# Patient Record
Sex: FEMALE | Race: WHITE | Employment: OTHER | URBAN - NONMETROPOLITAN AREA
[De-identification: names, ages, dates, MRNs, and addresses within clinical notes are randomized per-mention and may not be internally consistent; named-entity substitution may affect disease eponyms.]

---

## 2016-05-16 LAB
CHOLESTEROL, TOTAL: 203 MG/DL
CHOLESTEROL/HDL RATIO: 4.1
HDLC SERPL-MCNC: 49 MG/DL (ref 35–70)
LDL CHOLESTEROL CALCULATED: 123.8 MG/DL (ref 0–160)
TRIGL SERPL-MCNC: 151 MG/DL
VLDLC SERPL CALC-MCNC: 30 MG/DL

## 2016-09-14 LAB — HBA1C MFR BLD: 7.4 %

## 2017-01-19 LAB — HBA1C MFR BLD: 7.7 %

## 2017-02-02 ENCOUNTER — OFFICE VISIT (OUTPATIENT)
Dept: PRIMARY CARE CLINIC | Age: 72
End: 2017-02-02

## 2017-02-02 VITALS
SYSTOLIC BLOOD PRESSURE: 128 MMHG | HEART RATE: 70 BPM | HEIGHT: 64 IN | WEIGHT: 262.8 LBS | DIASTOLIC BLOOD PRESSURE: 88 MMHG | BODY MASS INDEX: 44.86 KG/M2 | RESPIRATION RATE: 12 BRPM | TEMPERATURE: 98.2 F | OXYGEN SATURATION: 95 %

## 2017-02-02 DIAGNOSIS — J01.00 ACUTE MAXILLARY SINUSITIS, RECURRENCE NOT SPECIFIED: Primary | ICD-10-CM

## 2017-02-02 PROBLEM — E66.01 OBESITY, MORBID, BMI 40.0-49.9 (HCC): Status: ACTIVE | Noted: 2017-02-02

## 2017-02-02 PROBLEM — E11.9 TYPE II OR UNSPECIFIED TYPE DIABETES MELLITUS WITHOUT MENTION OF COMPLICATION, NOT STATED AS UNCONTROLLED: Status: ACTIVE | Noted: 2017-02-02

## 2017-02-02 PROBLEM — M19.90 ARTHRITIS: Status: ACTIVE | Noted: 2017-02-02

## 2017-02-02 PROBLEM — I10 HYPERTENSION: Status: ACTIVE | Noted: 2017-02-02

## 2017-02-02 PROCEDURE — 1090F PRES/ABSN URINE INCON ASSESS: CPT | Performed by: FAMILY MEDICINE

## 2017-02-02 PROCEDURE — 3014F SCREEN MAMMO DOC REV: CPT | Performed by: FAMILY MEDICINE

## 2017-02-02 PROCEDURE — 1036F TOBACCO NON-USER: CPT | Performed by: FAMILY MEDICINE

## 2017-02-02 PROCEDURE — 99213 OFFICE O/P EST LOW 20 MIN: CPT | Performed by: FAMILY MEDICINE

## 2017-02-02 PROCEDURE — 3017F COLORECTAL CA SCREEN DOC REV: CPT | Performed by: FAMILY MEDICINE

## 2017-02-02 PROCEDURE — G8484 FLU IMMUNIZE NO ADMIN: HCPCS | Performed by: FAMILY MEDICINE

## 2017-02-02 PROCEDURE — G8419 CALC BMI OUT NRM PARAM NOF/U: HCPCS | Performed by: FAMILY MEDICINE

## 2017-02-02 PROCEDURE — 4040F PNEUMOC VAC/ADMIN/RCVD: CPT | Performed by: FAMILY MEDICINE

## 2017-02-02 PROCEDURE — 1123F ACP DISCUSS/DSCN MKR DOCD: CPT | Performed by: FAMILY MEDICINE

## 2017-02-02 PROCEDURE — G8400 PT W/DXA NO RESULTS DOC: HCPCS | Performed by: FAMILY MEDICINE

## 2017-02-02 PROCEDURE — G8427 DOCREV CUR MEDS BY ELIG CLIN: HCPCS | Performed by: FAMILY MEDICINE

## 2017-02-02 RX ORDER — AMOXICILLIN 875 MG/1
875 TABLET, COATED ORAL 2 TIMES DAILY
Qty: 20 TABLET | Refills: 0 | Status: SHIPPED | OUTPATIENT
Start: 2017-02-02 | End: 2020-03-24

## 2017-03-08 LAB
BUN BLDV-MCNC: 26 MG/DL
CALCIUM SERPL-MCNC: 9.3 MG/DL
CHLORIDE BLD-SCNC: 106 MMOL/L
CO2: 106 MMOL/L
CREAT SERPL-MCNC: 1.1 MG/DL
GFR CALCULATED: 49
GLUCOSE BLD-MCNC: 115 MG/DL
POTASSIUM SERPL-SCNC: 5.1 MMOL/L
SODIUM BLD-SCNC: 142 MMOL/L

## 2017-03-23 VITALS
DIASTOLIC BLOOD PRESSURE: 76 MMHG | WEIGHT: 225 LBS | HEIGHT: 64 IN | HEART RATE: 73 BPM | TEMPERATURE: 99.5 F | SYSTOLIC BLOOD PRESSURE: 116 MMHG | BODY MASS INDEX: 38.41 KG/M2

## 2017-03-23 RX ORDER — OXYBUTYNIN CHLORIDE 5 MG/1
5 TABLET ORAL 3 TIMES DAILY
COMMUNITY
End: 2017-08-02

## 2017-03-23 RX ORDER — FUROSEMIDE 40 MG/1
40 TABLET ORAL 2 TIMES DAILY
COMMUNITY
End: 2017-06-02 | Stop reason: SDUPTHER

## 2017-03-23 RX ORDER — CARVEDILOL 3.12 MG/1
3.12 TABLET ORAL 2 TIMES DAILY WITH MEALS
COMMUNITY
End: 2017-06-02 | Stop reason: SDUPTHER

## 2017-03-23 RX ORDER — ASPIRIN/CALCIUM/MAG/ALUMINUM 325 MG
TABLET ORAL
COMMUNITY
End: 2019-04-08 | Stop reason: ALTCHOICE

## 2017-03-23 RX ORDER — PROMETHAZINE HYDROCHLORIDE 25 MG/1
25 TABLET ORAL EVERY 6 HOURS PRN
COMMUNITY
End: 2017-08-02

## 2017-03-23 RX ORDER — IMIQUIMOD 12.5 MG/.25G
CREAM TOPICAL
COMMUNITY
End: 2017-08-02

## 2017-03-23 RX ORDER — SIMVASTATIN 10 MG
10 TABLET ORAL NIGHTLY
COMMUNITY
End: 2017-06-02 | Stop reason: SDUPTHER

## 2017-04-24 LAB
BUN BLDV-MCNC: 17 MG/DL
CALCIUM SERPL-MCNC: 9.1 MG/DL
CHLORIDE BLD-SCNC: 101 MMOL/L
CHOLESTEROL, TOTAL: 184 MG/DL
CHOLESTEROL/HDL RATIO: 4.1
CO2: 27 MMOL/L
CREAT SERPL-MCNC: 1 MG/DL
GFR CALCULATED: NORMAL
GLUCOSE BLD-MCNC: 160 MG/DL
HDLC SERPL-MCNC: 45 MG/DL (ref 35–70)
LDL CHOLESTEROL CALCULATED: 105.8 MG/DL (ref 0–160)
POTASSIUM SERPL-SCNC: 3.9 MMOL/L
SODIUM BLD-SCNC: 142 MMOL/L
TRIGL SERPL-MCNC: 166 MG/DL
VLDLC SERPL CALC-MCNC: 33 MG/DL

## 2017-04-25 DIAGNOSIS — E55.9 VITAMIN D DEFICIENCY: Primary | ICD-10-CM

## 2017-05-16 DIAGNOSIS — E11.29 TYPE 2 DIABETES MELLITUS WITH OTHER KIDNEY COMPLICATION: Primary | ICD-10-CM

## 2017-05-16 RX ORDER — LANCETS 30 GAUGE
1 EACH MISCELLANEOUS 2 TIMES DAILY
Qty: 210 EACH | Refills: 0 | Status: SHIPPED | OUTPATIENT
Start: 2017-05-16 | End: 2017-06-02 | Stop reason: SDUPTHER

## 2017-05-31 PROBLEM — I50.32 CHRONIC DIASTOLIC HEART FAILURE (HCC): Status: ACTIVE | Noted: 2017-05-31

## 2017-06-02 ENCOUNTER — OFFICE VISIT (OUTPATIENT)
Dept: FAMILY MEDICINE CLINIC | Age: 72
End: 2017-06-02
Payer: MEDICARE

## 2017-06-02 VITALS
OXYGEN SATURATION: 94 % | SYSTOLIC BLOOD PRESSURE: 118 MMHG | BODY MASS INDEX: 42.91 KG/M2 | TEMPERATURE: 97.9 F | RESPIRATION RATE: 18 BRPM | WEIGHT: 250 LBS | DIASTOLIC BLOOD PRESSURE: 66 MMHG | HEART RATE: 65 BPM

## 2017-06-02 DIAGNOSIS — E11.29 TYPE 2 DIABETES MELLITUS WITH OTHER KIDNEY COMPLICATION: ICD-10-CM

## 2017-06-02 DIAGNOSIS — L57.0 ACTINIC KERATOSIS OF RIGHT CHEEK: ICD-10-CM

## 2017-06-02 DIAGNOSIS — L71.9 ROSACEA: Primary | ICD-10-CM

## 2017-06-02 PROCEDURE — 1036F TOBACCO NON-USER: CPT | Performed by: NURSE PRACTITIONER

## 2017-06-02 PROCEDURE — 1123F ACP DISCUSS/DSCN MKR DOCD: CPT | Performed by: NURSE PRACTITIONER

## 2017-06-02 PROCEDURE — 3014F SCREEN MAMMO DOC REV: CPT | Performed by: NURSE PRACTITIONER

## 2017-06-02 PROCEDURE — 3045F PR MOST RECENT HEMOGLOBIN A1C LEVEL 7.0-9.0%: CPT | Performed by: NURSE PRACTITIONER

## 2017-06-02 PROCEDURE — 4040F PNEUMOC VAC/ADMIN/RCVD: CPT | Performed by: NURSE PRACTITIONER

## 2017-06-02 PROCEDURE — G8400 PT W/DXA NO RESULTS DOC: HCPCS | Performed by: NURSE PRACTITIONER

## 2017-06-02 PROCEDURE — 1090F PRES/ABSN URINE INCON ASSESS: CPT | Performed by: NURSE PRACTITIONER

## 2017-06-02 PROCEDURE — 99213 OFFICE O/P EST LOW 20 MIN: CPT | Performed by: NURSE PRACTITIONER

## 2017-06-02 PROCEDURE — G8427 DOCREV CUR MEDS BY ELIG CLIN: HCPCS | Performed by: NURSE PRACTITIONER

## 2017-06-02 PROCEDURE — 3017F COLORECTAL CA SCREEN DOC REV: CPT | Performed by: NURSE PRACTITIONER

## 2017-06-02 PROCEDURE — G8419 CALC BMI OUT NRM PARAM NOF/U: HCPCS | Performed by: NURSE PRACTITIONER

## 2017-06-02 RX ORDER — SIMVASTATIN 10 MG
10 TABLET ORAL NIGHTLY
Qty: 90 TABLET | Refills: 1 | Status: SHIPPED | OUTPATIENT
Start: 2017-06-02 | End: 2017-06-29 | Stop reason: ALTCHOICE

## 2017-06-02 RX ORDER — LANCETS
100 EACH MISCELLANEOUS 2 TIMES DAILY
Qty: 100 EACH | Refills: 2 | Status: CANCELLED | OUTPATIENT
Start: 2017-06-02

## 2017-06-02 RX ORDER — CARVEDILOL 3.12 MG/1
3.12 TABLET ORAL 2 TIMES DAILY WITH MEALS
Qty: 180 TABLET | Refills: 1 | Status: SHIPPED | OUTPATIENT
Start: 2017-06-02 | End: 2017-12-28 | Stop reason: SDUPTHER

## 2017-06-02 RX ORDER — SENNOSIDES 8.6 MG
650 CAPSULE ORAL EVERY 8 HOURS PRN
COMMUNITY

## 2017-06-02 RX ORDER — AZITHROMYCIN 500 MG/1
TABLET, FILM COATED ORAL
Qty: 12 TABLET | Refills: 2 | Status: SHIPPED | OUTPATIENT
Start: 2017-06-02 | End: 2017-08-01 | Stop reason: ALTCHOICE

## 2017-06-02 RX ORDER — FUROSEMIDE 40 MG/1
40 TABLET ORAL 2 TIMES DAILY
Qty: 180 TABLET | Refills: 1 | Status: SHIPPED | OUTPATIENT
Start: 2017-06-02 | End: 2018-03-29 | Stop reason: SDUPTHER

## 2017-06-02 RX ORDER — LANCETS 30 GAUGE
1 EACH MISCELLANEOUS 2 TIMES DAILY
Qty: 210 EACH | Refills: 3 | Status: SHIPPED | OUTPATIENT
Start: 2017-06-02 | End: 2018-03-05 | Stop reason: SDUPTHER

## 2017-06-16 ENCOUNTER — TELEPHONE (OUTPATIENT)
Dept: FAMILY MEDICINE CLINIC | Age: 72
End: 2017-06-16

## 2017-06-29 ENCOUNTER — OFFICE VISIT (OUTPATIENT)
Dept: FAMILY MEDICINE CLINIC | Age: 72
End: 2017-06-29
Payer: MEDICARE

## 2017-06-29 VITALS
BODY MASS INDEX: 42.57 KG/M2 | DIASTOLIC BLOOD PRESSURE: 78 MMHG | HEART RATE: 71 BPM | TEMPERATURE: 98.7 F | WEIGHT: 248 LBS | OXYGEN SATURATION: 93 % | SYSTOLIC BLOOD PRESSURE: 138 MMHG | RESPIRATION RATE: 18 BRPM

## 2017-06-29 DIAGNOSIS — E11.9 TYPE 2 DIABETES MELLITUS WITHOUT COMPLICATION, WITH LONG-TERM CURRENT USE OF INSULIN (HCC): ICD-10-CM

## 2017-06-29 DIAGNOSIS — E78.5 HYPERLIPIDEMIA ASSOCIATED WITH TYPE 2 DIABETES MELLITUS (HCC): ICD-10-CM

## 2017-06-29 DIAGNOSIS — G56.01 CARPAL TUNNEL SYNDROME, RIGHT: Primary | ICD-10-CM

## 2017-06-29 DIAGNOSIS — E11.69 HYPERLIPIDEMIA ASSOCIATED WITH TYPE 2 DIABETES MELLITUS (HCC): ICD-10-CM

## 2017-06-29 DIAGNOSIS — Z79.4 TYPE 2 DIABETES MELLITUS WITHOUT COMPLICATION, WITH LONG-TERM CURRENT USE OF INSULIN (HCC): ICD-10-CM

## 2017-06-29 DIAGNOSIS — G47.30 SLEEP APNEA IN ADULT: ICD-10-CM

## 2017-06-29 DIAGNOSIS — Z23 NEED FOR PROPHYLACTIC VACCINATION WITH TETANUS-DIPHTHERIA (TD): ICD-10-CM

## 2017-06-29 LAB
CREATININE URINE: 105.4 MG/DL
MICROALBUMIN/CREAT 24H UR: 4.9 MG/G{CREAT}

## 2017-06-29 PROCEDURE — 1036F TOBACCO NON-USER: CPT | Performed by: NURSE PRACTITIONER

## 2017-06-29 PROCEDURE — 3017F COLORECTAL CA SCREEN DOC REV: CPT | Performed by: NURSE PRACTITIONER

## 2017-06-29 PROCEDURE — 3014F SCREEN MAMMO DOC REV: CPT | Performed by: NURSE PRACTITIONER

## 2017-06-29 PROCEDURE — G8417 CALC BMI ABV UP PARAM F/U: HCPCS | Performed by: NURSE PRACTITIONER

## 2017-06-29 PROCEDURE — 4040F PNEUMOC VAC/ADMIN/RCVD: CPT | Performed by: NURSE PRACTITIONER

## 2017-06-29 PROCEDURE — 99214 OFFICE O/P EST MOD 30 MIN: CPT | Performed by: NURSE PRACTITIONER

## 2017-06-29 PROCEDURE — 1090F PRES/ABSN URINE INCON ASSESS: CPT | Performed by: NURSE PRACTITIONER

## 2017-06-29 PROCEDURE — G8427 DOCREV CUR MEDS BY ELIG CLIN: HCPCS | Performed by: NURSE PRACTITIONER

## 2017-06-29 PROCEDURE — G8400 PT W/DXA NO RESULTS DOC: HCPCS | Performed by: NURSE PRACTITIONER

## 2017-06-29 PROCEDURE — 3046F HEMOGLOBIN A1C LEVEL >9.0%: CPT | Performed by: NURSE PRACTITIONER

## 2017-06-29 PROCEDURE — 1123F ACP DISCUSS/DSCN MKR DOCD: CPT | Performed by: NURSE PRACTITIONER

## 2017-06-29 RX ORDER — TETANUS AND DIPHTHERIA TOXOIDS ADSORBED 2; 2 [LF]/.5ML; [LF]/.5ML
0.5 INJECTION INTRAMUSCULAR ONCE
Qty: 0.5 ML | Refills: 0 | Status: SHIPPED | OUTPATIENT
Start: 2017-06-29 | End: 2017-06-29

## 2017-06-29 RX ORDER — SIMVASTATIN 40 MG
40 TABLET ORAL NIGHTLY
Qty: 90 TABLET | Refills: 0 | Status: SHIPPED | OUTPATIENT
Start: 2017-06-29 | End: 2017-10-19 | Stop reason: SDUPTHER

## 2017-06-29 RX ORDER — OXYBUTYNIN CHLORIDE 5 MG/1
5 TABLET ORAL 3 TIMES DAILY
COMMUNITY
End: 2017-08-02

## 2017-06-29 RX ORDER — MECLIZINE HCL 12.5 MG/1
12.5 TABLET ORAL 3 TIMES DAILY PRN
COMMUNITY
End: 2017-08-23 | Stop reason: SDUPTHER

## 2017-06-29 ASSESSMENT — ENCOUNTER SYMPTOMS
COLOR CHANGE: 0
SHORTNESS OF BREATH: 0
COUGH: 0

## 2017-06-30 DIAGNOSIS — Z79.4 TYPE 2 DIABETES MELLITUS WITHOUT COMPLICATION, WITH LONG-TERM CURRENT USE OF INSULIN (HCC): ICD-10-CM

## 2017-06-30 DIAGNOSIS — E11.9 TYPE 2 DIABETES MELLITUS WITHOUT COMPLICATION, WITH LONG-TERM CURRENT USE OF INSULIN (HCC): ICD-10-CM

## 2017-07-17 RX ORDER — BLOOD SUGAR DIAGNOSTIC
1 STRIP MISCELLANEOUS DAILY
Qty: 180 EACH | Refills: 3 | Status: SHIPPED | OUTPATIENT
Start: 2017-07-17 | End: 2017-07-25 | Stop reason: SDUPTHER

## 2017-07-28 RX ORDER — BLOOD SUGAR DIAGNOSTIC
1 STRIP MISCELLANEOUS DAILY
Qty: 180 EACH | Refills: 3 | Status: SHIPPED | OUTPATIENT
Start: 2017-07-28 | End: 2017-08-02 | Stop reason: SDUPTHER

## 2017-08-01 ENCOUNTER — TELEPHONE (OUTPATIENT)
Dept: FAMILY MEDICINE CLINIC | Age: 72
End: 2017-08-01

## 2017-08-01 ENCOUNTER — OFFICE VISIT (OUTPATIENT)
Dept: FAMILY MEDICINE CLINIC | Age: 72
End: 2017-08-01
Payer: MEDICARE

## 2017-08-01 VITALS
BODY MASS INDEX: 42.57 KG/M2 | OXYGEN SATURATION: 97 % | RESPIRATION RATE: 16 BRPM | DIASTOLIC BLOOD PRESSURE: 94 MMHG | TEMPERATURE: 98.4 F | HEART RATE: 64 BPM | SYSTOLIC BLOOD PRESSURE: 138 MMHG | WEIGHT: 248 LBS

## 2017-08-01 DIAGNOSIS — R42 VERTIGO: Primary | ICD-10-CM

## 2017-08-01 PROCEDURE — 1123F ACP DISCUSS/DSCN MKR DOCD: CPT | Performed by: NURSE PRACTITIONER

## 2017-08-01 PROCEDURE — 1090F PRES/ABSN URINE INCON ASSESS: CPT | Performed by: NURSE PRACTITIONER

## 2017-08-01 PROCEDURE — 3014F SCREEN MAMMO DOC REV: CPT | Performed by: NURSE PRACTITIONER

## 2017-08-01 PROCEDURE — 99213 OFFICE O/P EST LOW 20 MIN: CPT | Performed by: NURSE PRACTITIONER

## 2017-08-01 PROCEDURE — G8417 CALC BMI ABV UP PARAM F/U: HCPCS | Performed by: NURSE PRACTITIONER

## 2017-08-01 PROCEDURE — 4040F PNEUMOC VAC/ADMIN/RCVD: CPT | Performed by: NURSE PRACTITIONER

## 2017-08-01 PROCEDURE — 1036F TOBACCO NON-USER: CPT | Performed by: NURSE PRACTITIONER

## 2017-08-01 PROCEDURE — G8400 PT W/DXA NO RESULTS DOC: HCPCS | Performed by: NURSE PRACTITIONER

## 2017-08-01 PROCEDURE — G8427 DOCREV CUR MEDS BY ELIG CLIN: HCPCS | Performed by: NURSE PRACTITIONER

## 2017-08-01 PROCEDURE — 3017F COLORECTAL CA SCREEN DOC REV: CPT | Performed by: NURSE PRACTITIONER

## 2017-08-01 ASSESSMENT — ENCOUNTER SYMPTOMS
NAUSEA: 0
VOMITING: 0
VISUAL CHANGE: 0

## 2017-08-02 ENCOUNTER — OFFICE VISIT (OUTPATIENT)
Dept: FAMILY MEDICINE CLINIC | Age: 72
End: 2017-08-02
Payer: MEDICARE

## 2017-08-02 VITALS
BODY MASS INDEX: 42.34 KG/M2 | HEIGHT: 64 IN | DIASTOLIC BLOOD PRESSURE: 78 MMHG | WEIGHT: 248 LBS | HEART RATE: 62 BPM | SYSTOLIC BLOOD PRESSURE: 130 MMHG | RESPIRATION RATE: 18 BRPM | OXYGEN SATURATION: 94 % | TEMPERATURE: 98.2 F

## 2017-08-02 DIAGNOSIS — F32.A ANXIETY AND DEPRESSION: ICD-10-CM

## 2017-08-02 DIAGNOSIS — Z23 NEED FOR PROPHYLACTIC VACCINATION AGAINST DIPHTHERIA-TETANUS-PERTUSSIS (DTP): ICD-10-CM

## 2017-08-02 DIAGNOSIS — F41.9 ANXIETY AND DEPRESSION: ICD-10-CM

## 2017-08-02 DIAGNOSIS — Z00.00 ROUTINE GENERAL MEDICAL EXAMINATION AT A HEALTH CARE FACILITY: ICD-10-CM

## 2017-08-02 DIAGNOSIS — Z00.00 MEDICARE ANNUAL WELLNESS VISIT, SUBSEQUENT: Primary | ICD-10-CM

## 2017-08-02 PROCEDURE — G0439 PPPS, SUBSEQ VISIT: HCPCS | Performed by: NURSE PRACTITIONER

## 2017-08-02 RX ORDER — BLOOD SUGAR DIAGNOSTIC
1 STRIP MISCELLANEOUS 2 TIMES DAILY
Qty: 180 EACH | Refills: 3 | Status: SHIPPED | OUTPATIENT
Start: 2017-08-02 | End: 2018-01-02 | Stop reason: SDUPTHER

## 2017-08-02 RX ORDER — ESCITALOPRAM OXALATE 10 MG/1
TABLET ORAL
Qty: 30 TABLET | Refills: 0 | Status: SHIPPED | OUTPATIENT
Start: 2017-08-02 | End: 2017-09-26 | Stop reason: SDUPTHER

## 2017-08-02 ASSESSMENT — ANXIETY QUESTIONNAIRES: GAD7 TOTAL SCORE: 5

## 2017-08-02 ASSESSMENT — LIFESTYLE VARIABLES: HOW OFTEN DO YOU HAVE A DRINK CONTAINING ALCOHOL: 0

## 2017-08-08 ENCOUNTER — TELEPHONE (OUTPATIENT)
Dept: FAMILY MEDICINE CLINIC | Age: 72
End: 2017-08-08

## 2017-08-23 ENCOUNTER — OFFICE VISIT (OUTPATIENT)
Dept: FAMILY MEDICINE CLINIC | Age: 72
End: 2017-08-23
Payer: MEDICARE

## 2017-08-23 VITALS
RESPIRATION RATE: 18 BRPM | TEMPERATURE: 97.8 F | BODY MASS INDEX: 42 KG/M2 | HEIGHT: 64 IN | WEIGHT: 246 LBS | SYSTOLIC BLOOD PRESSURE: 112 MMHG | HEART RATE: 66 BPM | OXYGEN SATURATION: 94 % | DIASTOLIC BLOOD PRESSURE: 64 MMHG

## 2017-08-23 DIAGNOSIS — Z79.4 TYPE 2 DIABETES MELLITUS WITH STAGE 3 CHRONIC KIDNEY DISEASE, WITH LONG-TERM CURRENT USE OF INSULIN (HCC): Primary | ICD-10-CM

## 2017-08-23 DIAGNOSIS — Z91.81 AT HIGH RISK FOR FALLS: ICD-10-CM

## 2017-08-23 DIAGNOSIS — E55.9 HYPOVITAMINOSIS D: ICD-10-CM

## 2017-08-23 DIAGNOSIS — E11.22 TYPE 2 DIABETES MELLITUS WITH STAGE 3 CHRONIC KIDNEY DISEASE, WITH LONG-TERM CURRENT USE OF INSULIN (HCC): Primary | ICD-10-CM

## 2017-08-23 DIAGNOSIS — F32.1 MODERATE SINGLE CURRENT EPISODE OF MAJOR DEPRESSIVE DISORDER (HCC): ICD-10-CM

## 2017-08-23 DIAGNOSIS — Z13.31 POSITIVE DEPRESSION SCREENING: ICD-10-CM

## 2017-08-23 DIAGNOSIS — N18.30 TYPE 2 DIABETES MELLITUS WITH STAGE 3 CHRONIC KIDNEY DISEASE, WITH LONG-TERM CURRENT USE OF INSULIN (HCC): Primary | ICD-10-CM

## 2017-08-23 DIAGNOSIS — R42 VERTIGO: ICD-10-CM

## 2017-08-23 LAB — HBA1C MFR BLD: 7.3 %

## 2017-08-23 PROCEDURE — G8400 PT W/DXA NO RESULTS DOC: HCPCS | Performed by: NURSE PRACTITIONER

## 2017-08-23 PROCEDURE — 1123F ACP DISCUSS/DSCN MKR DOCD: CPT | Performed by: NURSE PRACTITIONER

## 2017-08-23 PROCEDURE — 3014F SCREEN MAMMO DOC REV: CPT | Performed by: NURSE PRACTITIONER

## 2017-08-23 PROCEDURE — G8427 DOCREV CUR MEDS BY ELIG CLIN: HCPCS | Performed by: NURSE PRACTITIONER

## 2017-08-23 PROCEDURE — 4040F PNEUMOC VAC/ADMIN/RCVD: CPT | Performed by: NURSE PRACTITIONER

## 2017-08-23 PROCEDURE — 3017F COLORECTAL CA SCREEN DOC REV: CPT | Performed by: NURSE PRACTITIONER

## 2017-08-23 PROCEDURE — 3046F HEMOGLOBIN A1C LEVEL >9.0%: CPT | Performed by: NURSE PRACTITIONER

## 2017-08-23 PROCEDURE — 1090F PRES/ABSN URINE INCON ASSESS: CPT | Performed by: NURSE PRACTITIONER

## 2017-08-23 PROCEDURE — 1036F TOBACCO NON-USER: CPT | Performed by: NURSE PRACTITIONER

## 2017-08-23 PROCEDURE — 83036 HEMOGLOBIN GLYCOSYLATED A1C: CPT | Performed by: NURSE PRACTITIONER

## 2017-08-23 PROCEDURE — 99214 OFFICE O/P EST MOD 30 MIN: CPT | Performed by: NURSE PRACTITIONER

## 2017-08-23 PROCEDURE — G8417 CALC BMI ABV UP PARAM F/U: HCPCS | Performed by: NURSE PRACTITIONER

## 2017-08-23 PROCEDURE — G8431 POS CLIN DEPRES SCRN F/U DOC: HCPCS | Performed by: NURSE PRACTITIONER

## 2017-08-23 RX ORDER — ERGOCALCIFEROL (VITAMIN D2) 1250 MCG
50000 CAPSULE ORAL WEEKLY
Qty: 13 CAPSULE | Refills: 0 | Status: SHIPPED | OUTPATIENT
Start: 2017-08-23 | End: 2018-12-04 | Stop reason: ALTCHOICE

## 2017-08-23 RX ORDER — MECLIZINE HCL 12.5 MG/1
12.5 TABLET ORAL 3 TIMES DAILY PRN
Qty: 30 TABLET | Refills: 1 | Status: SHIPPED | OUTPATIENT
Start: 2017-08-23 | End: 2019-05-30

## 2017-08-23 RX ORDER — LISINOPRIL 2.5 MG/1
2.5 TABLET ORAL DAILY
Qty: 90 TABLET | Refills: 0 | Status: SHIPPED | OUTPATIENT
Start: 2017-08-23 | End: 2017-11-20 | Stop reason: SDUPTHER

## 2017-08-23 ASSESSMENT — PATIENT HEALTH QUESTIONNAIRE - PHQ9
5. POOR APPETITE OR OVEREATING: 2
SUM OF ALL RESPONSES TO PHQ9 QUESTIONS 1 & 2: 6
7. TROUBLE CONCENTRATING ON THINGS, SUCH AS READING THE NEWSPAPER OR WATCHING TELEVISION: 1
2. FEELING DOWN, DEPRESSED OR HOPELESS: 3
8. MOVING OR SPEAKING SO SLOWLY THAT OTHER PEOPLE COULD HAVE NOTICED. OR THE OPPOSITE, BEING SO FIGETY OR RESTLESS THAT YOU HAVE BEEN MOVING AROUND A LOT MORE THAN USUAL: 0
3. TROUBLE FALLING OR STAYING ASLEEP: 0
4. FEELING TIRED OR HAVING LITTLE ENERGY: 2
9. THOUGHTS THAT YOU WOULD BE BETTER OFF DEAD, OR OF HURTING YOURSELF: 0
6. FEELING BAD ABOUT YOURSELF - OR THAT YOU ARE A FAILURE OR HAVE LET YOURSELF OR YOUR FAMILY DOWN: 2
SUM OF ALL RESPONSES TO PHQ QUESTIONS 1-9: 13
10. IF YOU CHECKED OFF ANY PROBLEMS, HOW DIFFICULT HAVE THESE PROBLEMS MADE IT FOR YOU TO DO YOUR WORK, TAKE CARE OF THINGS AT HOME, OR GET ALONG WITH OTHER PEOPLE: 0
1. LITTLE INTEREST OR PLEASURE IN DOING THINGS: 3

## 2017-08-23 ASSESSMENT — ENCOUNTER SYMPTOMS
VISUAL CHANGE: 0
BLURRED VISION: 0

## 2017-09-25 ENCOUNTER — TELEPHONE (OUTPATIENT)
Dept: FAMILY MEDICINE CLINIC | Age: 72
End: 2017-09-25

## 2017-09-26 DIAGNOSIS — L71.8 ACNE ROSACEA, PAPULAR TYPE: Primary | ICD-10-CM

## 2017-09-26 DIAGNOSIS — F41.9 ANXIETY AND DEPRESSION: ICD-10-CM

## 2017-09-26 DIAGNOSIS — F32.A ANXIETY AND DEPRESSION: ICD-10-CM

## 2017-09-26 RX ORDER — ESCITALOPRAM OXALATE 10 MG/1
10 TABLET ORAL DAILY
Qty: 90 TABLET | Refills: 0 | Status: SHIPPED | OUTPATIENT
Start: 2017-09-26 | End: 2017-12-28 | Stop reason: SDUPTHER

## 2017-10-19 DIAGNOSIS — E11.69 HYPERLIPIDEMIA ASSOCIATED WITH TYPE 2 DIABETES MELLITUS (HCC): ICD-10-CM

## 2017-10-19 DIAGNOSIS — E78.5 HYPERLIPIDEMIA ASSOCIATED WITH TYPE 2 DIABETES MELLITUS (HCC): ICD-10-CM

## 2017-10-19 RX ORDER — OXYBUTYNIN CHLORIDE 5 MG/1
TABLET ORAL
Refills: 0 | COMMUNITY
Start: 2017-08-30 | End: 2018-03-29 | Stop reason: SDUPTHER

## 2017-10-19 RX ORDER — SIMVASTATIN 40 MG
40 TABLET ORAL NIGHTLY
Qty: 90 TABLET | Refills: 1 | Status: SHIPPED | OUTPATIENT
Start: 2017-10-19 | End: 2018-02-20 | Stop reason: SDUPTHER

## 2017-10-24 ENCOUNTER — OFFICE VISIT (OUTPATIENT)
Dept: FAMILY MEDICINE CLINIC | Age: 72
End: 2017-10-24
Payer: MEDICARE

## 2017-10-24 VITALS
TEMPERATURE: 98.3 F | RESPIRATION RATE: 14 BRPM | HEART RATE: 70 BPM | WEIGHT: 243 LBS | BODY MASS INDEX: 41.71 KG/M2 | OXYGEN SATURATION: 93 % | SYSTOLIC BLOOD PRESSURE: 130 MMHG | DIASTOLIC BLOOD PRESSURE: 70 MMHG

## 2017-10-24 DIAGNOSIS — N76.4 LEFT GENITAL LABIAL ABSCESS: Primary | ICD-10-CM

## 2017-10-24 DIAGNOSIS — J40 BRONCHITIS: ICD-10-CM

## 2017-10-24 PROCEDURE — 1090F PRES/ABSN URINE INCON ASSESS: CPT | Performed by: NURSE PRACTITIONER

## 2017-10-24 PROCEDURE — G8427 DOCREV CUR MEDS BY ELIG CLIN: HCPCS | Performed by: NURSE PRACTITIONER

## 2017-10-24 PROCEDURE — 3014F SCREEN MAMMO DOC REV: CPT | Performed by: NURSE PRACTITIONER

## 2017-10-24 PROCEDURE — 10060 I&D ABSCESS SIMPLE/SINGLE: CPT | Performed by: NURSE PRACTITIONER

## 2017-10-24 PROCEDURE — 1123F ACP DISCUSS/DSCN MKR DOCD: CPT | Performed by: NURSE PRACTITIONER

## 2017-10-24 PROCEDURE — 99213 OFFICE O/P EST LOW 20 MIN: CPT | Performed by: NURSE PRACTITIONER

## 2017-10-24 PROCEDURE — G8484 FLU IMMUNIZE NO ADMIN: HCPCS | Performed by: NURSE PRACTITIONER

## 2017-10-24 PROCEDURE — 4040F PNEUMOC VAC/ADMIN/RCVD: CPT | Performed by: NURSE PRACTITIONER

## 2017-10-24 PROCEDURE — 1036F TOBACCO NON-USER: CPT | Performed by: NURSE PRACTITIONER

## 2017-10-24 PROCEDURE — G8400 PT W/DXA NO RESULTS DOC: HCPCS | Performed by: NURSE PRACTITIONER

## 2017-10-24 PROCEDURE — G8417 CALC BMI ABV UP PARAM F/U: HCPCS | Performed by: NURSE PRACTITIONER

## 2017-10-24 PROCEDURE — 3017F COLORECTAL CA SCREEN DOC REV: CPT | Performed by: NURSE PRACTITIONER

## 2017-10-24 RX ORDER — DOXYCYCLINE HYCLATE 100 MG/1
100 CAPSULE ORAL 2 TIMES DAILY
Qty: 20 CAPSULE | Refills: 0 | Status: SHIPPED | OUTPATIENT
Start: 2017-10-24 | End: 2017-10-24 | Stop reason: CLARIF

## 2017-10-24 RX ORDER — GUAIFENESIN AND CODEINE PHOSPHATE 100; 10 MG/5ML; MG/5ML
5 SOLUTION ORAL 2 TIMES DAILY PRN
Qty: 118 ML | Refills: 0 | Status: SHIPPED | OUTPATIENT
Start: 2017-10-24 | End: 2017-10-31

## 2017-10-24 RX ORDER — GUAIFENESIN AND CODEINE PHOSPHATE 100; 10 MG/5ML; MG/5ML
5 SOLUTION ORAL 2 TIMES DAILY PRN
Qty: 118 ML | Refills: 0 | Status: SHIPPED | OUTPATIENT
Start: 2017-10-24 | End: 2017-10-24 | Stop reason: CLARIF

## 2017-10-24 RX ORDER — DOXYCYCLINE HYCLATE 100 MG/1
100 CAPSULE ORAL 2 TIMES DAILY
Qty: 20 CAPSULE | Refills: 0 | Status: SHIPPED | OUTPATIENT
Start: 2017-10-24 | End: 2020-03-24

## 2017-10-24 ASSESSMENT — ENCOUNTER SYMPTOMS: COUGH: 1

## 2017-10-24 NOTE — PROGRESS NOTES
MISC 1 each by Does not apply route 2 times daily 210 each 3    insulin NPH (HUMULIN N) 100 UNIT/ML injection vial 27 units in the morning and 32 units in the evening, 4 vial 2    metFORMIN (GLUCOPHAGE) 500 MG tablet Take 2 tablets by mouth daily Take 2 tablets by mouth twice daily 360 tablet 1    carvedilol (COREG) 3.125 MG tablet Take 1 tablet by mouth 2 times daily (with meals) 180 tablet 1    furosemide (LASIX) 40 MG tablet Take 1 tablet by mouth 2 times daily 180 tablet 1    Aspirin Buf,[id number], (BUFFERED ASPIRIN) 325 MG TABS Take by mouth      BOOSTRIX 5-2.5-18.5 injection        No current facility-administered medications for this visit. Allergies   Allergen Reactions    Prednisone      High blood sugars         Subjective:      Review of Systems   Constitutional: Positive for fatigue. Negative for chills and fever. HENT: Positive for congestion. Respiratory: Positive for cough. Negative for hemoptysis and shortness of breath. Cardiovascular: Negative for chest pain. Gastrointestinal: Negative for abdominal pain. Neurological: Positive for headaches. Objective:     /70 (Site: Left Arm, Position: Sitting, Cuff Size: Large Adult)   Pulse 70   Temp 98.3 °F (36.8 °C) (Tympanic)   Resp 14   Wt 243 lb (110.2 kg)   SpO2 93%   BMI 41.71 kg/m²     Physical Exam   Constitutional: She is oriented to person, place, and time. Vital signs are normal. She appears well-developed and well-nourished. No distress. HENT:   Head: Normocephalic. Right Ear: External ear normal.   Left Ear: External ear normal.   Nose: Nose normal.   Mouth/Throat: Oropharynx is clear and moist. No oropharyngeal exudate. Eyes: Conjunctivae and EOM are normal. Right eye exhibits no discharge. Left eye exhibits no discharge. No scleral icterus. Neck: Normal range of motion. Neck supple. Cardiovascular: Normal rate, regular rhythm and normal heart sounds.     Pulmonary/Chest: Effort normal. No respiratory distress. Congested cough noted   Abdominal: Hernia confirmed negative in the right inguinal area and confirmed negative in the left inguinal area. Genitourinary: Vagina normal.       There is no rash, tenderness, lesion or injury on the right labia. There is tenderness and lesion on the left labia. There is no rash or injury on the left labia. No vaginal discharge found. Musculoskeletal: Normal range of motion. Lymphadenopathy:        Right: No inguinal adenopathy present. Left: No inguinal adenopathy present. Neurological: She is alert and oriented to person, place, and time. Skin: Skin is warm, dry and intact. She is not diaphoretic. Psychiatric: She has a normal mood and affect. Her speech is normal and behavior is normal. Judgment and thought content normal. Cognition and memory are normal.   Nursing note and vitals reviewed. Assessment:     1. Left genital labial abscess  doxycycline hyclate (VIBRAMYCIN) 100 MG capsule    91314 - [place] DRAIN SKIN ABSCESS SIMPLE    DISCONTINUED: doxycycline hyclate (VIBRAMYCIN) 100 MG capsule   2. Bronchitis  guaiFENesin-codeine (TUSSI-ORGANIDIN NR) 100-10 MG/5ML syrup    DISCONTINUED: guaiFENesin-codeine (TUSSI-ORGANIDIN NR) 100-10 MG/5ML syrup     No results found for this visit on 10/24/17. Plan:       Incision and Drainage Procedure Note    Indication: Abscess Left labial    Procedure: The patient was positioned appropriately and the skin over the incision site was prepped with betadine and draped in a sterile fashion. Local anesthesia was obtained by infiltration using 2% Lidocaine without epinephrine - 3 mls. A 0.5 cm incision was then made over the greatest area of fluctuance and approximately 4 cc of purulent, serosanguineous and yellow material was expressed. Loculations were not present. The drainage cavity was then cleared of debris. The patient tolerated the procedure well.     Complications: None  Area was covered with

## 2017-10-24 NOTE — PATIENT INSTRUCTIONS
Patient Education   Doxycyline twice daily for 10 days. Warm showers to the area several times day or warm compresses 4 times a day for 20 minutes each time. Try to express any drainage from the area. Tussi-Organidin 1 teaspoon every 12 hours for cough. Do not operate heavy machinery or drive while taking this medication. Follow up  as needed. Perineal Abscess: Care Instructions  Your Care Instructions  A perineal abscess is an infection that causes a painful lump in the perineum. The perineum is the area between the scrotum and the anus in a man. In a woman, it's the area between the vulva and the anus. The area may look red and feel painful and be swollen. The abscess may form after surgery or after delivery of a baby. It can also be caused by an infection of the prostate gland. The prostate gland is an organ found inside a man's body, just below the bladder. Your doctor may have done minor surgery to open and drain the abscess. You may have had a sedative to help you relax. You may be unsteady after having sedation. It can take a few hours for the medicine's effects to wear off. Common side effects include nausea, vomiting, and feeling sleepy or tired. The doctor has checked you carefully, but problems can develop later. If you notice any problems or new symptoms, get medical treatment right away. Follow-up care is a key part of your treatment and safety. Be sure to make and go to all appointments, and call your doctor if you are having problems. It's also a good idea to know your test results and keep a list of the medicines you take. How can you care for yourself at home? · If your doctor gave you a sedative:  ¨ For 24 hours, don't do anything that requires attention to detail. It takes time for the medicine's effects to completely wear off. ¨ For your safety, do not drive or operate any machinery that could be dangerous. Wait until the medicine wears off.  You need to be able to think clearly and react easily. · Be safe with medicines. Read and follow all instructions on the label. ¨ If the doctor gave you a prescription medicine for pain, take it as prescribed. ¨ If you are not taking a prescription pain medicine, ask your doctor if you can take an over-the-counter medicine. · If your doctor prescribed antibiotics, take them as directed. Do not stop taking them just because you feel better. You need to take the full course of antibiotics. · Sit in a few inches of warm water (sitz bath) 3 times a day and after bowel movements. The warm water helps the area heal and eases discomfort. When should you call for help? Call 911 anytime you think you may need emergency care. For example, call if:  · You have trouble breathing. · You passed out (lost consciousness). · You pass maroon or very bloody stools. Call your doctor now or seek immediate medical care if:  · You have new or worse nausea or vomiting. · Your pain gets worse. · You have a new or higher fever. · You have new or increased swelling. · You have pus in your stool. Watch closely for changes in your health, and be sure to contact your doctor if:  · You do not get better as expected. Where can you learn more? Go to [email or web address].CallistoTV. org and sign in to your [employer] account. Enter J518 in the [facility] box to learn more about \"Perineal Abscess: Care Instructions. \"     If you do not have an account, please click on the \"Sign Up Now\" link. Current as of: August 9, 2016  Content Version: 11.3  © 3609-0865 [employer]. Care instructions adapted under license by [facility] ([facility]). If you have questions about a medical condition or this instruction, always ask your healthcare professional. [place] [address] any warranty or liability for your use of this information.        Patient Education        Bronchitis: Care Instructions  Your Care Instructions    Bronchitis is inflammation of the bronchial tubes, which carry air to the lungs. The tubes swell and produce mucus, or phlegm. The mucus and inflamed bronchial tubes make you cough. You may have trouble breathing. Most cases of bronchitis are caused by viruses like those that cause colds. Antibiotics usually do not help and they may be harmful. Bronchitis usually develops rapidly and lasts about 2 to 3 weeks in otherwise healthy people. Follow-up care is a key part of your treatment and safety. Be sure to make and go to all appointments, and call your doctor if you are having problems. It's also a good idea to know your test results and keep a list of the medicines you take. How can you care for yourself at home? · Take all medicines exactly as prescribed. Call your doctor if you think you are having a problem with your medicine. · Get some extra rest.  · Take an over-the-counter pain medicine, such as acetaminophen (Tylenol), ibuprofen (Advil, Motrin), or naproxen (Aleve) to reduce fever and relieve body aches. Read and follow all instructions on the label. · Do not take two or more pain medicines at the same time unless the doctor told you to. Many pain medicines have acetaminophen, which is Tylenol. Too much acetaminophen (Tylenol) can be harmful. · Take an over-the-counter cough medicine that contains dextromethorphan to help quiet a dry, hacking cough so that you can sleep. Avoid cough medicines that have more than one active ingredient. Read and follow all instructions on the label. · Breathe moist air from a humidifier, hot shower, or sink filled with hot water. The heat and moisture will thin mucus so you can cough it out. · Do not smoke. Smoking can make bronchitis worse. If you need help quitting, talk to your doctor about stop-smoking programs and medicines. These can increase your chances of quitting for good. When should you call for help? Call 911 anytime you think you may need emergency care.  For

## 2017-10-25 ASSESSMENT — ENCOUNTER SYMPTOMS
ABDOMINAL PAIN: 0
SHORTNESS OF BREATH: 0
HEMOPTYSIS: 0

## 2017-11-17 ENCOUNTER — OFFICE VISIT (OUTPATIENT)
Dept: FAMILY MEDICINE CLINIC | Age: 72
End: 2017-11-17
Payer: MEDICARE

## 2017-11-17 VITALS
HEART RATE: 66 BPM | DIASTOLIC BLOOD PRESSURE: 70 MMHG | BODY MASS INDEX: 41.71 KG/M2 | OXYGEN SATURATION: 95 % | RESPIRATION RATE: 14 BRPM | SYSTOLIC BLOOD PRESSURE: 128 MMHG | TEMPERATURE: 98.1 F | WEIGHT: 243 LBS

## 2017-11-17 DIAGNOSIS — Z79.4 TYPE 2 DIABETES MELLITUS WITH MICROALBUMINURIA, WITH LONG-TERM CURRENT USE OF INSULIN (HCC): Primary | ICD-10-CM

## 2017-11-17 DIAGNOSIS — Z23 NEED FOR INFLUENZA VACCINATION: ICD-10-CM

## 2017-11-17 DIAGNOSIS — F32.1 MODERATE SINGLE CURRENT EPISODE OF MAJOR DEPRESSIVE DISORDER (HCC): ICD-10-CM

## 2017-11-17 DIAGNOSIS — F32.A ANXIETY AND DEPRESSION: ICD-10-CM

## 2017-11-17 DIAGNOSIS — F41.9 ANXIETY AND DEPRESSION: ICD-10-CM

## 2017-11-17 DIAGNOSIS — E55.9 HYPOVITAMINOSIS D: ICD-10-CM

## 2017-11-17 DIAGNOSIS — E11.29 TYPE 2 DIABETES MELLITUS WITH MICROALBUMINURIA, WITH LONG-TERM CURRENT USE OF INSULIN (HCC): Primary | ICD-10-CM

## 2017-11-17 DIAGNOSIS — R80.9 TYPE 2 DIABETES MELLITUS WITH MICROALBUMINURIA, WITH LONG-TERM CURRENT USE OF INSULIN (HCC): Primary | ICD-10-CM

## 2017-11-17 DIAGNOSIS — M79.672 LEFT FOOT PAIN: ICD-10-CM

## 2017-11-17 PROBLEM — F32.9 MAJOR DEPRESSIVE DISORDER WITH CURRENT ACTIVE EPISODE: Status: ACTIVE | Noted: 2017-11-17

## 2017-11-17 LAB
CREATININE URINE: 29.4 MG/DL
CREATININE, RANDOM: 29.4 MG/DL
HBA1C MFR BLD: 7.1 %
MICROALBUMIN UR-MCNC: 1 MG/DL
MICROALBUMIN/CREAT 24H UR: 1 MG/G{CREAT}
MICROALBUMIN/CREAT UR-RTO: 34 MCG/MG CR

## 2017-11-17 PROCEDURE — 99214 OFFICE O/P EST MOD 30 MIN: CPT | Performed by: NURSE PRACTITIONER

## 2017-11-17 PROCEDURE — 1090F PRES/ABSN URINE INCON ASSESS: CPT | Performed by: NURSE PRACTITIONER

## 2017-11-17 PROCEDURE — G0008 ADMIN INFLUENZA VIRUS VAC: HCPCS | Performed by: NURSE PRACTITIONER

## 2017-11-17 PROCEDURE — G0444 DEPRESSION SCREEN ANNUAL: HCPCS | Performed by: NURSE PRACTITIONER

## 2017-11-17 PROCEDURE — G8427 DOCREV CUR MEDS BY ELIG CLIN: HCPCS | Performed by: NURSE PRACTITIONER

## 2017-11-17 PROCEDURE — 3017F COLORECTAL CA SCREEN DOC REV: CPT | Performed by: NURSE PRACTITIONER

## 2017-11-17 PROCEDURE — 3045F PR MOST RECENT HEMOGLOBIN A1C LEVEL 7.0-9.0%: CPT | Performed by: NURSE PRACTITIONER

## 2017-11-17 PROCEDURE — G8484 FLU IMMUNIZE NO ADMIN: HCPCS | Performed by: NURSE PRACTITIONER

## 2017-11-17 PROCEDURE — G8400 PT W/DXA NO RESULTS DOC: HCPCS | Performed by: NURSE PRACTITIONER

## 2017-11-17 PROCEDURE — 83036 HEMOGLOBIN GLYCOSYLATED A1C: CPT | Performed by: NURSE PRACTITIONER

## 2017-11-17 PROCEDURE — G8417 CALC BMI ABV UP PARAM F/U: HCPCS | Performed by: NURSE PRACTITIONER

## 2017-11-17 PROCEDURE — 1123F ACP DISCUSS/DSCN MKR DOCD: CPT | Performed by: NURSE PRACTITIONER

## 2017-11-17 PROCEDURE — 90662 IIV NO PRSV INCREASED AG IM: CPT | Performed by: NURSE PRACTITIONER

## 2017-11-17 PROCEDURE — 1036F TOBACCO NON-USER: CPT | Performed by: NURSE PRACTITIONER

## 2017-11-17 PROCEDURE — 4040F PNEUMOC VAC/ADMIN/RCVD: CPT | Performed by: NURSE PRACTITIONER

## 2017-11-17 PROCEDURE — 3014F SCREEN MAMMO DOC REV: CPT | Performed by: NURSE PRACTITIONER

## 2017-11-17 RX ORDER — LIDOCAINE 50 MG/G
1 PATCH TOPICAL DAILY
Qty: 30 PATCH | Refills: 0 | Status: SHIPPED | OUTPATIENT
Start: 2017-11-17 | End: 2018-02-20 | Stop reason: ALTCHOICE

## 2017-11-17 RX ORDER — BUPROPION HYDROCHLORIDE 150 MG/1
150 TABLET ORAL EVERY MORNING
Qty: 30 TABLET | Refills: 1 | Status: SHIPPED | OUTPATIENT
Start: 2017-11-17 | End: 2017-12-28 | Stop reason: SDUPTHER

## 2017-11-17 ASSESSMENT — PATIENT HEALTH QUESTIONNAIRE - PHQ9
4. FEELING TIRED OR HAVING LITTLE ENERGY: 3
3. TROUBLE FALLING OR STAYING ASLEEP: 3
7. TROUBLE CONCENTRATING ON THINGS, SUCH AS READING THE NEWSPAPER OR WATCHING TELEVISION: 3
1. LITTLE INTEREST OR PLEASURE IN DOING THINGS: 0
6. FEELING BAD ABOUT YOURSELF - OR THAT YOU ARE A FAILURE OR HAVE LET YOURSELF OR YOUR FAMILY DOWN: 3
8. MOVING OR SPEAKING SO SLOWLY THAT OTHER PEOPLE COULD HAVE NOTICED. OR THE OPPOSITE, BEING SO FIGETY OR RESTLESS THAT YOU HAVE BEEN MOVING AROUND A LOT MORE THAN USUAL: 0
10. IF YOU CHECKED OFF ANY PROBLEMS, HOW DIFFICULT HAVE THESE PROBLEMS MADE IT FOR YOU TO DO YOUR WORK, TAKE CARE OF THINGS AT HOME, OR GET ALONG WITH OTHER PEOPLE: 1
5. POOR APPETITE OR OVEREATING: 3
2. FEELING DOWN, DEPRESSED OR HOPELESS: 2
9. THOUGHTS THAT YOU WOULD BE BETTER OFF DEAD, OR OF HURTING YOURSELF: 0
SUM OF ALL RESPONSES TO PHQ QUESTIONS 1-9: 17
SUM OF ALL RESPONSES TO PHQ9 QUESTIONS 1 & 2: 2

## 2017-11-17 ASSESSMENT — ENCOUNTER SYMPTOMS
SHORTNESS OF BREATH: 0
ORTHOPNEA: 0
BLURRED VISION: 0

## 2017-11-17 NOTE — PROGRESS NOTES
[id number] [facility], [facility]  [address], [address]  Phone: [phone]  Fax: [phone]    Claudean Arrant is a 71 y.o. female who presents today for her medical conditions/complaints as noted below. Claudean Arrant c/o of 3 Month Follow-Up (DM/Hyperlipidemia/depression); Flu Vaccine (need for); and Hypertension      HPI:     Patient has finished her vitamin D supplementation. We will do recheck on level. She is having left foot pain where she previously had surgery. She is still having depressive symptoms. Hypertension   This is a chronic problem. The current episode started more than 1 year ago. The problem has been gradually improving since onset. The problem is controlled. Pertinent negatives include no anxiety, blurred vision, chest pain, headaches, malaise/fatigue, neck pain, orthopnea, palpitations, peripheral edema, PND, shortness of breath or sweats. There are no associated agents to hypertension. Risk factors for coronary artery disease include diabetes mellitus, dyslipidemia, obesity, sedentary lifestyle and post-menopausal state. Past treatments include beta blockers and ACE inhibitors. The current treatment provides significant improvement. Compliance problems include exercise. Kidney disease: ???   Diabetes   She presents for her follow-up diabetic visit. She has type 2 diabetes mellitus. Her disease course has been improving. There are no hypoglycemic associated symptoms. Pertinent negatives for hypoglycemia include no headaches or sweats. Pertinent negatives for diabetes include no blurred vision and no chest pain. There are no hypoglycemic complications. Diabetic complications include nephropathy (?? Microalbuminuria).  Risk factors for coronary artery disease include diabetes mellitus, dyslipidemia, hypertension, obesity, sedentary lifestyle and post-menopausal. Current diabetic treatment includes intensive insulin program. She is compliant with treatment all of the time. Her weight is decreasing steadily. When asked about meal planning, she reported none. She rarely participates in exercise. Her home blood glucose trend is decreasing steadily. Her breakfast blood glucose range is generally  mg/dl. Her dinner blood glucose range is generally 140-180 mg/dl. Her highest blood glucose is >200 mg/dl. An ACE inhibitor/angiotensin II receptor blocker is being taken. She sees a podiatrist.Eye exam is current. BP Readings from Last 3 Encounters:   11/17/17 128/70   10/24/17 130/70   08/23/17 112/64     Wt Readings from Last 3 Encounters:   11/17/17 243 lb (110.2 kg)   10/24/17 243 lb (110.2 kg)   08/23/17 246 lb (111.6 kg)              Past Medical History:   Diagnosis Date    Actinic keratitis     Ankle pain     Arthritis     Back pain     low     Chronic diastolic CHF (congestive heart failure) (HCC)     Chronic diastolic heart failure (HCC) 5/31/2017    Chronic kidney disease     Dependent edema     Hypertension     Incontinence     in female    Osteoarthritis     knee     Type II or unspecified type diabetes mellitus without mention of complication, not stated as uncontrolled       Past Surgical History:   Procedure Laterality Date    ANKLE SURGERY Left      Family History   Problem Relation Age of Onset    Diabetes Mother     Diabetes Father     Cancer Paternal Grandfather      BONE      Social History   Substance Use Topics    Smoking status: Never Smoker    Smokeless tobacco: Never Used    Alcohol use No      Current Outpatient Prescriptions   Medication Sig Dispense Refill    buPROPion (WELLBUTRIN XL) 150 MG extended release tablet Take 1 tablet by mouth every morning 30 tablet 1    lidocaine (LIDODERM) 5 % Place 1 patch onto the skin daily 12 hours on, 12 hours off.  30 patch 0    simvastatin (ZOCOR) 40 MG tablet Take 1 tablet by mouth nightly 90 tablet 1    BOOSTRIX 5-2.5-18.5 injection  oxybutynin (DITROPAN) 5 MG tablet   0    escitalopram (LEXAPRO) 10 MG tablet Take 1 tablet by mouth daily 90 tablet 0    metroNIDAZOLE (METROCREAM) 0.75 % cream Apply topically 2 times daily. Please provide 90 day supply. 3 Tube 3    ergocalciferol (DRISDOL) 16887 units capsule Take 1 capsule by mouth once a week 13 capsule 0    meclizine (ANTIVERT) 12.5 MG tablet Take 1 tablet by mouth 3 times daily as needed for Dizziness 30 tablet 1    lisinopril (PRINIVIL;ZESTRIL) 2.5 MG tablet Take 1 tablet by mouth daily 90 tablet 0    Insulin Syringe-Needle U-100 30G X 5/16\" 0.5 ML MISC 1 each by Does not apply route 2 times daily 180 each 3    acetaminophen (TYLENOL ARTHRITIS PAIN) 650 MG extended release tablet Take 650 mg by mouth every 8 hours as needed for Pain      glucose blood VI test strips (RELION CONFIRM/MICRO TEST) strip 1 each by In Vitro route 2 times daily As needed. 100 each 5    RELION ULTRA THIN LANCETS 30G MISC 1 each by Does not apply route 2 times daily 210 each 3    insulin NPH (HUMULIN N) 100 UNIT/ML injection vial 27 units in the morning and 32 units in the evening, 4 vial 2    metFORMIN (GLUCOPHAGE) 500 MG tablet Take 2 tablets by mouth daily Take 2 tablets by mouth twice daily 360 tablet 1    carvedilol (COREG) 3.125 MG tablet Take 1 tablet by mouth 2 times daily (with meals) 180 tablet 1    furosemide (LASIX) 40 MG tablet Take 1 tablet by mouth 2 times daily 180 tablet 1    Aspirin Buf,[id number]-[id number], (BUFFERED ASPIRIN) 325 MG TABS Take by mouth       No current facility-administered medications for this visit. Allergies   Allergen Reactions    Prednisone      High blood sugars         Subjective:      Review of Systems   Constitutional: Negative for chills, fever and malaise/fatigue. Eyes: Negative for blurred vision. Respiratory: Negative for shortness of breath. Cardiovascular: Negative for chest pain, palpitations, orthopnea and PND.    Musculoskeletal: Positive

## 2017-11-17 NOTE — PATIENT INSTRUCTIONS
Patient Education        Learning About ACE Inhibitors and ARBs for Diabetes  Introduction  ACE inhibitors and ARBs are medicines used to control blood pressure. They allow blood vessels to relax and open up. This lowers your blood pressure. When you have diabetes, taking an ACE inhibitor or ARB can help to:  · Treat high blood pressure. Your risk of problems from diabetes goes up when you have high blood pressure. · Prevent or slow kidney damage. Diabetes can damage the blood vessels in the kidneys. High blood pressure can damage the kidneys, too. · Lower the risks of stroke and heart attack. Your risks go up when you have high blood pressure, heart disease, or both. An ACE inhibitor or ARB is a good choice for people with diabetes. Unlike some medicines, these don't affect blood sugar levels. Examples  ACE inhibitors include:  · Benazepril. · Lisinopril. · Ramipril. ARBs include:  · Irbesartan. · Losartan. · Telmisartan. Possible side effects  All medicines can cause side effects. Some side effects of ACE inhibitors include:  · Low blood pressure. You may feel dizzy and weak. · A cough. · High potassium levels. · An allergic reaction of the skin. Symptoms may range from mild swelling to painful welts. Some side effects of ARBs include:  · Diarrhea. · High potassium levels. · Sinus problems. · Stomach problems. You may have other side effects or reactions not listed here. Check the information that comes with your medicine. What to know about taking this medicine  · Be safe with medicines. Take your medicines exactly as prescribed. Call your doctor if you think you are having a problem with your medicine. · Before starting an ACE inhibitor or ARB, tell your doctor if you:  ¨ Use a salt substitute. ¨ Take diuretics or potassium tablets. · These medicines are not safe for pregnancy. If you are pregnant or planning to be, talk to your doctor about a safe blood pressure medicine.   · ACE sick, but most of the time it doesn't lead to other problems. But it can cause serious problems in people who are older or who have a long-term illness, such as heart disease or diabetes. You can help prevent the flu by getting a flu vaccine every year, as soon as it is available. You cannot get the flu from the vaccine. The vaccine prevents most cases of the flu. But even when the vaccine doesn't prevent the flu, it can make symptoms less severe and reduce the chance of problems from the flu. Sometimes, young children and people who have an immune system problem may have a slight fever or muscle aches or pains 6 to 12 hours after getting the shot. These symptoms usually last 1 or 2 days. Follow-up care is a key part of your treatment and safety. Be sure to make and go to all appointments, and call your doctor if you are having problems. It's also a good idea to know your test results and keep a list of the medicines you take. Who should get the flu vaccine? Everyone age 7 months or older should get a flu vaccine each year. It lowers the chance of getting and spreading the flu. The vaccine is very important for people who are at high risk for getting other health problems from the flu. This includes:  · Anyone 48years of age or older. · People who live in a long-term care center, such as a nursing home. · All children 6 months through 25years of age. · Adults and children 6 months and older who have long-term heart or lung problems, such as asthma. · Adults and children 6 months and older who needed medical care or were in a hospital during the past year because of diabetes, chronic kidney disease, or a weak immune system (including HIV or AIDS). · Women who will be pregnant during the flu season. · People who have any condition that can make it hard to breathe or swallow (such as a brain injury or muscle disorders).   · People who can give the flu to others who are at high risk for problems from the flu. This includes all health care workers and close contacts of people age 72 or older. Who should not get the flu vaccine? The person who gives the vaccine may tell you not to get it if you:  · Have a severe allergy to eggs or any part of the vaccine. · Have had a severe reaction to a flu vaccine in the past.  · Have had Guillain-Barré syndrome (GBS). · Are sick with a fever. (Get the vaccine when symptoms are gone.)  How can you care for yourself at home? · If you or your child has a sore arm or a slight fever after the shot, take an over-the-counter pain medicine, such as acetaminophen (Tylenol) or ibuprofen (Advil, Motrin). Read and follow all instructions on the label. Do not give aspirin to anyone younger than 20. It has been linked to Reye syndrome, a serious illness. · Do not take two or more pain medicines at the same time unless the doctor told you to. Many pain medicines have acetaminophen, which is Tylenol. Too much acetaminophen (Tylenol) can be harmful. When should you call for help? Call 911 anytime you think you may need emergency care. For example, call if after getting the flu vaccine:  · You have symptoms of a severe reaction to the flu vaccine. Symptoms of a severe reaction may include:  ¨ Severe difficulty breathing. ¨ Sudden raised, red areas (hives) all over your body. ¨ Severe lightheadedness. Call your doctor now or seek immediate medical care if after getting the flu vaccine:  · You think you are having a reaction to the flu vaccine, such as a new fever. Watch closely for changes in your health, and be sure to contact your doctor if you have any problems. Where can you learn more? Go to [email or web address] org and sign in to your AutomateIt account. Enter T018 in the [facility] box to learn more about \"Influenza (Flu) Vaccine: Care Instructions. \"     If you do not have an account, please click on the \"Sign Up Now\" link.   Current as of: August 1, 2016  Content Version: 11.3  © 1413-6380 [employer]. Care instructions adapted under license by [facility] ([facility]). If you have questions about a medical condition or this instruction, always ask your healthcare professional. [address] any warranty or liability for your use of this information.

## 2017-11-17 NOTE — PROGRESS NOTES
After obtaining consent, and per orders of Stephanie Miller, injection of high dose influenza given in Left deltoid by Lenore Lam. Patient instructed to remain in clinic for 20 minutes afterwards, and to report any adverse reaction to me immediately.

## 2017-11-20 DIAGNOSIS — Z79.4 TYPE 2 DIABETES MELLITUS WITH STAGE 3 CHRONIC KIDNEY DISEASE, WITH LONG-TERM CURRENT USE OF INSULIN (HCC): ICD-10-CM

## 2017-11-20 DIAGNOSIS — N18.30 TYPE 2 DIABETES MELLITUS WITH STAGE 3 CHRONIC KIDNEY DISEASE, WITH LONG-TERM CURRENT USE OF INSULIN (HCC): ICD-10-CM

## 2017-11-20 DIAGNOSIS — E11.22 TYPE 2 DIABETES MELLITUS WITH STAGE 3 CHRONIC KIDNEY DISEASE, WITH LONG-TERM CURRENT USE OF INSULIN (HCC): ICD-10-CM

## 2017-11-20 RX ORDER — LISINOPRIL 2.5 MG/1
2.5 TABLET ORAL DAILY
Qty: 90 TABLET | Refills: 0 | Status: SHIPPED | OUTPATIENT
Start: 2017-11-20 | End: 2017-12-28 | Stop reason: SDUPTHER

## 2017-12-28 DIAGNOSIS — Z79.4 TYPE 2 DIABETES MELLITUS WITH STAGE 3 CHRONIC KIDNEY DISEASE, WITH LONG-TERM CURRENT USE OF INSULIN (HCC): ICD-10-CM

## 2017-12-28 DIAGNOSIS — F41.9 ANXIETY AND DEPRESSION: ICD-10-CM

## 2017-12-28 DIAGNOSIS — F32.A ANXIETY AND DEPRESSION: ICD-10-CM

## 2017-12-28 DIAGNOSIS — N18.30 TYPE 2 DIABETES MELLITUS WITH STAGE 3 CHRONIC KIDNEY DISEASE, WITH LONG-TERM CURRENT USE OF INSULIN (HCC): ICD-10-CM

## 2017-12-28 DIAGNOSIS — F32.1 MODERATE SINGLE CURRENT EPISODE OF MAJOR DEPRESSIVE DISORDER (HCC): ICD-10-CM

## 2017-12-28 DIAGNOSIS — E11.22 TYPE 2 DIABETES MELLITUS WITH STAGE 3 CHRONIC KIDNEY DISEASE, WITH LONG-TERM CURRENT USE OF INSULIN (HCC): ICD-10-CM

## 2017-12-28 RX ORDER — BUPROPION HYDROCHLORIDE 150 MG/1
150 TABLET ORAL EVERY MORNING
Qty: 90 TABLET | Refills: 1 | Status: SHIPPED | OUTPATIENT
Start: 2017-12-28 | End: 2018-04-05 | Stop reason: ALTCHOICE

## 2017-12-28 RX ORDER — CARVEDILOL 3.12 MG/1
3.12 TABLET ORAL 2 TIMES DAILY WITH MEALS
Qty: 180 TABLET | Refills: 1 | Status: SHIPPED | OUTPATIENT
Start: 2017-12-28 | End: 2018-03-05 | Stop reason: DRUGHIGH

## 2017-12-28 RX ORDER — LISINOPRIL 2.5 MG/1
2.5 TABLET ORAL DAILY
Qty: 90 TABLET | Refills: 0 | Status: SHIPPED | OUTPATIENT
Start: 2017-12-28 | End: 2018-03-29 | Stop reason: SDUPTHER

## 2017-12-28 RX ORDER — ESCITALOPRAM OXALATE 10 MG/1
10 TABLET ORAL DAILY
Qty: 90 TABLET | Refills: 0 | Status: SHIPPED | OUTPATIENT
Start: 2017-12-28 | End: 2018-05-17 | Stop reason: SDUPTHER

## 2018-01-02 RX ORDER — BLOOD SUGAR DIAGNOSTIC
1 STRIP MISCELLANEOUS 2 TIMES DAILY
Qty: 180 EACH | Refills: 3 | Status: SHIPPED | OUTPATIENT
Start: 2018-01-02 | End: 2018-02-20 | Stop reason: SDUPTHER

## 2018-02-20 ENCOUNTER — OFFICE VISIT (OUTPATIENT)
Dept: FAMILY MEDICINE CLINIC | Age: 73
End: 2018-02-20
Payer: MEDICARE

## 2018-02-20 VITALS
RESPIRATION RATE: 16 BRPM | DIASTOLIC BLOOD PRESSURE: 84 MMHG | OXYGEN SATURATION: 96 % | WEIGHT: 251 LBS | HEART RATE: 63 BPM | BODY MASS INDEX: 43.08 KG/M2 | TEMPERATURE: 98.2 F | SYSTOLIC BLOOD PRESSURE: 152 MMHG

## 2018-02-20 DIAGNOSIS — E11.29 TYPE 2 DIABETES MELLITUS WITH MICROALBUMINURIA, WITH LONG-TERM CURRENT USE OF INSULIN (HCC): ICD-10-CM

## 2018-02-20 DIAGNOSIS — R63.5 WEIGHT GAIN: ICD-10-CM

## 2018-02-20 DIAGNOSIS — Z79.4 TYPE 2 DIABETES MELLITUS WITH MICROALBUMINURIA, WITH LONG-TERM CURRENT USE OF INSULIN (HCC): ICD-10-CM

## 2018-02-20 DIAGNOSIS — I50.9 ACUTE HEART FAILURE, UNSPECIFIED HEART FAILURE TYPE (HCC): Primary | ICD-10-CM

## 2018-02-20 DIAGNOSIS — R06.02 SOB (SHORTNESS OF BREATH): ICD-10-CM

## 2018-02-20 DIAGNOSIS — E78.5 HYPERLIPIDEMIA ASSOCIATED WITH TYPE 2 DIABETES MELLITUS (HCC): ICD-10-CM

## 2018-02-20 DIAGNOSIS — F32.1 MODERATE SINGLE CURRENT EPISODE OF MAJOR DEPRESSIVE DISORDER (HCC): ICD-10-CM

## 2018-02-20 DIAGNOSIS — R80.9 TYPE 2 DIABETES MELLITUS WITH MICROALBUMINURIA, WITH LONG-TERM CURRENT USE OF INSULIN (HCC): ICD-10-CM

## 2018-02-20 DIAGNOSIS — E11.69 HYPERLIPIDEMIA ASSOCIATED WITH TYPE 2 DIABETES MELLITUS (HCC): ICD-10-CM

## 2018-02-20 DIAGNOSIS — I50.32 CHRONIC DIASTOLIC HEART FAILURE (HCC): ICD-10-CM

## 2018-02-20 LAB — HBA1C MFR BLD: 6.6 %

## 2018-02-20 PROCEDURE — G8431 POS CLIN DEPRES SCRN F/U DOC: HCPCS | Performed by: NURSE PRACTITIONER

## 2018-02-20 PROCEDURE — 3017F COLORECTAL CA SCREEN DOC REV: CPT | Performed by: NURSE PRACTITIONER

## 2018-02-20 PROCEDURE — G8417 CALC BMI ABV UP PARAM F/U: HCPCS | Performed by: NURSE PRACTITIONER

## 2018-02-20 PROCEDURE — 93000 ELECTROCARDIOGRAM COMPLETE: CPT | Performed by: NURSE PRACTITIONER

## 2018-02-20 PROCEDURE — 3044F HG A1C LEVEL LT 7.0%: CPT | Performed by: NURSE PRACTITIONER

## 2018-02-20 PROCEDURE — G8484 FLU IMMUNIZE NO ADMIN: HCPCS | Performed by: NURSE PRACTITIONER

## 2018-02-20 PROCEDURE — 1090F PRES/ABSN URINE INCON ASSESS: CPT | Performed by: NURSE PRACTITIONER

## 2018-02-20 PROCEDURE — G8400 PT W/DXA NO RESULTS DOC: HCPCS | Performed by: NURSE PRACTITIONER

## 2018-02-20 PROCEDURE — G8427 DOCREV CUR MEDS BY ELIG CLIN: HCPCS | Performed by: NURSE PRACTITIONER

## 2018-02-20 PROCEDURE — 99213 OFFICE O/P EST LOW 20 MIN: CPT | Performed by: NURSE PRACTITIONER

## 2018-02-20 PROCEDURE — 3014F SCREEN MAMMO DOC REV: CPT | Performed by: NURSE PRACTITIONER

## 2018-02-20 PROCEDURE — 1123F ACP DISCUSS/DSCN MKR DOCD: CPT | Performed by: NURSE PRACTITIONER

## 2018-02-20 PROCEDURE — 1036F TOBACCO NON-USER: CPT | Performed by: NURSE PRACTITIONER

## 2018-02-20 PROCEDURE — 83036 HEMOGLOBIN GLYCOSYLATED A1C: CPT | Performed by: NURSE PRACTITIONER

## 2018-02-20 PROCEDURE — 4040F PNEUMOC VAC/ADMIN/RCVD: CPT | Performed by: NURSE PRACTITIONER

## 2018-02-20 RX ORDER — SYRINGE-NEEDLE,INSULIN,0.5 ML 31 GX5/16"
SYRINGE, EMPTY DISPOSABLE MISCELLANEOUS
COMMUNITY
Start: 2018-01-28 | End: 2019-01-02 | Stop reason: SDUPTHER

## 2018-02-20 RX ORDER — SIMVASTATIN 40 MG
40 TABLET ORAL NIGHTLY
Qty: 90 TABLET | Refills: 1 | Status: SHIPPED | OUTPATIENT
Start: 2018-02-20 | End: 2018-02-21 | Stop reason: SDUPTHER

## 2018-02-20 ASSESSMENT — ENCOUNTER SYMPTOMS
SHORTNESS OF BREATH: 1
WHEEZING: 1

## 2018-02-20 NOTE — PROGRESS NOTES
Aspirin Buf,KuGxl-MwQib-ZxAic, (BUFFERED ASPIRIN) 325 MG TABS Take by mouth      RELION INSULIN SYR 0.5ML/31G 31G X 5/16\" 0.5 ML MISC       BOOSTRIX 5-2.5-18.5 injection       ergocalciferol (DRISDOL) 83051 units capsule Take 1 capsule by mouth once a week 13 capsule 0     No current facility-administered medications for this visit. Allergies   Allergen Reactions    Prednisone      High blood sugars         Subjective:      Review of Systems   Respiratory: Positive for shortness of breath and wheezing. Cardiovascular: Positive for chest pain (last week). Negative for claudication, leg swelling and syncope. Objective:     BP (!) 152/84 (Site: Left Arm, Position: Sitting, Cuff Size: Large Adult)   Pulse 63   Temp 98.2 °F (36.8 °C) (Tympanic)   Resp 16   Wt 251 lb (113.9 kg)   SpO2 96%   BMI 43.08 kg/m²     Physical Exam   Constitutional: She is oriented to person, place, and time. Vital signs are normal. She appears well-developed and well-nourished. No distress. HENT:   Head: Normocephalic. Right Ear: External ear normal.   Left Ear: External ear normal.   Eyes: Conjunctivae and EOM are normal. Right eye exhibits no discharge. Left eye exhibits no discharge. No scleral icterus. Neck: Normal range of motion. Neck supple. Cardiovascular: Normal rate, regular rhythm and normal heart sounds. Pulmonary/Chest: Effort normal. No respiratory distress. She has rales. Musculoskeletal: Normal range of motion. She exhibits no edema. Neurological: She is alert and oriented to person, place, and time. Skin: Skin is warm, dry and intact. She is not diaphoretic. Psychiatric: She has a normal mood and affect. Her speech is normal and behavior is normal. Judgment and thought content normal. Cognition and memory are normal.   Nursing note and vitals reviewed. Assessment:     1. Acute heart failure, unspecified heart failure type ([address]     2.  Hyperlipidemia associated with type 2 diabetes mellitus (HCC)  simvastatin (ZOCOR) 40 MG tablet    DISCONTINUED: simvastatin (ZOCOR) 40 MG tablet   3. SOB (shortness of breath)  EKG 12 Lead   4. Chronic diastolic heart failure (HCC)  EKG 12 Lead   5. Weight gain  EKG 12 Lead   6. Type 2 diabetes mellitus with microalbuminuria, with long-term current use of insulin (HCC)  RELION INSULIN SYR 0.5ML/31G 31G X 5/16\" 0.5 ML MISC    POCT Hb A1C (glycosylated hemoglobin)     Results for POC orders placed in visit on 02/20/18   POCT Hb A1C (glycosylated hemoglobin)   Result Value Ref Range    Hemoglobin A1C 6.6 %         Plan:       Patient appears to have an acute on chronic heart failure. She may have had a silent myocardial infarct or may have a pulmonary embolism. Taken by wheelchair to the emergency room for further evaluation. Script sent for simvastatin as requested. No Follow-up on file. Discussed use, benefit, and side effects of prescribed medications. All patient questions answered. Pt voiced understanding. Reviewed health maintenance. Instructed to continue current medications, diet and exercise. Patient agreed with treatment plan. Follow up as directed.      Electronically signed by Adamaris John CNP on 2/21/2018

## 2018-02-21 RX ORDER — SIMVASTATIN 40 MG
40 TABLET ORAL NIGHTLY
Qty: 90 TABLET | Refills: 1 | Status: SHIPPED | OUTPATIENT
Start: 2018-02-21 | End: 2018-08-22 | Stop reason: SDUPTHER

## 2018-02-26 ENCOUNTER — NURSE ONLY (OUTPATIENT)
Dept: FAMILY MEDICINE CLINIC | Age: 73
End: 2018-02-26

## 2018-02-26 VITALS — SYSTOLIC BLOOD PRESSURE: 130 MMHG | DIASTOLIC BLOOD PRESSURE: 68 MMHG

## 2018-02-26 DIAGNOSIS — I50.32 CHRONIC DIASTOLIC HEART FAILURE (HCC): ICD-10-CM

## 2018-02-26 DIAGNOSIS — E11.29 TYPE 2 DIABETES MELLITUS WITH MICROALBUMINURIA, WITH LONG-TERM CURRENT USE OF INSULIN (HCC): ICD-10-CM

## 2018-02-26 DIAGNOSIS — D64.9 ANEMIA, UNSPECIFIED TYPE: Primary | ICD-10-CM

## 2018-02-26 DIAGNOSIS — I10 ESSENTIAL HYPERTENSION: Primary | ICD-10-CM

## 2018-02-26 DIAGNOSIS — I10 HYPERTENSION, UNSPECIFIED TYPE: Primary | ICD-10-CM

## 2018-02-26 DIAGNOSIS — R80.9 TYPE 2 DIABETES MELLITUS WITH MICROALBUMINURIA, WITH LONG-TERM CURRENT USE OF INSULIN (HCC): ICD-10-CM

## 2018-02-26 DIAGNOSIS — Z79.4 TYPE 2 DIABETES MELLITUS WITH MICROALBUMINURIA, WITH LONG-TERM CURRENT USE OF INSULIN (HCC): ICD-10-CM

## 2018-02-26 RX ORDER — BLOOD PRESSURE TEST KIT
1 KIT MISCELLANEOUS DAILY
Qty: 1 KIT | Refills: 0 | Status: SHIPPED | OUTPATIENT
Start: 2018-02-26 | End: 2018-03-05 | Stop reason: ALTCHOICE

## 2018-02-28 LAB
AGE FOR GFR: 71
ANION GAP SERPL CALCULATED.3IONS-SCNC: 20 MMOL/L
BUN BLDV-MCNC: 50 MG/DL
CALCIUM SERPL-MCNC: 9.6 MG/DL
CHLORIDE BLD-SCNC: 101 MMOL/L
CO2: 28 MMOL/L
CREAT SERPL-MCNC: 1.7 MG/DL
EGFR BF: 36 ML/MIN/1.73 M2
EGFR BM: 48 ML/MIN/1.73 M2
EGFR WF: 30 ML/MIN/1.73 M2
EGFR WM: 40 ML/MIN/1.73 M2
ERYTHROCYTES: NORMAL
FOLATE: 9.2 NG/ML
GLUCOSE: 105 MG/DL
METHYLMALONIC ACID: NORMAL
POTASSIUM SERPL-SCNC: 4.6 MMOL/L
RETICULOCYTE ABSOLUTE COUNT: NORMAL
RETICULOCYTE COUNT PCT: NORMAL
SODIUM BLD-SCNC: 144 MMOL/L
VITAMIN B-12: 256 PG/ML

## 2018-03-01 LAB — RETIC: NORMAL

## 2018-03-05 ENCOUNTER — OFFICE VISIT (OUTPATIENT)
Dept: FAMILY MEDICINE CLINIC | Age: 73
End: 2018-03-05
Payer: MEDICARE

## 2018-03-05 VITALS
DIASTOLIC BLOOD PRESSURE: 62 MMHG | RESPIRATION RATE: 16 BRPM | BODY MASS INDEX: 41.02 KG/M2 | WEIGHT: 239 LBS | OXYGEN SATURATION: 96 % | SYSTOLIC BLOOD PRESSURE: 116 MMHG | TEMPERATURE: 98.6 F | HEART RATE: 64 BPM

## 2018-03-05 DIAGNOSIS — D51.9 ANEMIA DUE TO VITAMIN B12 DEFICIENCY, UNSPECIFIED B12 DEFICIENCY TYPE: ICD-10-CM

## 2018-03-05 DIAGNOSIS — E11.9 DIABETES MELLITUS TYPE 2, INSULIN DEPENDENT (HCC): ICD-10-CM

## 2018-03-05 DIAGNOSIS — D50.8 OTHER IRON DEFICIENCY ANEMIA: ICD-10-CM

## 2018-03-05 DIAGNOSIS — E66.01 OBESITY, MORBID, BMI 40.0-49.9 (HCC): ICD-10-CM

## 2018-03-05 DIAGNOSIS — I50.32 CHRONIC DIASTOLIC HEART FAILURE (HCC): Primary | ICD-10-CM

## 2018-03-05 DIAGNOSIS — Z79.4 DIABETES MELLITUS TYPE 2, INSULIN DEPENDENT (HCC): ICD-10-CM

## 2018-03-05 PROBLEM — R80.9 TYPE 2 DIABETES MELLITUS WITH MICROALBUMINURIA, WITH LONG-TERM CURRENT USE OF INSULIN (HCC): Status: RESOLVED | Noted: 2017-02-02 | Resolved: 2018-03-05

## 2018-03-05 PROBLEM — E11.29 TYPE 2 DIABETES MELLITUS WITH MICROALBUMINURIA, WITH LONG-TERM CURRENT USE OF INSULIN (HCC): Status: RESOLVED | Noted: 2017-02-02 | Resolved: 2018-03-05

## 2018-03-05 PROCEDURE — 1036F TOBACCO NON-USER: CPT | Performed by: NURSE PRACTITIONER

## 2018-03-05 PROCEDURE — 3017F COLORECTAL CA SCREEN DOC REV: CPT | Performed by: NURSE PRACTITIONER

## 2018-03-05 PROCEDURE — G8427 DOCREV CUR MEDS BY ELIG CLIN: HCPCS | Performed by: NURSE PRACTITIONER

## 2018-03-05 PROCEDURE — 99214 OFFICE O/P EST MOD 30 MIN: CPT | Performed by: NURSE PRACTITIONER

## 2018-03-05 PROCEDURE — G8400 PT W/DXA NO RESULTS DOC: HCPCS | Performed by: NURSE PRACTITIONER

## 2018-03-05 PROCEDURE — 3044F HG A1C LEVEL LT 7.0%: CPT | Performed by: NURSE PRACTITIONER

## 2018-03-05 PROCEDURE — 1123F ACP DISCUSS/DSCN MKR DOCD: CPT | Performed by: NURSE PRACTITIONER

## 2018-03-05 PROCEDURE — 1090F PRES/ABSN URINE INCON ASSESS: CPT | Performed by: NURSE PRACTITIONER

## 2018-03-05 PROCEDURE — 3014F SCREEN MAMMO DOC REV: CPT | Performed by: NURSE PRACTITIONER

## 2018-03-05 PROCEDURE — 4040F PNEUMOC VAC/ADMIN/RCVD: CPT | Performed by: NURSE PRACTITIONER

## 2018-03-05 PROCEDURE — 1111F DSCHRG MED/CURRENT MED MERGE: CPT | Performed by: NURSE PRACTITIONER

## 2018-03-05 PROCEDURE — G8417 CALC BMI ABV UP PARAM F/U: HCPCS | Performed by: NURSE PRACTITIONER

## 2018-03-05 PROCEDURE — G8482 FLU IMMUNIZE ORDER/ADMIN: HCPCS | Performed by: NURSE PRACTITIONER

## 2018-03-05 RX ORDER — POTASSIUM CHLORIDE 750 MG/1
CAPSULE, EXTENDED RELEASE ORAL
COMMUNITY
Start: 2018-02-22 | End: 2018-05-17 | Stop reason: ALTCHOICE

## 2018-03-05 RX ORDER — BUMETANIDE 1 MG/1
TABLET ORAL
COMMUNITY
Start: 2018-02-22 | End: 2018-09-12 | Stop reason: SDUPTHER

## 2018-03-05 RX ORDER — CYANOCOBALAMIN 1000 UG/ML
1000 INJECTION INTRAMUSCULAR; SUBCUTANEOUS ONCE
Qty: 1 ML | Refills: 0 | Status: SHIPPED | OUTPATIENT
Start: 2018-03-05 | End: 2018-05-02 | Stop reason: SDUPTHER

## 2018-03-05 RX ORDER — BLOOD PRESSURE TEST KIT-MEDIUM
KIT MISCELLANEOUS
COMMUNITY
Start: 2018-02-26 | End: 2018-03-05 | Stop reason: ALTCHOICE

## 2018-03-05 RX ORDER — FERROUS SULFATE 325(65) MG
325 TABLET ORAL
Qty: 30 TABLET | Refills: 3 | Status: SHIPPED | OUTPATIENT
Start: 2018-03-05 | End: 2018-07-26 | Stop reason: SDUPTHER

## 2018-03-05 RX ORDER — LANCETS 30 GAUGE
1 EACH MISCELLANEOUS 2 TIMES DAILY
Qty: 210 EACH | Refills: 3 | Status: SHIPPED | OUTPATIENT
Start: 2018-03-05 | End: 2019-11-05 | Stop reason: SDUPTHER

## 2018-03-05 RX ORDER — CARVEDILOL 6.25 MG/1
TABLET ORAL
COMMUNITY
Start: 2018-02-22 | End: 2018-10-04 | Stop reason: SDUPTHER

## 2018-03-05 NOTE — PATIENT INSTRUCTIONS
Patient Education     STOP METFORMIN. Start Januvia. Start Iron. Start Vitamin B12. Labs done in 1 month - CBC and BMP. Iron Deficiency Anemia: Care Instructions  Your Care Instructions    Anemia means that you do not have enough red blood cells. Red blood cells carry oxygen around your body. When you have anemia, it can make you pale, weak, and tired. Many things can cause anemia. The most common cause is loss of blood. This can happen if you have heavy menstrual periods. It can also happen if you have bleeding in your stomach or bowel. You can also get anemia if you don't have enough iron in your diet or if it's hard for your body to absorb iron. In some cases, pregnancy causes anemia. That's because a pregnant woman needs more iron. Your doctor may do more tests to find the cause of your anemia. If a disease or other health problem is causing it, your doctor will treat that problem. It's important to follow up with your doctor to make sure that your iron level returns to normal.  Follow-up care is a key part of your treatment and safety. Be sure to make and go to all appointments, and call your doctor if you are having problems. It's also a good idea to know your test results and keep a list of the medicines you take. How can you care for yourself at home? · If your doctor recommended iron pills, take them as directed. ¨ Try to take the pills on an empty stomach. You can do this about 1 hour before or 2 hours after meals. But you may need to take iron with food to avoid an upset stomach. ¨ Do not take antacids or drink milk or anything with caffeine within 2 hours of when you take your iron. They can keep your body from absorbing the iron well. ¨ Vitamin C helps your body absorb iron. You may want to take iron pills with a glass of orange juice or some other food high in vitamin C.  ¨ Iron pills may cause stomach problems.  These include heartburn, nausea, diarrhea, constipation, and could harm a nursing baby. Tell your doctor if you are breast-feeding a baby. Sitagliptin is not approved for use by anyone younger than 25years old. How should I take sitagliptin? Follow all directions on your prescription label. Your doctor may occasionally change your dose to make sure you get the best results. Do not take this medicine in larger or smaller amounts or for longer than recommended. You may take this medicine with or without food. Follow your doctor's instructions. Your blood sugar will need to be checked often, and you may need other blood tests at your doctor's office. Low blood sugar (hypoglycemia) can happen to everyone who has diabetes. Symptoms include headache, hunger, sweating, pale skin, irritability, dizziness, feeling shaky, or trouble concentrating. Keep a source of sugar with you in case you have low blood sugar. Sugar sources include fruit juice, hard candy, crackers, raisins, and non-diet soda. Be sure your family and close friends know how to help you in an emergency. If you have severe hypoglycemia and cannot eat or drink, use a glucagon injection. Your doctor can prescribe a glucagon emergency injection kit and tell you how to use it. Also watch for signs of high blood sugar (hyperglycemia) such as increased thirst, increased urination, hunger, dry mouth, fruity breath odor, drowsiness, dry skin, blurred vision, and weight loss. Check your blood sugar carefully during times of stress, travel, illness, surgery or medical emergency, vigorous exercise, or if you drink alcohol or skip meals. These things can affect your glucose levels and your dose needs may also change. Do not change your medication dose or schedule without your doctor's advice. Sitagliptin is only part of a complete treatment program that may also include diet, exercise, weight control, regular blood sugar testing, and special medical care. Follow your doctor's instructions very closely.   Store at room sitagliptin? Other drugs may increase or decrease the effects of sitagliptin on lowering your blood sugar. Tell your doctor about all medications you use. This includes prescription and over-the-counter medicines, vitamins, and herbal products. Not all possible interactions are listed in this medication guide. Where can I get more information? Your pharmacist can provide more information about sitagliptin. Remember, keep this and all other medicines out of the reach of children, never share your medicines with others, and use this medication only for the indication prescribed. Every effort has been made to ensure that the information provided by [address] is accurate, up-to-date, and complete, but no guarantee is made to that effect. Drug information contained herein may be time sensitive. [facility] information has been compiled for use by healthcare practitioners and consumers in the [place] and therefore [facility] does not warrant that uses outside of the [place] are appropriate, unless specifically indicated otherwise. [facility]'s drug information does not endorse drugs, diagnose patients or recommend therapy. [facility] drug information is an informational resource designed to assist licensed healthcare practitioners in caring for their patients and/or to serve consumers viewing this service as a supplement to, and not a substitute for, the expertise, skill, knowledge and judgment of healthcare practitioners. The absence of a warning for a given drug or drug combination in no way should be construed to indicate that the drug or drug combination is safe, effective or appropriate for any given patient. [facility] does not assume any responsibility for any aspect of healthcare administered with the aid of information [facility] provides.  The information contained herein is not intended to cover all possible uses, directions, precautions, warnings, drug interactions, allergic reactions, or recommended. Take ferrous sulfate on an empty stomach, at least 1 hour before or 2 hours after a meal. Avoid taking antacids or antibiotics within 2 hours before or after taking ferrous sulfate . Take this medication with a full glass of water. Do not crush, chew, break, or open an extended-release tablet or capsule. Swallow the pill whole. Breaking or opening the pill may cause too much of the drug to be released at one time. Shake the oral suspension (liquid) well just before you measure a dose. Measure the liquid with a special dose-measuring spoon or medicine cup, not with a regular table spoon. If you do not have a dose-measuring device, ask your pharmacist for one. Ferrous sulfate can stain your teeth, but this effect is temporary. To prevent tooth staining, mix the liquid form of ferrous sulfate with water or fruit juice (not with milk) and drink the mixture through a straw. You may also clean your teeth with baking soda once per week to treat any tooth staining. Ferrous sulfate is only part of a complete program of treatment that may also include a special diet. It is very important to follow the diet plan created for you by your doctor or nutrition counselor. You should become very familiar with the list of foods you should eat to make sure you get enough iron from both your diet and your medication. Store at room temperature, away from moisture and heat. What happens if I miss a dose? Take the missed dose as soon as you remember. Skip the missed dose if it is almost time for your next scheduled dose. Do not  take extra medicine to make up the missed dose. What happens if I overdose? Seek emergency medical attention or call the Poison Help line at 1-747.667.3271, especially if a child has accidentally swallowed it. An overdose of ferrous sulfate can be fatal to a child.   Overdose symptoms may include nausea, severe stomach pain, bloody diarrhea, coughing up blood or vomit that looks like coffee John provides. The information contained herein is not intended to cover all possible uses, directions, precautions, warnings, drug interactions, allergic reactions, or adverse effects. If you have questions about the drugs you are taking, check with your doctor, nurse or pharmacist.  Copyright 4724-1873 [address] [place] [address]. Version: 4.03. Revision date: 3/30/2012. Care instructions adapted under license by [facility] ([facility]). If you have questions about a medical condition or this instruction, always ask your healthcare professional. [place] [address] any warranty or liability for your use of this information. Patient Education          cyanocobalamin (oral)  Pronunciation:  justin cruz  Brand:  B-12, Eligen B12, Vitamin B12  What is the most important information I should know about oral cyanocobalamin? You should not use this medicine if you have Darvin's disease. Cyanocobalamin can lead to optic nerve damage (and possibly blindness) in people with Darvin's disease. What is oral cyanocobalamin? Cyanocobalamin is a man-made form of vitamin B12. Vitamin B12 is important for growth, cell reproduction, blood formation, and protein and tissue synthesis. Cyanocobalamin is used to treat vitamin B12 deficiency in people with pernicious anemia and other conditions. Cyanocobalamin may also be used for purposes not listed in this medication guide. What should I discuss with my healthcare provider before taking oral cyanocobalamin? You should not use this medicine if you are allergic to cobalt, or if you have Darvin's disease. Cyanocobalamin can lead to optic nerve damage (and possibly blindness) in people with Darvin's disease. To make sure cyanocobalamin is safe for you, tell your doctor if you have:  · any type of infection;  · iron or folic acid deficiency;  · kidney or liver disease; or  · if you are receiving any medication or treatment that affects bone marrow.   It is not known

## 2018-03-05 NOTE — PROGRESS NOTES
10 MG tablet  Take 1 tablet by mouth daily             ferrous sulfate 325 (65 Fe) MG tablet  Take 1 tablet by mouth daily (with breakfast)             furosemide (LASIX) 40 MG tablet  Take 1 tablet by mouth 2 times daily             glucose blood VI test strips (RELION CONFIRM/MICRO TEST) strip  1 each by In Vitro route 2 times daily As needed. insulin NPH (HUMULIN N) 100 UNIT/ML injection vial  27 units in the morning and 32 units in the evening,             lisinopril (PRINIVIL;ZESTRIL) 2.5 MG tablet  Take 1 tablet by mouth daily             meclizine (ANTIVERT) 12.5 MG tablet  Take 1 tablet by mouth 3 times daily as needed for Dizziness             metroNIDAZOLE (METROCREAM) 0.75 % cream  Apply topically 2 times daily. Please provide 90 day supply.              oxybutynin (DITROPAN) 5 MG tablet               potassium chloride (MICRO-K) 10 MEQ extended release capsule               RELION INSULIN SYR 0.5ML/31G 31G X 5/16\" 0.5 ML MISC               RELION ULTRA THIN LANCETS 30G MISC  1 each by Does not apply route 2 times daily             simvastatin (ZOCOR) 40 MG tablet  Take 1 tablet by mouth nightly             SITagliptin (JANUVIA) 100 MG tablet  Take 1 tablet by mouth daily                   Medications marked \"taking\" at this time  Outpatient Prescriptions Marked as Taking for the 3/5/18 encounter (Office Visit) with Brian Cisneros CNP   Medication Sig Dispense Refill    bumetanide (BUMEX) 1 MG tablet       carvedilol (COREG) 6.25 MG tablet       potassium chloride (MICRO-K) 10 MEQ extended release capsule       cyanocobalamin 1000 MCG/ML injection Inject 1 mL into the muscle once for 1 dose 1 mL 0    ferrous sulfate 325 (65 Fe) MG tablet Take 1 tablet by mouth daily (with breakfast) 30 tablet 3    SITagliptin (JANUVIA) 100 MG tablet Take 1 tablet by mouth daily 28 tablet 0    glucose blood VI test strips (RELION CONFIRM/MICRO TEST) strip 1 each by In Vitro route 2 times daily As needed. 100 each 5    RELION ULTRA THIN LANCETS 30G MISC 1 each by Does not apply route 2 times daily 210 each 3    simvastatin (ZOCOR) 40 MG tablet Take 1 tablet by mouth nightly 90 tablet 1    RELION INSULIN SYR 0.5ML/31G 31G X 5/16\" 0.5 ML MISC       buPROPion (WELLBUTRIN XL) 150 MG extended release tablet Take 1 tablet by mouth every morning 90 tablet 1    lisinopril (PRINIVIL;ZESTRIL) 2.5 MG tablet Take 1 tablet by mouth daily 90 tablet 0    escitalopram (LEXAPRO) 10 MG tablet Take 1 tablet by mouth daily 90 tablet 0    oxybutynin (DITROPAN) 5 MG tablet   0    metroNIDAZOLE (METROCREAM) 0.75 % cream Apply topically 2 times daily. Please provide 90 day supply. 3 Tube 3    meclizine (ANTIVERT) 12.5 MG tablet Take 1 tablet by mouth 3 times daily as needed for Dizziness 30 tablet 1    acetaminophen (TYLENOL ARTHRITIS PAIN) 650 MG extended release tablet Take 650 mg by mouth every 8 hours as needed for Pain      insulin NPH (HUMULIN N) 100 UNIT/ML injection vial 27 units in the morning and 32 units in the evening, 4 vial 2    furosemide (LASIX) 40 MG tablet Take 1 tablet by mouth 2 times daily 180 tablet 1    Aspirin Buf,InAov-EoEsd-ZcZql, (BUFFERED ASPIRIN) 325 MG TABS Take by mouth          Medications patient taking as of now reconciled against medications ordered at time of hospital discharge: Yes    Vitals:    03/05/18 0925   BP: 116/62   Site: Left Arm   Position: Sitting   Cuff Size: Large Adult   Pulse: 64   Resp: 16   Temp: 98.6 °F (37 °C)   SpO2: 96%   Weight: 239 lb (108.4 kg)     Body mass index is 41.02 kg/m². Wt Readings from Last 3 Encounters:   03/05/18 239 lb (108.4 kg)   02/20/18 251 lb (113.9 kg)   11/17/17 243 lb (110.2 kg)     BP Readings from Last 3 Encounters:   03/05/18 116/62   02/26/18 130/68   02/20/18 (!) 152/84        Inpatient course: Discharge summary reviewed- see chart.     Chief Complaint   Patient presents with    Results     lab test results    Anemia 4.80 - 10 thou/ml3 11.13 (H)   RBC Latest Ref Range: 4.20 - 5.4 M/uL 3.82 (L)   Hemoglobin Quant Latest Ref Range: 12.0 - 16 g/dL 10.6 (L)   Hematocrit Latest Ref Range: 37.0 - 47 % 33.0 (L)   MCV Latest Ref Range: 80.0 - 94 fl 86.4   MCH Latest Ref Range: 28.5 - 32 pg 27.7 (L)   MCHC Latest Ref Range: 32.0 - 37 g/dL 32.1   MPV Latest Ref Range: 7.4 - 11.0 fl 6.5 (L)   RDW Latest Ref Range: 8.5 - 15.5 % 14.5   Platelet Count Latest Ref Range: 130 - 400 thou/mm3 301   Differential Unknown AUTOMATED DIFF   Lymphocytes Latest Ref Range: 1.00 - 5.5 thou/mm3 2.55   Monocytes Latest Ref Range: 0.10 - 1.0 thou/mm3 0.84   Eosinophils Latest Ref Range: 0.00 - 1.1 thou/mm3 0.21   Basophils Latest Ref Range: 0.00 - 0.3 thou/mm3 0.12   Results for Christiana Bird (MRN [id number]) as of 3/8/2018 13:01   Ref. Range 2/20/2018 08:54   Ferritin Latest Ref Range: 10.00 - 28 ng/mL 19   Iron Latest Ref Range: 37 - 170 mg/dL 38   TIBC Latest Ref Range: 261 - 497 mg/dL 342     Kusum was seen today for results and anemia. Diagnoses and all orders for this visit:    Diabetes mellitus type 2, insulin dependent (HCC)  -     SITagliptin (JANUVIA) 100 MG tablet; Take 1 tablet by mouth daily  -     glucose blood VI test strips (RELION CONFIRM/MICRO TEST) strip; 1 each by In Vitro route 2 times daily As needed. -     RELION ULTRA THIN LANCETS 30G MISC; 1 each by Does not apply route 2 times daily  -     Basic Metabolic Panel; Future    Anemia due to vitamin B12 deficiency, unspecified B12 deficiency type  -     cyanocobalamin 1000 MCG/ML injection; Inject 1 mL into the muscle once for 1 dose  -     CBC Auto Differential    Other iron deficiency anemia  -     ferrous sulfate 325 (65 Fe) MG tablet; Take 1 tablet by mouth daily (with breakfast)  -     CBC Auto Differential            STOP METFORMIN. Start Januvia. Start Iron. Start Vitamin B12. Labs done in 1 month - CBC and BMP. Daily pulse, weight and blood pressure check.    If weight varies by 3 pounds please call me.

## 2018-03-06 DIAGNOSIS — D64.9 ANEMIA, UNSPECIFIED TYPE: ICD-10-CM

## 2018-03-08 LAB
AGE FOR GFR: 71
ANION GAP SERPL CALCULATED.3IONS-SCNC: 17 MMOL/L
BUN BLDV-MCNC: 37 MG/DL
CALCIUM SERPL-MCNC: 9.5 MG/DL
CHLORIDE BLD-SCNC: 99 MMOL/L
CO2: 32 MMOL/L
CREAT SERPL-MCNC: 1.6 MG/DL
EGFR BF: 38 ML/MIN/1.73 M2
EGFR BM: 52 ML/MIN/1.73 M2
EGFR WF: 32 ML/MIN/1.73 M2
EGFR WM: 43 ML/MIN/1.73 M2
GLUCOSE: 136 MG/DL
POTASSIUM SERPL-SCNC: 5.1 MMOL/L
SODIUM BLD-SCNC: 143 MMOL/L

## 2018-03-15 ENCOUNTER — OFFICE VISIT (OUTPATIENT)
Dept: FAMILY MEDICINE CLINIC | Age: 73
End: 2018-03-15
Payer: MEDICARE

## 2018-03-15 VITALS
HEART RATE: 56 BPM | TEMPERATURE: 98.1 F | DIASTOLIC BLOOD PRESSURE: 64 MMHG | SYSTOLIC BLOOD PRESSURE: 118 MMHG | BODY MASS INDEX: 40.85 KG/M2 | RESPIRATION RATE: 14 BRPM | WEIGHT: 238 LBS | OXYGEN SATURATION: 96 %

## 2018-03-15 DIAGNOSIS — N18.4 TYPE 2 DIABETES MELLITUS WITH STAGE 4 CHRONIC KIDNEY DISEASE, WITH LONG-TERM CURRENT USE OF INSULIN (HCC): Primary | ICD-10-CM

## 2018-03-15 DIAGNOSIS — E11.69 HYPERLIPIDEMIA ASSOCIATED WITH TYPE 2 DIABETES MELLITUS (HCC): ICD-10-CM

## 2018-03-15 DIAGNOSIS — E78.5 HYPERLIPIDEMIA ASSOCIATED WITH TYPE 2 DIABETES MELLITUS (HCC): ICD-10-CM

## 2018-03-15 DIAGNOSIS — Z79.4 TYPE 2 DIABETES MELLITUS WITH STAGE 4 CHRONIC KIDNEY DISEASE, WITH LONG-TERM CURRENT USE OF INSULIN (HCC): Primary | ICD-10-CM

## 2018-03-15 DIAGNOSIS — E11.22 TYPE 2 DIABETES MELLITUS WITH STAGE 4 CHRONIC KIDNEY DISEASE, WITH LONG-TERM CURRENT USE OF INSULIN (HCC): Primary | ICD-10-CM

## 2018-03-15 PROBLEM — R94.31 ABNORMAL ELECTROCARDIOGRAM: Status: ACTIVE | Noted: 2017-03-08

## 2018-03-15 PROBLEM — R60.9 EDEMA: Status: ACTIVE | Noted: 2017-03-08

## 2018-03-15 PROCEDURE — 4040F PNEUMOC VAC/ADMIN/RCVD: CPT | Performed by: NURSE PRACTITIONER

## 2018-03-15 PROCEDURE — 1090F PRES/ABSN URINE INCON ASSESS: CPT | Performed by: NURSE PRACTITIONER

## 2018-03-15 PROCEDURE — G8427 DOCREV CUR MEDS BY ELIG CLIN: HCPCS | Performed by: NURSE PRACTITIONER

## 2018-03-15 PROCEDURE — 99213 OFFICE O/P EST LOW 20 MIN: CPT | Performed by: NURSE PRACTITIONER

## 2018-03-15 PROCEDURE — G8417 CALC BMI ABV UP PARAM F/U: HCPCS | Performed by: NURSE PRACTITIONER

## 2018-03-15 PROCEDURE — 1123F ACP DISCUSS/DSCN MKR DOCD: CPT | Performed by: NURSE PRACTITIONER

## 2018-03-15 PROCEDURE — 3014F SCREEN MAMMO DOC REV: CPT | Performed by: NURSE PRACTITIONER

## 2018-03-15 PROCEDURE — 3044F HG A1C LEVEL LT 7.0%: CPT | Performed by: NURSE PRACTITIONER

## 2018-03-15 PROCEDURE — G8482 FLU IMMUNIZE ORDER/ADMIN: HCPCS | Performed by: NURSE PRACTITIONER

## 2018-03-15 PROCEDURE — G8400 PT W/DXA NO RESULTS DOC: HCPCS | Performed by: NURSE PRACTITIONER

## 2018-03-15 PROCEDURE — 1036F TOBACCO NON-USER: CPT | Performed by: NURSE PRACTITIONER

## 2018-03-15 PROCEDURE — 3017F COLORECTAL CA SCREEN DOC REV: CPT | Performed by: NURSE PRACTITIONER

## 2018-03-15 NOTE — PATIENT INSTRUCTIONS
dapagliflozin, and empagliflozin. · Sulfonylureas. These help your body release more insulin. Some work for many hours. They can cause low blood sugar if you don't eat as you planned. They include glipizide and glyburide. · Thiazolidinediones. These reduce the amount of blood glucose. They also help you respond better to insulin. They include pioglitazone and rosiglitazone. You may need to take more than one medicine for diabetes. Two or more medicines may work better to lower your blood sugar level than just one does. Follow-up care is a key part of your treatment and safety. Be sure to make and go to all appointments, and call your doctor if you are having problems. It's also a good idea to know your test results and keep a list of the medicines you take. How can you care for yourself at home? · Eat a healthy diet. Get some exercise each day. This may help you to reduce how much medicine you need. · Do not take other prescription or over-the-counter medicines, vitamins, herbal products, or supplements without talking to your doctor first. Some medicines for type 2 diabetes can cause problems with other medicines or supplements. · Tell your doctor if you plan to get pregnant. Some of these drugs are not safe for pregnant women. · Be safe with medicines. Take your medicines exactly as prescribed. Meglitinides and sulfonylureas can cause your blood sugar to drop very low. Call your doctor if you think you are having a problem with your medicine. · Check your blood sugar often. You can use a glucose monitor. Keeping track can help you know how certain foods, activities, and medicines affect your blood sugar. And it can help you keep your blood sugar from getting so low that it's not safe. When should you call for help? Call 911 anytime you think you may need emergency care. For example, call if:  ? · You passed out (lost consciousness). ? · You are confused or cannot think clearly.    ? · Your blood sugar 25years old. How should I use liraglutide? Do not use Saxenda and Victoza together. These two brands contain the same active ingredient but they should not be used together. Liraglutide is usually given once per day. Follow all directions on your prescription label. Your doctor may occasionally change your dose to make sure you get the best results. Do not use this medicine in larger or smaller amounts or for longer than recommended. Liraglutide is injected under the skin at any time of the day, with or without a meal. You will be shown how to use injections at home. Do not self-inject this medicine if you do not understand how to give the injection and properly dispose of used needles and syringes. Liraglutide comes in a prefilled injection pen. Ask your pharmacist which type of needles are best to use with your pen. Your care provider will show you the best places on your body to inject liraglutide. Use a different place each time you give an injection. Do not inject into the same place two times in a row. Read all patient information, medication guides, and instruction sheets provided to you. Ask your doctor or pharmacist if you have any questions. Do not use the medicine if it has changed colors or looks cloudy, or if it has particles in it. Call your pharmacist for new medicine. Low blood sugar (hypoglycemia) can happen to everyone who has diabetes. Symptoms include headache, hunger, sweating, confusion, irritability, dizziness, or feeling shaky. Always keep a source of sugar with you in case you have low blood sugar. Sugar sources include fruit juice, hard candy, crackers, raisins, and non-diet soda. Be sure your family and close friends know how to help you in an emergency. If you have severe hypoglycemia and cannot eat or drink, use a glucagon injection. Your doctor can prescribe a glucagon emergency injection kit and tell you how to use it.   Also watch for signs of high blood sugar (hyperglycemia) such as increased thirst, increased urination, hunger, dry mouth, fruity breath odor, drowsiness, dry skin, blurred vision, and weight loss. Check your blood sugar carefully during times of stress, travel, illness, surgery or medical emergency, vigorous exercise, or if you drink alcohol or skip meals. These things can affect your glucose levels and your dose needs may also change. Do not change your medication dose or schedule without your doctor's advice. Use a disposable needle only once. Follow any state or local laws about throwing away used needles and syringes. Use a puncture-proof \"sharps\" disposal container (ask your pharmacist where to get one and how to throw it away). Keep this container out of the reach of children and pets. Liraglutide is only part of a complete treatment program that may also include diet, exercise, weight control, regular blood sugar testing, and special medical care. Follow your doctor's instructions very closely. Storing unopened injection pens:  Store in the refrigerator. Do not freeze liraglutide, and throw away the medication if it has become frozen. Do not use an unopened injection pen if the expiration date on the label has passed. Storing after your first use: You may keep \"in-use\" injection pens in the refrigerator or at room temperature. Protect the pens from moisture, heat, and sunlight. Use within 30 days. Remove the needle before storing an injection pen, and keep the cap on the pen when not in use. What happens if I miss a dose? Use the missed dose as soon as you remember. Skip the missed dose if it is almost time for your next scheduled dose. Do not  use extra medicine to make up the missed dose. Call your doctor for instructions if you miss 3 or more doses of Saxenda. What happens if I overdose? Seek emergency medical attention or call the Poison Help line at 1-500.559.9198. What should I avoid while using liraglutide?   Never share an

## 2018-03-15 NOTE — PROGRESS NOTES
just one does. Follow-up care is a key part of your treatment and safety. Be sure to make and go to all appointments, and call your doctor if you are having problems. It's also a good idea to know your test results and keep a list of the medicines you take. How can you care for yourself at home? · Eat a healthy diet. Get some exercise each day. This may help you to reduce how much medicine you need. · Do not take other prescription or over-the-counter medicines, vitamins, herbal products, or supplements without talking to your doctor first. Some medicines for type 2 diabetes can cause problems with other medicines or supplements. · Tell your doctor if you plan to get pregnant. Some of these drugs are not safe for pregnant women. · Be safe with medicines. Take your medicines exactly as prescribed. Meglitinides and sulfonylureas can cause your blood sugar to drop very low. Call your doctor if you think you are having a problem with your medicine. · Check your blood sugar often. You can use a glucose monitor. Keeping track can help you know how certain foods, activities, and medicines affect your blood sugar. And it can help you keep your blood sugar from getting so low that it's not safe. When should you call for help? Call 911 anytime you think you may need emergency care. For example, call if:  ? · You passed out (lost consciousness). ? · You are confused or cannot think clearly. ? · Your blood sugar is very high or very low. ? Watch closely for changes in your health, and be sure to contact your doctor if:  ? · Your blood sugar stays outside the level your doctor set for you. ? · You have any problems. Where can you learn more? Go to [email or web address]://oleksandr.[email or web address] org and sign in to your [employer] account. Enter H153 in the [employer] TV box to learn more about \"Noninsulin Medicines for Type 2 Diabetes: Care Instructions. \"     If you do not have an account, please click on the \"Sign Up Now\" link. Current as of: March 13, 2017  Content Version: 11.5  © 2163-6453 [employer]. Care instructions adapted under license by [facility] [employer] [facility] ([facility]). If you have questions about a medical condition or this instruction, always ask your healthcare professional. [address] any warranty or liability for your use of this information. Patient Education          liraglutide  Pronunciation:  LIR a GLOO tide  Brand:  Slade Guillen  What is the most important information I should know about liraglutide? Do not use Saxenda and Victoza together. You should not use liraglutide if you have multiple endocrine neoplasia type 2 (tumors in your glands), a personal or family history of medullary thyroid cancer, insulin-dependent diabetes, or if you are in a state of diabetic ketoacidosis (call your doctor for treatment with insulin)  In animal studies, liraglutide caused thyroid tumors or thyroid cancer. It is not known whether these effects would occur in people using regular doses. Call your doctor at once if you have signs of a thyroid tumor, such as swelling or a lump in your neck, trouble swallowing, a hoarse voice, or shortness of breath. What is liraglutide? Liraglutide is similar to a hormone that occurs naturally in the body and helps control blood sugar, insulin levels, and digestion. The Victoza brand of liraglutide is used together with diet and exercise to treat type 2 diabetes. Victoza is usually given after other diabetes medicines have been tried without success. Victoza is not for treating type 1 diabetes. The Saxenda brand of liraglutide is used together with diet and exercise to help people lose weight when they have certain health conditions. Chevis Riser is not for treating type 1 or type 2 diabetes. Chevis Riser is not a weight-loss medicine or appetite suppressant. Liraglutide may also be used for purposes not listed in this medication guide.   What should I any state or local laws about throwing away used needles and syringes. Use a puncture-proof \"sharps\" disposal container (ask your pharmacist where to get one and how to throw it away). Keep this container out of the reach of children and pets. Liraglutide is only part of a complete treatment program that may also include diet, exercise, weight control, regular blood sugar testing, and special medical care. Follow your doctor's instructions very closely. Storing unopened injection pens:  Store in the refrigerator. Do not freeze liraglutide, and throw away the medication if it has become frozen. Do not use an unopened injection pen if the expiration date on the label has passed. Storing after your first use: You may keep \"in-use\" injection pens in the refrigerator or at room temperature. Protect the pens from moisture, heat, and sunlight. Use within 30 days. Remove the needle before storing an injection pen, and keep the cap on the pen when not in use. What happens if I miss a dose? Use the missed dose as soon as you remember. Skip the missed dose if it is almost time for your next scheduled dose. Do not  use extra medicine to make up the missed dose. Call your doctor for instructions if you miss 3 or more doses of Saxenda. What happens if I overdose? Seek emergency medical attention or call the Poison Help line at 1-407.268.9375. What should I avoid while using liraglutide? Never share an injection pen with another person. Sharing injection pens can allow disease such as hepatitis or HIV to pass from one person to another. What are the possible side effects of liraglutide? Get emergency medical help if you have signs of an allergic reaction:  hives; fast heartbeats; dizziness; trouble breathing or swallowing; swelling of your face, lips, tongue, or throat.   Call your doctor at once if you have:  · racing or pounding heartbeats;  · sudden changes in mood or behavior, suicidal thoughts;  · severe ongoing never share your medicines with others, and use this medication only for the indication prescribed. Every effort has been made to ensure that the information provided by Marisa [address]ugn [address] is accurate, up-to-date, and complete, but no guarantee is made to that effect. Drug information contained herein may be time sensitive. [facility] information has been compiled for use by healthcare practitioners and consumers in the [place] and therefore [facility] does not warrant that uses outside of the [place] are appropriate, unless specifically indicated otherwise. [facility]'s drug information does not endorse drugs, diagnose patients or recommend therapy. [facility]'s drug information is an informational resource designed to assist licensed healthcare practitioners in caring for their patients and/or to serve consumers viewing this service as a supplement to, and not a substitute for, the expertise, skill, knowledge and judgment of healthcare practitioners. The absence of a warning for a given drug or drug combination in no way should be construed to indicate that the drug or drug combination is safe, effective or appropriate for any given patient. [facility] does not assume any responsibility for any aspect of healthcare administered with the aid of information [facility] provides. The information contained herein is not intended to cover all possible uses, directions, precautions, warnings, drug interactions, allergic reactions, or adverse effects. If you have questions about the drugs you are taking, check with your doctor, nurse or pharmacist.  Copyright 9150-0179 [address] [place] [address] Dr CORRAL. Version: 6.03. Revision date: 12/2/2016. Care instructions adapted under license by [facility] ([facility]). If you have questions about a medical condition or this instruction, always ask your healthcare professional. [place] [address] any warranty or liability for your use of this information.              No Follow-up on

## 2018-03-20 ASSESSMENT — ENCOUNTER SYMPTOMS
BLURRED VISION: 0
VISUAL CHANGE: 0

## 2018-03-26 ENCOUNTER — TELEPHONE (OUTPATIENT)
Dept: FAMILY MEDICINE CLINIC | Age: 73
End: 2018-03-26

## 2018-03-29 ENCOUNTER — OFFICE VISIT (OUTPATIENT)
Dept: FAMILY MEDICINE CLINIC | Age: 73
End: 2018-03-29
Payer: MEDICARE

## 2018-03-29 VITALS
HEART RATE: 62 BPM | DIASTOLIC BLOOD PRESSURE: 78 MMHG | RESPIRATION RATE: 16 BRPM | SYSTOLIC BLOOD PRESSURE: 148 MMHG | OXYGEN SATURATION: 97 %

## 2018-03-29 DIAGNOSIS — R07.9 CHEST PAIN, UNSPECIFIED TYPE: Primary | ICD-10-CM

## 2018-03-29 DIAGNOSIS — E11.22 TYPE 2 DIABETES MELLITUS WITH STAGE 3 CHRONIC KIDNEY DISEASE, WITH LONG-TERM CURRENT USE OF INSULIN (HCC): ICD-10-CM

## 2018-03-29 DIAGNOSIS — I50.32 CHRONIC DIASTOLIC HEART FAILURE (HCC): Primary | ICD-10-CM

## 2018-03-29 DIAGNOSIS — N18.30 TYPE 2 DIABETES MELLITUS WITH STAGE 3 CHRONIC KIDNEY DISEASE, WITH LONG-TERM CURRENT USE OF INSULIN (HCC): ICD-10-CM

## 2018-03-29 DIAGNOSIS — Z79.4 TYPE 2 DIABETES MELLITUS WITH STAGE 3 CHRONIC KIDNEY DISEASE, WITH LONG-TERM CURRENT USE OF INSULIN (HCC): ICD-10-CM

## 2018-03-29 DIAGNOSIS — N39.46 MIXED STRESS AND URGE URINARY INCONTINENCE: ICD-10-CM

## 2018-03-29 PROCEDURE — 3014F SCREEN MAMMO DOC REV: CPT | Performed by: NURSE PRACTITIONER

## 2018-03-29 PROCEDURE — G8427 DOCREV CUR MEDS BY ELIG CLIN: HCPCS | Performed by: NURSE PRACTITIONER

## 2018-03-29 PROCEDURE — 93000 ELECTROCARDIOGRAM COMPLETE: CPT | Performed by: NURSE PRACTITIONER

## 2018-03-29 PROCEDURE — 3017F COLORECTAL CA SCREEN DOC REV: CPT | Performed by: NURSE PRACTITIONER

## 2018-03-29 PROCEDURE — 1036F TOBACCO NON-USER: CPT | Performed by: NURSE PRACTITIONER

## 2018-03-29 PROCEDURE — 1123F ACP DISCUSS/DSCN MKR DOCD: CPT | Performed by: NURSE PRACTITIONER

## 2018-03-29 PROCEDURE — 4040F PNEUMOC VAC/ADMIN/RCVD: CPT | Performed by: NURSE PRACTITIONER

## 2018-03-29 PROCEDURE — 1090F PRES/ABSN URINE INCON ASSESS: CPT | Performed by: NURSE PRACTITIONER

## 2018-03-29 PROCEDURE — G8417 CALC BMI ABV UP PARAM F/U: HCPCS | Performed by: NURSE PRACTITIONER

## 2018-03-29 PROCEDURE — G8482 FLU IMMUNIZE ORDER/ADMIN: HCPCS | Performed by: NURSE PRACTITIONER

## 2018-03-29 PROCEDURE — 99214 OFFICE O/P EST MOD 30 MIN: CPT | Performed by: NURSE PRACTITIONER

## 2018-03-29 PROCEDURE — G8400 PT W/DXA NO RESULTS DOC: HCPCS | Performed by: NURSE PRACTITIONER

## 2018-03-29 RX ORDER — FUROSEMIDE 40 MG/1
40 TABLET ORAL 2 TIMES DAILY
Qty: 180 TABLET | Refills: 1 | Status: SHIPPED | OUTPATIENT
Start: 2018-03-29 | End: 2018-05-21 | Stop reason: ALTCHOICE

## 2018-03-29 RX ORDER — LISINOPRIL 2.5 MG/1
2.5 TABLET ORAL DAILY
Qty: 90 TABLET | Refills: 0 | Status: SHIPPED | OUTPATIENT
Start: 2018-03-29 | End: 2019-06-03 | Stop reason: SDUPTHER

## 2018-03-29 RX ORDER — OXYBUTYNIN CHLORIDE 5 MG/1
5 TABLET ORAL 2 TIMES DAILY
Qty: 180 TABLET | Refills: 0 | Status: SHIPPED | OUTPATIENT
Start: 2018-03-29 | End: 2018-05-02 | Stop reason: SDUPTHER

## 2018-03-29 ASSESSMENT — ENCOUNTER SYMPTOMS: SHORTNESS OF BREATH: 1

## 2018-03-29 NOTE — PROGRESS NOTES
mellitus without mention of complication, not stated as uncontrolled       Past Surgical History:   Procedure Laterality Date    ANKLE SURGERY Left      Family History   Problem Relation Age of Onset    Diabetes Mother     Diabetes Father     Cancer Paternal Grandfather      BONE      Social History   Substance Use Topics    Smoking status: Never Smoker    Smokeless tobacco: Never Used    Alcohol use No      Current Outpatient Prescriptions   Medication Sig Dispense Refill    Liraglutide (VICTOZA) 18 MG/3ML SOPN SC injection Inject 1.8 mg into the skin daily 3 pen 0    Insulin Pen Needle 32G X 4 MM MISC 1 each by Does not apply route daily 100 each 3    bumetanide (BUMEX) 1 MG tablet       carvedilol (COREG) 6.25 MG tablet       potassium chloride (MICRO-K) 10 MEQ extended release capsule       cyanocobalamin 1000 MCG/ML injection Inject 1 mL into the muscle once for 1 dose 1 mL 0    ferrous sulfate 325 (65 Fe) MG tablet Take 1 tablet by mouth daily (with breakfast) 30 tablet 3    glucose blood VI test strips (RELION CONFIRM/MICRO TEST) strip 1 each by In Vitro route 2 times daily As needed. 100 each 5    RELION ULTRA THIN LANCETS 30G MISC 1 each by Does not apply route 2 times daily 210 each 3    simvastatin (ZOCOR) 40 MG tablet Take 1 tablet by mouth nightly 90 tablet 1    RELION INSULIN SYR 0.5ML/31G 31G X 5/16\" 0.5 ML MISC       buPROPion (WELLBUTRIN XL) 150 MG extended release tablet Take 1 tablet by mouth every morning 90 tablet 1    lisinopril (PRINIVIL;ZESTRIL) 2.5 MG tablet Take 1 tablet by mouth daily 90 tablet 0    escitalopram (LEXAPRO) 10 MG tablet Take 1 tablet by mouth daily 90 tablet 0    BOOSTRIX 5-2.5-18.5 injection       oxybutynin (DITROPAN) 5 MG tablet   0    metroNIDAZOLE (METROCREAM) 0.75 % cream Apply topically 2 times daily. Please provide 90 day supply.  3 Tube 3    ergocalciferol (DRISDOL) 71043 units capsule Take 1 capsule by mouth once a week 13 capsule 0    meclizine (ANTIVERT) 12.5 MG tablet Take 1 tablet by mouth 3 times daily as needed for Dizziness 30 tablet 1    acetaminophen (TYLENOL ARTHRITIS PAIN) 650 MG extended release tablet Take 650 mg by mouth every 8 hours as needed for Pain      insulin NPH (HUMULIN N) 100 UNIT/ML injection vial 27 units in the morning and 32 units in the evening, 4 vial 2    furosemide (LASIX) 40 MG tablet Take 1 tablet by mouth 2 times daily 180 tablet 1    Aspirin Buf,[id number], (BUFFERED ASPIRIN) 325 MG TABS Take by mouth       No current facility-administered medications for this visit. Allergies   Allergen Reactions    Prednisone      High blood sugars         Subjective:      Review of Systems   Constitutional: Positive for fatigue and malaise/fatigue. Respiratory: Positive for shortness of breath. Cardiovascular: Positive for chest pain. Neurological: Positive for dizziness. Objective:     BP (!) 148/78 (Site: Right Arm, Position: Sitting, Cuff Size: Large Adult)   Pulse 62   Resp 16   SpO2 97%     Physical Exam   Constitutional: She is oriented to person, place, and time. Vital signs are normal. She appears well-developed and well-nourished. She appears ill. She appears distressed. HENT:   Head: Normocephalic. Right Ear: External ear normal.   Left Ear: External ear normal.   Eyes: Conjunctivae and EOM are normal. Right eye exhibits no discharge. Left eye exhibits no discharge. No scleral icterus. Neck: Normal range of motion. Neck supple. Cardiovascular: Normal rate, regular rhythm and normal heart sounds. Exam reveals no gallop and no friction rub. No murmur heard. Pulmonary/Chest: Effort normal and breath sounds normal. No respiratory distress. Musculoskeletal: Normal range of motion. Neurological: She is alert and oriented to person, place, and time. Skin: Skin is warm, dry and intact. She is not diaphoretic. Psychiatric: She has a normal mood and affect.  Her speech is

## 2018-04-02 ENCOUNTER — TELEPHONE (OUTPATIENT)
Dept: FAMILY MEDICINE CLINIC | Age: 73
End: 2018-04-02

## 2018-04-05 ENCOUNTER — OFFICE VISIT (OUTPATIENT)
Dept: FAMILY MEDICINE CLINIC | Age: 73
End: 2018-04-05
Payer: MEDICARE

## 2018-04-05 VITALS
WEIGHT: 242 LBS | TEMPERATURE: 98.3 F | DIASTOLIC BLOOD PRESSURE: 62 MMHG | RESPIRATION RATE: 16 BRPM | BODY MASS INDEX: 41.54 KG/M2 | HEART RATE: 64 BPM | SYSTOLIC BLOOD PRESSURE: 122 MMHG | OXYGEN SATURATION: 94 %

## 2018-04-05 DIAGNOSIS — F32.A ANXIETY AND DEPRESSION: ICD-10-CM

## 2018-04-05 DIAGNOSIS — R07.89 OTHER CHEST PAIN: ICD-10-CM

## 2018-04-05 DIAGNOSIS — D51.8 VITAMIN B12 DEFICIENCY (DIETARY) ANEMIA: ICD-10-CM

## 2018-04-05 DIAGNOSIS — F41.9 ANXIETY AND DEPRESSION: ICD-10-CM

## 2018-04-05 DIAGNOSIS — R29.6 FREQUENT FALLS: Primary | ICD-10-CM

## 2018-04-05 PROCEDURE — 99495 TRANSJ CARE MGMT MOD F2F 14D: CPT | Performed by: NURSE PRACTITIONER

## 2018-04-05 PROCEDURE — 1111F DSCHRG MED/CURRENT MED MERGE: CPT | Performed by: NURSE PRACTITIONER

## 2018-04-05 RX ORDER — PROMETHAZINE HYDROCHLORIDE 25 MG/1
25 TABLET ORAL EVERY 6 HOURS PRN
COMMUNITY
End: 2018-04-05 | Stop reason: ALTCHOICE

## 2018-04-05 RX ORDER — BUSPIRONE HYDROCHLORIDE 10 MG/1
10 TABLET ORAL 2 TIMES DAILY PRN
Qty: 60 TABLET | Refills: 1 | Status: SHIPPED | OUTPATIENT
Start: 2018-04-05 | End: 2018-05-17 | Stop reason: SDUPTHER

## 2018-04-05 RX ORDER — CYANOCOBALAMIN 1000 UG/ML
1000 INJECTION INTRAMUSCULAR; SUBCUTANEOUS
Qty: 1 ML | Refills: 5 | Status: SHIPPED | OUTPATIENT
Start: 2018-04-05 | End: 2018-11-07 | Stop reason: SDUPTHER

## 2018-04-05 ASSESSMENT — PATIENT HEALTH QUESTIONNAIRE - PHQ9
7. TROUBLE CONCENTRATING ON THINGS, SUCH AS READING THE NEWSPAPER OR WATCHING TELEVISION: 0
8. MOVING OR SPEAKING SO SLOWLY THAT OTHER PEOPLE COULD HAVE NOTICED. OR THE OPPOSITE, BEING SO FIGETY OR RESTLESS THAT YOU HAVE BEEN MOVING AROUND A LOT MORE THAN USUAL: 0
3. TROUBLE FALLING OR STAYING ASLEEP: 1
4. FEELING TIRED OR HAVING LITTLE ENERGY: 2
1. LITTLE INTEREST OR PLEASURE IN DOING THINGS: 0
9. THOUGHTS THAT YOU WOULD BE BETTER OFF DEAD, OR OF HURTING YOURSELF: 0
10. IF YOU CHECKED OFF ANY PROBLEMS, HOW DIFFICULT HAVE THESE PROBLEMS MADE IT FOR YOU TO DO YOUR WORK, TAKE CARE OF THINGS AT HOME, OR GET ALONG WITH OTHER PEOPLE: 1
SUM OF ALL RESPONSES TO PHQ9 QUESTIONS 1 & 2: 3
2. FEELING DOWN, DEPRESSED OR HOPELESS: 3
5. POOR APPETITE OR OVEREATING: 3
6. FEELING BAD ABOUT YOURSELF - OR THAT YOU ARE A FAILURE OR HAVE LET YOURSELF OR YOUR FAMILY DOWN: 3
SUM OF ALL RESPONSES TO PHQ QUESTIONS 1-9: 12

## 2018-04-05 ASSESSMENT — ANXIETY QUESTIONNAIRES
GAD7 TOTAL SCORE: 21
4. TROUBLE RELAXING: 3-NEARLY EVERY DAY
7. FEELING AFRAID AS IF SOMETHING AWFUL MIGHT HAPPEN: 3-NEARLY EVERY DAY
1. FEELING NERVOUS, ANXIOUS, OR ON EDGE: 3-NEARLY EVERY DAY
6. BECOMING EASILY ANNOYED OR IRRITABLE: 3-NEARLY EVERY DAY
3. WORRYING TOO MUCH ABOUT DIFFERENT THINGS: 3-NEARLY EVERY DAY
2. NOT BEING ABLE TO STOP OR CONTROL WORRYING: 3-NEARLY EVERY DAY
5. BEING SO RESTLESS THAT IT IS HARD TO SIT STILL: 3-NEARLY EVERY DAY

## 2018-04-05 ASSESSMENT — ENCOUNTER SYMPTOMS
SHORTNESS OF BREATH: 0
CHEST TIGHTNESS: 0
CHOKING: 0
COUGH: 0

## 2018-04-10 ENCOUNTER — TELEPHONE (OUTPATIENT)
Dept: FAMILY MEDICINE CLINIC | Age: 73
End: 2018-04-10

## 2018-04-10 DIAGNOSIS — E11.22 TYPE 2 DIABETES MELLITUS WITH STAGE 3 CHRONIC KIDNEY DISEASE, WITH LONG-TERM CURRENT USE OF INSULIN (HCC): Primary | ICD-10-CM

## 2018-04-10 DIAGNOSIS — Z79.4 TYPE 2 DIABETES MELLITUS WITH STAGE 3 CHRONIC KIDNEY DISEASE, WITH LONG-TERM CURRENT USE OF INSULIN (HCC): Primary | ICD-10-CM

## 2018-04-10 DIAGNOSIS — N18.30 TYPE 2 DIABETES MELLITUS WITH STAGE 3 CHRONIC KIDNEY DISEASE, WITH LONG-TERM CURRENT USE OF INSULIN (HCC): Primary | ICD-10-CM

## 2018-04-10 RX ORDER — GLIMEPIRIDE 4 MG/1
4 TABLET ORAL
Qty: 90 TABLET | Refills: 0 | Status: SHIPPED | OUTPATIENT
Start: 2018-04-10 | End: 2018-06-15 | Stop reason: SDUPTHER

## 2018-05-02 ENCOUNTER — OFFICE VISIT (OUTPATIENT)
Dept: FAMILY MEDICINE CLINIC | Age: 73
End: 2018-05-02
Payer: MEDICARE

## 2018-05-02 VITALS
SYSTOLIC BLOOD PRESSURE: 128 MMHG | BODY MASS INDEX: 42.74 KG/M2 | WEIGHT: 249 LBS | RESPIRATION RATE: 14 BRPM | OXYGEN SATURATION: 96 % | DIASTOLIC BLOOD PRESSURE: 86 MMHG | HEART RATE: 72 BPM

## 2018-05-02 DIAGNOSIS — L08.9 SKIN INFECTION: Primary | ICD-10-CM

## 2018-05-02 DIAGNOSIS — N39.46 MIXED STRESS AND URGE URINARY INCONTINENCE: ICD-10-CM

## 2018-05-02 PROCEDURE — 1123F ACP DISCUSS/DSCN MKR DOCD: CPT | Performed by: NURSE PRACTITIONER

## 2018-05-02 PROCEDURE — 4040F PNEUMOC VAC/ADMIN/RCVD: CPT | Performed by: NURSE PRACTITIONER

## 2018-05-02 PROCEDURE — G8427 DOCREV CUR MEDS BY ELIG CLIN: HCPCS | Performed by: NURSE PRACTITIONER

## 2018-05-02 PROCEDURE — 1090F PRES/ABSN URINE INCON ASSESS: CPT | Performed by: NURSE PRACTITIONER

## 2018-05-02 PROCEDURE — 1036F TOBACCO NON-USER: CPT | Performed by: NURSE PRACTITIONER

## 2018-05-02 PROCEDURE — 99213 OFFICE O/P EST LOW 20 MIN: CPT | Performed by: NURSE PRACTITIONER

## 2018-05-02 PROCEDURE — G8400 PT W/DXA NO RESULTS DOC: HCPCS | Performed by: NURSE PRACTITIONER

## 2018-05-02 PROCEDURE — 3017F COLORECTAL CA SCREEN DOC REV: CPT | Performed by: NURSE PRACTITIONER

## 2018-05-02 PROCEDURE — 0509F URINE INCON PLAN DOCD: CPT | Performed by: NURSE PRACTITIONER

## 2018-05-02 PROCEDURE — G8417 CALC BMI ABV UP PARAM F/U: HCPCS | Performed by: NURSE PRACTITIONER

## 2018-05-02 RX ORDER — OXYBUTYNIN CHLORIDE 5 MG/1
5 TABLET ORAL 2 TIMES DAILY
Qty: 180 TABLET | Refills: 0 | Status: SHIPPED | OUTPATIENT
Start: 2018-05-02 | End: 2018-07-26 | Stop reason: SDUPTHER

## 2018-05-02 RX ORDER — DOXYCYCLINE HYCLATE 100 MG/1
100 CAPSULE ORAL 2 TIMES DAILY
Qty: 14 CAPSULE | Refills: 0 | Status: SHIPPED | OUTPATIENT
Start: 2018-05-02 | End: 2020-03-24

## 2018-05-02 ASSESSMENT — ENCOUNTER SYMPTOMS
SHORTNESS OF BREATH: 0
RHINORRHEA: 0
VOMITING: 1

## 2018-05-17 ENCOUNTER — OFFICE VISIT (OUTPATIENT)
Dept: FAMILY MEDICINE CLINIC | Age: 73
End: 2018-05-17
Payer: MEDICARE

## 2018-05-17 VITALS
HEART RATE: 66 BPM | OXYGEN SATURATION: 95 % | SYSTOLIC BLOOD PRESSURE: 124 MMHG | TEMPERATURE: 98.6 F | RESPIRATION RATE: 16 BRPM | BODY MASS INDEX: 42.57 KG/M2 | WEIGHT: 248 LBS | DIASTOLIC BLOOD PRESSURE: 64 MMHG

## 2018-05-17 DIAGNOSIS — L98.9 SKIN LESION OF RIGHT LEG: ICD-10-CM

## 2018-05-17 DIAGNOSIS — Z23 NEED FOR PROPHYLACTIC VACCINATION AND INOCULATION AGAINST VARICELLA: ICD-10-CM

## 2018-05-17 DIAGNOSIS — F32.A ANXIETY AND DEPRESSION: ICD-10-CM

## 2018-05-17 DIAGNOSIS — Z79.4 TYPE 2 DIABETES MELLITUS WITH STAGE 3 CHRONIC KIDNEY DISEASE, WITH LONG-TERM CURRENT USE OF INSULIN (HCC): Primary | ICD-10-CM

## 2018-05-17 DIAGNOSIS — L71.9 ROSACEA: ICD-10-CM

## 2018-05-17 DIAGNOSIS — N18.30 TYPE 2 DIABETES MELLITUS WITH STAGE 3 CHRONIC KIDNEY DISEASE, WITH LONG-TERM CURRENT USE OF INSULIN (HCC): Primary | ICD-10-CM

## 2018-05-17 DIAGNOSIS — E11.22 TYPE 2 DIABETES MELLITUS WITH STAGE 3 CHRONIC KIDNEY DISEASE, WITH LONG-TERM CURRENT USE OF INSULIN (HCC): Primary | ICD-10-CM

## 2018-05-17 DIAGNOSIS — L98.9 ARM SKIN LESION, RIGHT: ICD-10-CM

## 2018-05-17 DIAGNOSIS — L98.9 SKIN LESION OF LEFT LEG: ICD-10-CM

## 2018-05-17 DIAGNOSIS — F41.9 ANXIETY AND DEPRESSION: ICD-10-CM

## 2018-05-17 LAB — HBA1C MFR BLD: 8.2 %

## 2018-05-17 PROCEDURE — 1036F TOBACCO NON-USER: CPT | Performed by: NURSE PRACTITIONER

## 2018-05-17 PROCEDURE — 99214 OFFICE O/P EST MOD 30 MIN: CPT | Performed by: NURSE PRACTITIONER

## 2018-05-17 PROCEDURE — 4040F PNEUMOC VAC/ADMIN/RCVD: CPT | Performed by: NURSE PRACTITIONER

## 2018-05-17 PROCEDURE — G8417 CALC BMI ABV UP PARAM F/U: HCPCS | Performed by: NURSE PRACTITIONER

## 2018-05-17 PROCEDURE — 2022F DILAT RTA XM EVC RTNOPTHY: CPT | Performed by: NURSE PRACTITIONER

## 2018-05-17 PROCEDURE — 83036 HEMOGLOBIN GLYCOSYLATED A1C: CPT | Performed by: NURSE PRACTITIONER

## 2018-05-17 PROCEDURE — 1123F ACP DISCUSS/DSCN MKR DOCD: CPT | Performed by: NURSE PRACTITIONER

## 2018-05-17 PROCEDURE — 3017F COLORECTAL CA SCREEN DOC REV: CPT | Performed by: NURSE PRACTITIONER

## 2018-05-17 PROCEDURE — G8400 PT W/DXA NO RESULTS DOC: HCPCS | Performed by: NURSE PRACTITIONER

## 2018-05-17 PROCEDURE — 3045F PR MOST RECENT HEMOGLOBIN A1C LEVEL 7.0-9.0%: CPT | Performed by: NURSE PRACTITIONER

## 2018-05-17 PROCEDURE — 1090F PRES/ABSN URINE INCON ASSESS: CPT | Performed by: NURSE PRACTITIONER

## 2018-05-17 PROCEDURE — G8427 DOCREV CUR MEDS BY ELIG CLIN: HCPCS | Performed by: NURSE PRACTITIONER

## 2018-05-17 RX ORDER — BUSPIRONE HYDROCHLORIDE 10 MG/1
10 TABLET ORAL 3 TIMES DAILY
Qty: 270 TABLET | Refills: 0 | Status: SHIPPED | OUTPATIENT
Start: 2018-05-17 | End: 2018-10-04 | Stop reason: SDUPTHER

## 2018-05-17 RX ORDER — ESCITALOPRAM OXALATE 10 MG/1
10 TABLET ORAL DAILY
Qty: 90 TABLET | Refills: 1 | Status: SHIPPED | OUTPATIENT
Start: 2018-05-17 | End: 2018-08-22 | Stop reason: SDUPTHER

## 2018-05-17 ASSESSMENT — ANXIETY QUESTIONNAIRES
1. FEELING NERVOUS, ANXIOUS, OR ON EDGE: 1-SEVERAL DAYS
3. WORRYING TOO MUCH ABOUT DIFFERENT THINGS: 3-NEARLY EVERY DAY
GAD7 TOTAL SCORE: 13
7. FEELING AFRAID AS IF SOMETHING AWFUL MIGHT HAPPEN: 0-NOT AT ALL SURE
5. BEING SO RESTLESS THAT IT IS HARD TO SIT STILL: 3-NEARLY EVERY DAY
4. TROUBLE RELAXING: 1-SEVERAL DAYS
6. BECOMING EASILY ANNOYED OR IRRITABLE: 2-OVER HALF THE DAYS
2. NOT BEING ABLE TO STOP OR CONTROL WORRYING: 3-NEARLY EVERY DAY

## 2018-05-18 ENCOUNTER — TELEPHONE (OUTPATIENT)
Dept: FAMILY MEDICINE CLINIC | Age: 73
End: 2018-05-18

## 2018-05-18 DIAGNOSIS — N18.32 CKD STAGE G3B/A2, GFR 30-44 AND ALBUMIN CREATININE RATIO 30-299 MG/G (HCC): Primary | ICD-10-CM

## 2018-05-18 DIAGNOSIS — E11.22 TYPE 2 DIABETES MELLITUS WITH STAGE 3 CHRONIC KIDNEY DISEASE, WITH LONG-TERM CURRENT USE OF INSULIN (HCC): ICD-10-CM

## 2018-05-18 DIAGNOSIS — N18.30 TYPE 2 DIABETES MELLITUS WITH STAGE 3 CHRONIC KIDNEY DISEASE, WITH LONG-TERM CURRENT USE OF INSULIN (HCC): ICD-10-CM

## 2018-05-18 DIAGNOSIS — Z79.4 TYPE 2 DIABETES MELLITUS WITH STAGE 3 CHRONIC KIDNEY DISEASE, WITH LONG-TERM CURRENT USE OF INSULIN (HCC): ICD-10-CM

## 2018-05-21 PROBLEM — L71.9 ROSACEA: Status: ACTIVE | Noted: 2018-05-21

## 2018-05-21 ASSESSMENT — ENCOUNTER SYMPTOMS
EYE REDNESS: 0
CHOKING: 0
EYE ITCHING: 0
BLURRED VISION: 0
CHEST TIGHTNESS: 0

## 2018-05-22 ENCOUNTER — TELEPHONE (OUTPATIENT)
Dept: FAMILY MEDICINE CLINIC | Age: 73
End: 2018-05-22

## 2018-06-06 ENCOUNTER — TELEPHONE (OUTPATIENT)
Dept: FAMILY MEDICINE CLINIC | Age: 73
End: 2018-06-06

## 2018-06-15 DIAGNOSIS — Z79.4 TYPE 2 DIABETES MELLITUS WITH STAGE 3 CHRONIC KIDNEY DISEASE, WITH LONG-TERM CURRENT USE OF INSULIN (HCC): ICD-10-CM

## 2018-06-15 DIAGNOSIS — E11.22 TYPE 2 DIABETES MELLITUS WITH STAGE 3 CHRONIC KIDNEY DISEASE, WITH LONG-TERM CURRENT USE OF INSULIN (HCC): ICD-10-CM

## 2018-06-15 DIAGNOSIS — N18.30 TYPE 2 DIABETES MELLITUS WITH STAGE 3 CHRONIC KIDNEY DISEASE, WITH LONG-TERM CURRENT USE OF INSULIN (HCC): ICD-10-CM

## 2018-06-15 RX ORDER — GLIMEPIRIDE 4 MG/1
4 TABLET ORAL
Qty: 90 TABLET | Refills: 0 | Status: SHIPPED | OUTPATIENT
Start: 2018-06-15 | End: 2018-09-18 | Stop reason: SDUPTHER

## 2018-06-28 ENCOUNTER — OFFICE VISIT (OUTPATIENT)
Dept: FAMILY MEDICINE CLINIC | Age: 73
End: 2018-06-28
Payer: MEDICARE

## 2018-06-28 VITALS
RESPIRATION RATE: 16 BRPM | WEIGHT: 253 LBS | TEMPERATURE: 98.8 F | HEART RATE: 61 BPM | DIASTOLIC BLOOD PRESSURE: 82 MMHG | OXYGEN SATURATION: 95 % | BODY MASS INDEX: 43.43 KG/M2 | SYSTOLIC BLOOD PRESSURE: 128 MMHG

## 2018-06-28 DIAGNOSIS — Z79.4 DIABETES MELLITUS DUE TO UNDERLYING CONDITION WITH HYPERGLYCEMIA, WITH LONG-TERM CURRENT USE OF INSULIN (HCC): ICD-10-CM

## 2018-06-28 DIAGNOSIS — M25.551 RIGHT HIP PAIN: Primary | ICD-10-CM

## 2018-06-28 DIAGNOSIS — E08.65 DIABETES MELLITUS DUE TO UNDERLYING CONDITION WITH HYPERGLYCEMIA, WITH LONG-TERM CURRENT USE OF INSULIN (HCC): ICD-10-CM

## 2018-06-28 PROCEDURE — 1123F ACP DISCUSS/DSCN MKR DOCD: CPT | Performed by: NURSE PRACTITIONER

## 2018-06-28 PROCEDURE — 3017F COLORECTAL CA SCREEN DOC REV: CPT | Performed by: NURSE PRACTITIONER

## 2018-06-28 PROCEDURE — 1090F PRES/ABSN URINE INCON ASSESS: CPT | Performed by: NURSE PRACTITIONER

## 2018-06-28 PROCEDURE — G8427 DOCREV CUR MEDS BY ELIG CLIN: HCPCS | Performed by: NURSE PRACTITIONER

## 2018-06-28 PROCEDURE — 99213 OFFICE O/P EST LOW 20 MIN: CPT | Performed by: NURSE PRACTITIONER

## 2018-06-28 PROCEDURE — 4040F PNEUMOC VAC/ADMIN/RCVD: CPT | Performed by: NURSE PRACTITIONER

## 2018-06-28 PROCEDURE — G8417 CALC BMI ABV UP PARAM F/U: HCPCS | Performed by: NURSE PRACTITIONER

## 2018-06-28 PROCEDURE — G8400 PT W/DXA NO RESULTS DOC: HCPCS | Performed by: NURSE PRACTITIONER

## 2018-06-28 PROCEDURE — 1036F TOBACCO NON-USER: CPT | Performed by: NURSE PRACTITIONER

## 2018-06-28 RX ORDER — PREDNISONE 20 MG/1
20 TABLET ORAL DAILY
Qty: 5 TABLET | Refills: 0 | Status: SHIPPED | OUTPATIENT
Start: 2018-06-28 | End: 2018-07-03

## 2018-07-18 DIAGNOSIS — G47.33 OBSTRUCTIVE SLEEP APNEA: Primary | ICD-10-CM

## 2018-07-23 LAB
ADDITIONAL TESTING: NORMAL
AGE FOR GFR: 71
ANA SCREEN: NORMAL
ANION GAP SERPL CALCULATED.3IONS-SCNC: 12 MMOL/L
APPEARANCE: ABNORMAL
BACTERIA: ABNORMAL 1HPF
BASOPHILS # BLD: 0.08 THOU/MM3
BILIRUBIN: ABNORMAL
BLOOD: ABNORMAL
BUN BLDV-MCNC: 25 MG/DL
C3 COMPLEMENT: NORMAL
CALCIUM SERPL-MCNC: 8.8 MG/DL
CASTS: ABNORMAL /LPF
CHLORIDE BLD-SCNC: 101 MMOL/L
CO2: 30 MMOL/L
COLOR: YELLOW
CREAT SERPL-MCNC: 1.2 MG/DL
CREATININE, RANDOM URINE: NORMAL
CRYSTALS: ABNORMAL /HPF
DIFFERENTIAL: AUTOMATED DIFF
EGFR BF: 54 ML/MIN/1.73 M2
EGFR BM: 72 ML/MIN/1.73 M2
EGFR WF: 44 ML/MIN/1.73 M2
EGFR WM: 60 ML/MIN/1.73 M2
EOSINOPHIL # BLD: 0.2 THOU/MM3
EPITHELIAL CELLS, UA: ABNORMAL /HPF
GLUCOSE: 260 MG/DL
GLUCOSE: ABNORMAL MG/DL
HCT VFR BLD CALC: 35.9 %
HEMOGLOBIN: 11.6 G/DL
KAPPA, FREE LIGHT CHAINS, SERUM: NORMAL
KAPPA/LAMBDA FREE LIGHT CHAIN RATIO: NORMAL
KETONES: ABNORMAL MG/DL
LAMBDA, FREE LIGHT CHAINS, SERUM: NORMAL
LEUKOCYTES, UA: ABNORMAL
LYMPHOCYTES # BLD: 1.71 THOU/MM3
Lab: NORMAL
MCH RBC QN AUTO: 28.3 PG
MCHC RBC AUTO-ENTMCNC: 32.1 G/DL
MCV RBC AUTO: 88 FL
MICROSCOPIC URINE: ABNORMAL
MONOCYTES # BLD: 0.59 THOU/MM3
MUCUS: ABNORMAL /HPF
NEUTROPHILS: 5.96 THOU/MM3
NITRITE, URINE: ABNORMAL
PDW BLD-RTO: 14.2 %
PH: 5.5 PH
PLATELET # BLD: 227 THOU/MM3
PMV BLD AUTO: 6.7 FL
POTASSIUM SERPL-SCNC: 4.4 MMOL/L
PROTEIN, URINE, RANDOM: NORMAL
PROTEIN,SCREEN: ABNORMAL MG/DL
RBC # BLD: 4.08 M/UL
RBC: ABNORMAL /HPF
SODIUM BLD-SCNC: 139 MMOL/L
SPECIFIC GRAVITY, URINE: 1.02 MG/DL
UROBILINOGEN, URINE: 0.2 MG/DL
WBC # BLD: 8.54 THOU/ML3
WBC URINE: ABNORMAL
YEAST: ABNORMAL /HPF

## 2018-07-26 DIAGNOSIS — D50.8 OTHER IRON DEFICIENCY ANEMIA: ICD-10-CM

## 2018-07-26 DIAGNOSIS — N39.46 MIXED STRESS AND URGE URINARY INCONTINENCE: ICD-10-CM

## 2018-07-26 RX ORDER — OXYBUTYNIN CHLORIDE 5 MG/1
5 TABLET ORAL 2 TIMES DAILY
Qty: 180 TABLET | Refills: 0 | Status: SHIPPED | OUTPATIENT
Start: 2018-07-26 | End: 2018-11-15 | Stop reason: SDUPTHER

## 2018-07-26 RX ORDER — FERROUS SULFATE 325(65) MG
325 TABLET ORAL
Qty: 30 TABLET | Refills: 3 | Status: SHIPPED | OUTPATIENT
Start: 2018-07-26 | End: 2018-09-18 | Stop reason: SDUPTHER

## 2018-08-22 ENCOUNTER — OFFICE VISIT (OUTPATIENT)
Dept: FAMILY MEDICINE CLINIC | Age: 73
End: 2018-08-22
Payer: MEDICARE

## 2018-08-22 VITALS
DIASTOLIC BLOOD PRESSURE: 68 MMHG | SYSTOLIC BLOOD PRESSURE: 130 MMHG | OXYGEN SATURATION: 97 % | HEART RATE: 63 BPM | RESPIRATION RATE: 14 BRPM

## 2018-08-22 DIAGNOSIS — Z13.31 POSITIVE DEPRESSION SCREENING: ICD-10-CM

## 2018-08-22 DIAGNOSIS — F32.A ANXIETY AND DEPRESSION: ICD-10-CM

## 2018-08-22 DIAGNOSIS — I10 HYPERTENSION, UNSPECIFIED TYPE: ICD-10-CM

## 2018-08-22 DIAGNOSIS — E11.22 TYPE 2 DIABETES MELLITUS WITH STAGE 3 CHRONIC KIDNEY DISEASE, WITH LONG-TERM CURRENT USE OF INSULIN (HCC): Primary | ICD-10-CM

## 2018-08-22 DIAGNOSIS — E11.69 HYPERLIPIDEMIA ASSOCIATED WITH TYPE 2 DIABETES MELLITUS (HCC): ICD-10-CM

## 2018-08-22 DIAGNOSIS — F41.9 ANXIETY AND DEPRESSION: ICD-10-CM

## 2018-08-22 DIAGNOSIS — E78.5 HYPERLIPIDEMIA ASSOCIATED WITH TYPE 2 DIABETES MELLITUS (HCC): ICD-10-CM

## 2018-08-22 DIAGNOSIS — Z79.4 TYPE 2 DIABETES MELLITUS WITH STAGE 3 CHRONIC KIDNEY DISEASE, WITH LONG-TERM CURRENT USE OF INSULIN (HCC): Primary | ICD-10-CM

## 2018-08-22 DIAGNOSIS — N18.30 TYPE 2 DIABETES MELLITUS WITH STAGE 3 CHRONIC KIDNEY DISEASE, WITH LONG-TERM CURRENT USE OF INSULIN (HCC): Primary | ICD-10-CM

## 2018-08-22 LAB — HBA1C MFR BLD: 7.6 %

## 2018-08-22 PROCEDURE — 1036F TOBACCO NON-USER: CPT | Performed by: NURSE PRACTITIONER

## 2018-08-22 PROCEDURE — 2022F DILAT RTA XM EVC RTNOPTHY: CPT | Performed by: NURSE PRACTITIONER

## 2018-08-22 PROCEDURE — G8431 POS CLIN DEPRES SCRN F/U DOC: HCPCS | Performed by: NURSE PRACTITIONER

## 2018-08-22 PROCEDURE — 3017F COLORECTAL CA SCREEN DOC REV: CPT | Performed by: NURSE PRACTITIONER

## 2018-08-22 PROCEDURE — 1090F PRES/ABSN URINE INCON ASSESS: CPT | Performed by: NURSE PRACTITIONER

## 2018-08-22 PROCEDURE — 4040F PNEUMOC VAC/ADMIN/RCVD: CPT | Performed by: NURSE PRACTITIONER

## 2018-08-22 PROCEDURE — G8427 DOCREV CUR MEDS BY ELIG CLIN: HCPCS | Performed by: NURSE PRACTITIONER

## 2018-08-22 PROCEDURE — G8400 PT W/DXA NO RESULTS DOC: HCPCS | Performed by: NURSE PRACTITIONER

## 2018-08-22 PROCEDURE — 1101F PT FALLS ASSESS-DOCD LE1/YR: CPT | Performed by: NURSE PRACTITIONER

## 2018-08-22 PROCEDURE — G8417 CALC BMI ABV UP PARAM F/U: HCPCS | Performed by: NURSE PRACTITIONER

## 2018-08-22 PROCEDURE — 1123F ACP DISCUSS/DSCN MKR DOCD: CPT | Performed by: NURSE PRACTITIONER

## 2018-08-22 PROCEDURE — 3045F PR MOST RECENT HEMOGLOBIN A1C LEVEL 7.0-9.0%: CPT | Performed by: NURSE PRACTITIONER

## 2018-08-22 PROCEDURE — 99214 OFFICE O/P EST MOD 30 MIN: CPT | Performed by: NURSE PRACTITIONER

## 2018-08-22 PROCEDURE — 83036 HEMOGLOBIN GLYCOSYLATED A1C: CPT | Performed by: NURSE PRACTITIONER

## 2018-08-22 RX ORDER — ESCITALOPRAM OXALATE 10 MG/1
15 TABLET ORAL DAILY
Qty: 90 TABLET | Refills: 1
Start: 2018-08-22 | End: 2018-12-03 | Stop reason: SDUPTHER

## 2018-08-22 RX ORDER — SIMVASTATIN 40 MG
40 TABLET ORAL NIGHTLY
Qty: 90 TABLET | Refills: 1 | Status: SHIPPED | OUTPATIENT
Start: 2018-08-22 | End: 2018-11-15 | Stop reason: SDUPTHER

## 2018-08-22 ASSESSMENT — PATIENT HEALTH QUESTIONNAIRE - PHQ9
SUM OF ALL RESPONSES TO PHQ QUESTIONS 1-9: 14
7. TROUBLE CONCENTRATING ON THINGS, SUCH AS READING THE NEWSPAPER OR WATCHING TELEVISION: 3
3. TROUBLE FALLING OR STAYING ASLEEP: 1
SUM OF ALL RESPONSES TO PHQ9 QUESTIONS 1 & 2: 3
SUM OF ALL RESPONSES TO PHQ QUESTIONS 1-9: 14
5. POOR APPETITE OR OVEREATING: 2
8. MOVING OR SPEAKING SO SLOWLY THAT OTHER PEOPLE COULD HAVE NOTICED. OR THE OPPOSITE, BEING SO FIGETY OR RESTLESS THAT YOU HAVE BEEN MOVING AROUND A LOT MORE THAN USUAL: 1
2. FEELING DOWN, DEPRESSED OR HOPELESS: 2
4. FEELING TIRED OR HAVING LITTLE ENERGY: 3
6. FEELING BAD ABOUT YOURSELF - OR THAT YOU ARE A FAILURE OR HAVE LET YOURSELF OR YOUR FAMILY DOWN: 1
10. IF YOU CHECKED OFF ANY PROBLEMS, HOW DIFFICULT HAVE THESE PROBLEMS MADE IT FOR YOU TO DO YOUR WORK, TAKE CARE OF THINGS AT HOME, OR GET ALONG WITH OTHER PEOPLE: 1
9. THOUGHTS THAT YOU WOULD BE BETTER OFF DEAD, OR OF HURTING YOURSELF: 0
1. LITTLE INTEREST OR PLEASURE IN DOING THINGS: 1

## 2018-09-01 ASSESSMENT — ENCOUNTER SYMPTOMS
CONSTIPATION: 0
EYES NEGATIVE: 1
COUGH: 0
ABDOMINAL PAIN: 0
SHORTNESS OF BREATH: 0
WHEEZING: 0
DIARRHEA: 0

## 2018-09-12 RX ORDER — BUMETANIDE 1 MG/1
1 TABLET ORAL 2 TIMES DAILY
Qty: 180 TABLET | Refills: 1 | Status: SHIPPED | OUTPATIENT
Start: 2018-09-12 | End: 2019-05-23 | Stop reason: SDUPTHER

## 2018-09-18 ENCOUNTER — OFFICE VISIT (OUTPATIENT)
Dept: FAMILY MEDICINE CLINIC | Age: 73
End: 2018-09-18

## 2018-09-18 VITALS
TEMPERATURE: 98.5 F | SYSTOLIC BLOOD PRESSURE: 136 MMHG | BODY MASS INDEX: 43.6 KG/M2 | DIASTOLIC BLOOD PRESSURE: 74 MMHG | RESPIRATION RATE: 10 BRPM | OXYGEN SATURATION: 98 % | WEIGHT: 254 LBS | HEART RATE: 60 BPM

## 2018-09-18 DIAGNOSIS — E11.22 TYPE 2 DIABETES MELLITUS WITH STAGE 3 CHRONIC KIDNEY DISEASE, WITH LONG-TERM CURRENT USE OF INSULIN (HCC): ICD-10-CM

## 2018-09-18 DIAGNOSIS — R42 VERTIGO: ICD-10-CM

## 2018-09-18 DIAGNOSIS — N18.30 TYPE 2 DIABETES MELLITUS WITH STAGE 3 CHRONIC KIDNEY DISEASE, WITH LONG-TERM CURRENT USE OF INSULIN (HCC): ICD-10-CM

## 2018-09-18 DIAGNOSIS — I50.32 CHRONIC DIASTOLIC HEART FAILURE (HCC): ICD-10-CM

## 2018-09-18 DIAGNOSIS — E11.69 HYPERLIPIDEMIA ASSOCIATED WITH TYPE 2 DIABETES MELLITUS (HCC): ICD-10-CM

## 2018-09-18 DIAGNOSIS — I10 HYPERTENSION, UNSPECIFIED TYPE: ICD-10-CM

## 2018-09-18 DIAGNOSIS — F32.A ANXIETY AND DEPRESSION: ICD-10-CM

## 2018-09-18 DIAGNOSIS — Z01.818 ENCOUNTER FOR PRE-OPERATIVE EXAMINATION: Primary | ICD-10-CM

## 2018-09-18 DIAGNOSIS — G47.33 OBSTRUCTIVE SLEEP APNEA: ICD-10-CM

## 2018-09-18 DIAGNOSIS — F41.9 ANXIETY AND DEPRESSION: ICD-10-CM

## 2018-09-18 DIAGNOSIS — M65.331 TRIGGER MIDDLE FINGER OF RIGHT HAND: ICD-10-CM

## 2018-09-18 DIAGNOSIS — E66.01 OBESITY, MORBID, BMI 40.0-49.9 (HCC): ICD-10-CM

## 2018-09-18 DIAGNOSIS — E78.5 HYPERLIPIDEMIA ASSOCIATED WITH TYPE 2 DIABETES MELLITUS (HCC): ICD-10-CM

## 2018-09-18 DIAGNOSIS — Z79.4 TYPE 2 DIABETES MELLITUS WITH STAGE 3 CHRONIC KIDNEY DISEASE, WITH LONG-TERM CURRENT USE OF INSULIN (HCC): ICD-10-CM

## 2018-09-18 DIAGNOSIS — D50.8 OTHER IRON DEFICIENCY ANEMIA: ICD-10-CM

## 2018-09-18 PROCEDURE — PREOPEXAM PRE-OP EXAM: Performed by: NURSE PRACTITIONER

## 2018-09-18 RX ORDER — GLIMEPIRIDE 4 MG/1
4 TABLET ORAL
Qty: 90 TABLET | Refills: 2 | Status: SHIPPED | OUTPATIENT
Start: 2018-09-18 | End: 2018-11-26 | Stop reason: SDUPTHER

## 2018-09-18 RX ORDER — FERROUS SULFATE 325(65) MG
325 TABLET ORAL
Qty: 90 TABLET | Refills: 2 | Status: SHIPPED | OUTPATIENT
Start: 2018-09-18 | End: 2019-11-06 | Stop reason: SDUPTHER

## 2018-09-18 ASSESSMENT — ENCOUNTER SYMPTOMS
COUGH: 0
ORTHOPNEA: 0
EYES NEGATIVE: 1
ABDOMINAL PAIN: 0
CONSTIPATION: 0
WHEEZING: 0
DIARRHEA: 0
NAUSEA: 0
HEARTBURN: 0
SHORTNESS OF BREATH: 1

## 2018-09-18 NOTE — PROGRESS NOTES
months 1 each 1    Insulin NPH Human, Isophane, (NOVOLIN N SC) Inject into the skin       cyanocobalamin 1000 MCG/ML injection Inject 1 mL into the muscle every 30 days 1 mL 5    Insulin Pen Needle 32G X 4 MM MISC 1 each by Does not apply route daily 100 each 3    carvedilol (COREG) 6.25 MG tablet       glucose blood VI test strips (RELION CONFIRM/MICRO TEST) strip 1 each by In Vitro route 2 times daily As needed. 100 each 5    RELION ULTRA THIN LANCETS 30G MISC 1 each by Does not apply route 2 times daily 210 each 3    RELION INSULIN SYR 0.5ML/31G 31G X 5/16\" 0.5 ML MISC       BOOSTRIX 5-2.5-18.5 injection       metroNIDAZOLE (METROCREAM) 0.75 % cream Apply topically 2 times daily. Please provide 90 day supply. 3 Tube 3    ergocalciferol (DRISDOL) 41948 units capsule Take 1 capsule by mouth once a week 13 capsule 0    meclizine (ANTIVERT) 12.5 MG tablet Take 1 tablet by mouth 3 times daily as needed for Dizziness 30 tablet 1    acetaminophen (TYLENOL ARTHRITIS PAIN) 650 MG extended release tablet Take 650 mg by mouth every 8 hours as needed for Pain      Aspirin Buf,[id number], (BUFFERED ASPIRIN) 325 MG TABS Take by mouth      lisinopril (PRINIVIL;ZESTRIL) 2.5 MG tablet Take 1 tablet by mouth daily 90 tablet 0     No current facility-administered medications for this visit. Allergies   Allergen Reactions    Prednisone      High blood sugars       Social History   Substance Use Topics    Smoking status: Never Smoker    Smokeless tobacco: Never Used    Alcohol use No        Family History   Problem Relation Age of Onset    Diabetes Mother     Diabetes Father     Cancer Paternal Grandfather         BONE       Review of Systems   Constitutional: Negative for chills, fever and malaise/fatigue. HENT: Negative. Eyes: Negative. Respiratory: Positive for shortness of breath (with humidity). Negative for cough and wheezing.     Cardiovascular: Negative for chest pain, palpitations, orthopnea and leg swelling. Gastrointestinal: Negative for abdominal pain, constipation, diarrhea, heartburn and nausea. Genitourinary: Negative for dysuria, flank pain, frequency, hematuria and urgency. Musculoskeletal: Negative for falls, joint pain and myalgias. Right middle trigger finger   Skin: Negative for itching and rash. Neurological: Negative for dizziness, tingling, tremors and focal weakness. Endo/Heme/Allergies: Does not bruise/bleed easily. Psychiatric/Behavioral: Negative for depression. The patient is not nervous/anxious and does not have insomnia. Objective:      /74   Pulse 60   Temp 98.5 °F (36.9 °C) (Tympanic)   Resp 10   Wt 254 lb (115.2 kg)   SpO2 98%   BMI 43.60 kg/m²      Physical Exam   Constitutional: She is oriented to person, place, and time and well-developed, well-nourished, and in no distress. HENT:   Head: Normocephalic. Right Ear: Tympanic membrane and external ear normal.   Left Ear: Tympanic membrane and external ear normal.   Nose: Nose normal. No rhinorrhea, sinus tenderness or septal deviation. Mouth/Throat: Oropharynx is clear and moist and mucous membranes are normal. Normal dentition. Eyes: Pupils are equal, round, and reactive to light. Conjunctivae and EOM are normal.   Neck: Neck supple. No JVD present. Carotid bruit is not present. No tracheal deviation present. No thyromegaly present. Cardiovascular: Normal rate, regular rhythm, normal heart sounds and intact distal pulses. No murmur heard. Pulmonary/Chest: Effort normal and breath sounds normal. No respiratory distress. She has no wheezes. Chest wall is not dull to percussion. Abdominal: Soft. Bowel sounds are normal. There is no hepatosplenomegaly. There is no tenderness. There is no CVA tenderness. Musculoskeletal: She exhibits no edema. Right hand: She exhibits normal range of motion and no swelling.         Hands:  Lymphadenopathy:     She has no cervical adenopathy. Neurological: She is alert and oriented to person, place, and time. She has normal strength and normal reflexes. Gait normal.   Skin: Skin is intact. Psychiatric: Affect normal.       Lab Results   Component Value Date    LABA1C 7.6 08/22/2018     No results found for: EAG    Assessment:       ICD-10-CM ICD-9-CM    1. Encounter for pre-operative examination Z01.818 V72.84    2. Trigger middle finger of right hand M65.331 727.03    3. Type 2 diabetes mellitus with stage 3 chronic kidney disease, with long-term current use of insulin ([facility]) E11.22 250.40 glimepiride (AMARYL) 4 MG tablet    N18.3 585.3     Z79.4 V58.67    4. Hypertension, unspecified type I10 401.9    5. Hyperlipidemia associated with type 2 diabetes mellitus ([facility]) E11.69 250.80     E78.5 272.4    6. Other iron deficiency anemia D50.8 280.8 ferrous sulfate 325 (65 Fe) MG tablet   7. Chronic diastolic heart failure ([facility]) I50.32 428.32    8. Anxiety and depression F41.9 300.00     F32.9 311    9. Obstructive sleep apnea G47.33 327.23    10. Obesity, morbid, BMI 40.0-49.9 ([facility]) E66.01 278.01    11. Vertigo R42 780.4         Plan:     Per operating surgeon. Orders Placed This Encounter   Medications    glimepiride (AMARYL) 4 MG tablet     Sig: Take 1 tablet by mouth every morning (before breakfast)     Dispense:  90 tablet     Refill:  2    ferrous sulfate 325 (65 Fe) MG tablet     Sig: Take 1 tablet by mouth daily (with breakfast)     Dispense:  90 tablet     Refill:  2     Patient states pre operative testing was ordered by Dr. Yaakov Pearce. She will complete any testing needed on Thursday this week. We will contact Dr. Yaakov Pearce to clarify what has been ordered and order any additional testing needed. Will wait for results to clear for surgery.

## 2018-09-23 PROBLEM — D64.9 ABSOLUTE ANEMIA: Status: ACTIVE | Noted: 2018-09-23

## 2018-09-23 PROBLEM — M65.331 TRIGGER MIDDLE FINGER OF RIGHT HAND: Status: ACTIVE | Noted: 2018-09-23

## 2018-09-24 ENCOUNTER — TELEPHONE (OUTPATIENT)
Dept: FAMILY MEDICINE CLINIC | Age: 73
End: 2018-09-24

## 2018-09-24 DIAGNOSIS — Z01.818 PREOP EXAMINATION: Primary | ICD-10-CM

## 2018-09-24 NOTE — TELEPHONE ENCOUNTER
Pt has surgery with Dr. Regan Chacon on 09/27/18, she needs a call back to see what medications to take and not take, she is questioning the insulin.  Please call her at [phone]

## 2018-10-02 DIAGNOSIS — R42 VERTIGO: Primary | ICD-10-CM

## 2018-10-04 DIAGNOSIS — F32.A ANXIETY AND DEPRESSION: ICD-10-CM

## 2018-10-04 DIAGNOSIS — F41.9 ANXIETY AND DEPRESSION: ICD-10-CM

## 2018-10-04 RX ORDER — BUSPIRONE HYDROCHLORIDE 10 MG/1
10 TABLET ORAL 3 TIMES DAILY
Qty: 270 TABLET | Refills: 2 | Status: SHIPPED | OUTPATIENT
Start: 2018-10-04 | End: 2019-06-03 | Stop reason: SDUPTHER

## 2018-10-04 RX ORDER — CARVEDILOL 6.25 MG/1
6.25 TABLET ORAL 2 TIMES DAILY WITH MEALS
Qty: 180 TABLET | Refills: 1 | Status: SHIPPED | OUTPATIENT
Start: 2018-10-04 | End: 2019-05-23 | Stop reason: SDUPTHER

## 2018-11-06 PROBLEM — E78.49 OTHER HYPERLIPIDEMIA: Status: ACTIVE | Noted: 2017-06-29

## 2018-11-07 ENCOUNTER — OFFICE VISIT (OUTPATIENT)
Dept: FAMILY MEDICINE CLINIC | Age: 73
End: 2018-11-07
Payer: MEDICARE

## 2018-11-07 VITALS
TEMPERATURE: 98.1 F | WEIGHT: 258 LBS | DIASTOLIC BLOOD PRESSURE: 72 MMHG | SYSTOLIC BLOOD PRESSURE: 126 MMHG | OXYGEN SATURATION: 97 % | HEART RATE: 66 BPM | HEIGHT: 64 IN | RESPIRATION RATE: 16 BRPM | BODY MASS INDEX: 44.05 KG/M2

## 2018-11-07 DIAGNOSIS — E11.22 TYPE 2 DIABETES MELLITUS WITH STAGE 3 CHRONIC KIDNEY DISEASE, WITH LONG-TERM CURRENT USE OF INSULIN (HCC): Primary | ICD-10-CM

## 2018-11-07 DIAGNOSIS — Z79.4 TYPE 2 DIABETES MELLITUS WITH STAGE 3 CHRONIC KIDNEY DISEASE, WITH LONG-TERM CURRENT USE OF INSULIN (HCC): Primary | ICD-10-CM

## 2018-11-07 DIAGNOSIS — G47.33 OBSTRUCTIVE SLEEP APNEA: ICD-10-CM

## 2018-11-07 DIAGNOSIS — E11.40 NEUROPATHY DUE TO TYPE 2 DIABETES MELLITUS (HCC): ICD-10-CM

## 2018-11-07 DIAGNOSIS — N18.30 TYPE 2 DIABETES MELLITUS WITH STAGE 3 CHRONIC KIDNEY DISEASE, WITH LONG-TERM CURRENT USE OF INSULIN (HCC): Primary | ICD-10-CM

## 2018-11-07 DIAGNOSIS — D51.8 VITAMIN B12 DEFICIENCY (DIETARY) ANEMIA: ICD-10-CM

## 2018-11-07 DIAGNOSIS — Z23 NEED FOR SHINGLES VACCINE: ICD-10-CM

## 2018-11-07 DIAGNOSIS — E78.49 OTHER HYPERLIPIDEMIA: ICD-10-CM

## 2018-11-07 DIAGNOSIS — I50.32 CHRONIC DIASTOLIC HEART FAILURE (HCC): ICD-10-CM

## 2018-11-07 DIAGNOSIS — I10 ESSENTIAL HYPERTENSION: ICD-10-CM

## 2018-11-07 PROCEDURE — 4040F PNEUMOC VAC/ADMIN/RCVD: CPT | Performed by: NURSE PRACTITIONER

## 2018-11-07 PROCEDURE — 1036F TOBACCO NON-USER: CPT | Performed by: NURSE PRACTITIONER

## 2018-11-07 PROCEDURE — 3045F PR MOST RECENT HEMOGLOBIN A1C LEVEL 7.0-9.0%: CPT | Performed by: NURSE PRACTITIONER

## 2018-11-07 PROCEDURE — 2022F DILAT RTA XM EVC RTNOPTHY: CPT | Performed by: NURSE PRACTITIONER

## 2018-11-07 PROCEDURE — G8427 DOCREV CUR MEDS BY ELIG CLIN: HCPCS | Performed by: NURSE PRACTITIONER

## 2018-11-07 PROCEDURE — 1090F PRES/ABSN URINE INCON ASSESS: CPT | Performed by: NURSE PRACTITIONER

## 2018-11-07 PROCEDURE — 99214 OFFICE O/P EST MOD 30 MIN: CPT | Performed by: NURSE PRACTITIONER

## 2018-11-07 PROCEDURE — 1123F ACP DISCUSS/DSCN MKR DOCD: CPT | Performed by: NURSE PRACTITIONER

## 2018-11-07 PROCEDURE — 83036 HEMOGLOBIN GLYCOSYLATED A1C: CPT | Performed by: NURSE PRACTITIONER

## 2018-11-07 PROCEDURE — G8484 FLU IMMUNIZE NO ADMIN: HCPCS | Performed by: NURSE PRACTITIONER

## 2018-11-07 PROCEDURE — 3017F COLORECTAL CA SCREEN DOC REV: CPT | Performed by: NURSE PRACTITIONER

## 2018-11-07 PROCEDURE — 1101F PT FALLS ASSESS-DOCD LE1/YR: CPT | Performed by: NURSE PRACTITIONER

## 2018-11-07 PROCEDURE — G8400 PT W/DXA NO RESULTS DOC: HCPCS | Performed by: NURSE PRACTITIONER

## 2018-11-07 PROCEDURE — G8417 CALC BMI ABV UP PARAM F/U: HCPCS | Performed by: NURSE PRACTITIONER

## 2018-11-07 RX ORDER — CYANOCOBALAMIN 1000 UG/ML
1000 INJECTION INTRAMUSCULAR; SUBCUTANEOUS
Qty: 1 ML | Refills: 5 | Status: SHIPPED | OUTPATIENT
Start: 2018-11-07 | End: 2019-01-02 | Stop reason: SDUPTHER

## 2018-11-07 NOTE — PROGRESS NOTES
II or unspecified type diabetes mellitus without mention of complication, not stated as uncontrolled       Past Surgical History:   Procedure Laterality Date    ANKLE SURGERY Left        Family History   Problem Relation Age of Onset    Diabetes Mother     Diabetes Father     Cancer Paternal Grandfather         BONE        Social History   Substance Use Topics    Smoking status: Never Smoker    Smokeless tobacco: Never Used    Alcohol use No        Current Outpatient Prescriptions   Medication Sig Dispense Refill    cyanocobalamin 1000 MCG/ML injection Inject 1 mL into the muscle every 30 days 1 mL 5    busPIRone (BUSPAR) 10 MG tablet Take 1 tablet by mouth 3 times daily 270 tablet 2    carvedilol (COREG) 6.25 MG tablet Take 1 tablet by mouth 2 times daily (with meals) 180 tablet 1    glimepiride (AMARYL) 4 MG tablet Take 1 tablet by mouth every morning (before breakfast) 90 tablet 2    ferrous sulfate 325 (65 Fe) MG tablet Take 1 tablet by mouth daily (with breakfast) 90 tablet 2    bumetanide (BUMEX) 1 MG tablet Take 1 tablet by mouth 2 times daily 180 tablet 1    escitalopram (LEXAPRO) 10 MG tablet Take 1.5 tablets by mouth daily 90 tablet 1    Insulin NPH Human, Isophane, (NOVOLIN N SC) Inject into the skin       lisinopril (PRINIVIL;ZESTRIL) 2.5 MG tablet Take 1 tablet by mouth daily 90 tablet 0    Insulin Pen Needle 32G X 4 MM MISC 1 each by Does not apply route daily 100 each 3    glucose blood VI test strips (RELION CONFIRM/MICRO TEST) strip 1 each by In Vitro route 2 times daily As needed.  100 each 5    RELION ULTRA THIN LANCETS 30G MISC 1 each by Does not apply route 2 times daily 210 each 3    RELION INSULIN SYR 0.5ML/31G 31G X 5/16\" 0.5 ML MISC       meclizine (ANTIVERT) 12.5 MG tablet Take 1 tablet by mouth 3 times daily as needed for Dizziness 30 tablet 1    acetaminophen (TYLENOL ARTHRITIS PAIN) 650 MG extended release tablet Take 650 mg by mouth every 8 hours as needed for Pain

## 2018-11-15 DIAGNOSIS — E11.69 HYPERLIPIDEMIA ASSOCIATED WITH TYPE 2 DIABETES MELLITUS (HCC): ICD-10-CM

## 2018-11-15 DIAGNOSIS — E78.5 HYPERLIPIDEMIA ASSOCIATED WITH TYPE 2 DIABETES MELLITUS (HCC): ICD-10-CM

## 2018-11-15 DIAGNOSIS — N39.46 MIXED STRESS AND URGE URINARY INCONTINENCE: ICD-10-CM

## 2018-11-15 LAB — HBA1C MFR BLD: 7.3 %

## 2018-11-15 RX ORDER — OXYBUTYNIN CHLORIDE 5 MG/1
5 TABLET ORAL 2 TIMES DAILY
Qty: 180 TABLET | Refills: 0 | Status: SHIPPED | OUTPATIENT
Start: 2018-11-15 | End: 2019-03-07 | Stop reason: SDUPTHER

## 2018-11-15 RX ORDER — SIMVASTATIN 40 MG
40 TABLET ORAL NIGHTLY
Qty: 90 TABLET | Refills: 1 | Status: SHIPPED | OUTPATIENT
Start: 2018-11-15 | End: 2019-03-07 | Stop reason: SDUPTHER

## 2018-11-17 PROBLEM — D51.8 VITAMIN B12 DEFICIENCY (DIETARY) ANEMIA: Status: ACTIVE | Noted: 2018-11-17

## 2018-11-17 PROBLEM — E11.40 NEUROPATHY DUE TO TYPE 2 DIABETES MELLITUS (HCC): Status: ACTIVE | Noted: 2018-11-17

## 2018-11-17 ASSESSMENT — ENCOUNTER SYMPTOMS
SHORTNESS OF BREATH: 0
CONSTIPATION: 0
COUGH: 0
DIARRHEA: 0
ABDOMINAL PAIN: 0
WHEEZING: 0
EYES NEGATIVE: 1

## 2018-11-26 ENCOUNTER — OFFICE VISIT (OUTPATIENT)
Dept: FAMILY MEDICINE CLINIC | Age: 73
End: 2018-11-26
Payer: MEDICARE

## 2018-11-26 VITALS
RESPIRATION RATE: 16 BRPM | SYSTOLIC BLOOD PRESSURE: 134 MMHG | OXYGEN SATURATION: 94 % | HEART RATE: 60 BPM | TEMPERATURE: 98 F | DIASTOLIC BLOOD PRESSURE: 76 MMHG

## 2018-11-26 DIAGNOSIS — N18.30 TYPE 2 DIABETES MELLITUS WITH STAGE 3 CHRONIC KIDNEY DISEASE, WITH LONG-TERM CURRENT USE OF INSULIN (HCC): ICD-10-CM

## 2018-11-26 DIAGNOSIS — Z79.4 TYPE 2 DIABETES MELLITUS WITH STAGE 3 CHRONIC KIDNEY DISEASE, WITH LONG-TERM CURRENT USE OF INSULIN (HCC): ICD-10-CM

## 2018-11-26 DIAGNOSIS — E11.22 TYPE 2 DIABETES MELLITUS WITH STAGE 3 CHRONIC KIDNEY DISEASE, WITH LONG-TERM CURRENT USE OF INSULIN (HCC): ICD-10-CM

## 2018-11-26 LAB — GLUCOSE BLD-MCNC: 308 MG/DL

## 2018-11-26 PROCEDURE — 99214 OFFICE O/P EST MOD 30 MIN: CPT | Performed by: NURSE PRACTITIONER

## 2018-11-26 PROCEDURE — G8427 DOCREV CUR MEDS BY ELIG CLIN: HCPCS | Performed by: NURSE PRACTITIONER

## 2018-11-26 PROCEDURE — 1101F PT FALLS ASSESS-DOCD LE1/YR: CPT | Performed by: NURSE PRACTITIONER

## 2018-11-26 PROCEDURE — 1090F PRES/ABSN URINE INCON ASSESS: CPT | Performed by: NURSE PRACTITIONER

## 2018-11-26 PROCEDURE — 3045F PR MOST RECENT HEMOGLOBIN A1C LEVEL 7.0-9.0%: CPT | Performed by: NURSE PRACTITIONER

## 2018-11-26 PROCEDURE — 2022F DILAT RTA XM EVC RTNOPTHY: CPT | Performed by: NURSE PRACTITIONER

## 2018-11-26 PROCEDURE — 4040F PNEUMOC VAC/ADMIN/RCVD: CPT | Performed by: NURSE PRACTITIONER

## 2018-11-26 PROCEDURE — G8417 CALC BMI ABV UP PARAM F/U: HCPCS | Performed by: NURSE PRACTITIONER

## 2018-11-26 PROCEDURE — 1036F TOBACCO NON-USER: CPT | Performed by: NURSE PRACTITIONER

## 2018-11-26 PROCEDURE — G8400 PT W/DXA NO RESULTS DOC: HCPCS | Performed by: NURSE PRACTITIONER

## 2018-11-26 PROCEDURE — 82962 GLUCOSE BLOOD TEST: CPT | Performed by: NURSE PRACTITIONER

## 2018-11-26 PROCEDURE — G8484 FLU IMMUNIZE NO ADMIN: HCPCS | Performed by: NURSE PRACTITIONER

## 2018-11-26 PROCEDURE — 3017F COLORECTAL CA SCREEN DOC REV: CPT | Performed by: NURSE PRACTITIONER

## 2018-11-26 PROCEDURE — 1123F ACP DISCUSS/DSCN MKR DOCD: CPT | Performed by: NURSE PRACTITIONER

## 2018-11-26 RX ORDER — GLIMEPIRIDE 2 MG/1
2 TABLET ORAL
Qty: 30 TABLET | Refills: 2 | Status: SHIPPED | OUTPATIENT
Start: 2018-11-26 | End: 2019-02-06 | Stop reason: DRUGHIGH

## 2018-11-26 NOTE — PROGRESS NOTES
[address] [facility]  [address] [facility], [address] [facility]  Dept: [phone]  Dept Fax: [phone]    Fan Guerrero is a 68 y.o. female who presents todayfor her medical conditions/complaints as noted below. Minor Bolds c/o of Blood Sugar Problem (hypoglycemia EMT reports blood sugar was  29. Denied any symptoms  prior to event \" asleep for an hour and a hlf before  we realized she was just not napping\") and Other (only taking 1/3 of insulin per  ER instructions 157 last night, 152 this  morning, and  an hour ago 331  blood sugar)    HPI:   Patient presents to the office for follow up after ED visit at [facility] for hypoglycemia. She reports feeling tired and remembers royal down to rest. Family reports they could not wake her and called EMS. Blood sugar reported as 29. Medications adjusted and discharged home. Today she reports feeling good. She has not had any more hypoglycemic events since medication changes. She is currently taking 12 units of insulin bid. Diabetes   She presents for her follow-up diabetic visit. She has type 2 diabetes mellitus. Her disease course has been fluctuating. Pertinent negatives for hypoglycemia include no confusion, dizziness, headaches or nervousness/anxiousness. Associated symptoms include fatigue. Pertinent negatives for diabetes include no chest pain, no polydipsia and no polyuria. Hypoglycemia complications include required assistance. [facility] ED) Symptoms are improving. Risk factors for coronary artery disease include diabetes mellitus, sedentary lifestyle, post-menopausal, obesity, hypertension and dyslipidemia. She is compliant with treatment all of the time. Her home blood glucose trend is fluctuating dramatically ([id number]).         BP Readings from Last 3 Encounters:   12/04/18 132/74   11/26/18 134/76   11/07/18 126/72          ([id number])    Wt Readings from Last 3 Encounters:   12/04/18 260 lb (117.9 kg)   11/07/18 258 lb (117 kg) 09/18/18 254 lb (115.2 kg)       Past Medical History:   Diagnosis Date    Actinic keratitis     Ankle pain     Arthritis     Back pain     low     Chronic diastolic CHF (congestive heart failure) (HCC)     Chronic diastolic heart failure (HCC) 5/31/2017    Chronic kidney disease     Dependent edema     Hypertension     Incontinence     in female    Osteoarthritis     knee     Type II or unspecified type diabetes mellitus without mention of complication, not stated as uncontrolled       Past Surgical History:   Procedure Laterality Date    ANKLE SURGERY Left        Family History   Problem Relation Age of Onset    Diabetes Mother     Diabetes Father     Cancer Paternal Grandfather         BONE        Social History   Substance Use Topics    Smoking status: Never Smoker    Smokeless tobacco: Never Used    Alcohol use No      Current Outpatient Prescriptions   Medication Sig Dispense Refill    glimepiride (AMARYL) 2 MG tablet Take 1 tablet by mouth every morning (before breakfast) 30 tablet 2    insulin NPH (NOVOLIN N) 100 UNIT/ML injection vial Inject 24 Units into the skin nightly 1 vial 3    simvastatin (ZOCOR) 40 MG tablet Take 1 tablet by mouth nightly 90 tablet 1    oxybutynin (DITROPAN) 5 MG tablet Take 1 tablet by mouth 2 times daily 180 tablet 0    cyanocobalamin 1000 MCG/ML injection Inject 1 mL into the muscle every 30 days 1 mL 5    busPIRone (BUSPAR) 10 MG tablet Take 1 tablet by mouth 3 times daily 270 tablet 2    carvedilol (COREG) 6.25 MG tablet Take 1 tablet by mouth 2 times daily (with meals) 180 tablet 1    ferrous sulfate 325 (65 Fe) MG tablet Take 1 tablet by mouth daily (with breakfast) 90 tablet 2    bumetanide (BUMEX) 1 MG tablet Take 1 tablet by mouth 2 times daily 180 tablet 1    lisinopril (PRINIVIL;ZESTRIL) 2.5 MG tablet Take 1 tablet by mouth daily 90 tablet 0    Insulin Pen Needle 32G X 4 MM MISC 1 each by Does not apply route daily 100 each 3    glucose blood VI test strips (RELION CONFIRM/MICRO TEST) strip 1 each by In Vitro route 2 times daily As needed. 100 each 5    RELION ULTRA THIN LANCETS 30G MISC 1 each by Does not apply route 2 times daily 210 each 3    RELION INSULIN SYR 0.5ML/31G 31G X 5/16\" 0.5 ML MISC       meclizine (ANTIVERT) 12.5 MG tablet Take 1 tablet by mouth 3 times daily as needed for Dizziness 30 tablet 1    acetaminophen (TYLENOL ARTHRITIS PAIN) 650 MG extended release tablet Take 650 mg by mouth every 8 hours as needed for Pain      Aspirin Buf,[id number], (BUFFERED ASPIRIN) 325 MG TABS Take by mouth      escitalopram (LEXAPRO) 10 MG tablet Take 1.5 tablets by mouth daily 135 tablet 1     No current facility-administered medications for this visit. Allergies   Allergen Reactions    Prednisone      High blood sugars       Health Maintenance   Topic Date Due    Shingles Vaccine (1 of 2 - 2 Dose Series) 03/01/2007    Hepatitis C screen  06/29/2022 (Originally 1945)    Breast cancer screen  01/11/2019    Lipid screen  02/21/2019    Diabetic retinal exam  04/11/2019    Diabetic foot exam  11/07/2019    A1C test (Diabetic or Prediabetic)  11/07/2019    Potassium monitoring  11/23/2019    Creatinine monitoring  11/23/2019    Colon cancer screen colonoscopy  10/04/2020    DTaP/Tdap/Td vaccine (2 - Td) 08/25/2027    DEXA (modify frequency per FRAX score)  Completed    Flu vaccine  Completed    Pneumococcal low/med risk  Completed     Subjective:      Review of Systems   Constitutional: Positive for fatigue. Negative for chills, diaphoresis and fever. HENT: Negative. Respiratory: Negative for cough, shortness of breath and wheezing. Cardiovascular: Negative for chest pain, palpitations and leg swelling. Gastrointestinal: Negative for abdominal pain, constipation, diarrhea and nausea. Endocrine: Negative for polydipsia and polyuria. Musculoskeletal: Negative for myalgias.    Neurological: Negative for

## 2018-12-03 ENCOUNTER — TELEPHONE (OUTPATIENT)
Dept: FAMILY MEDICINE CLINIC | Age: 73
End: 2018-12-03

## 2018-12-03 DIAGNOSIS — F41.9 ANXIETY AND DEPRESSION: ICD-10-CM

## 2018-12-03 DIAGNOSIS — F32.A ANXIETY AND DEPRESSION: ICD-10-CM

## 2018-12-03 RX ORDER — ESCITALOPRAM OXALATE 10 MG/1
15 TABLET ORAL DAILY
Qty: 90 TABLET | Refills: 1 | Status: SHIPPED | OUTPATIENT
Start: 2018-12-03 | End: 2018-12-04 | Stop reason: SDUPTHER

## 2018-12-03 NOTE — TELEPHONE ENCOUNTER
Jaret Jung is calling to request a refill on the following medication(s):  Requested Prescriptions     Pending Prescriptions Disp Refills    escitalopram (LEXAPRO) 10 MG tablet 90 tablet 1     Sig: Take 1.5 tablets by mouth daily       Last Visit Date (If Applicable):  79/63/2321    Next Visit Date:    12/10/2018

## 2018-12-04 ENCOUNTER — OFFICE VISIT (OUTPATIENT)
Dept: PULMONOLOGY | Age: 73
End: 2018-12-04
Payer: MEDICARE

## 2018-12-04 VITALS
HEIGHT: 64 IN | WEIGHT: 260 LBS | SYSTOLIC BLOOD PRESSURE: 132 MMHG | DIASTOLIC BLOOD PRESSURE: 74 MMHG | HEART RATE: 64 BPM | OXYGEN SATURATION: 94 % | BODY MASS INDEX: 44.39 KG/M2

## 2018-12-04 DIAGNOSIS — E11.22 TYPE 2 DIABETES MELLITUS WITH STAGE 3 CHRONIC KIDNEY DISEASE, WITH LONG-TERM CURRENT USE OF INSULIN (HCC): ICD-10-CM

## 2018-12-04 DIAGNOSIS — Z79.4 TYPE 2 DIABETES MELLITUS WITH STAGE 3 CHRONIC KIDNEY DISEASE, WITH LONG-TERM CURRENT USE OF INSULIN (HCC): ICD-10-CM

## 2018-12-04 DIAGNOSIS — G47.33 OSA TREATED WITH BIPAP: Primary | ICD-10-CM

## 2018-12-04 DIAGNOSIS — N18.30 TYPE 2 DIABETES MELLITUS WITH STAGE 3 CHRONIC KIDNEY DISEASE, WITH LONG-TERM CURRENT USE OF INSULIN (HCC): ICD-10-CM

## 2018-12-04 PROCEDURE — 3017F COLORECTAL CA SCREEN DOC REV: CPT | Performed by: INTERNAL MEDICINE

## 2018-12-04 PROCEDURE — G8427 DOCREV CUR MEDS BY ELIG CLIN: HCPCS | Performed by: INTERNAL MEDICINE

## 2018-12-04 PROCEDURE — 2022F DILAT RTA XM EVC RTNOPTHY: CPT | Performed by: INTERNAL MEDICINE

## 2018-12-04 PROCEDURE — G8417 CALC BMI ABV UP PARAM F/U: HCPCS | Performed by: INTERNAL MEDICINE

## 2018-12-04 PROCEDURE — 1090F PRES/ABSN URINE INCON ASSESS: CPT | Performed by: INTERNAL MEDICINE

## 2018-12-04 PROCEDURE — G8400 PT W/DXA NO RESULTS DOC: HCPCS | Performed by: INTERNAL MEDICINE

## 2018-12-04 PROCEDURE — 1036F TOBACCO NON-USER: CPT | Performed by: INTERNAL MEDICINE

## 2018-12-04 PROCEDURE — G8484 FLU IMMUNIZE NO ADMIN: HCPCS | Performed by: INTERNAL MEDICINE

## 2018-12-04 PROCEDURE — 3045F PR MOST RECENT HEMOGLOBIN A1C LEVEL 7.0-9.0%: CPT | Performed by: INTERNAL MEDICINE

## 2018-12-04 PROCEDURE — 99204 OFFICE O/P NEW MOD 45 MIN: CPT | Performed by: INTERNAL MEDICINE

## 2018-12-04 PROCEDURE — 1123F ACP DISCUSS/DSCN MKR DOCD: CPT | Performed by: INTERNAL MEDICINE

## 2018-12-04 PROCEDURE — 4040F PNEUMOC VAC/ADMIN/RCVD: CPT | Performed by: INTERNAL MEDICINE

## 2018-12-04 PROCEDURE — 1101F PT FALLS ASSESS-DOCD LE1/YR: CPT | Performed by: INTERNAL MEDICINE

## 2018-12-04 RX ORDER — ESCITALOPRAM OXALATE 10 MG/1
15 TABLET ORAL DAILY
Qty: 135 TABLET | Refills: 1 | Status: SHIPPED | OUTPATIENT
Start: 2018-12-04 | End: 2018-12-13 | Stop reason: SDUPTHER

## 2018-12-04 ASSESSMENT — SLEEP AND FATIGUE QUESTIONNAIRES
HOW LIKELY ARE YOU TO NOD OFF OR FALL ASLEEP WHILE SITTING AND READING: 1
HOW LIKELY ARE YOU TO NOD OFF OR FALL ASLEEP WHILE SITTING QUIETLY AFTER LUNCH WITHOUT ALCOHOL: 0
HOW LIKELY ARE YOU TO NOD OFF OR FALL ASLEEP WHILE LYING DOWN TO REST IN THE AFTERNOON WHEN CIRCUMSTANCES PERMIT: 0
HOW LIKELY ARE YOU TO NOD OFF OR FALL ASLEEP WHILE WATCHING TV: 0
HOW LIKELY ARE YOU TO NOD OFF OR FALL ASLEEP WHILE SITTING AND TALKING TO SOMEONE: 0
HOW LIKELY ARE YOU TO NOD OFF OR FALL ASLEEP WHEN YOU ARE A PASSENGER IN A CAR FOR AN HOUR WITHOUT A BREAK: 0
HOW LIKELY ARE YOU TO NOD OFF OR FALL ASLEEP IN A CAR, WHILE STOPPED FOR A FEW MINUTES IN TRAFFIC: 0
HOW LIKELY ARE YOU TO NOD OFF OR FALL ASLEEP WHILE SITTING INACTIVE IN A PUBLIC PLACE: 0
ESS TOTAL SCORE: 1

## 2018-12-04 NOTE — PROGRESS NOTES
Influenza, High Dose (Fluzone 65 yrs and older) 11/17/2017    Pneumococcal 13-valent Conjugate (Elohzov12) 09/15/2015    Pneumococcal Polysaccharide ([id number]) 01/01/2009, 09/14/2016    Tdap (Boostrix, Adacel) 08/25/2017    Zoster Live (Zostavax) 01/01/2007        Pneumococcal Vaccine     [x] Up to date    [] Indicated   [] Refused  [] Contraindicated       Influenza Vaccine   [x] Up to date    [] Indicated   [] Refused  [] Contraindicated          PAST MEDICAL HISTORY:       Diagnosis Date    Actinic keratitis     Ankle pain     Arthritis     Back pain     low     Chronic diastolic CHF (congestive heart failure) ([facility]     Chronic diastolic heart failure ([address] 5/31/2017    Chronic kidney disease     Dependent edema     Hypertension     Incontinence     in female    Osteoarthritis     knee     Type II or unspecified type diabetes mellitus without mention of complication, not stated as uncontrolled          Family History:   Family History   Problem Relation Age of Onset    Diabetes Mother     Diabetes Father     Cancer Paternal Grandfather         BONE        SURGICAL HISTORY:   Past Surgical History:   Procedure Laterality Date    ANKLE SURGERY Left            SOCIAL AND OCCUPATIONAL HEALTH:      There  no history of TB or TB exposure. There  no asbestos or silica dust exposure. The patient reports  n coal, foundry, quarry or [employer] exposure. Travel history reveals o. There  no history of recreational or IV drug use. There  no hot tube exposure. 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 She has never used smokeless tobacco.  ETOH:   reports that she does not drink alcohol. ALLERGIES:      Allergies   Allergen Reactions    Prednisone      High blood sugars         Home Meds:   Prior to Admission medications    Medication Sig Start Date End Date Taking?  Authorizing Provider   escitalopram (LEXAPRO) 10 MG tablet 325 MG TABS Take by mouth   Yes Historical Provider, MD   meclizine (ANTIVERT) 12.5 MG tablet Take 1 tablet by mouth 3 times daily as needed for Dizziness 8/23/17   ORTIZ Carrera - CNP              REVIEW OF SYSTEMS:    CONSTITUTIONAL:  negative for  fevers, chills, sweats, fatigue, malaise, anorexia and weight loss  EYES:  negative for  double vision, blurred vision, dry eyes, eye discharge and redness  HEENT:  negative for  hearing loss, tinnitus, ear drainage, earaches and nasal congestion  RESPIRATORY:no sob no cough no wheeze  CARDIOVASCULAR:  negative for  chest pain,, palpitations, orthopnea, PND  GASTROINTESTINAL:  negative for nausea, vomiting, change in bowel habits, diarrhea, constipation, abdominal pain, pruritus, abdominal mass and abdominal distention  GENITOURINARY:  negative for frequency, dysuria, nocturia, urinary incontinence and hesitancy  INTEGUMENT  negative for rash, skin lesion(s), dryness, skin color change, changes in lesion, pruritus and changes in hair  HEMATOLOGIC/LYMPHATIC:  negative for easy bruising, bleeding, lymphadenopathy, petechiae and swelling/edema  ALLERGIC/IMMUNOLOGIC:  negative for recurrent infections, urticaria and drug reactions  ENDOCRINE:  negative for heat intolerance, cold intolerance, tremor, weight changes and change in bowel habits  MUSCULOSKELETAL:  negative for  myalgias, arthralgias, pain, joint swelling, stiff joints and decreased range of motion  NEUROLOGICAL:  negative for headaches, dizziness, seizures, memory problems, speech problems, visual disturbance and coordination problems  BEHAVIOR/PSYCH:  negative for poor appetite, increased appetite, decreased sleep, increased sleep, decreased energy level, increased energy level and poor concentration  Skin no rash no dermatitis  Vitals:  /74   Pulse 64   Ht 5' 4\" (1.626 m)   Wt 260 lb (117.9 kg)   SpO2 94%   BMI 44.63 kg/m²     PHYSICAL EXAM:  General Appearance:    Alert, cooperative, no clinical thinking. Thank you for allowing me to participate in his care. Sincerely,    Electronically signed by Alexandria Turner MD on 12/4/2018 at 2:30 PM       Please note that this chart was generated using voice recognition Dragon dictation software. Although every effort was made to ensure the accuracy of this automated transcription, some errors in transcription may have occurred.

## 2018-12-07 ENCOUNTER — TELEPHONE (OUTPATIENT)
Dept: FAMILY MEDICINE CLINIC | Age: 73
End: 2018-12-07

## 2018-12-07 ASSESSMENT — ENCOUNTER SYMPTOMS
ABDOMINAL PAIN: 0
NAUSEA: 0
DIARRHEA: 0
SHORTNESS OF BREATH: 0
COUGH: 0
CONSTIPATION: 0
WHEEZING: 0

## 2018-12-10 ENCOUNTER — OFFICE VISIT (OUTPATIENT)
Dept: FAMILY MEDICINE CLINIC | Age: 73
End: 2018-12-10
Payer: MEDICARE

## 2018-12-10 VITALS
BODY MASS INDEX: 43.94 KG/M2 | SYSTOLIC BLOOD PRESSURE: 134 MMHG | TEMPERATURE: 98.1 F | HEART RATE: 60 BPM | DIASTOLIC BLOOD PRESSURE: 82 MMHG | WEIGHT: 256 LBS | RESPIRATION RATE: 16 BRPM

## 2018-12-10 DIAGNOSIS — I10 ESSENTIAL HYPERTENSION: ICD-10-CM

## 2018-12-10 DIAGNOSIS — G47.33 OBSTRUCTIVE SLEEP APNEA: ICD-10-CM

## 2018-12-10 DIAGNOSIS — D51.8 VITAMIN B12 DEFICIENCY (DIETARY) ANEMIA: ICD-10-CM

## 2018-12-10 DIAGNOSIS — E78.49 OTHER HYPERLIPIDEMIA: ICD-10-CM

## 2018-12-10 DIAGNOSIS — E66.01 OBESITY, MORBID, BMI 40.0-49.9 (HCC): ICD-10-CM

## 2018-12-10 DIAGNOSIS — E11.40 NEUROPATHY DUE TO TYPE 2 DIABETES MELLITUS (HCC): ICD-10-CM

## 2018-12-10 DIAGNOSIS — I50.32 CHRONIC DIASTOLIC HEART FAILURE (HCC): ICD-10-CM

## 2018-12-10 DIAGNOSIS — F41.9 ANXIETY AND DEPRESSION: ICD-10-CM

## 2018-12-10 DIAGNOSIS — F32.A ANXIETY AND DEPRESSION: ICD-10-CM

## 2018-12-10 DIAGNOSIS — Z79.4 TYPE 2 DIABETES MELLITUS WITH STAGE 3 CHRONIC KIDNEY DISEASE, WITH LONG-TERM CURRENT USE OF INSULIN (HCC): Primary | ICD-10-CM

## 2018-12-10 DIAGNOSIS — M19.90 ARTHRITIS: ICD-10-CM

## 2018-12-10 DIAGNOSIS — E11.22 TYPE 2 DIABETES MELLITUS WITH STAGE 3 CHRONIC KIDNEY DISEASE, WITH LONG-TERM CURRENT USE OF INSULIN (HCC): Primary | ICD-10-CM

## 2018-12-10 DIAGNOSIS — N18.30 TYPE 2 DIABETES MELLITUS WITH STAGE 3 CHRONIC KIDNEY DISEASE, WITH LONG-TERM CURRENT USE OF INSULIN (HCC): Primary | ICD-10-CM

## 2018-12-10 PROCEDURE — 3045F PR MOST RECENT HEMOGLOBIN A1C LEVEL 7.0-9.0%: CPT | Performed by: NURSE PRACTITIONER

## 2018-12-10 PROCEDURE — 99214 OFFICE O/P EST MOD 30 MIN: CPT | Performed by: NURSE PRACTITIONER

## 2018-12-10 PROCEDURE — G8427 DOCREV CUR MEDS BY ELIG CLIN: HCPCS | Performed by: NURSE PRACTITIONER

## 2018-12-10 PROCEDURE — 1101F PT FALLS ASSESS-DOCD LE1/YR: CPT | Performed by: NURSE PRACTITIONER

## 2018-12-10 PROCEDURE — G8400 PT W/DXA NO RESULTS DOC: HCPCS | Performed by: NURSE PRACTITIONER

## 2018-12-10 PROCEDURE — 4040F PNEUMOC VAC/ADMIN/RCVD: CPT | Performed by: NURSE PRACTITIONER

## 2018-12-10 PROCEDURE — G8484 FLU IMMUNIZE NO ADMIN: HCPCS | Performed by: NURSE PRACTITIONER

## 2018-12-10 PROCEDURE — 1036F TOBACCO NON-USER: CPT | Performed by: NURSE PRACTITIONER

## 2018-12-10 PROCEDURE — 1123F ACP DISCUSS/DSCN MKR DOCD: CPT | Performed by: NURSE PRACTITIONER

## 2018-12-10 PROCEDURE — 1090F PRES/ABSN URINE INCON ASSESS: CPT | Performed by: NURSE PRACTITIONER

## 2018-12-10 PROCEDURE — 3017F COLORECTAL CA SCREEN DOC REV: CPT | Performed by: NURSE PRACTITIONER

## 2018-12-10 PROCEDURE — G8417 CALC BMI ABV UP PARAM F/U: HCPCS | Performed by: NURSE PRACTITIONER

## 2018-12-10 PROCEDURE — 2022F DILAT RTA XM EVC RTNOPTHY: CPT | Performed by: NURSE PRACTITIONER

## 2018-12-13 ENCOUNTER — TELEPHONE (OUTPATIENT)
Dept: FAMILY MEDICINE CLINIC | Age: 73
End: 2018-12-13

## 2018-12-13 DIAGNOSIS — F32.A ANXIETY AND DEPRESSION: ICD-10-CM

## 2018-12-13 DIAGNOSIS — F41.9 ANXIETY AND DEPRESSION: ICD-10-CM

## 2018-12-15 RX ORDER — ESCITALOPRAM OXALATE 10 MG/1
15 TABLET ORAL DAILY
Qty: 135 TABLET | Refills: 1 | Status: SHIPPED | OUTPATIENT
Start: 2018-12-15 | End: 2019-05-30

## 2018-12-17 ENCOUNTER — TELEPHONE (OUTPATIENT)
Dept: FAMILY MEDICINE CLINIC | Age: 73
End: 2018-12-17

## 2018-12-17 ASSESSMENT — ENCOUNTER SYMPTOMS
SHORTNESS OF BREATH: 0
DIARRHEA: 0
ABDOMINAL PAIN: 0
WHEEZING: 0
COUGH: 0
EYES NEGATIVE: 1
CONSTIPATION: 0

## 2018-12-28 ENCOUNTER — TELEPHONE (OUTPATIENT)
Dept: FAMILY MEDICINE CLINIC | Age: 73
End: 2018-12-28

## 2018-12-28 DIAGNOSIS — E11.22 TYPE 2 DIABETES MELLITUS WITH STAGE 3 CHRONIC KIDNEY DISEASE, WITH LONG-TERM CURRENT USE OF INSULIN (HCC): ICD-10-CM

## 2018-12-28 DIAGNOSIS — N18.30 TYPE 2 DIABETES MELLITUS WITH STAGE 3 CHRONIC KIDNEY DISEASE, WITH LONG-TERM CURRENT USE OF INSULIN (HCC): ICD-10-CM

## 2018-12-28 DIAGNOSIS — Z79.4 TYPE 2 DIABETES MELLITUS WITH STAGE 3 CHRONIC KIDNEY DISEASE, WITH LONG-TERM CURRENT USE OF INSULIN (HCC): ICD-10-CM

## 2019-01-02 ENCOUNTER — OFFICE VISIT (OUTPATIENT)
Dept: FAMILY MEDICINE CLINIC | Age: 74
End: 2019-01-02
Payer: MEDICARE

## 2019-01-02 VITALS
BODY MASS INDEX: 44.37 KG/M2 | SYSTOLIC BLOOD PRESSURE: 134 MMHG | OXYGEN SATURATION: 97 % | TEMPERATURE: 97.9 F | RESPIRATION RATE: 18 BRPM | HEART RATE: 68 BPM | DIASTOLIC BLOOD PRESSURE: 72 MMHG | WEIGHT: 258.5 LBS

## 2019-01-02 DIAGNOSIS — M65.331 TRIGGER MIDDLE FINGER OF RIGHT HAND: ICD-10-CM

## 2019-01-02 DIAGNOSIS — I50.32 CHRONIC DIASTOLIC HEART FAILURE (HCC): ICD-10-CM

## 2019-01-02 DIAGNOSIS — E11.29 TYPE 2 DIABETES MELLITUS WITH MICROALBUMINURIA, WITH LONG-TERM CURRENT USE OF INSULIN (HCC): ICD-10-CM

## 2019-01-02 DIAGNOSIS — N18.4 TYPE 2 DIABETES MELLITUS WITH STAGE 4 CHRONIC KIDNEY DISEASE, WITH LONG-TERM CURRENT USE OF INSULIN (HCC): ICD-10-CM

## 2019-01-02 DIAGNOSIS — D51.8 VITAMIN B12 DEFICIENCY (DIETARY) ANEMIA: ICD-10-CM

## 2019-01-02 DIAGNOSIS — E11.22 TYPE 2 DIABETES MELLITUS WITH STAGE 4 CHRONIC KIDNEY DISEASE, WITH LONG-TERM CURRENT USE OF INSULIN (HCC): ICD-10-CM

## 2019-01-02 DIAGNOSIS — E78.49 OTHER HYPERLIPIDEMIA: ICD-10-CM

## 2019-01-02 DIAGNOSIS — R80.9 TYPE 2 DIABETES MELLITUS WITH MICROALBUMINURIA, WITH LONG-TERM CURRENT USE OF INSULIN (HCC): ICD-10-CM

## 2019-01-02 DIAGNOSIS — Z12.31 ENCOUNTER FOR SCREENING MAMMOGRAM FOR BREAST CANCER: ICD-10-CM

## 2019-01-02 DIAGNOSIS — F32.A ANXIETY AND DEPRESSION: ICD-10-CM

## 2019-01-02 DIAGNOSIS — E11.40 NEUROPATHY DUE TO TYPE 2 DIABETES MELLITUS (HCC): ICD-10-CM

## 2019-01-02 DIAGNOSIS — N18.30 TYPE 2 DIABETES MELLITUS WITH STAGE 3 CHRONIC KIDNEY DISEASE, WITH LONG-TERM CURRENT USE OF INSULIN (HCC): Primary | ICD-10-CM

## 2019-01-02 DIAGNOSIS — Z79.4 TYPE 2 DIABETES MELLITUS WITH MICROALBUMINURIA, WITH LONG-TERM CURRENT USE OF INSULIN (HCC): ICD-10-CM

## 2019-01-02 DIAGNOSIS — G47.33 OBSTRUCTIVE SLEEP APNEA: ICD-10-CM

## 2019-01-02 DIAGNOSIS — E11.22 TYPE 2 DIABETES MELLITUS WITH STAGE 3 CHRONIC KIDNEY DISEASE, WITH LONG-TERM CURRENT USE OF INSULIN (HCC): Primary | ICD-10-CM

## 2019-01-02 DIAGNOSIS — Z79.4 TYPE 2 DIABETES MELLITUS WITH STAGE 3 CHRONIC KIDNEY DISEASE, WITH LONG-TERM CURRENT USE OF INSULIN (HCC): Primary | ICD-10-CM

## 2019-01-02 DIAGNOSIS — Z79.4 TYPE 2 DIABETES MELLITUS WITH STAGE 4 CHRONIC KIDNEY DISEASE, WITH LONG-TERM CURRENT USE OF INSULIN (HCC): ICD-10-CM

## 2019-01-02 DIAGNOSIS — I10 ESSENTIAL HYPERTENSION: ICD-10-CM

## 2019-01-02 DIAGNOSIS — F41.9 ANXIETY AND DEPRESSION: ICD-10-CM

## 2019-01-02 PROCEDURE — G0444 DEPRESSION SCREEN ANNUAL: HCPCS | Performed by: NURSE PRACTITIONER

## 2019-01-02 PROCEDURE — 1036F TOBACCO NON-USER: CPT | Performed by: NURSE PRACTITIONER

## 2019-01-02 PROCEDURE — 1101F PT FALLS ASSESS-DOCD LE1/YR: CPT | Performed by: NURSE PRACTITIONER

## 2019-01-02 PROCEDURE — G8427 DOCREV CUR MEDS BY ELIG CLIN: HCPCS | Performed by: NURSE PRACTITIONER

## 2019-01-02 PROCEDURE — 99214 OFFICE O/P EST MOD 30 MIN: CPT | Performed by: NURSE PRACTITIONER

## 2019-01-02 PROCEDURE — 4040F PNEUMOC VAC/ADMIN/RCVD: CPT | Performed by: NURSE PRACTITIONER

## 2019-01-02 PROCEDURE — G8417 CALC BMI ABV UP PARAM F/U: HCPCS | Performed by: NURSE PRACTITIONER

## 2019-01-02 PROCEDURE — 1090F PRES/ABSN URINE INCON ASSESS: CPT | Performed by: NURSE PRACTITIONER

## 2019-01-02 PROCEDURE — G8484 FLU IMMUNIZE NO ADMIN: HCPCS | Performed by: NURSE PRACTITIONER

## 2019-01-02 PROCEDURE — 3046F HEMOGLOBIN A1C LEVEL >9.0%: CPT | Performed by: NURSE PRACTITIONER

## 2019-01-02 PROCEDURE — 1123F ACP DISCUSS/DSCN MKR DOCD: CPT | Performed by: NURSE PRACTITIONER

## 2019-01-02 PROCEDURE — G8400 PT W/DXA NO RESULTS DOC: HCPCS | Performed by: NURSE PRACTITIONER

## 2019-01-02 PROCEDURE — 2022F DILAT RTA XM EVC RTNOPTHY: CPT | Performed by: NURSE PRACTITIONER

## 2019-01-02 PROCEDURE — 3017F COLORECTAL CA SCREEN DOC REV: CPT | Performed by: NURSE PRACTITIONER

## 2019-01-02 RX ORDER — SYRINGE-NEEDLE,INSULIN,0.5 ML 31 GX5/16"
1 SYRINGE, EMPTY DISPOSABLE MISCELLANEOUS 2 TIMES DAILY
Qty: 100 EACH | Refills: 5 | Status: SHIPPED | OUTPATIENT
Start: 2019-01-02 | End: 2019-10-30 | Stop reason: SDUPTHER

## 2019-01-02 RX ORDER — CYANOCOBALAMIN 1000 UG/ML
1000 INJECTION INTRAMUSCULAR; SUBCUTANEOUS
Qty: 1 ML | Refills: 5 | Status: SHIPPED | OUTPATIENT
Start: 2019-01-02 | End: 2019-07-31 | Stop reason: SDUPTHER

## 2019-01-02 ASSESSMENT — PATIENT HEALTH QUESTIONNAIRE - PHQ9
3. TROUBLE FALLING OR STAYING ASLEEP: 3
10. IF YOU CHECKED OFF ANY PROBLEMS, HOW DIFFICULT HAVE THESE PROBLEMS MADE IT FOR YOU TO DO YOUR WORK, TAKE CARE OF THINGS AT HOME, OR GET ALONG WITH OTHER PEOPLE: 0
SUM OF ALL RESPONSES TO PHQ QUESTIONS 1-9: 16
9. THOUGHTS THAT YOU WOULD BE BETTER OFF DEAD, OR OF HURTING YOURSELF: 0
4. FEELING TIRED OR HAVING LITTLE ENERGY: 3
5. POOR APPETITE OR OVEREATING: 3
2. FEELING DOWN, DEPRESSED OR HOPELESS: 2
6. FEELING BAD ABOUT YOURSELF - OR THAT YOU ARE A FAILURE OR HAVE LET YOURSELF OR YOUR FAMILY DOWN: 2
1. LITTLE INTEREST OR PLEASURE IN DOING THINGS: 2
8. MOVING OR SPEAKING SO SLOWLY THAT OTHER PEOPLE COULD HAVE NOTICED. OR THE OPPOSITE, BEING SO FIGETY OR RESTLESS THAT YOU HAVE BEEN MOVING AROUND A LOT MORE THAN USUAL: 0
SUM OF ALL RESPONSES TO PHQ9 QUESTIONS 1 & 2: 4
SUM OF ALL RESPONSES TO PHQ QUESTIONS 1-9: 16
7. TROUBLE CONCENTRATING ON THINGS, SUCH AS READING THE NEWSPAPER OR WATCHING TELEVISION: 1

## 2019-01-04 ASSESSMENT — ENCOUNTER SYMPTOMS
ABDOMINAL PAIN: 0
WHEEZING: 0
COUGH: 0
EYES NEGATIVE: 1
SHORTNESS OF BREATH: 0
DIARRHEA: 0
CONSTIPATION: 0

## 2019-01-14 ENCOUNTER — OFFICE VISIT (OUTPATIENT)
Dept: INTERNAL MEDICINE | Age: 74
End: 2019-01-14
Payer: MEDICARE

## 2019-01-14 VITALS
OXYGEN SATURATION: 94 % | RESPIRATION RATE: 16 BRPM | DIASTOLIC BLOOD PRESSURE: 76 MMHG | SYSTOLIC BLOOD PRESSURE: 120 MMHG | HEART RATE: 57 BPM | BODY MASS INDEX: 44.08 KG/M2 | WEIGHT: 256.8 LBS

## 2019-01-14 DIAGNOSIS — E66.01 OBESITY, MORBID, BMI 40.0-49.9 (HCC): ICD-10-CM

## 2019-01-14 DIAGNOSIS — N18.30 TYPE 2 DIABETES MELLITUS WITH STAGE 3 CHRONIC KIDNEY DISEASE, WITH LONG-TERM CURRENT USE OF INSULIN (HCC): Primary | ICD-10-CM

## 2019-01-14 DIAGNOSIS — I10 ESSENTIAL HYPERTENSION: ICD-10-CM

## 2019-01-14 DIAGNOSIS — E11.22 TYPE 2 DIABETES MELLITUS WITH STAGE 3 CHRONIC KIDNEY DISEASE, WITH LONG-TERM CURRENT USE OF INSULIN (HCC): Primary | ICD-10-CM

## 2019-01-14 DIAGNOSIS — E78.49 OTHER HYPERLIPIDEMIA: ICD-10-CM

## 2019-01-14 DIAGNOSIS — Z79.4 TYPE 2 DIABETES MELLITUS WITH STAGE 3 CHRONIC KIDNEY DISEASE, WITH LONG-TERM CURRENT USE OF INSULIN (HCC): Primary | ICD-10-CM

## 2019-01-14 PROCEDURE — 3017F COLORECTAL CA SCREEN DOC REV: CPT | Performed by: NURSE PRACTITIONER

## 2019-01-14 PROCEDURE — G8427 DOCREV CUR MEDS BY ELIG CLIN: HCPCS | Performed by: NURSE PRACTITIONER

## 2019-01-14 PROCEDURE — 1036F TOBACCO NON-USER: CPT | Performed by: NURSE PRACTITIONER

## 2019-01-14 PROCEDURE — 99214 OFFICE O/P EST MOD 30 MIN: CPT | Performed by: NURSE PRACTITIONER

## 2019-01-14 PROCEDURE — G8400 PT W/DXA NO RESULTS DOC: HCPCS | Performed by: NURSE PRACTITIONER

## 2019-01-14 PROCEDURE — 95251 CONT GLUC MNTR ANALYSIS I&R: CPT | Performed by: NURSE PRACTITIONER

## 2019-01-14 PROCEDURE — 1123F ACP DISCUSS/DSCN MKR DOCD: CPT | Performed by: NURSE PRACTITIONER

## 2019-01-14 PROCEDURE — 4040F PNEUMOC VAC/ADMIN/RCVD: CPT | Performed by: NURSE PRACTITIONER

## 2019-01-14 PROCEDURE — 1090F PRES/ABSN URINE INCON ASSESS: CPT | Performed by: NURSE PRACTITIONER

## 2019-01-14 PROCEDURE — G8417 CALC BMI ABV UP PARAM F/U: HCPCS | Performed by: NURSE PRACTITIONER

## 2019-01-14 PROCEDURE — 2022F DILAT RTA XM EVC RTNOPTHY: CPT | Performed by: NURSE PRACTITIONER

## 2019-01-14 PROCEDURE — 1101F PT FALLS ASSESS-DOCD LE1/YR: CPT | Performed by: NURSE PRACTITIONER

## 2019-01-14 PROCEDURE — 3046F HEMOGLOBIN A1C LEVEL >9.0%: CPT | Performed by: NURSE PRACTITIONER

## 2019-01-14 PROCEDURE — G8484 FLU IMMUNIZE NO ADMIN: HCPCS | Performed by: NURSE PRACTITIONER

## 2019-01-14 ASSESSMENT — ENCOUNTER SYMPTOMS
DIARRHEA: 0
VISUAL CHANGE: 0
ABDOMINAL PAIN: 0
RESPIRATORY NEGATIVE: 1
SHORTNESS OF BREATH: 0
BLURRED VISION: 0

## 2019-02-06 ENCOUNTER — OFFICE VISIT (OUTPATIENT)
Dept: INTERNAL MEDICINE | Age: 74
End: 2019-02-06
Payer: MEDICARE

## 2019-02-06 VITALS
DIASTOLIC BLOOD PRESSURE: 78 MMHG | WEIGHT: 252.4 LBS | OXYGEN SATURATION: 98 % | HEART RATE: 58 BPM | BODY MASS INDEX: 43.32 KG/M2 | SYSTOLIC BLOOD PRESSURE: 130 MMHG

## 2019-02-06 DIAGNOSIS — Z79.4 TYPE 2 DIABETES MELLITUS WITH STAGE 3 CHRONIC KIDNEY DISEASE, WITH LONG-TERM CURRENT USE OF INSULIN (HCC): Primary | ICD-10-CM

## 2019-02-06 DIAGNOSIS — I10 ESSENTIAL HYPERTENSION: ICD-10-CM

## 2019-02-06 DIAGNOSIS — Z71.89 DIABETES EDUCATION, ENCOUNTER FOR: ICD-10-CM

## 2019-02-06 DIAGNOSIS — E78.49 OTHER HYPERLIPIDEMIA: ICD-10-CM

## 2019-02-06 DIAGNOSIS — E66.01 OBESITY, MORBID, BMI 40.0-49.9 (HCC): ICD-10-CM

## 2019-02-06 DIAGNOSIS — E11.22 TYPE 2 DIABETES MELLITUS WITH STAGE 3 CHRONIC KIDNEY DISEASE, WITH LONG-TERM CURRENT USE OF INSULIN (HCC): Primary | ICD-10-CM

## 2019-02-06 DIAGNOSIS — N18.30 TYPE 2 DIABETES MELLITUS WITH STAGE 3 CHRONIC KIDNEY DISEASE, WITH LONG-TERM CURRENT USE OF INSULIN (HCC): Primary | ICD-10-CM

## 2019-02-06 PROCEDURE — 99214 OFFICE O/P EST MOD 30 MIN: CPT | Performed by: NURSE PRACTITIONER

## 2019-02-06 PROCEDURE — G8427 DOCREV CUR MEDS BY ELIG CLIN: HCPCS | Performed by: NURSE PRACTITIONER

## 2019-02-06 PROCEDURE — 2022F DILAT RTA XM EVC RTNOPTHY: CPT | Performed by: NURSE PRACTITIONER

## 2019-02-06 PROCEDURE — G8400 PT W/DXA NO RESULTS DOC: HCPCS | Performed by: NURSE PRACTITIONER

## 2019-02-06 PROCEDURE — 1123F ACP DISCUSS/DSCN MKR DOCD: CPT | Performed by: NURSE PRACTITIONER

## 2019-02-06 PROCEDURE — 3046F HEMOGLOBIN A1C LEVEL >9.0%: CPT | Performed by: NURSE PRACTITIONER

## 2019-02-06 PROCEDURE — 1036F TOBACCO NON-USER: CPT | Performed by: NURSE PRACTITIONER

## 2019-02-06 PROCEDURE — 1090F PRES/ABSN URINE INCON ASSESS: CPT | Performed by: NURSE PRACTITIONER

## 2019-02-06 PROCEDURE — G8484 FLU IMMUNIZE NO ADMIN: HCPCS | Performed by: NURSE PRACTITIONER

## 2019-02-06 PROCEDURE — 4040F PNEUMOC VAC/ADMIN/RCVD: CPT | Performed by: NURSE PRACTITIONER

## 2019-02-06 PROCEDURE — 3017F COLORECTAL CA SCREEN DOC REV: CPT | Performed by: NURSE PRACTITIONER

## 2019-02-06 PROCEDURE — 1101F PT FALLS ASSESS-DOCD LE1/YR: CPT | Performed by: NURSE PRACTITIONER

## 2019-02-06 PROCEDURE — G8417 CALC BMI ABV UP PARAM F/U: HCPCS | Performed by: NURSE PRACTITIONER

## 2019-02-06 RX ORDER — GLIMEPIRIDE 2 MG/1
2 TABLET ORAL
COMMUNITY
End: 2019-03-07 | Stop reason: ALTCHOICE

## 2019-02-06 ASSESSMENT — ENCOUNTER SYMPTOMS
SHORTNESS OF BREATH: 0
DIARRHEA: 0
RESPIRATORY NEGATIVE: 1
ABDOMINAL PAIN: 0
BLURRED VISION: 0
VISUAL CHANGE: 0

## 2019-02-07 ENCOUNTER — OFFICE VISIT (OUTPATIENT)
Dept: FAMILY MEDICINE CLINIC | Age: 74
End: 2019-02-07
Payer: MEDICARE

## 2019-02-07 VITALS
OXYGEN SATURATION: 97 % | WEIGHT: 252 LBS | BODY MASS INDEX: 43.02 KG/M2 | TEMPERATURE: 97.3 F | DIASTOLIC BLOOD PRESSURE: 84 MMHG | HEIGHT: 64 IN | RESPIRATION RATE: 18 BRPM | SYSTOLIC BLOOD PRESSURE: 120 MMHG | HEART RATE: 56 BPM

## 2019-02-07 DIAGNOSIS — F33.1 MODERATE EPISODE OF RECURRENT MAJOR DEPRESSIVE DISORDER (HCC): ICD-10-CM

## 2019-02-07 DIAGNOSIS — E11.40 NEUROPATHY DUE TO TYPE 2 DIABETES MELLITUS (HCC): ICD-10-CM

## 2019-02-07 DIAGNOSIS — Z79.4 TYPE 2 DIABETES MELLITUS WITH STAGE 3 CHRONIC KIDNEY DISEASE, WITH LONG-TERM CURRENT USE OF INSULIN (HCC): Primary | ICD-10-CM

## 2019-02-07 DIAGNOSIS — E66.01 OBESITY, MORBID, BMI 40.0-49.9 (HCC): ICD-10-CM

## 2019-02-07 DIAGNOSIS — N18.30 TYPE 2 DIABETES MELLITUS WITH STAGE 3 CHRONIC KIDNEY DISEASE, WITH LONG-TERM CURRENT USE OF INSULIN (HCC): Primary | ICD-10-CM

## 2019-02-07 DIAGNOSIS — K42.9 PERIUMBILICAL HERNIA: ICD-10-CM

## 2019-02-07 DIAGNOSIS — I10 ESSENTIAL HYPERTENSION: ICD-10-CM

## 2019-02-07 DIAGNOSIS — E11.22 TYPE 2 DIABETES MELLITUS WITH STAGE 3 CHRONIC KIDNEY DISEASE, WITH LONG-TERM CURRENT USE OF INSULIN (HCC): Primary | ICD-10-CM

## 2019-02-07 DIAGNOSIS — E78.49 OTHER HYPERLIPIDEMIA: ICD-10-CM

## 2019-02-07 DIAGNOSIS — G47.33 OSA TREATED WITH BIPAP: ICD-10-CM

## 2019-02-07 LAB — HBA1C MFR BLD: 8.6 %

## 2019-02-07 PROCEDURE — 3045F PR MOST RECENT HEMOGLOBIN A1C LEVEL 7.0-9.0%: CPT | Performed by: NURSE PRACTITIONER

## 2019-02-07 PROCEDURE — 1036F TOBACCO NON-USER: CPT | Performed by: NURSE PRACTITIONER

## 2019-02-07 PROCEDURE — G8400 PT W/DXA NO RESULTS DOC: HCPCS | Performed by: NURSE PRACTITIONER

## 2019-02-07 PROCEDURE — G8427 DOCREV CUR MEDS BY ELIG CLIN: HCPCS | Performed by: NURSE PRACTITIONER

## 2019-02-07 PROCEDURE — 1123F ACP DISCUSS/DSCN MKR DOCD: CPT | Performed by: NURSE PRACTITIONER

## 2019-02-07 PROCEDURE — G8417 CALC BMI ABV UP PARAM F/U: HCPCS | Performed by: NURSE PRACTITIONER

## 2019-02-07 PROCEDURE — 99214 OFFICE O/P EST MOD 30 MIN: CPT | Performed by: NURSE PRACTITIONER

## 2019-02-07 PROCEDURE — 4040F PNEUMOC VAC/ADMIN/RCVD: CPT | Performed by: NURSE PRACTITIONER

## 2019-02-07 PROCEDURE — 1101F PT FALLS ASSESS-DOCD LE1/YR: CPT | Performed by: NURSE PRACTITIONER

## 2019-02-07 PROCEDURE — G8484 FLU IMMUNIZE NO ADMIN: HCPCS | Performed by: NURSE PRACTITIONER

## 2019-02-07 PROCEDURE — 1090F PRES/ABSN URINE INCON ASSESS: CPT | Performed by: NURSE PRACTITIONER

## 2019-02-07 PROCEDURE — 2022F DILAT RTA XM EVC RTNOPTHY: CPT | Performed by: NURSE PRACTITIONER

## 2019-02-07 PROCEDURE — 3017F COLORECTAL CA SCREEN DOC REV: CPT | Performed by: NURSE PRACTITIONER

## 2019-02-07 PROCEDURE — 83036 HEMOGLOBIN GLYCOSYLATED A1C: CPT | Performed by: NURSE PRACTITIONER

## 2019-02-07 ASSESSMENT — PATIENT HEALTH QUESTIONNAIRE - PHQ9
7. TROUBLE CONCENTRATING ON THINGS, SUCH AS READING THE NEWSPAPER OR WATCHING TELEVISION: 2
4. FEELING TIRED OR HAVING LITTLE ENERGY: 2
5. POOR APPETITE OR OVEREATING: 2
6. FEELING BAD ABOUT YOURSELF - OR THAT YOU ARE A FAILURE OR HAVE LET YOURSELF OR YOUR FAMILY DOWN: 2
3. TROUBLE FALLING OR STAYING ASLEEP: 3
10. IF YOU CHECKED OFF ANY PROBLEMS, HOW DIFFICULT HAVE THESE PROBLEMS MADE IT FOR YOU TO DO YOUR WORK, TAKE CARE OF THINGS AT HOME, OR GET ALONG WITH OTHER PEOPLE: 1
9. THOUGHTS THAT YOU WOULD BE BETTER OFF DEAD, OR OF HURTING YOURSELF: 0
SUM OF ALL RESPONSES TO PHQ QUESTIONS 1-9: 15
8. MOVING OR SPEAKING SO SLOWLY THAT OTHER PEOPLE COULD HAVE NOTICED. OR THE OPPOSITE, BEING SO FIGETY OR RESTLESS THAT YOU HAVE BEEN MOVING AROUND A LOT MORE THAN USUAL: 1
SUM OF ALL RESPONSES TO PHQ9 QUESTIONS 1 & 2: 3
SUM OF ALL RESPONSES TO PHQ QUESTIONS 1-9: 15
1. LITTLE INTEREST OR PLEASURE IN DOING THINGS: 0
2. FEELING DOWN, DEPRESSED OR HOPELESS: 3

## 2019-02-17 PROBLEM — F33.1 MODERATE EPISODE OF RECURRENT MAJOR DEPRESSIVE DISORDER (HCC): Status: ACTIVE | Noted: 2019-02-17

## 2019-02-17 ASSESSMENT — ENCOUNTER SYMPTOMS
DIARRHEA: 0
SHORTNESS OF BREATH: 0
COUGH: 0
ABDOMINAL PAIN: 0
CONSTIPATION: 0
EYES NEGATIVE: 1
WHEEZING: 0

## 2019-03-07 ENCOUNTER — OFFICE VISIT (OUTPATIENT)
Dept: FAMILY MEDICINE CLINIC | Age: 74
End: 2019-03-07
Payer: MEDICARE

## 2019-03-07 VITALS
BODY MASS INDEX: 42.85 KG/M2 | WEIGHT: 251 LBS | SYSTOLIC BLOOD PRESSURE: 128 MMHG | TEMPERATURE: 97.9 F | HEIGHT: 64 IN | DIASTOLIC BLOOD PRESSURE: 84 MMHG | HEART RATE: 60 BPM | OXYGEN SATURATION: 96 %

## 2019-03-07 DIAGNOSIS — N18.30 TYPE 2 DIABETES MELLITUS WITH STAGE 3 CHRONIC KIDNEY DISEASE, WITH LONG-TERM CURRENT USE OF INSULIN (HCC): Primary | ICD-10-CM

## 2019-03-07 DIAGNOSIS — E11.22 TYPE 2 DIABETES MELLITUS WITH STAGE 3 CHRONIC KIDNEY DISEASE, WITH LONG-TERM CURRENT USE OF INSULIN (HCC): Primary | ICD-10-CM

## 2019-03-07 DIAGNOSIS — Z79.4 TYPE 2 DIABETES MELLITUS WITH STAGE 3 CHRONIC KIDNEY DISEASE, WITH LONG-TERM CURRENT USE OF INSULIN (HCC): Primary | ICD-10-CM

## 2019-03-07 DIAGNOSIS — G47.33 OSA TREATED WITH BIPAP: ICD-10-CM

## 2019-03-07 DIAGNOSIS — E78.5 HYPERLIPIDEMIA ASSOCIATED WITH TYPE 2 DIABETES MELLITUS (HCC): ICD-10-CM

## 2019-03-07 DIAGNOSIS — E11.69 HYPERLIPIDEMIA ASSOCIATED WITH TYPE 2 DIABETES MELLITUS (HCC): ICD-10-CM

## 2019-03-07 DIAGNOSIS — N39.46 MIXED STRESS AND URGE URINARY INCONTINENCE: ICD-10-CM

## 2019-03-07 DIAGNOSIS — E66.01 OBESITY, MORBID, BMI 40.0-49.9 (HCC): ICD-10-CM

## 2019-03-07 DIAGNOSIS — I10 ESSENTIAL HYPERTENSION: ICD-10-CM

## 2019-03-07 DIAGNOSIS — R35.0 FREQUENCY OF URINATION: ICD-10-CM

## 2019-03-07 DIAGNOSIS — N39.0 URINARY TRACT INFECTION WITH HEMATURIA, SITE UNSPECIFIED: ICD-10-CM

## 2019-03-07 DIAGNOSIS — R31.9 URINARY TRACT INFECTION WITH HEMATURIA, SITE UNSPECIFIED: ICD-10-CM

## 2019-03-07 LAB
APPEARANCE FLUID: CLEAR
BILIRUBIN, POC: NEGATIVE
BLOOD URINE, POC: ABNORMAL
CLARITY, POC: CLEAR
COLOR, POC: YELLOW
GLUCOSE URINE, POC: NEGATIVE
HBA1C MFR BLD: 8.3 %
KETONES, POC: NEGATIVE
LEUKOCYTE EST, POC: ABNORMAL
NITRITE, POC: NEGATIVE
PH, POC: 5
PROTEIN, POC: NEGATIVE
SPECIFIC GRAVITY, POC: 1.01
URINE CULTURE, ROUTINE: NORMAL
UROBILINOGEN, POC: 0.2

## 2019-03-07 PROCEDURE — 81002 URINALYSIS NONAUTO W/O SCOPE: CPT | Performed by: NURSE PRACTITIONER

## 2019-03-07 PROCEDURE — G8400 PT W/DXA NO RESULTS DOC: HCPCS | Performed by: NURSE PRACTITIONER

## 2019-03-07 PROCEDURE — 1101F PT FALLS ASSESS-DOCD LE1/YR: CPT | Performed by: NURSE PRACTITIONER

## 2019-03-07 PROCEDURE — 99214 OFFICE O/P EST MOD 30 MIN: CPT | Performed by: NURSE PRACTITIONER

## 2019-03-07 PROCEDURE — 3017F COLORECTAL CA SCREEN DOC REV: CPT | Performed by: NURSE PRACTITIONER

## 2019-03-07 PROCEDURE — 1036F TOBACCO NON-USER: CPT | Performed by: NURSE PRACTITIONER

## 2019-03-07 PROCEDURE — 4040F PNEUMOC VAC/ADMIN/RCVD: CPT | Performed by: NURSE PRACTITIONER

## 2019-03-07 PROCEDURE — 83036 HEMOGLOBIN GLYCOSYLATED A1C: CPT | Performed by: NURSE PRACTITIONER

## 2019-03-07 PROCEDURE — 87086 URINE CULTURE/COLONY COUNT: CPT | Performed by: NURSE PRACTITIONER

## 2019-03-07 PROCEDURE — 1090F PRES/ABSN URINE INCON ASSESS: CPT | Performed by: NURSE PRACTITIONER

## 2019-03-07 PROCEDURE — 1123F ACP DISCUSS/DSCN MKR DOCD: CPT | Performed by: NURSE PRACTITIONER

## 2019-03-07 PROCEDURE — G8427 DOCREV CUR MEDS BY ELIG CLIN: HCPCS | Performed by: NURSE PRACTITIONER

## 2019-03-07 PROCEDURE — 2022F DILAT RTA XM EVC RTNOPTHY: CPT | Performed by: NURSE PRACTITIONER

## 2019-03-07 PROCEDURE — 0509F URINE INCON PLAN DOCD: CPT | Performed by: NURSE PRACTITIONER

## 2019-03-07 PROCEDURE — G8484 FLU IMMUNIZE NO ADMIN: HCPCS | Performed by: NURSE PRACTITIONER

## 2019-03-07 PROCEDURE — 3045F PR MOST RECENT HEMOGLOBIN A1C LEVEL 7.0-9.0%: CPT | Performed by: NURSE PRACTITIONER

## 2019-03-07 PROCEDURE — G8417 CALC BMI ABV UP PARAM F/U: HCPCS | Performed by: NURSE PRACTITIONER

## 2019-03-07 RX ORDER — NITROFURANTOIN 25; 75 MG/1; MG/1
100 CAPSULE ORAL 2 TIMES DAILY
Qty: 20 CAPSULE | Refills: 0 | Status: SHIPPED | OUTPATIENT
Start: 2019-03-07 | End: 2020-03-24

## 2019-03-07 RX ORDER — OXYBUTYNIN CHLORIDE 5 MG/1
5 TABLET ORAL 2 TIMES DAILY
Qty: 180 TABLET | Refills: 0 | Status: SHIPPED | OUTPATIENT
Start: 2019-03-07 | End: 2019-05-30

## 2019-03-07 RX ORDER — SIMVASTATIN 40 MG
40 TABLET ORAL NIGHTLY
Qty: 90 TABLET | Refills: 1 | Status: SHIPPED | OUTPATIENT
Start: 2019-03-07 | End: 2019-05-30

## 2019-03-07 ASSESSMENT — ENCOUNTER SYMPTOMS: NAUSEA: 0

## 2019-03-11 ENCOUNTER — TELEPHONE (OUTPATIENT)
Dept: INTERNAL MEDICINE | Age: 74
End: 2019-03-11

## 2019-03-14 ENCOUNTER — TELEPHONE (OUTPATIENT)
Dept: FAMILY MEDICINE CLINIC | Age: 74
End: 2019-03-14

## 2019-03-17 ASSESSMENT — ENCOUNTER SYMPTOMS
ABDOMINAL PAIN: 0
SHORTNESS OF BREATH: 0
WHEEZING: 0
COUGH: 0
DIARRHEA: 0
CONSTIPATION: 0

## 2019-03-19 ENCOUNTER — TELEPHONE (OUTPATIENT)
Dept: FAMILY MEDICINE CLINIC | Age: 74
End: 2019-03-19

## 2019-03-26 ENCOUNTER — TELEPHONE (OUTPATIENT)
Dept: FAMILY MEDICINE CLINIC | Age: 74
End: 2019-03-26

## 2019-03-26 DIAGNOSIS — Z79.4 TYPE 2 DIABETES MELLITUS WITH STAGE 3 CHRONIC KIDNEY DISEASE, WITH LONG-TERM CURRENT USE OF INSULIN (HCC): ICD-10-CM

## 2019-03-26 DIAGNOSIS — N18.30 TYPE 2 DIABETES MELLITUS WITH STAGE 3 CHRONIC KIDNEY DISEASE, WITH LONG-TERM CURRENT USE OF INSULIN (HCC): ICD-10-CM

## 2019-03-26 DIAGNOSIS — E11.22 TYPE 2 DIABETES MELLITUS WITH STAGE 3 CHRONIC KIDNEY DISEASE, WITH LONG-TERM CURRENT USE OF INSULIN (HCC): ICD-10-CM

## 2019-03-28 ENCOUNTER — OFFICE VISIT (OUTPATIENT)
Dept: FAMILY MEDICINE CLINIC | Age: 74
End: 2019-03-28
Payer: MEDICARE

## 2019-03-28 VITALS
HEIGHT: 64 IN | HEART RATE: 68 BPM | OXYGEN SATURATION: 96 % | WEIGHT: 248 LBS | SYSTOLIC BLOOD PRESSURE: 124 MMHG | DIASTOLIC BLOOD PRESSURE: 80 MMHG | BODY MASS INDEX: 42.34 KG/M2

## 2019-03-28 DIAGNOSIS — G47.33 OSA TREATED WITH BIPAP: ICD-10-CM

## 2019-03-28 DIAGNOSIS — I10 ESSENTIAL HYPERTENSION: ICD-10-CM

## 2019-03-28 DIAGNOSIS — D51.8 VITAMIN B12 DEFICIENCY (DIETARY) ANEMIA: ICD-10-CM

## 2019-03-28 DIAGNOSIS — E66.01 OBESITY, MORBID, BMI 40.0-49.9 (HCC): ICD-10-CM

## 2019-03-28 DIAGNOSIS — E78.49 OTHER HYPERLIPIDEMIA: ICD-10-CM

## 2019-03-28 DIAGNOSIS — F33.0 MILD EPISODE OF RECURRENT MAJOR DEPRESSIVE DISORDER (HCC): ICD-10-CM

## 2019-03-28 DIAGNOSIS — E11.22 TYPE 2 DIABETES MELLITUS WITH STAGE 3 CHRONIC KIDNEY DISEASE, WITH LONG-TERM CURRENT USE OF INSULIN (HCC): Primary | ICD-10-CM

## 2019-03-28 DIAGNOSIS — I50.32 CHRONIC DIASTOLIC HEART FAILURE (HCC): ICD-10-CM

## 2019-03-28 DIAGNOSIS — E11.40 NEUROPATHY DUE TO TYPE 2 DIABETES MELLITUS (HCC): ICD-10-CM

## 2019-03-28 DIAGNOSIS — R42 VERTIGO: ICD-10-CM

## 2019-03-28 DIAGNOSIS — N18.30 TYPE 2 DIABETES MELLITUS WITH STAGE 3 CHRONIC KIDNEY DISEASE, WITH LONG-TERM CURRENT USE OF INSULIN (HCC): Primary | ICD-10-CM

## 2019-03-28 DIAGNOSIS — Z79.4 TYPE 2 DIABETES MELLITUS WITH STAGE 3 CHRONIC KIDNEY DISEASE, WITH LONG-TERM CURRENT USE OF INSULIN (HCC): Primary | ICD-10-CM

## 2019-03-28 LAB
CHOLESTEROL/HDL RATIO: 3 RATIO (ref 0–4.5)
CHOLESTEROL: 157 MG/DL (ref 50–200)
HDL, DIRECT: 53 MG/DL (ref 36–68)
LDL CHOLESTEROL CALCULATED: 69.4 MG/DL (ref 0–160)
TRIGL SERPL-MCNC: 173 MG/DL (ref 10–250)
VLDLC SERPL CALC-MCNC: 35 MG/DL (ref 0–40)

## 2019-03-28 PROCEDURE — 4040F PNEUMOC VAC/ADMIN/RCVD: CPT | Performed by: NURSE PRACTITIONER

## 2019-03-28 PROCEDURE — 2022F DILAT RTA XM EVC RTNOPTHY: CPT | Performed by: NURSE PRACTITIONER

## 2019-03-28 PROCEDURE — 3017F COLORECTAL CA SCREEN DOC REV: CPT | Performed by: NURSE PRACTITIONER

## 2019-03-28 PROCEDURE — 1090F PRES/ABSN URINE INCON ASSESS: CPT | Performed by: NURSE PRACTITIONER

## 2019-03-28 PROCEDURE — G8484 FLU IMMUNIZE NO ADMIN: HCPCS | Performed by: NURSE PRACTITIONER

## 2019-03-28 PROCEDURE — 1123F ACP DISCUSS/DSCN MKR DOCD: CPT | Performed by: NURSE PRACTITIONER

## 2019-03-28 PROCEDURE — 1036F TOBACCO NON-USER: CPT | Performed by: NURSE PRACTITIONER

## 2019-03-28 PROCEDURE — 3045F PR MOST RECENT HEMOGLOBIN A1C LEVEL 7.0-9.0%: CPT | Performed by: NURSE PRACTITIONER

## 2019-03-28 PROCEDURE — G8400 PT W/DXA NO RESULTS DOC: HCPCS | Performed by: NURSE PRACTITIONER

## 2019-03-28 PROCEDURE — 99214 OFFICE O/P EST MOD 30 MIN: CPT | Performed by: NURSE PRACTITIONER

## 2019-03-28 PROCEDURE — G8427 DOCREV CUR MEDS BY ELIG CLIN: HCPCS | Performed by: NURSE PRACTITIONER

## 2019-03-28 PROCEDURE — G8417 CALC BMI ABV UP PARAM F/U: HCPCS | Performed by: NURSE PRACTITIONER

## 2019-03-28 NOTE — PROGRESS NOTES
([address] 5/31/2017    Chronic kidney disease     Dependent edema     Hypertension     Incontinence     in female    Osteoarthritis     knee     Type II or unspecified type diabetes mellitus without mention of complication, not stated as uncontrolled       Past Surgical History:   Procedure Laterality Date    ANKLE SURGERY Left     FINGER TRIGGER RELEASE Right        Family History   Problem Relation Age of Onset    Diabetes Mother     Diabetes Father     Cancer Paternal Grandfather         BONE        Social History     Tobacco Use    Smoking status: Never Smoker    Smokeless tobacco: Never Used   Substance Use Topics    Alcohol use: No      Current Outpatient Medications   Medication Sig Dispense Refill    Insulin Glargine-Lixisenatide (SOLIQUA) 100-33 UNT-MCG/ML SOPN Inject 46 Units into the skin daily 1 pen 5    oxybutynin (DITROPAN) 5 MG tablet Take 1 tablet by mouth 2 times daily 180 tablet 0    simvastatin (ZOCOR) 40 MG tablet Take 1 tablet by mouth nightly 90 tablet 1    cyanocobalamin 1000 MCG/ML injection Inject 1 mL into the muscle every 30 days 1 mL 5    RELION INSULIN SYR 0.5ML/31G 31G X 5/16\" 0.5 ML MISC Inject 1 each into the skin 2 times daily 100 each 5    escitalopram (LEXAPRO) 10 MG tablet Take 1.5 tablets by mouth daily 135 tablet 1    busPIRone (BUSPAR) 10 MG tablet Take 1 tablet by mouth 3 times daily 270 tablet 2    carvedilol (COREG) 6.25 MG tablet Take 1 tablet by mouth 2 times daily (with meals) 180 tablet 1    ferrous sulfate 325 (65 Fe) MG tablet Take 1 tablet by mouth daily (with breakfast) 90 tablet 2    bumetanide (BUMEX) 1 MG tablet Take 1 tablet by mouth 2 times daily 180 tablet 1    lisinopril (PRINIVIL;ZESTRIL) 2.5 MG tablet Take 1 tablet by mouth daily 90 tablet 0    Insulin Pen Needle 32G X 4 MM MISC 1 each by Does not apply route daily 100 each 3    glucose blood VI test strips (RELION CONFIRM/MICRO TEST) strip 1 each by In Vitro route 2 times daily As needed. 100 each 5    RELION ULTRA THIN LANCETS 30G MISC 1 each by Does not apply route 2 times daily 210 each 3    meclizine (ANTIVERT) 12.5 MG tablet Take 1 tablet by mouth 3 times daily as needed for Dizziness 30 tablet 1    acetaminophen (TYLENOL ARTHRITIS PAIN) 650 MG extended release tablet Take 650 mg by mouth every 8 hours as needed for Pain      Aspirin Buf,[id number], (BUFFERED ASPIRIN) 325 MG TABS Take by mouth       No current facility-administered medications for this visit. Allergies   Allergen Reactions    Prednisone      High blood sugars       Health Maintenance   Topic Date Due    Shingles Vaccine (2 of 3) 02/26/2007    Breast cancer screen  01/11/2019    Diabetic retinal exam  04/11/2019    Hepatitis C screen  06/29/2022 (Originally 1945)    Diabetic foot exam  11/07/2019    Potassium monitoring  12/11/2019    Creatinine monitoring  12/11/2019    A1C test (Diabetic or Prediabetic)  03/07/2020    Lipid screen  03/28/2020    Colon cancer screen colonoscopy  10/04/2020    DTaP/Tdap/Td vaccine (2 - Td) 08/25/2027    DEXA (modify frequency per FRAX score)  Completed    Flu vaccine  Completed    Pneumococcal 65+ years Vaccine  Completed     Subjective:      Review of Systems   Constitutional: Negative for chills, diaphoresis and fever. HENT: Negative. Respiratory: Negative for cough, shortness of breath and wheezing. Cardiovascular: Negative for chest pain, palpitations and leg swelling. Gastrointestinal: Negative for abdominal pain, constipation and diarrhea. Endocrine: Negative for cold intolerance, heat intolerance, polydipsia, polyphagia and polyuria. Genitourinary: Negative. Musculoskeletal: Negative for myalgias. Neurological: Negative for dizziness and headaches. Psychiatric/Behavioral: Negative for sleep disturbance. The patient is not nervous/anxious.       Objective:     /80 (Site: Right Upper Arm, Position: Sitting)   Pulse 68 Ht 5' 4\" (1.626 m)   Wt 248 lb (112.5 kg)   SpO2 96%   BMI 42.57 kg/m²     Physical Exam   Constitutional: She is oriented to person, place, and time. She appears well-developed and well-nourished. HENT:   Head: Normocephalic. Right Ear: Tympanic membrane normal.   Left Ear: Tympanic membrane normal.   Nose: Nose normal.   Eyes: Pupils are equal, round, and reactive to light. Conjunctivae are normal.   Neck: Neck supple. No JVD present. Carotid bruit is not present. No thyromegaly present. Cardiovascular: Normal rate, regular rhythm, normal heart sounds and intact distal pulses. Pulmonary/Chest: Effort normal and breath sounds normal. No respiratory distress. She has no wheezes. Abdominal: Soft. Bowel sounds are normal. There is no tenderness. Musculoskeletal: She exhibits no edema. Lymphadenopathy:     She has no cervical adenopathy. Neurological: She is alert and oriented to person, place, and time. Psychiatric: She has a normal mood and affect. Lab Results   Component Value Date     (L) 12/11/2018    K 4.3 12/11/2018    CL 95 (L) 12/11/2018    CO2 28 12/11/2018    BUN 28 (H) 12/11/2018    CREATININE 1.2 (H) 12/11/2018    GLUCOSE 374 (H) 12/11/2018    CALCIUM 9.0 12/11/2018    PROT 7.3 11/23/2018    LABALBU 4.2 11/23/2018    BILITOT neg 11/23/2018    ALKPHOS 96 11/23/2018    AST 16 11/23/2018    ALT 10 11/23/2018    LABGLOM 44/53 12/11/2018    AGRATIO 1.4 11/23/2018    GLOB 3.1 11/23/2018       Lab Results   Component Value Date    LABA1C 8.3 03/07/2019    LABA1C 8.6 02/07/2019    LABA1C 7.3 11/07/2018     Lab Results   Component Value Date    LABMICR 1.0 11/17/2017    LDLCALC 69.4 03/28/2019    CREATININE 1.2 (H) 12/11/2018       Assessment:     1. Type 2 diabetes mellitus with stage 3 chronic kidney disease, with long-term current use of insulin ([facility]    2. Essential hypertension    3. Other hyperlipidemia    4. Chronic diastolic heart failure ([facility]    5.  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 Obesity, morbid, BMI 40.0-49.9 (HCC)    7. Vertigo    8. DAVID treated with BiPAP    9. Vitamin B12 deficiency (dietary) anemia    10. Mild episode of recurrent major depressive disorder ([facility]        Plan:     Orders Placed This Encounter   Procedures    Lipid Panel       Orders Placed This Encounter   Medications    Insulin Glargine-Lixisenatide (SOLIQUA) 100-33 UNT-MCG/ML SOPN     Sig: Inject 46 Units into the skin daily     Dispense:  1 pen     Refill:  5      Return in about 3 months (around 6/28/2019). Instructed to continue 46 units of Soliqua for 1 week. Call after return from vacation to report blood sugar results. Discussed use, benefit, and side effects of prescribed medications. All patient questions answered. Patient voiced understanding. Health Maintenance reviewed. Instructed to continue current medications, diet and exercise. Patient agreed with treatment plan. Follow up as directed.      Electronically signed by ORTIZ Bronson CNP on 4/6/2019

## 2019-04-06 PROBLEM — F33.0 MILD EPISODE OF RECURRENT MAJOR DEPRESSIVE DISORDER (HCC): Status: ACTIVE | Noted: 2017-11-17

## 2019-04-06 ASSESSMENT — ENCOUNTER SYMPTOMS
ABDOMINAL PAIN: 0
CONSTIPATION: 0
WHEEZING: 0
COUGH: 0
DIARRHEA: 0
SHORTNESS OF BREATH: 0

## 2019-04-08 ENCOUNTER — OFFICE VISIT (OUTPATIENT)
Dept: FAMILY MEDICINE CLINIC | Age: 74
End: 2019-04-08
Payer: MEDICARE

## 2019-04-08 VITALS
WEIGHT: 246.9 LBS | OXYGEN SATURATION: 95 % | DIASTOLIC BLOOD PRESSURE: 80 MMHG | HEART RATE: 61 BPM | BODY MASS INDEX: 42.15 KG/M2 | HEIGHT: 64 IN | SYSTOLIC BLOOD PRESSURE: 124 MMHG

## 2019-04-08 DIAGNOSIS — N18.30 TYPE 2 DIABETES MELLITUS WITH STAGE 3 CHRONIC KIDNEY DISEASE, WITH LONG-TERM CURRENT USE OF INSULIN (HCC): Primary | ICD-10-CM

## 2019-04-08 DIAGNOSIS — Z79.4 TYPE 2 DIABETES MELLITUS WITH STAGE 3 CHRONIC KIDNEY DISEASE, WITH LONG-TERM CURRENT USE OF INSULIN (HCC): Primary | ICD-10-CM

## 2019-04-08 DIAGNOSIS — F33.0 MILD EPISODE OF RECURRENT MAJOR DEPRESSIVE DISORDER (HCC): ICD-10-CM

## 2019-04-08 DIAGNOSIS — E66.01 OBESITY, MORBID, BMI 40.0-49.9 (HCC): ICD-10-CM

## 2019-04-08 DIAGNOSIS — E11.22 TYPE 2 DIABETES MELLITUS WITH STAGE 3 CHRONIC KIDNEY DISEASE, WITH LONG-TERM CURRENT USE OF INSULIN (HCC): Primary | ICD-10-CM

## 2019-04-08 DIAGNOSIS — I10 ESSENTIAL HYPERTENSION: ICD-10-CM

## 2019-04-08 DIAGNOSIS — E78.49 OTHER HYPERLIPIDEMIA: ICD-10-CM

## 2019-04-08 DIAGNOSIS — I50.32 CHRONIC DIASTOLIC HEART FAILURE (HCC): ICD-10-CM

## 2019-04-08 PROCEDURE — 99214 OFFICE O/P EST MOD 30 MIN: CPT | Performed by: NURSE PRACTITIONER

## 2019-04-08 PROCEDURE — 1090F PRES/ABSN URINE INCON ASSESS: CPT | Performed by: NURSE PRACTITIONER

## 2019-04-08 PROCEDURE — 3017F COLORECTAL CA SCREEN DOC REV: CPT | Performed by: NURSE PRACTITIONER

## 2019-04-08 PROCEDURE — 1123F ACP DISCUSS/DSCN MKR DOCD: CPT | Performed by: NURSE PRACTITIONER

## 2019-04-08 PROCEDURE — 2022F DILAT RTA XM EVC RTNOPTHY: CPT | Performed by: NURSE PRACTITIONER

## 2019-04-08 PROCEDURE — 3045F PR MOST RECENT HEMOGLOBIN A1C LEVEL 7.0-9.0%: CPT | Performed by: NURSE PRACTITIONER

## 2019-04-08 PROCEDURE — G8427 DOCREV CUR MEDS BY ELIG CLIN: HCPCS | Performed by: NURSE PRACTITIONER

## 2019-04-08 PROCEDURE — 4040F PNEUMOC VAC/ADMIN/RCVD: CPT | Performed by: NURSE PRACTITIONER

## 2019-04-08 PROCEDURE — G8400 PT W/DXA NO RESULTS DOC: HCPCS | Performed by: NURSE PRACTITIONER

## 2019-04-08 PROCEDURE — 1036F TOBACCO NON-USER: CPT | Performed by: NURSE PRACTITIONER

## 2019-04-08 PROCEDURE — G8417 CALC BMI ABV UP PARAM F/U: HCPCS | Performed by: NURSE PRACTITIONER

## 2019-04-08 NOTE — PROGRESS NOTES
[address] [facility]  [address] [facility], [address] [facility]  Dept: [phone]  Dept Fax: [phone]    Korey Reyez is a 68 y.o. female who presents today for her medical conditions/complaints as noted below. Korey Brain c/o of Other (f/u due to [facility] denial)      HPI:   Patient present to the office for follow up of diabetes. She has been taking [facility] with adjustments in doses weekly. Bloods sugars:  in am, 150-217 in pm. Medication has been denied by the insurance and patient is unable to afford continuing. Diabetes   She presents for her follow-up diabetic visit. She has type 2 diabetes mellitus. Her disease course has been improving. There are no hypoglycemic associated symptoms. Pertinent negatives for hypoglycemia include no dizziness, headaches or nervousness/anxiousness. There are no diabetic associated symptoms. Pertinent negatives for diabetes include no chest pain, no polydipsia, no polyphagia and no polyuria. There are no hypoglycemic complications. Symptoms are stable. There are no diabetic complications. Risk factors for coronary artery disease include obesity, sedentary lifestyle, post-menopausal and diabetes mellitus. Current diabetic treatment includes insulin injections. She is compliant with treatment most of the time. An ACE inhibitor/angiotensin II receptor blocker is being taken.        BP Readings from Last 3 Encounters:   04/08/19 124/80   03/28/19 124/80   03/07/19 128/84          ([id number])    Wt Readings from Last 3 Encounters:   04/08/19 246 lb 14.4 oz (112 kg)   03/28/19 248 lb (112.5 kg)   03/07/19 251 lb (113.9 kg)       Past Medical History:   Diagnosis Date    Actinic keratitis     Ankle pain     Arthritis     Back pain     low     Chronic diastolic CHF (congestive heart failure) (HCC)     Chronic diastolic heart failure (HCC) 5/31/2017    Chronic kidney disease     Dependent edema     Hypertension     Incontinence in female    Osteoarthritis     knee     Type II or unspecified type diabetes mellitus without mention of complication, not stated as uncontrolled       Past Surgical History:   Procedure Laterality Date    ANKLE SURGERY Left     FINGER TRIGGER RELEASE Right        Family History   Problem Relation Age of Onset    Diabetes Mother     Diabetes Father     Cancer Paternal Grandfather         BONE        Social History     Tobacco Use    Smoking status: Never Smoker    Smokeless tobacco: Never Used   Substance Use Topics    Alcohol use: No      Current Outpatient Medications   Medication Sig Dispense Refill    insulin glargine (TOUJEO SOLOSTAR) 300 UNIT/ML injection pen Inject 40 Units into the skin nightly 3 pen 2    oxybutynin (DITROPAN) 5 MG tablet Take 1 tablet by mouth 2 times daily 180 tablet 0    simvastatin (ZOCOR) 40 MG tablet Take 1 tablet by mouth nightly 90 tablet 1    cyanocobalamin 1000 MCG/ML injection Inject 1 mL into the muscle every 30 days 1 mL 5    RELION INSULIN SYR 0.5ML/31G 31G X 5/16\" 0.5 ML MISC Inject 1 each into the skin 2 times daily 100 each 5    escitalopram (LEXAPRO) 10 MG tablet Take 1.5 tablets by mouth daily 135 tablet 1    busPIRone (BUSPAR) 10 MG tablet Take 1 tablet by mouth 3 times daily 270 tablet 2    carvedilol (COREG) 6.25 MG tablet Take 1 tablet by mouth 2 times daily (with meals) 180 tablet 1    ferrous sulfate 325 (65 Fe) MG tablet Take 1 tablet by mouth daily (with breakfast) 90 tablet 2    bumetanide (BUMEX) 1 MG tablet Take 1 tablet by mouth 2 times daily 180 tablet 1    lisinopril (PRINIVIL;ZESTRIL) 2.5 MG tablet Take 1 tablet by mouth daily 90 tablet 0    Insulin Pen Needle 32G X 4 MM MISC 1 each by Does not apply route daily 100 each 3    glucose blood VI test strips (RELION CONFIRM/MICRO TEST) strip 1 each by In Vitro route 2 times daily As needed.  100 each 5    RELION ULTRA THIN LANCETS 30G MISC 1 each by Does not apply route 2 times daily in about 1 month (around 5/6/2019). Continue to monitor blood sugars daily. Call in 1 week with results. Discussed use, benefit, and side effects of prescribed medications. All patient questions answered. Patient voiced understanding. Health Maintenance reviewed. Instructed to continue current medications, diet and exercise. Patient agreed with treatment plan. Follow up as directed.      Electronically signed by ORTIZ Sam CNP on 4/12/2019

## 2019-04-12 ASSESSMENT — ENCOUNTER SYMPTOMS
WHEEZING: 0
COUGH: 0
SHORTNESS OF BREATH: 0

## 2019-04-17 ENCOUNTER — OFFICE VISIT (OUTPATIENT)
Dept: FAMILY MEDICINE CLINIC | Age: 74
End: 2019-04-17
Payer: MEDICARE

## 2019-04-17 VITALS
DIASTOLIC BLOOD PRESSURE: 82 MMHG | HEIGHT: 64 IN | OXYGEN SATURATION: 96 % | SYSTOLIC BLOOD PRESSURE: 130 MMHG | HEART RATE: 66 BPM | BODY MASS INDEX: 42.68 KG/M2 | WEIGHT: 250 LBS

## 2019-04-17 DIAGNOSIS — E11.40 NEUROPATHY DUE TO TYPE 2 DIABETES MELLITUS (HCC): ICD-10-CM

## 2019-04-17 DIAGNOSIS — I50.32 CHRONIC DIASTOLIC HEART FAILURE (HCC): ICD-10-CM

## 2019-04-17 DIAGNOSIS — E11.22 TYPE 2 DIABETES MELLITUS WITH STAGE 3 CHRONIC KIDNEY DISEASE, WITH LONG-TERM CURRENT USE OF INSULIN (HCC): Primary | ICD-10-CM

## 2019-04-17 DIAGNOSIS — Z79.4 TYPE 2 DIABETES MELLITUS WITH STAGE 3 CHRONIC KIDNEY DISEASE, WITH LONG-TERM CURRENT USE OF INSULIN (HCC): Primary | ICD-10-CM

## 2019-04-17 DIAGNOSIS — F33.0 MILD EPISODE OF RECURRENT MAJOR DEPRESSIVE DISORDER (HCC): ICD-10-CM

## 2019-04-17 DIAGNOSIS — Z13.31 POSITIVE DEPRESSION SCREENING: ICD-10-CM

## 2019-04-17 DIAGNOSIS — I10 ESSENTIAL HYPERTENSION: ICD-10-CM

## 2019-04-17 DIAGNOSIS — Z91.81 AT HIGH RISK FOR FALLS: ICD-10-CM

## 2019-04-17 DIAGNOSIS — N18.30 TYPE 2 DIABETES MELLITUS WITH STAGE 3 CHRONIC KIDNEY DISEASE, WITH LONG-TERM CURRENT USE OF INSULIN (HCC): Primary | ICD-10-CM

## 2019-04-17 DIAGNOSIS — E78.49 OTHER HYPERLIPIDEMIA: ICD-10-CM

## 2019-04-17 DIAGNOSIS — E66.01 OBESITY, MORBID, BMI 40.0-49.9 (HCC): ICD-10-CM

## 2019-04-17 PROCEDURE — 1090F PRES/ABSN URINE INCON ASSESS: CPT | Performed by: NURSE PRACTITIONER

## 2019-04-17 PROCEDURE — 2022F DILAT RTA XM EVC RTNOPTHY: CPT | Performed by: NURSE PRACTITIONER

## 2019-04-17 PROCEDURE — G8400 PT W/DXA NO RESULTS DOC: HCPCS | Performed by: NURSE PRACTITIONER

## 2019-04-17 PROCEDURE — G8417 CALC BMI ABV UP PARAM F/U: HCPCS | Performed by: NURSE PRACTITIONER

## 2019-04-17 PROCEDURE — 99214 OFFICE O/P EST MOD 30 MIN: CPT | Performed by: NURSE PRACTITIONER

## 2019-04-17 PROCEDURE — 4040F PNEUMOC VAC/ADMIN/RCVD: CPT | Performed by: NURSE PRACTITIONER

## 2019-04-17 PROCEDURE — 3017F COLORECTAL CA SCREEN DOC REV: CPT | Performed by: NURSE PRACTITIONER

## 2019-04-17 PROCEDURE — 1036F TOBACCO NON-USER: CPT | Performed by: NURSE PRACTITIONER

## 2019-04-17 PROCEDURE — G8427 DOCREV CUR MEDS BY ELIG CLIN: HCPCS | Performed by: NURSE PRACTITIONER

## 2019-04-17 PROCEDURE — 1123F ACP DISCUSS/DSCN MKR DOCD: CPT | Performed by: NURSE PRACTITIONER

## 2019-04-17 PROCEDURE — 3045F PR MOST RECENT HEMOGLOBIN A1C LEVEL 7.0-9.0%: CPT | Performed by: NURSE PRACTITIONER

## 2019-04-17 NOTE — PROGRESS NOTES
([address] 5/31/2017    Chronic kidney disease     Dependent edema     Hypertension     Incontinence     in female    Osteoarthritis     knee     Type II or unspecified type diabetes mellitus without mention of complication, not stated as uncontrolled       Past Surgical History:   Procedure Laterality Date    ANKLE SURGERY Left     FINGER TRIGGER RELEASE Right        Family History   Problem Relation Age of Onset    Diabetes Mother     Diabetes Father     Cancer Paternal Grandfather         BONE        Social History     Tobacco Use    Smoking status: Never Smoker    Smokeless tobacco: Never Used   Substance Use Topics    Alcohol use: No      Current Outpatient Medications   Medication Sig Dispense Refill    insulin glargine (TOUJEO SOLOSTAR) 300 UNIT/ML injection pen Inject 40 Units into the skin nightly 3 pen 2    oxybutynin (DITROPAN) 5 MG tablet Take 1 tablet by mouth 2 times daily 180 tablet 0    simvastatin (ZOCOR) 40 MG tablet Take 1 tablet by mouth nightly 90 tablet 1    cyanocobalamin 1000 MCG/ML injection Inject 1 mL into the muscle every 30 days 1 mL 5    RELION INSULIN SYR 0.5ML/31G 31G X 5/16\" 0.5 ML MISC Inject 1 each into the skin 2 times daily 100 each 5    escitalopram (LEXAPRO) 10 MG tablet Take 1.5 tablets by mouth daily 135 tablet 1    busPIRone (BUSPAR) 10 MG tablet Take 1 tablet by mouth 3 times daily 270 tablet 2    carvedilol (COREG) 6.25 MG tablet Take 1 tablet by mouth 2 times daily (with meals) 180 tablet 1    ferrous sulfate 325 (65 Fe) MG tablet Take 1 tablet by mouth daily (with breakfast) 90 tablet 2    bumetanide (BUMEX) 1 MG tablet Take 1 tablet by mouth 2 times daily 180 tablet 1    lisinopril (PRINIVIL;ZESTRIL) 2.5 MG tablet Take 1 tablet by mouth daily 90 tablet 0    Insulin Pen Needle 32G X 4 MM MISC 1 each by Does not apply route daily 100 each 3    glucose blood VI test strips (RELION CONFIRM/MICRO TEST) strip 1 each by In Vitro route 2 times daily As needed. 100 each 5    RELION ULTRA THIN LANCETS 30G MISC 1 each by Does not apply route 2 times daily 210 each 3    meclizine (ANTIVERT) 12.5 MG tablet Take 1 tablet by mouth 3 times daily as needed for Dizziness 30 tablet 1    acetaminophen (TYLENOL ARTHRITIS PAIN) 650 MG extended release tablet Take 650 mg by mouth every 8 hours as needed for Pain       No current facility-administered medications for this visit. Allergies   Allergen Reactions    Prednisone      High blood sugars       Health Maintenance   Topic Date Due    Shingles Vaccine (2 of 3) 02/26/2007    Breast cancer screen  01/11/2019    Diabetic retinal exam  04/11/2019    Hepatitis C screen  06/29/2022 (Originally 1945)    Diabetic foot exam  11/07/2019    Potassium monitoring  12/11/2019    Creatinine monitoring  12/11/2019    A1C test (Diabetic or Prediabetic)  03/07/2020    Lipid screen  03/28/2020    Colon cancer screen colonoscopy  10/04/2020    DTaP/Tdap/Td vaccine (2 - Td) 08/25/2027    DEXA (modify frequency per FRAX score)  Completed    Flu vaccine  Completed    Pneumococcal 65+ years Vaccine  Completed     Subjective:      Review of Systems   Constitutional: Negative for chills, diaphoresis and fever. HENT: Negative. Respiratory: Negative for cough, shortness of breath and wheezing. Cardiovascular: Negative for chest pain, palpitations and leg swelling. Gastrointestinal: Negative for abdominal pain, constipation and diarrhea. Endocrine: Negative for cold intolerance, heat intolerance, polydipsia, polyphagia and polyuria. Genitourinary: Negative. Musculoskeletal: Negative for myalgias. Neurological: Negative for dizziness and headaches. Psychiatric/Behavioral: Negative for sleep disturbance. The patient is not nervous/anxious.       Objective:     /82 (Site: Left Upper Arm, Position: Sitting)   Pulse 66   Ht 5' 4\" (1.626 m)   Wt 250 lb (113.4 kg)   SpO2 96%   BMI 42.91 kg/m² Physical Exam   Constitutional: She is oriented to person, place, and time. She appears well-developed and well-nourished. HENT:   Head: Normocephalic. Right Ear: Tympanic membrane normal.   Left Ear: Tympanic membrane normal.   Nose: Nose normal.   Eyes: Pupils are equal, round, and reactive to light. Conjunctivae are normal.   Neck: Neck supple. No JVD present. Carotid bruit is not present. No thyromegaly present. Cardiovascular: Normal rate, regular rhythm, normal heart sounds and intact distal pulses. Pulmonary/Chest: Effort normal and breath sounds normal. No respiratory distress. She has no wheezes. Abdominal: Soft. Bowel sounds are normal. There is no tenderness. Musculoskeletal: She exhibits no edema. Lymphadenopathy:     She has no cervical adenopathy. Neurological: She is alert and oriented to person, place, and time. Psychiatric: She has a normal mood and affect.      PHQ Scores 2/7/2019 1/2/2019 8/22/2018 4/5/2018 11/17/2017 8/23/2017 8/2/2017   PHQ2 Score 3 4 3 3 2 6 3   PHQ9 Score 15 16 14 12 17 13 15     Interpretation of Total Score Depression Severity: 1-4 = Minimal depression, 5-9 = Mild depression, 10-14 = Moderate depression, 15-19 = Moderately severe depression, 20-27 = Severe depression    Lab Results   Component Value Date     (L) 12/11/2018    K 4.3 12/11/2018    CL 95 (L) 12/11/2018    CO2 28 12/11/2018    BUN 28 (H) 12/11/2018    CREATININE 1.2 (H) 12/11/2018    GLUCOSE 374 (H) 12/11/2018    CALCIUM 9.0 12/11/2018    PROT 7.3 11/23/2018    LABALBU 4.2 11/23/2018    BILITOT neg 11/23/2018    ALKPHOS 96 11/23/2018    AST 16 11/23/2018    ALT 10 11/23/2018    LABGLOM 44/53 12/11/2018    AGRATIO 1.4 11/23/2018    GLOB 3.1 11/23/2018       Lab Results   Component Value Date    LABA1C 8.3 03/07/2019    LABA1C 8.6 02/07/2019    LABA1C 7.3 11/07/2018     Lab Results   Component Value Date    LABMICR 1.0 11/17/2017    LDLCALC 69.4 03/28/2019    CREATININE 1.2 (H) 12/11/2018         Assessment:     1. Type 2 diabetes mellitus with stage 3 chronic kidney disease, with long-term current use of insulin ([facility]    2. Essential hypertension    3. Other hyperlipidemia    4. Obesity, morbid, BMI 40.0-49.9 ([facility]    5. Positive depression screening    6. At high risk for falls    7. Chronic diastolic heart failure ([facility]    8. Mild episode of recurrent major depressive disorder ([facility]    9. Neuropathy due to type 2 diabetes mellitus ([facility]        Plan:        Return in about 1 month (around 5/15/2019). Patient given educational materials - see patient instructions. Discussed use, benefit, and side effects of prescribed medications. All patient questions answered. Patient voiced understanding. Health Maintenance reviewed. Instructed to continue current medications, diet and exercise. Patient agreed with treatment plan. Follow up as directed. On the basis of positive falls risk screening, assessment and plan is as follows: home safety tips provided.     Electronically signed by ORTIZ Sam CNP on 4/25/2019

## 2019-04-23 DIAGNOSIS — E11.9 DIABETES MELLITUS TYPE 2, INSULIN DEPENDENT (HCC): ICD-10-CM

## 2019-04-23 DIAGNOSIS — Z79.4 DIABETES MELLITUS TYPE 2, INSULIN DEPENDENT (HCC): ICD-10-CM

## 2019-04-25 ASSESSMENT — ENCOUNTER SYMPTOMS
ABDOMINAL PAIN: 0
SHORTNESS OF BREATH: 0
CONSTIPATION: 0
COUGH: 0
DIARRHEA: 0
WHEEZING: 0

## 2019-04-29 NOTE — TELEPHONE ENCOUNTER
Patient is requesting test strips and a new test patch? Patient states that she has seen a lot of good feedback on a patch that you wear on your arm.

## 2019-05-02 ENCOUNTER — OFFICE VISIT (OUTPATIENT)
Dept: FAMILY MEDICINE CLINIC | Age: 74
End: 2019-05-02
Payer: MEDICARE

## 2019-05-02 VITALS
OXYGEN SATURATION: 92 % | WEIGHT: 246 LBS | BODY MASS INDEX: 42.23 KG/M2 | HEART RATE: 61 BPM | DIASTOLIC BLOOD PRESSURE: 84 MMHG | SYSTOLIC BLOOD PRESSURE: 126 MMHG

## 2019-05-02 DIAGNOSIS — N30.01 ACUTE CYSTITIS WITH HEMATURIA: Primary | ICD-10-CM

## 2019-05-02 DIAGNOSIS — Z79.4 TYPE 2 DIABETES MELLITUS WITH STAGE 3 CHRONIC KIDNEY DISEASE, WITH LONG-TERM CURRENT USE OF INSULIN (HCC): ICD-10-CM

## 2019-05-02 DIAGNOSIS — R35.0 URINARY FREQUENCY: ICD-10-CM

## 2019-05-02 DIAGNOSIS — E11.22 TYPE 2 DIABETES MELLITUS WITH STAGE 3 CHRONIC KIDNEY DISEASE, WITH LONG-TERM CURRENT USE OF INSULIN (HCC): ICD-10-CM

## 2019-05-02 DIAGNOSIS — N18.30 TYPE 2 DIABETES MELLITUS WITH STAGE 3 CHRONIC KIDNEY DISEASE, WITH LONG-TERM CURRENT USE OF INSULIN (HCC): ICD-10-CM

## 2019-05-02 LAB
BILIRUBIN, POC: NEGATIVE
BLOOD URINE, POC: ABNORMAL
CLARITY, POC: ABNORMAL
COLOR, POC: YELLOW
GLUCOSE URINE, POC: NEGATIVE
KETONES, POC: NEGATIVE
LEUKOCYTE EST, POC: ABNORMAL
NITRITE, POC: NEGATIVE
PH, POC: 5
PROTEIN, POC: ABNORMAL
SPECIFIC GRAVITY, POC: 1.02
URINE CULTURE, ROUTINE: NORMAL
UROBILINOGEN, POC: 0.2

## 2019-05-02 PROCEDURE — 81002 URINALYSIS NONAUTO W/O SCOPE: CPT | Performed by: NURSE PRACTITIONER

## 2019-05-02 PROCEDURE — 4040F PNEUMOC VAC/ADMIN/RCVD: CPT | Performed by: NURSE PRACTITIONER

## 2019-05-02 PROCEDURE — 1036F TOBACCO NON-USER: CPT | Performed by: NURSE PRACTITIONER

## 2019-05-02 PROCEDURE — G8427 DOCREV CUR MEDS BY ELIG CLIN: HCPCS | Performed by: NURSE PRACTITIONER

## 2019-05-02 PROCEDURE — 99213 OFFICE O/P EST LOW 20 MIN: CPT | Performed by: NURSE PRACTITIONER

## 2019-05-02 PROCEDURE — G8400 PT W/DXA NO RESULTS DOC: HCPCS | Performed by: NURSE PRACTITIONER

## 2019-05-02 PROCEDURE — 1123F ACP DISCUSS/DSCN MKR DOCD: CPT | Performed by: NURSE PRACTITIONER

## 2019-05-02 PROCEDURE — 2022F DILAT RTA XM EVC RTNOPTHY: CPT | Performed by: NURSE PRACTITIONER

## 2019-05-02 PROCEDURE — 1090F PRES/ABSN URINE INCON ASSESS: CPT | Performed by: NURSE PRACTITIONER

## 2019-05-02 PROCEDURE — 3017F COLORECTAL CA SCREEN DOC REV: CPT | Performed by: NURSE PRACTITIONER

## 2019-05-02 PROCEDURE — G8417 CALC BMI ABV UP PARAM F/U: HCPCS | Performed by: NURSE PRACTITIONER

## 2019-05-02 PROCEDURE — 87086 URINE CULTURE/COLONY COUNT: CPT | Performed by: NURSE PRACTITIONER

## 2019-05-02 PROCEDURE — 3045F PR MOST RECENT HEMOGLOBIN A1C LEVEL 7.0-9.0%: CPT | Performed by: NURSE PRACTITIONER

## 2019-05-02 RX ORDER — NITROFURANTOIN 25; 75 MG/1; MG/1
100 CAPSULE ORAL 2 TIMES DAILY
Qty: 14 CAPSULE | Refills: 0 | Status: SHIPPED | OUTPATIENT
Start: 2019-05-02 | End: 2020-03-24

## 2019-05-02 ASSESSMENT — ENCOUNTER SYMPTOMS
WHEEZING: 0
CONSTIPATION: 0
SHORTNESS OF BREATH: 0
NAUSEA: 0
ABDOMINAL PAIN: 0
BLOOD IN STOOL: 0
COUGH: 0
DIARRHEA: 0

## 2019-05-02 NOTE — PROGRESS NOTES
[address] [place] [address]  Dept: [phone]  Dept Fax: [phone]      Vinod Arvizu is a 68 y.o. female who presents today for her medical conditions/complaints as noted below. Chief Complaint   Patient presents with    Urinary Frequency     x3 days       HPI:     Urinary Frequency    This is a new problem. The current episode started in the past 7 days (3 days). The problem has been gradually worsening. The pain is at a severity of 0/10. The patient is experiencing no pain. There has been no fever. Associated symptoms include flank pain (left), frequency and urgency (some incontinence ). Pertinent negatives include no chills, hematuria or nausea. She has tried nothing for the symptoms. BS running close to 300 this week. Current Outpatient Medications   Medication Sig Dispense Refill    nitrofurantoin, macrocrystal-monohydrate, (MACROBID) 100 MG capsule Take 1 capsule by mouth 2 times daily for 7 days 14 capsule 0    Insulin Glargine-Lixisenatide 100-33 UNT-MCG/ML SOPN Inject 52 Units into the skin daily 5 pen 5    blood glucose test strips (RELION CONFIRM/MICRO TEST) strip 1 each by In Vitro route 2 times daily As needed.  100 each 5    oxybutynin (DITROPAN) 5 MG tablet Take 1 tablet by mouth 2 times daily 180 tablet 0    simvastatin (ZOCOR) 40 MG tablet Take 1 tablet by mouth nightly 90 tablet 1    cyanocobalamin 1000 MCG/ML injection Inject 1 mL into the muscle every 30 days 1 mL 5    RELION INSULIN SYR 0.5ML/31G 31G X 5/16\" 0.5 ML MISC Inject 1 each into the skin 2 times daily 100 each 5    escitalopram (LEXAPRO) 10 MG tablet Take 1.5 tablets by mouth daily 135 tablet 1    busPIRone (BUSPAR) 10 MG tablet Take 1 tablet by mouth 3 times daily 270 tablet 2    carvedilol (COREG) 6.25 MG tablet Take 1 tablet by mouth 2 times daily (with meals) 180 tablet 1    ferrous sulfate 325 (65 Fe) MG tablet Take 1 tablet by mouth use: No    Drug use: No    Sexual activity: Not on file   Lifestyle    Physical activity:     Days per week: Not on file     Minutes per session: Not on file    Stress: Not on file   Relationships    Social connections:     Talks on phone: Not on file     Gets together: Not on file     Attends Yazdanism service: Not on file     Active member of club or organization: Not on file     Attends meetings of clubs or organizations: Not on file     Relationship status: Not on file    Intimate partner violence:     Fear of current or ex partner: Not on file     Emotionally abused: Not on file     Physically abused: Not on file     Forced sexual activity: Not on file   Other Topics Concern    Not on file   Social History Narrative    Not on file     Family History   Problem Relation Age of Onset    Diabetes Mother     Diabetes Father     Cancer Paternal Grandfather         BONE        Subjective:      Review of Systems   Constitutional: Positive for fatigue. Negative for appetite change, chills and fever. Respiratory: Negative for cough, shortness of breath and wheezing. Cardiovascular: Negative for chest pain. Gastrointestinal: Negative for abdominal pain, blood in stool, constipation, diarrhea and nausea. Genitourinary: Positive for flank pain (left), frequency and urgency (some incontinence ). Negative for dysuria and hematuria. Objective:     /84 (Site: Left Upper Arm, Position: Sitting)   Pulse 61   Wt 246 lb (111.6 kg)   SpO2 92%   BMI 42.23 kg/m²     Physical Exam   Constitutional: She is oriented to person, place, and time. She appears well-developed and well-nourished. HENT:   Head: Normocephalic. Eyes: Conjunctivae are normal.   Neck: Neck supple. Cardiovascular: Normal rate, regular rhythm and normal heart sounds. Pulmonary/Chest: Effort normal and breath sounds normal. No respiratory distress. She has no wheezes. Abdominal: Soft.  Bowel sounds are normal. There is no tenderness. There is CVA tenderness (left). Lymphadenopathy:     She has no cervical adenopathy. Neurological: She is alert and oriented to person, place, and time. Assessment:      Diagnosis Orders   1. Acute cystitis with hematuria  nitrofurantoin, macrocrystal-monohydrate, (MACROBID) 100 MG capsule    Urine Culture   2. Urinary frequency  POCT Urinalysis no Micro   3. Type 2 diabetes mellitus with stage 3 chronic kidney disease, with long-term current use of insulin (HCC)  Insulin Glargine-Lixisenatide 100-33 UNT-MCG/ML SOPN       Plan:     Return if symptoms worsen or fail to improve. Orders Placed This Encounter   Procedures    Urine Culture     Order Specific Question:   Specify (ex-cath, midstream, cysto, etc)? Answer:   midstream    POCT Urinalysis no Micro     Increase Soliqua to 52 units daily. Continue to monitor blood sugar and call the office in 1 week to report readings. Patient given educational materials - see patient instructions. Discussed use, benefit, and side effects of prescribed medications. Instructed to rest and increase fluids. Monitor for worsening symptoms, call office with any concerns. All patient questions answered. Patient voiced understanding. Follow up as directed.      Electronically signed by ORTIZ Lopez CNP on 5/2/2019 at 9:13 AM

## 2019-05-04 ENCOUNTER — TELEPHONE (OUTPATIENT)
Dept: FAMILY MEDICINE CLINIC | Age: 74
End: 2019-05-04

## 2019-05-04 NOTE — TELEPHONE ENCOUNTER
Renetta Corona from [facility] lab called with positive Urine results. Positive for Ecoli.   Patient was started on Macrobid on 5/2/2019    Sent to on-call

## 2019-05-08 ENCOUNTER — OFFICE VISIT (OUTPATIENT)
Dept: DIABETES SERVICES | Age: 74
End: 2019-05-08
Payer: MEDICARE

## 2019-05-08 VITALS
HEART RATE: 69 BPM | WEIGHT: 246.6 LBS | DIASTOLIC BLOOD PRESSURE: 76 MMHG | OXYGEN SATURATION: 96 % | SYSTOLIC BLOOD PRESSURE: 122 MMHG | BODY MASS INDEX: 42.33 KG/M2

## 2019-05-08 DIAGNOSIS — Z71.89 DIABETES EDUCATION, ENCOUNTER FOR: ICD-10-CM

## 2019-05-08 DIAGNOSIS — E78.49 OTHER HYPERLIPIDEMIA: ICD-10-CM

## 2019-05-08 DIAGNOSIS — E66.01 OBESITY, MORBID, BMI 40.0-49.9 (HCC): ICD-10-CM

## 2019-05-08 DIAGNOSIS — N18.30 TYPE 2 DIABETES MELLITUS WITH STAGE 3 CHRONIC KIDNEY DISEASE, WITH LONG-TERM CURRENT USE OF INSULIN (HCC): Primary | ICD-10-CM

## 2019-05-08 DIAGNOSIS — E11.22 TYPE 2 DIABETES MELLITUS WITH STAGE 3 CHRONIC KIDNEY DISEASE, WITH LONG-TERM CURRENT USE OF INSULIN (HCC): Primary | ICD-10-CM

## 2019-05-08 DIAGNOSIS — I10 ESSENTIAL HYPERTENSION: ICD-10-CM

## 2019-05-08 DIAGNOSIS — Z79.4 TYPE 2 DIABETES MELLITUS WITH STAGE 3 CHRONIC KIDNEY DISEASE, WITH LONG-TERM CURRENT USE OF INSULIN (HCC): Primary | ICD-10-CM

## 2019-05-08 PROCEDURE — G8400 PT W/DXA NO RESULTS DOC: HCPCS | Performed by: NURSE PRACTITIONER

## 2019-05-08 PROCEDURE — 4040F PNEUMOC VAC/ADMIN/RCVD: CPT | Performed by: NURSE PRACTITIONER

## 2019-05-08 PROCEDURE — 3045F PR MOST RECENT HEMOGLOBIN A1C LEVEL 7.0-9.0%: CPT | Performed by: NURSE PRACTITIONER

## 2019-05-08 PROCEDURE — 1036F TOBACCO NON-USER: CPT | Performed by: NURSE PRACTITIONER

## 2019-05-08 PROCEDURE — 1123F ACP DISCUSS/DSCN MKR DOCD: CPT | Performed by: NURSE PRACTITIONER

## 2019-05-08 PROCEDURE — G8417 CALC BMI ABV UP PARAM F/U: HCPCS | Performed by: NURSE PRACTITIONER

## 2019-05-08 PROCEDURE — G8428 CUR MEDS NOT DOCUMENT: HCPCS | Performed by: NURSE PRACTITIONER

## 2019-05-08 PROCEDURE — 2022F DILAT RTA XM EVC RTNOPTHY: CPT | Performed by: NURSE PRACTITIONER

## 2019-05-08 PROCEDURE — 1090F PRES/ABSN URINE INCON ASSESS: CPT | Performed by: NURSE PRACTITIONER

## 2019-05-08 PROCEDURE — 99215 OFFICE O/P EST HI 40 MIN: CPT | Performed by: NURSE PRACTITIONER

## 2019-05-08 PROCEDURE — 3017F COLORECTAL CA SCREEN DOC REV: CPT | Performed by: NURSE PRACTITIONER

## 2019-05-08 ASSESSMENT — ENCOUNTER SYMPTOMS
BLURRED VISION: 0
VISUAL CHANGE: 0
RESPIRATORY NEGATIVE: 1
ABDOMINAL PAIN: 0
SHORTNESS OF BREATH: 0
DIARRHEA: 0

## 2019-05-08 NOTE — PROGRESS NOTES
[address] [facility] INTERNAL MED  [address] [facility]  [phone]        HISTORY:    Farheen Newell presents today for evaluation and management of:  Chief Complaint   Patient presents with    Diabetes       Diabetes   She presents for her follow-up diabetic visit. She has type 2 diabetes mellitus. No MedicAlert identification noted. Her disease course has been worsening. Hypoglycemia symptoms include sweats and tremors. Pertinent negatives for hypoglycemia include no confusion, dizziness, headaches or seizures. (Symptomatic when in the 80's) Associated symptoms include fatigue, foot paresthesias, polydipsia and polyuria. Pertinent negatives for diabetes include no blurred vision, no chest pain, no foot ulcerations, no polyphagia, no visual change, no weakness and no weight loss. Symptoms are stable. Diabetic complications include heart disease, nephropathy and peripheral neuropathy. Pertinent negatives for diabetic complications include no CVA or retinopathy. Risk factors for coronary artery disease include diabetes mellitus, dyslipidemia, family history, hypertension, obesity and sedentary lifestyle. Current diabetic treatment includes insulin injections. She is compliant with treatment all of the time. She is currently taking insulin pre-breakfast. Insulin injections are given by patient. Rotation sites for injection include the abdominal wall. Her weight is stable. She is following a diabetic diet. Meal planning includes carbohydrate counting (45 grams or less per meal). She has not had a previous visit with a dietitian. She never participates in exercise. She monitors blood glucose at home 3-4 x per week. Blood glucose monitoring compliance is fair. Her home blood glucose trend is increasing steadily. Her overall blood glucose range is >200 mg/dl. An ACE inhibitor/angiotensin II receptor blocker is being taken.  She does not see a podiatrist.Eye exam current: blood glucose test strips (RELION CONFIRM/MICRO TEST) strip 1 each by In Vitro route 2 times daily As needed. 100 each 5    oxybutynin (DITROPAN) 5 MG tablet Take 1 tablet by mouth 2 times daily 180 tablet 0    simvastatin (ZOCOR) 40 MG tablet Take 1 tablet by mouth nightly 90 tablet 1    cyanocobalamin 1000 MCG/ML injection Inject 1 mL into the muscle every 30 days 1 mL 5    RELION INSULIN SYR 0.5ML/31G 31G X 5/16\" 0.5 ML MISC Inject 1 each into the skin 2 times daily 100 each 5    escitalopram (LEXAPRO) 10 MG tablet Take 1.5 tablets by mouth daily 135 tablet 1    busPIRone (BUSPAR) 10 MG tablet Take 1 tablet by mouth 3 times daily 270 tablet 2    carvedilol (COREG) 6.25 MG tablet Take 1 tablet by mouth 2 times daily (with meals) 180 tablet 1    ferrous sulfate 325 (65 Fe) MG tablet Take 1 tablet by mouth daily (with breakfast) 90 tablet 2    bumetanide (BUMEX) 1 MG tablet Take 1 tablet by mouth 2 times daily 180 tablet 1    lisinopril (PRINIVIL;ZESTRIL) 2.5 MG tablet Take 1 tablet by mouth daily 90 tablet 0    Insulin Pen Needle 32G X 4 MM MISC 1 each by Does not apply route daily 100 each 3    RELION ULTRA THIN LANCETS 30G MISC 1 each by Does not apply route 2 times daily 210 each 3    meclizine (ANTIVERT) 12.5 MG tablet Take 1 tablet by mouth 3 times daily as needed for Dizziness 30 tablet 1    acetaminophen (TYLENOL ARTHRITIS PAIN) 650 MG extended release tablet Take 650 mg by mouth every 8 hours as needed for Pain       No current facility-administered medications for this visit. Review ofSymptoms:  Review of Systems   Constitutional: Positive for fatigue. Negative for unexpected weight change and weight loss. Eyes: Negative for blurred vision and visual disturbance. Respiratory: Negative. Negative for shortness of breath. Cardiovascular: Negative for chest pain and leg swelling. Gastrointestinal: Negative for abdominal pain and diarrhea.    Endocrine: Positive for polydipsia and polyuria. Negative for polyphagia. Genitourinary: Negative. Musculoskeletal: Negative. Skin: Negative for rash and wound. Neurological: Positive for tremors. Negative for dizziness, seizures, weakness and headaches. Psychiatric/Behavioral: Negative. Negative for confusion and decreased concentration. Theremainder of a complete 14-point review of systems is negative. Vital Signs: /76   Pulse 69   Wt 246 lb 9.6 oz (111.9 kg)   SpO2 96%   BMI 42.33 kg/m²      Wt Readings from Last 3 Encounters:   05/08/19 246 lb 9.6 oz (111.9 kg)   05/02/19 246 lb (111.6 kg)   04/17/19 250 lb (113.4 kg)     Body mass index is 42.33 kg/m².   LABS:  Hemoglobin A1C   Date Value Ref Range Status   03/07/2019 8.3 % Final   02/07/2019 8.6 % Final     Lab Results   Component Value Date    LABMICR 1.0 11/17/2017     Lab Results   Component Value Date     (L) 12/11/2018    K 4.3 12/11/2018    CL 95 (L) 12/11/2018    CO2 28 12/11/2018    BUN 28 (H) 12/11/2018    CREATININE 1.2 (H) 12/11/2018    GLUCOSE 374 (H) 12/11/2018    CALCIUM 9.0 12/11/2018    PROT 7.3 11/23/2018    LABALBU 4.2 11/23/2018    BILITOT neg 11/23/2018    ALKPHOS 96 11/23/2018    AST 16 11/23/2018    ALT 10 11/23/2018    LABGLOM 44/53 12/11/2018    AGRATIO 1.4 11/23/2018    GLOB 3.1 11/23/2018     Lab Results   Component Value Date    CHOL 157 03/28/2019    CHOL 194 02/21/2018    CHOL 184 04/24/2017     Lab Results   Component Value Date    TRIG 173 03/28/2019    TRIG 110 02/21/2018    TRIG 166 04/24/2017     Lab Results   Component Value Date    HDL 45 04/24/2017    HDL 49 05/16/2016     Lab Results   Component Value Date    LDLCALC 69.4 03/28/2019    LDLCALC 125.0 02/21/2018    LDLCALC 105.8 04/24/2017     Lab Results   Component Value Date    VLDL 35 03/28/2019    VLDL 22 02/21/2018    VLDL 33 04/24/2017     Lab Results   Component Value Date    CHOLHDLRATIO 3.0 03/28/2019    CHOLHDLRATIO 4.1 02/21/2018    CHOLHDLRATIO 4.1 04/24/2017 current?:No .  Encouraged patient to schedule. Diabetic retinal exam and have office or access result note. - Labs reviewed includes: Most recent A1c of 8.3%, creatinine 1.2, GFR 44. Repeat labs ordered Yes due in June. -Blood sugar report reviewed and scanned into media tab. -We discussed in great detail dietary modifications they can make to better improve their blood sugaafter discussing with her PCP, we will transition her from combination medication to [place] 45 units daily. Titrate as needed. Consider adding in mealtime insulin. Recommend patient start testing blood sugars more frequently. Would also consider adding a CGM for better blood sugar control and ease of titrating insulin injections. CKD- stable. Keep appointment with renal on 6/19/2019    Discussed signs and symptoms of hyper/hypoglycemia and how to treat. Encouraged 150 minutes of physical activity per week. Follow a low carbohydrate diet consuming 45 grams of carbohydrates at breakfast, lunch and dinner with two separate snacks [id number] grams of carbohydrates. Encouraged at least 7 hours of sleep. The patient was informed of the goals of diabetes management. This can only be accomplished by watching their diet and exercise levels. We certainly use medicines to help attain these goals. The consequences of not controlling blood sugars were discussed. These include blindness, heart disease, stroke, kidney disease, and possibly need for dialysis. They were told to be careful with their foot care as diabetics often have nerve damage, infections and risk for limb amutations . They also need a dilated eye exam yearly.  We discussed the issues of diet, exercise, medication, complication avoidance, reviewed the signs and symptoms of diabetes, hypoglycemic episodes, significance of HbA1C.       3. Obesity, morbid, BMI 40.0-49.9 ([facility])  Reduce calories and increase physical activity to achieve a slow and steady weight loss to improve blood pressure, cholesterol and diabetes. 4. Other hyperlipidemia  stable, lipid panel reviewed, continue current medications. Diet and exercise      5. Essential hypertension   stable, continue current medications. Diet and exercise Seek emergent care if chest pain develops. Answered all patient questions. Agrees to follow plan of care and to follow up in 2 weeks, sooner if needed. Call office if unexplained blood sugars less than 70 occur or above 400. Call office or access MyChart with any further questions or concerns. Be sure to bring glucometer/food log at next appointment. Patient was seen with total face to face time of 60 minutes. More than 50%  of this visit was counseling and education regarding her diabetes.     Electronically signed by ORTIZ Slater CNP on 5/8/2019 at 12:48 PM      (Please note that portions of this note were completed with a voice-recognition program. Efforts were made to edit the dictation but occasionally words are mis-transcribed.)

## 2019-05-08 NOTE — PATIENT INSTRUCTIONS
Document blood sugars 3 times a day     When you are out of soliqua start sample Tresiba at 45 units once a day. Call me with any low blood sugars. Patient Education        insulin degludec  Pronunciation:  IN airam Jamison  Brand:  Miguel Ángel Lacey Alexander  What is the most important information I should know about insulin degludec? Never share an injection pen with another person, even if the needle has been changed. What is insulin degludec? Insulin is a hormone that works by lowering levels of glucose (sugar) in the blood. Insulin degludec is a long-acting insulin that starts to work several hours after injection and keeps working evenly for 24 hours. Insulin degludec is used to improve blood sugar control in adults with diabetes mellitus. This medicine may be used for type 1 or type 2 diabetes. Insulin degludec may also be used for purposes not listed in this medication guide. What should I discuss with my healthcare provider before using insulin degludec? You should not use insulin degludec if you are allergic to it, or if you are having an episode of hypoglycemia (low blood sugar). Insulin degludec is not approved for use by anyone younger than 25years old. To make sure insulin degludec is safe for you, tell your doctor if you have:  · liver or kidney disease;  · low levels of potassium in your blood (hypokalemia); or  · diabetic ketoacidosis (call your doctor for treatment). Tell your doctor if you also take pioglitazone or rosiglitazone (sometimes contained in combinations with glimepiride or metformin). Taking certain oral diabetes medicines while you are using insulin may increase your risk of serious heart problems. Follow your doctor's instructions about using insulin if you are pregnant or breast-feeding a baby. Blood sugar control is very important during pregnancy, and your dose needs may be different during each trimester of pregnancy.  Your dose needs may also be different while you are breast-feeding. How should I use insulin degludec? Follow all directions on your prescription label. Do not use this medicine in larger or smaller amounts or for longer than recommended. A dose counter on the injection pen shows your dose in units. Do not convert your dose. Insulin is injected under the skin. You will be shown how to use injections at home. Do not give yourself this medicine if you do not understand how to use the injection and properly dispose of used needles and syringes. Insulin degludec must not be given with an insulin pump, or mixed with other insulins. Do not inject insulin degludec into a vein or a muscle. Insulin degludec is usually given once daily, at any time of the day. Follow your doctor's dosing instructions very carefully. Your care provider will show you the best places on your body to inject insulin degludec. Use a different place each time you give an injection. Do not inject into the same place two times in a row. Use only the prefilled injection pen that comes with this medicine. Attach a new needle before each use. Do not transfer the insulin from the pen into a syringe. Never share an injection pen with another person, even if the needle has been changed. Sharing these devices can allow infections or disease to pass from one person to another. Use a disposable needle and syringe only once. Follow any state or local laws about throwing away used needles and syringes. Use a puncture-proof \"sharps\" disposal container (ask your pharmacist where to get one and how to throw it away). Keep this container out of the reach of children and pets. Low blood sugar (hypoglycemia) can happen to everyone who has diabetes. Symptoms include headache, hunger, sweating, irritability, dizziness, nausea, fast heart rate, and feeling anxious or shaky.  To quickly treat low blood sugar, always keep a fast-acting source of sugar with you such as fruit juice, hard candy, crackers, raisins, or non-diet soda. Your doctor can prescribe a glucagon emergency injection kit to use in case you have severe hypoglycemia and cannot eat or drink. Be sure your family and close friends know how to give you this injection in an emergency. Also watch for signs of high blood sugar (hyperglycemia) such as increased thirst or urination, blurred vision, headache, and tiredness. Blood sugar levels can be affected by stress, illness, surgery, exercise, alcohol use, or skipping meals. Ask your doctor before changing your insulin dose or schedule. Insulin degludec is only part of a complete treatment program that may also include diet, exercise, weight control, regular blood sugar testing, and special medical care. Follow your doctor's instructions very closely. Keep this medicine in its original container protected from heat and light. Do not freeze insulin or store it near the cooling element in a refrigerator. Throw away any insulin that has been frozen. Storing unopened (not in use) insulin degludec:  · Refrigerate and use until expiration date; or  · Store at room temperature and use within 8 weeks (56 days). Storing opened (in use) insulin degludec:  · Store the injection pen at room temperature (do not refrigerate) and use within 8 weeks. Do not store the injection pen with a needle attached. Do not use the medicine if it looks cloudy or has changed colors. Call your pharmacist for new medicine. Read all patient information, medication guides, and instruction sheets provided to you. Ask your doctor or pharmacist if you have any questions. Wear a diabetes medical alert tag in case of emergency. Any medical care provider who treats you should know that you have diabetes. What happens if I miss a dose? Use the missed dose as soon as you remember. Then continue your regular dosing schedule, allowing at least 8 hours to pass between doses. Do not use extra insulin to make up a missed dose.   Keep insulin on hand at all times. Get your prescription refilled before you run out of medicine completely. What happens if I overdose? Seek emergency medical attention or call the Poison Help line at 1-548.365.1081. Insulin overdose can cause life-threatening hypoglycemia. Symptoms include drowsiness, confusion, blurred vision, numbness or tingling in your mouth, trouble speaking, muscle weakness, clumsy or jerky movements, seizure (convulsions), or loss of consciousness. What should I avoid while using insulin degludec? Insulin can cause low blood sugar. Avoid driving or operating machinery until you know how this medicine will affect you. Avoid medication errors by always checking the medicine label before injecting your insulin. Avoid drinking alcohol. It can cause low blood sugar and may interfere with your diabetes treatment. What are the possible side effects of insulin degludec? Get emergency medical help if you have signs of an allergic reaction: hives, itching, skin rash; wheezing, tiredness, trouble breathing; feeling like you might pass out; nausea, diarrhea; swelling of your face, lips, tongue, or throat. Call your doctor at once if you have:  · fluid retention --weight gain, swelling in your hands or feet, feeling short of breath; or  · low potassium --leg cramps, constipation, irregular heartbeats, fluttering in your chest, increased thirst or urination, numbness or tingling, muscle weakness or limp feeling. Common side effects may include:  · low blood sugar;  · itching, mild skin rash; or  · thickening or hollowing of the skin where you injected the medicine. This is not a complete list of side effects and others may occur. Call your doctor for medical advice about side effects. You may report side effects to FDA at 6-166-FDA-9655. What other drugs will affect insulin degludec? Many other medicines can affect your blood sugar, and some medicines can increase or decrease the effects of insulin.  Some drugs

## 2019-05-09 ENCOUNTER — TELEPHONE (OUTPATIENT)
Dept: INTERNAL MEDICINE | Age: 74
End: 2019-05-09

## 2019-05-10 ENCOUNTER — TELEPHONE (OUTPATIENT)
Dept: INTERNAL MEDICINE | Age: 74
End: 2019-05-10

## 2019-05-10 NOTE — TELEPHONE ENCOUNTER
Patient called into the office to let Aura Wang know that the medication that was sent into there pharmacy at her last appointment was $433 with insurance. She did not pick it up. Please call her back to discuss the next step, [phone].

## 2019-05-14 ENCOUNTER — TELEPHONE (OUTPATIENT)
Dept: INTERNAL MEDICINE | Age: 74
End: 2019-05-14

## 2019-05-14 NOTE — TELEPHONE ENCOUNTER
Patient called to state tresiba is tier 3 and first month will be $400 then $134 per box. I have given her a list of all long acting insulin to check on cost. We can change to whatever insulin is most feasible.

## 2019-05-23 ENCOUNTER — OFFICE VISIT (OUTPATIENT)
Dept: FAMILY MEDICINE CLINIC | Age: 74
End: 2019-05-23
Payer: MEDICARE

## 2019-05-23 VITALS
HEIGHT: 64 IN | DIASTOLIC BLOOD PRESSURE: 84 MMHG | SYSTOLIC BLOOD PRESSURE: 120 MMHG | HEART RATE: 53 BPM | WEIGHT: 253 LBS | OXYGEN SATURATION: 97 % | BODY MASS INDEX: 43.19 KG/M2

## 2019-05-23 DIAGNOSIS — E78.5 HYPERLIPIDEMIA ASSOCIATED WITH TYPE 2 DIABETES MELLITUS (HCC): ICD-10-CM

## 2019-05-23 DIAGNOSIS — M62.830 SPASM OF LUMBAR PARASPINOUS MUSCLE: Primary | ICD-10-CM

## 2019-05-23 DIAGNOSIS — I50.32 CHRONIC DIASTOLIC HEART FAILURE (HCC): ICD-10-CM

## 2019-05-23 DIAGNOSIS — I10 ESSENTIAL HYPERTENSION: ICD-10-CM

## 2019-05-23 DIAGNOSIS — E11.69 HYPERLIPIDEMIA ASSOCIATED WITH TYPE 2 DIABETES MELLITUS (HCC): ICD-10-CM

## 2019-05-23 PROCEDURE — 3045F PR MOST RECENT HEMOGLOBIN A1C LEVEL 7.0-9.0%: CPT | Performed by: NURSE PRACTITIONER

## 2019-05-23 PROCEDURE — G8427 DOCREV CUR MEDS BY ELIG CLIN: HCPCS | Performed by: NURSE PRACTITIONER

## 2019-05-23 PROCEDURE — 1123F ACP DISCUSS/DSCN MKR DOCD: CPT | Performed by: NURSE PRACTITIONER

## 2019-05-23 PROCEDURE — G8417 CALC BMI ABV UP PARAM F/U: HCPCS | Performed by: NURSE PRACTITIONER

## 2019-05-23 PROCEDURE — 1090F PRES/ABSN URINE INCON ASSESS: CPT | Performed by: NURSE PRACTITIONER

## 2019-05-23 PROCEDURE — 3017F COLORECTAL CA SCREEN DOC REV: CPT | Performed by: NURSE PRACTITIONER

## 2019-05-23 PROCEDURE — 99213 OFFICE O/P EST LOW 20 MIN: CPT | Performed by: NURSE PRACTITIONER

## 2019-05-23 PROCEDURE — 1036F TOBACCO NON-USER: CPT | Performed by: NURSE PRACTITIONER

## 2019-05-23 PROCEDURE — G8400 PT W/DXA NO RESULTS DOC: HCPCS | Performed by: NURSE PRACTITIONER

## 2019-05-23 PROCEDURE — 2022F DILAT RTA XM EVC RTNOPTHY: CPT | Performed by: NURSE PRACTITIONER

## 2019-05-23 PROCEDURE — 4040F PNEUMOC VAC/ADMIN/RCVD: CPT | Performed by: NURSE PRACTITIONER

## 2019-05-23 RX ORDER — CARVEDILOL 6.25 MG/1
6.25 TABLET ORAL 2 TIMES DAILY WITH MEALS
Qty: 180 TABLET | Refills: 1 | Status: SHIPPED | OUTPATIENT
Start: 2019-05-23 | End: 2019-05-30

## 2019-05-23 RX ORDER — BACLOFEN 10 MG/1
10 TABLET ORAL 3 TIMES DAILY
Qty: 90 TABLET | Refills: 0 | Status: SHIPPED | OUTPATIENT
Start: 2019-05-23 | End: 2019-06-03 | Stop reason: ALTCHOICE

## 2019-05-23 RX ORDER — BUMETANIDE 1 MG/1
1 TABLET ORAL 2 TIMES DAILY
Qty: 180 TABLET | Refills: 1 | Status: SHIPPED | OUTPATIENT
Start: 2019-05-23 | End: 2019-06-13 | Stop reason: SDUPTHER

## 2019-05-23 ASSESSMENT — ENCOUNTER SYMPTOMS: ABDOMINAL PAIN: 0

## 2019-05-23 NOTE — PATIENT INSTRUCTIONS
Patient Education      Low Back Pain: Exercises  Your Care Instructions  Here are some examples of typical rehabilitation exercises for your condition. Start each exercise slowly. Ease off the exercise if you start to have pain. Your doctor or physical therapist will tell you when you can start these exercises and which ones will work best for you. How to do the exercises  Press-up    1. Lie on your stomach, supporting your body with your forearms. 2. Press your elbows down into the floor to raise your upper back. As you do this, relax your stomach muscles and allow your back to arch without using your back muscles. As your press up, do not let your hips or pelvis come off the floor. 3. Hold for 15 to 30 seconds, then relax. 4. Repeat 2 to 4 times. Alternate arm and leg (bird dog) exercise    1. Start on the floor, on your hands and knees. 2. Tighten your belly muscles. 3. Raise one leg off the floor, and hold it straight out behind you. Be careful not to let your hip drop down, because that will twist your trunk. 4. Hold for about 6 seconds, then lower your leg and switch to the other leg. 5. Repeat 8 to 12 times on each leg. 6. Over time, work up to holding for 10 to 30 seconds each time. 7. If you feel stable and secure with your leg raised, try raising the opposite arm straight out in front of you at the same time. Knee-to-chest exercise    1. Lie on your back with your knees bent and your feet flat on the floor. 2. Bring one knee to your chest, keeping the other foot flat on the floor (or keeping the other leg straight, whichever feels better on your lower back). 3. Keep your lower back pressed to the floor. Hold for at least 15 to 30 seconds. 4. Relax, and lower the knee to the starting position. 5. Repeat with the other leg. Repeat 2 to 4 times with each leg. 6. To get more stretch, put your other leg flat on the floor while pulling your knee to your chest.    Curl-ups    1.  Lie on the floor on your back with your knees bent at a 90-degree angle. Your feet should be flat on the floor, about 12 inches from your buttocks. 2. Cross your arms over your chest. If this bothers your neck, try putting your hands behind your neck (not your head), with your elbows spread apart. 3. Slowly tighten your belly muscles and raise your shoulder blades off the floor. 4. Keep your head in line with your body, and do not press your chin to your chest.  5. Hold this position for 1 or 2 seconds, then slowly lower yourself back down to the floor. 6. Repeat 8 to 12 times. Pelvic tilt exercise    1. Lie on your back with your knees bent. 2. \"Brace\" your stomach. This means to tighten your muscles by pulling in and imagining your belly button moving toward your spine. You should feel like your back is pressing to the floor and your hips and pelvis are rocking back. 3. Hold for about 6 seconds while you breathe smoothly. 4. Repeat 8 to 12 times. Heel dig bridging    1. Lie on your back with both knees bent and your ankles bent so that only your heels are digging into the floor. Your knees should be bent about 90 degrees. 2. Then push your heels into the floor, squeeze your buttocks, and lift your hips off the floor until your shoulders, hips, and knees are all in a straight line. 3. Hold for about 6 seconds as you continue to breathe normally, and then slowly lower your hips back down to the floor and rest for up to 10 seconds. 4. Do 8 to 12 repetitions. Hamstring stretch in doorway    1. Lie on your back in a doorway, with one leg through the open door. 2. Slide your leg up the wall to straighten your knee. You should feel a gentle stretch down the back of your leg. 3. Hold the stretch for at least 15 to 30 seconds. Do not arch your back, point your toes, or bend either knee. Keep one heel touching the floor and the other heel touching the wall. 4. Repeat with your other leg.   5. Do 2 to 4 times for each leg. Hip flexor stretch    1. Kneel on the floor with one knee bent and one leg behind you. Place your forward knee over your foot. Keep your other knee touching the floor. 2. Slowly push your hips forward until you feel a stretch in the upper thigh of your rear leg. 3. Hold the stretch for at least 15 to 30 seconds. Repeat with your other leg. 4. Do 2 to 4 times on each side. Wall sit    1. Stand with your back 10 to 12 inches away from a wall. 2. Lean into the wall until your back is flat against it. 3. Slowly slide down until your knees are slightly bent, pressing your lower back into the wall. 4. Hold for about 6 seconds, then slide back up the wall. 5. Repeat 8 to 12 times. Follow-up care is a key part of your treatment and safety. Be sure to make and go to all appointments, and call your doctor if you are having problems. It's also a good idea to know your test results and keep a list of the medicines you take. Where can you learn more? Go to https://[employer]Lettuce. org and sign in to your [employer] account. Enter J911 in the [employer] box to learn more about \"Low Back Pain: Exercises. \"     If you do not have an account, please click on the \"Sign Up Now\" link. Current as of: September 20, 2018  Content Version: 12.0  © 8130-2715 [employer]. Care instructions adapted under license by [facility] ([facility]). If you have questions about a medical condition or this instruction, always ask your healthcare professional. [place] [address] any warranty or liability for your use of this information.

## 2019-05-23 NOTE — PROGRESS NOTES
[address] [address] [address] [facility], [address] [facility]  Dept: [phone]  Dept Fax: [phone]    Linda Mancini is a 68 y.o. female who presents today for her medical conditions/complaints as noted below. Linda Mancini c/o of Back Pain (low right side) and Hip Pain (right hip)      HPI:     Back Pain   This is a new problem. The current episode started 1 to 4 weeks ago (1 week). The problem occurs constantly. The problem is unchanged. The pain is present in the lumbar spine (on right). The quality of the pain is described as aching (occasionally sharp). Radiates to: right hip. The pain is at a severity of 8/10. The pain is moderate. The pain is the same all the time. The symptoms are aggravated by bending (going from sitting to standing position). Pertinent negatives include no abdominal pain, bladder incontinence, chest pain, dysuria, fever, numbness, tingling or weakness. Risk factors include menopause, sedentary lifestyle and obesity. She has tried heat and ice (Biofreeze) for the symptoms. The treatment provided mild relief.        BP Readings from Last 3 Encounters:   05/23/19 120/84   05/08/19 122/76   05/02/19 126/84          ([id number])    Wt Readings from Last 3 Encounters:   05/23/19 253 lb (114.8 kg)   05/08/19 246 lb 9.6 oz (111.9 kg)   05/02/19 246 lb (111.6 kg)       Past Medical History:   Diagnosis Date    Actinic keratitis     Ankle pain     Arthritis     Back pain     low     Chronic diastolic CHF (congestive heart failure) (HCC)     Chronic diastolic heart failure (HCC) 5/31/2017    Chronic kidney disease     Dependent edema     Hypertension     Incontinence     in female    Osteoarthritis     knee     Type II or unspecified type diabetes mellitus without mention of complication, not stated as uncontrolled       Past Surgical History:   Procedure Laterality Date    ANKLE SURGERY Left     FINGER TRIGGER RELEASE Right        Family History Problem Relation Age of Onset    Diabetes Mother     Diabetes Father     Cancer Paternal Grandfather         BONE        Social History     Tobacco Use    Smoking status: Never Smoker    Smokeless tobacco: Never Used   Substance Use Topics    Alcohol use: No      Current Outpatient Medications   Medication Sig Dispense Refill    simvastatin (ZOCOR) 40 MG tablet Take 1 tablet by mouth nightly 90 tablet 1    carvedilol (COREG) 6.25 MG tablet Take 1 tablet by mouth 2 times daily (with meals) 180 tablet 1    baclofen (LIORESAL) 10 MG tablet Take 1 tablet by mouth 3 times daily 90 tablet 0    bumetanide (BUMEX) 1 MG tablet Take 1 tablet by mouth 2 times daily 180 tablet 1    Insulin Degludec (TRESIBA FLEXTOUCH) 200 UNIT/ML SOPN Inject 45 Units into the skin daily 3 pen 3    blood glucose test strips (RELION CONFIRM/MICRO TEST) strip 1 each by In Vitro route 2 times daily As needed. 100 each 5    cyanocobalamin 1000 MCG/ML injection Inject 1 mL into the muscle every 30 days 1 mL 5    RELION INSULIN SYR 0.5ML/31G 31G X 5/16\" 0.5 ML MISC Inject 1 each into the skin 2 times daily 100 each 5    busPIRone (BUSPAR) 10 MG tablet Take 1 tablet by mouth 3 times daily 270 tablet 2    ferrous sulfate 325 (65 Fe) MG tablet Take 1 tablet by mouth daily (with breakfast) 90 tablet 2    lisinopril (PRINIVIL;ZESTRIL) 2.5 MG tablet Take 1 tablet by mouth daily 90 tablet 0    Insulin Pen Needle 32G X 4 MM MISC 1 each by Does not apply route daily 100 each 3    RELION ULTRA THIN LANCETS 30G MISC 1 each by Does not apply route 2 times daily 210 each 3    acetaminophen (TYLENOL ARTHRITIS PAIN) 650 MG extended release tablet Take 650 mg by mouth every 8 hours as needed for Pain      lisinopril (PRINIVIL;ZESTRIL) 2.5 MG tablet Take 2.5 mg by mouth      meclizine (ANTIVERT) 12.5 MG tablet Take 12.5 mg by mouth       No current facility-administered medications for this visit.       Allergies   Allergen Reactions    Prednisone      High blood sugars       Health Maintenance   Topic Date Due    Shingles Vaccine (2 of 3) 02/26/2007    Breast cancer screen  01/11/2019    Diabetic retinal exam  04/11/2019    Hepatitis C screen  06/29/2022 (Originally 1945)    Diabetic foot exam  11/07/2019    A1C test (Diabetic or Prediabetic)  03/07/2020    Lipid screen  03/28/2020    Potassium monitoring  05/26/2020    Creatinine monitoring  05/26/2020    Colon cancer screen colonoscopy  10/04/2020    DTaP/Tdap/Td vaccine (2 - Td) 08/25/2027    DEXA (modify frequency per FRAX score)  Completed    Flu vaccine  Completed    Pneumococcal 65+ years Vaccine  Completed     Subjective:      Review of Systems   Constitutional: Negative for chills, fatigue and fever. HENT: Negative. Respiratory: Negative. Cardiovascular: Negative for chest pain. Gastrointestinal: Negative for abdominal pain. Genitourinary: Negative for bladder incontinence and dysuria. Musculoskeletal: Positive for back pain (lower). Neurological: Negative for tingling, weakness and numbness. Objective:     /84 (Site: Right Upper Arm, Position: Sitting)   Pulse 53   Ht 5' 4\" (1.626 m)   Wt 253 lb (114.8 kg)   SpO2 97%   BMI 43.43 kg/m²     Physical Exam   Constitutional: She is oriented to person, place, and time. She appears well-developed and well-nourished. HENT:   Head: Normocephalic. Eyes: Conjunctivae are normal.   Neck: Neck supple. Cardiovascular: Normal rate, regular rhythm and normal heart sounds. Pulmonary/Chest: Effort normal and breath sounds normal. No respiratory distress. She has no wheezes. Musculoskeletal:        Lumbar back: She exhibits tenderness and spasm. She exhibits normal range of motion. Back:    Lymphadenopathy:     She has no cervical adenopathy. Neurological: She is alert and oriented to person, place, and time.        Lab Results   Component Value Date     05/26/2019    K 4.1 Electronically signed by ORTIZ Abdul CNP on 6/1/2019

## 2019-05-28 ENCOUNTER — TELEPHONE (OUTPATIENT)
Dept: FAMILY MEDICINE CLINIC | Age: 74
End: 2019-05-28

## 2019-05-29 NOTE — TELEPHONE ENCOUNTER
[facility] Initial Follow Up Call    Call within 2 business days of discharge: Yes     Patient: Blaire Morales Patient : 1945 MRN: [id number]    [unfilled]    RARS: No data recorded     Spoke with: Sunday Aly    Discharge department/facility: [facility]    Non-face-to-face services provided:  Scheduled appointment with PCP-6/3/19    Follow Up  Future Appointments   Date Time Provider Kailee Vidal   6/3/2019  9:20 AM ORTIZ Gilman - [facility]   2019  9:50 AM Karishma Neri MD [facility] None   2019  9:30 [facility]       Kalpana Stock MA      Discharged home/MRI scheduled 4 wks out.

## 2019-05-30 RX ORDER — LISINOPRIL 2.5 MG/1
2.5 TABLET ORAL
COMMUNITY
End: 2019-06-03 | Stop reason: SDUPTHER

## 2019-05-30 RX ORDER — MECLIZINE HCL 12.5 MG/1
12.5 TABLET ORAL
COMMUNITY
End: 2019-06-03 | Stop reason: SDUPTHER

## 2019-06-01 RX ORDER — SIMVASTATIN 40 MG
40 TABLET ORAL NIGHTLY
Qty: 90 TABLET | Refills: 1
Start: 2019-06-01 | End: 2019-10-08 | Stop reason: SDUPTHER

## 2019-06-01 RX ORDER — CARVEDILOL 6.25 MG/1
6.25 TABLET ORAL 2 TIMES DAILY WITH MEALS
Qty: 180 TABLET | Refills: 1
Start: 2019-06-01 | End: 2019-06-13 | Stop reason: SDUPTHER

## 2019-06-01 ASSESSMENT — ENCOUNTER SYMPTOMS
RESPIRATORY NEGATIVE: 1
BACK PAIN: 1

## 2019-06-03 ENCOUNTER — OFFICE VISIT (OUTPATIENT)
Dept: FAMILY MEDICINE CLINIC | Age: 74
End: 2019-06-03
Payer: MEDICARE

## 2019-06-03 VITALS
WEIGHT: 244 LBS | OXYGEN SATURATION: 95 % | SYSTOLIC BLOOD PRESSURE: 132 MMHG | BODY MASS INDEX: 41.88 KG/M2 | HEART RATE: 55 BPM | DIASTOLIC BLOOD PRESSURE: 82 MMHG

## 2019-06-03 DIAGNOSIS — R42 DIZZINESS: ICD-10-CM

## 2019-06-03 DIAGNOSIS — N30.01 ACUTE CYSTITIS WITH HEMATURIA: ICD-10-CM

## 2019-06-03 DIAGNOSIS — M54.50 ACUTE RIGHT-SIDED LOW BACK PAIN WITHOUT SCIATICA: ICD-10-CM

## 2019-06-03 DIAGNOSIS — M16.11 PRIMARY OSTEOARTHRITIS OF RIGHT HIP: ICD-10-CM

## 2019-06-03 DIAGNOSIS — N18.30 TYPE 2 DIABETES MELLITUS WITH STAGE 3 CHRONIC KIDNEY DISEASE, WITH LONG-TERM CURRENT USE OF INSULIN (HCC): ICD-10-CM

## 2019-06-03 DIAGNOSIS — Z79.4 TYPE 2 DIABETES MELLITUS WITH STAGE 3 CHRONIC KIDNEY DISEASE, WITH LONG-TERM CURRENT USE OF INSULIN (HCC): ICD-10-CM

## 2019-06-03 DIAGNOSIS — Z09 HOSPITAL DISCHARGE FOLLOW-UP: Primary | ICD-10-CM

## 2019-06-03 DIAGNOSIS — I50.32 CHRONIC DIASTOLIC HEART FAILURE (HCC): ICD-10-CM

## 2019-06-03 DIAGNOSIS — F32.A ANXIETY AND DEPRESSION: ICD-10-CM

## 2019-06-03 DIAGNOSIS — E11.22 TYPE 2 DIABETES MELLITUS WITH STAGE 3 CHRONIC KIDNEY DISEASE, WITH LONG-TERM CURRENT USE OF INSULIN (HCC): ICD-10-CM

## 2019-06-03 DIAGNOSIS — F41.9 ANXIETY AND DEPRESSION: ICD-10-CM

## 2019-06-03 DIAGNOSIS — R35.0 URINARY FREQUENCY: ICD-10-CM

## 2019-06-03 LAB
APPEARANCE FLUID: ABNORMAL
BILIRUBIN, POC: NEGATIVE
BLOOD URINE, POC: ABNORMAL
CLARITY, POC: ABNORMAL
COLOR, POC: YELLOW
GLUCOSE URINE, POC: NEGATIVE
KETONES, POC: ABNORMAL
LEUKOCYTE EST, POC: ABNORMAL
NITRITE, POC: NEGATIVE
PH, POC: 5
PROTEIN, POC: NEGATIVE
SPECIFIC GRAVITY, POC: 1.02
URINE CULTURE, ROUTINE: NORMAL
UROBILINOGEN, POC: 0.2

## 2019-06-03 PROCEDURE — 81002 URINALYSIS NONAUTO W/O SCOPE: CPT | Performed by: NURSE PRACTITIONER

## 2019-06-03 PROCEDURE — 1111F DSCHRG MED/CURRENT MED MERGE: CPT | Performed by: NURSE PRACTITIONER

## 2019-06-03 PROCEDURE — 99495 TRANSJ CARE MGMT MOD F2F 14D: CPT | Performed by: NURSE PRACTITIONER

## 2019-06-03 RX ORDER — BUSPIRONE HYDROCHLORIDE 15 MG/1
15 TABLET ORAL 3 TIMES DAILY
Qty: 90 TABLET | Refills: 5
Start: 2019-06-03 | End: 2020-01-21 | Stop reason: SDUPTHER

## 2019-06-03 RX ORDER — LISINOPRIL 2.5 MG/1
2.5 TABLET ORAL DAILY
Qty: 90 TABLET | Refills: 0 | Status: SHIPPED | OUTPATIENT
Start: 2019-06-03 | End: 2019-11-06 | Stop reason: SDUPTHER

## 2019-06-03 RX ORDER — TRAMADOL HYDROCHLORIDE 50 MG/1
50 TABLET ORAL EVERY 4 HOURS PRN
Qty: 30 TABLET | Refills: 0 | Status: SHIPPED | OUTPATIENT
Start: 2019-06-03 | End: 2019-06-08

## 2019-06-03 RX ORDER — TRAMADOL HYDROCHLORIDE 50 MG/1
50 TABLET ORAL EVERY 6 HOURS PRN
Refills: 0 | Status: CANCELLED | OUTPATIENT
Start: 2019-06-03 | End: 2019-06-08

## 2019-06-03 RX ORDER — MECLIZINE HCL 12.5 MG/1
12.5 TABLET ORAL 3 TIMES DAILY PRN
Qty: 90 TABLET | Refills: 2 | Status: SHIPPED | OUTPATIENT
Start: 2019-06-03 | End: 2022-11-03 | Stop reason: SDUPTHER

## 2019-06-03 RX ORDER — SULFAMETHOXAZOLE AND TRIMETHOPRIM 800; 160 MG/1; MG/1
1 TABLET ORAL 2 TIMES DAILY
Qty: 10 TABLET | Refills: 0 | Status: SHIPPED | OUTPATIENT
Start: 2019-06-03 | End: 2020-03-24

## 2019-06-03 RX ORDER — MELOXICAM 15 MG/1
15 TABLET ORAL DAILY
Qty: 30 TABLET | Refills: 1 | Status: CANCELLED | OUTPATIENT
Start: 2019-06-03

## 2019-06-03 RX ORDER — CARVEDILOL 6.25 MG/1
6.25 TABLET ORAL
COMMUNITY
End: 2019-06-03 | Stop reason: SDUPTHER

## 2019-06-03 RX ORDER — PNV NO.95/FERROUS FUM/FOLIC AC 28MG-0.8MG
325 TABLET ORAL
COMMUNITY
End: 2019-06-03 | Stop reason: SDUPTHER

## 2019-06-03 RX ORDER — BUMETANIDE 1 MG/1
1 TABLET ORAL
COMMUNITY
End: 2019-06-03 | Stop reason: SDUPTHER

## 2019-06-03 NOTE — PROGRESS NOTES
sulfamethoxazole-trimethoprim (BACTRIM DS;SEPTRA DS) 800-160 MG per tablet Take 1 tablet by mouth 2 times daily for 5 days 10 tablet 0    [] traMADol (ULTRAM) 50 MG tablet Take 1 tablet by mouth every 4 hours as needed for Pain for up to 5 days. Intended supply: 5 days. Take lowest dose possible to manage pain 30 tablet 0    simvastatin (ZOCOR) 40 MG tablet Take 1 tablet by mouth nightly 90 tablet 1    carvedilol (COREG) 6.25 MG tablet Take 1 tablet by mouth 2 times daily (with meals) 180 tablet 1    bumetanide (BUMEX) 1 MG tablet Take 1 tablet by mouth 2 times daily 180 tablet 1    Insulin Degludec (TRESIBA FLEXTOUCH) 200 UNIT/ML SOPN Inject 45 Units into the skin daily 3 pen 3    blood glucose test strips (RELION CONFIRM/MICRO TEST) strip 1 each by In Vitro route 2 times daily As needed. 100 each 5    cyanocobalamin 1000 MCG/ML injection Inject 1 mL into the muscle every 30 days 1 mL 5    RELION INSULIN SYR 0.5ML/31G 31G X 5/16\" 0.5 ML MISC Inject 1 each into the skin 2 times daily 100 each 5    ferrous sulfate 325 (65 Fe) MG tablet Take 1 tablet by mouth daily (with breakfast) 90 tablet 2    Insulin Pen Needle 32G X 4 MM MISC 1 each by Does not apply route daily 100 each 3    RELION ULTRA THIN LANCETS 30G MISC 1 each by Does not apply route 2 times daily 210 each 3    acetaminophen (TYLENOL ARTHRITIS PAIN) 650 MG extended release tablet Take 650 mg by mouth every 8 hours as needed for Pain        Medications patient taking as of now reconciled against medications ordered at time of hospital discharge: Yes    Chief Complaint   Patient presents with    Follow-Up from [facility]     d/c [facility]     Patient went to [facility] on 2018 for confusion. Positive for UTI, transferred to [facility] to see neurologist/further evaluation of confusion. She has an appointment scheduled on 2019 with Dr. Sukhwinder Randhawa (neurologist). Urinary Tract Infection    This is a new problem.  The current episode started in the past 7 days. The problem has been waxing and waning. The patient is experiencing no pain. There has been no fever. Associated symptoms include frequency and urgency. Pertinent negatives include no chills, flank pain, hematuria, hesitancy, nausea or vomiting. Treatments tried: Bactrim. The treatment provided mild relief. Inpatient course: Discharge summary reviewed- see chart. Interval history/Current status: Patient states she has been forgetting to take her medication. She still complains of frequency, and urgency, denies hematuria, or confusion. Vitals:    06/03/19 0935   BP: 132/82   Site: Left Upper Arm   Position: Sitting   Pulse: 55   SpO2: 95%   Weight: 244 lb (110.7 kg)     Body mass index is 41.88 kg/m². Wt Readings from Last 3 Encounters:   06/03/19 244 lb (110.7 kg)   05/23/19 253 lb (114.8 kg)   05/08/19 246 lb 9.6 oz (111.9 kg)     BP Readings from Last 3 Encounters:   06/03/19 132/82   05/23/19 120/84   05/08/19 122/76     Review of Systems   Constitutional: Negative for chills, fatigue and fever. HENT: Negative. Eyes: Negative. Respiratory: Negative for cough, shortness of breath and wheezing. Cardiovascular: Negative for chest pain, palpitations and leg swelling. Gastrointestinal: Negative for abdominal pain, constipation, diarrhea, nausea and vomiting. Endocrine: Negative for cold intolerance, heat intolerance, polydipsia, polyphagia and polyuria. Genitourinary: Positive for frequency and urgency. Negative for flank pain, hematuria and hesitancy. Musculoskeletal: Positive for back pain (lower back, chronic right-sided hip pain). Negative for arthralgias and myalgias. Skin: Negative. Allergic/Immunologic: Negative for environmental allergies and food allergies. Neurological: Negative for dizziness, weakness and headaches. Psychiatric/Behavioral: Positive for decreased concentration (improving).  Negative for confusion, dysphoric mood and sleep disturbance. The patient is nervous/anxious. Vitals:    06/03/19 0935   BP: 132/82   Site: Left Upper Arm   Position: Sitting   Pulse: 55   SpO2: 95%   Weight: 244 lb (110.7 kg)       Physical Exam   Constitutional: She is oriented to person, place, and time. She appears well-developed and well-nourished. HENT:   Head: Normocephalic. Right Ear: Tympanic membrane normal.   Left Ear: Tympanic membrane normal.   Mouth/Throat: Oropharynx is clear and moist.   Eyes: Conjunctivae are normal.   Neck: Neck supple. No thyromegaly present. Cardiovascular: Normal rate, regular rhythm and normal heart sounds. Pulmonary/Chest: Effort normal and breath sounds normal. She has no wheezes. Abdominal: Soft. Bowel sounds are normal. There is no tenderness. There is no CVA tenderness. Musculoskeletal:        Lumbar back: She exhibits decreased range of motion, tenderness and pain. Back:    Lymphadenopathy:     She has no cervical adenopathy. Neurological: She is alert and oriented to person, place, and time. She has normal strength. Psychiatric: She exhibits a depressed mood. Results for POC orders placed in visit on 06/03/19   POCT Urinalysis no Micro   Result Value Ref Range    Color, UA yellow     Clarity, UA cloudy     Glucose, UA POC negative     Bilirubin, UA negative     Ketones, UA trace     Spec Grav, UA 1.020     Blood, UA POC small     pH, UA 5.0     Protein, UA POC negative     Urobilinogen, UA 0.2     Leukocytes, UA large     Nitrite, UA negative     Appearance, Fluid Cloudy (A) Clear, Slightly Cloudy       Assessment/Plan:    ICD-10-CM    1. Hospital discharge follow-up Z09 RI DISCHARGE MEDS RECONCILED W/ CURRENT OUTPATIENT MED LIST   2. Acute cystitis with hematuria N30.01 sulfamethoxazole-trimethoprim (BACTRIM DS;SEPTRA DS) 800-160 MG per tablet   3.  Acute right-sided low back pain without sciatica M54.5 Hailee Hairston CNP, Pain Management, [place]     traMADol (ULTRAM) 50 MG tablet XR LUMBAR SPINE (2-3 VIEWS)   4. Primary osteoarthritis of right hip M16.11    5. Type 2 diabetes mellitus with stage 3 chronic kidney disease, with long-term current use of insulin (HCC) E11.22 lisinopril (PRINIVIL;ZESTRIL) 2.5 MG tablet    N18.3     Z79.4    6. Chronic diastolic heart failure (HCC) I50.32 lisinopril (PRINIVIL;ZESTRIL) 2.5 MG tablet   7. Dizziness R42 meclizine (ANTIVERT) 12.5 MG tablet   8. Anxiety and depression F41.9 busPIRone (BUSPAR) 15 MG tablet    F32.9    9. Urinary frequency R35.0 POCT Urinalysis no Micro     Urine Culture       Medical Decision Making: moderate complexity    Referral in place for pain management. NSAID's are contraindicated due to decreased renal function. Short term supply of Tramadol given. Discussed use, benefit, and side effects of prescribed medications. All patient questions answered. Patient voiced understanding. Instructed to continue current medications, diet and exercise. Patient agreed with treatment plan. Follow up as directed. Controlled Substances Monitoring: Attestation: The Prescription Monitoring Report for this patient was reviewed today.  ORTIZ Choe CNP)  Periodic Controlled Substance Monitoring: No signs of potential drug abuse or diversion identified: otherwise, see note documentation ORTIZ Choe CNP)    Electronically signed by ORTIZ Mon CNP on 6/11/2019 at 9:27 AM.

## 2019-06-11 ASSESSMENT — ENCOUNTER SYMPTOMS
NAUSEA: 0
ABDOMINAL PAIN: 0
COUGH: 0
VOMITING: 0
CONSTIPATION: 0
SHORTNESS OF BREATH: 0
EYES NEGATIVE: 1
DIARRHEA: 0
WHEEZING: 0
BACK PAIN: 1

## 2019-06-13 ENCOUNTER — OFFICE VISIT (OUTPATIENT)
Dept: FAMILY MEDICINE CLINIC | Age: 74
End: 2019-06-13
Payer: MEDICARE

## 2019-06-13 VITALS
SYSTOLIC BLOOD PRESSURE: 118 MMHG | HEART RATE: 64 BPM | DIASTOLIC BLOOD PRESSURE: 80 MMHG | WEIGHT: 242 LBS | BODY MASS INDEX: 41.54 KG/M2

## 2019-06-13 DIAGNOSIS — F41.9 ANXIETY AND DEPRESSION: ICD-10-CM

## 2019-06-13 DIAGNOSIS — M54.50 ACUTE RIGHT-SIDED LOW BACK PAIN WITHOUT SCIATICA: Primary | ICD-10-CM

## 2019-06-13 DIAGNOSIS — E78.49 OTHER HYPERLIPIDEMIA: ICD-10-CM

## 2019-06-13 DIAGNOSIS — Z79.4 DIABETES MELLITUS TYPE 2, INSULIN DEPENDENT (HCC): ICD-10-CM

## 2019-06-13 DIAGNOSIS — I10 ESSENTIAL HYPERTENSION: ICD-10-CM

## 2019-06-13 DIAGNOSIS — F32.A ANXIETY AND DEPRESSION: ICD-10-CM

## 2019-06-13 DIAGNOSIS — I50.32 CHRONIC DIASTOLIC HEART FAILURE (HCC): ICD-10-CM

## 2019-06-13 DIAGNOSIS — E11.9 DIABETES MELLITUS TYPE 2, INSULIN DEPENDENT (HCC): ICD-10-CM

## 2019-06-13 LAB
BILIRUBIN, POC: NEGATIVE
BLOOD URINE, POC: NEGATIVE
CLARITY, POC: CLEAR
COLOR, POC: NORMAL
GLUCOSE URINE, POC: NORMAL
KETONES, POC: NEGATIVE
LEUKOCYTE EST, POC: NEGATIVE
NITRITE, POC: NEGATIVE
PH, POC: 5
PROTEIN, POC: NEGATIVE
SPECIFIC GRAVITY, POC: 1
UROBILINOGEN, POC: 0.2

## 2019-06-13 PROCEDURE — 99214 OFFICE O/P EST MOD 30 MIN: CPT | Performed by: NURSE PRACTITIONER

## 2019-06-13 PROCEDURE — G8400 PT W/DXA NO RESULTS DOC: HCPCS | Performed by: NURSE PRACTITIONER

## 2019-06-13 PROCEDURE — 1123F ACP DISCUSS/DSCN MKR DOCD: CPT | Performed by: NURSE PRACTITIONER

## 2019-06-13 PROCEDURE — 3045F PR MOST RECENT HEMOGLOBIN A1C LEVEL 7.0-9.0%: CPT | Performed by: NURSE PRACTITIONER

## 2019-06-13 PROCEDURE — G8427 DOCREV CUR MEDS BY ELIG CLIN: HCPCS | Performed by: NURSE PRACTITIONER

## 2019-06-13 PROCEDURE — 2022F DILAT RTA XM EVC RTNOPTHY: CPT | Performed by: NURSE PRACTITIONER

## 2019-06-13 PROCEDURE — 4040F PNEUMOC VAC/ADMIN/RCVD: CPT | Performed by: NURSE PRACTITIONER

## 2019-06-13 PROCEDURE — 1036F TOBACCO NON-USER: CPT | Performed by: NURSE PRACTITIONER

## 2019-06-13 PROCEDURE — G8417 CALC BMI ABV UP PARAM F/U: HCPCS | Performed by: NURSE PRACTITIONER

## 2019-06-13 PROCEDURE — 3017F COLORECTAL CA SCREEN DOC REV: CPT | Performed by: NURSE PRACTITIONER

## 2019-06-13 PROCEDURE — 1090F PRES/ABSN URINE INCON ASSESS: CPT | Performed by: NURSE PRACTITIONER

## 2019-06-13 PROCEDURE — 81002 URINALYSIS NONAUTO W/O SCOPE: CPT | Performed by: NURSE PRACTITIONER

## 2019-06-13 RX ORDER — CARVEDILOL 6.25 MG/1
6.25 TABLET ORAL 2 TIMES DAILY WITH MEALS
Qty: 180 TABLET | Refills: 1 | Status: SHIPPED | OUTPATIENT
Start: 2019-06-13 | End: 2019-11-06 | Stop reason: SDUPTHER

## 2019-06-13 RX ORDER — BUMETANIDE 1 MG/1
1 TABLET ORAL 2 TIMES DAILY
Qty: 180 TABLET | Refills: 1 | Status: SHIPPED | OUTPATIENT
Start: 2019-06-13 | End: 2021-09-01 | Stop reason: DRUGHIGH

## 2019-06-13 NOTE — PROGRESS NOTES
diastolic CHF (congestive heart failure) ([facility])     Chronic diastolic heart failure ([facility] [address] 5/31/2017    Chronic kidney disease     Dependent edema     Hypertension     Incontinence     in female    Osteoarthritis     knee     Type II or unspecified type diabetes mellitus without mention of complication, not stated as uncontrolled       Past Surgical History:   Procedure Laterality Date    ANKLE SURGERY Left     FINGER TRIGGER RELEASE Right        Family History   Problem Relation Age of Onset    Diabetes Mother     Diabetes Father     Cancer Paternal Grandfather         BONE        Social History     Tobacco Use    Smoking status: Never Smoker    Smokeless tobacco: Never Used   Substance Use Topics    Alcohol use: No      Current Outpatient Medications   Medication Sig Dispense Refill    carvedilol (COREG) 6.25 MG tablet Take 1 tablet by mouth 2 times daily (with meals) 180 tablet 1    bumetanide (BUMEX) 1 MG tablet Take 1 tablet by mouth 2 times daily 180 tablet 1    lisinopril (PRINIVIL;ZESTRIL) 2.5 MG tablet Take 1 tablet by mouth daily 90 tablet 0    meclizine (ANTIVERT) 12.5 MG tablet Take 1 tablet by mouth 3 times daily as needed for Dizziness 90 tablet 2    busPIRone (BUSPAR) 15 MG tablet Take 15 mg by mouth 3 times daily 90 tablet 5    simvastatin (ZOCOR) 40 MG tablet Take 1 tablet by mouth nightly 90 tablet 1    Insulin Degludec (TRESIBA FLEXTOUCH) 200 UNIT/ML SOPN Inject 45 Units into the skin daily 3 pen 3    blood glucose test strips (RELION CONFIRM/MICRO TEST) strip 1 each by In Vitro route 2 times daily As needed.  100 each 5    cyanocobalamin 1000 MCG/ML injection Inject 1 mL into the muscle every 30 days 1 mL 5    RELION INSULIN SYR 0.5ML/31G 31G X 5/16\" 0.5 ML MISC Inject 1 each into the skin 2 times daily 100 each 5    ferrous sulfate 325 (65 Fe) MG tablet Take 1 tablet by mouth daily (with breakfast) 90 tablet 2    Insulin Pen Needle 32G X 4 MM MISC 1 each by Does not apply route daily 100 each 3    RELION ULTRA THIN LANCETS 30G MISC 1 each by Does not apply route 2 times daily 210 each 3    acetaminophen (TYLENOL ARTHRITIS PAIN) 650 MG extended release tablet Take 650 mg by mouth every 8 hours as needed for Pain       No current facility-administered medications for this visit. No Known Allergies    Health Maintenance   Topic Date Due    Shingles Vaccine (2 of 3) 02/26/2007    Annual Wellness Visit (AWV)  11/17/2018    Breast cancer screen  01/11/2019    Diabetic retinal exam  04/11/2019    Hepatitis C screen  06/29/2022 (Originally 1945)    Diabetic foot exam  11/07/2019    A1C test (Diabetic or Prediabetic)  03/07/2020    Lipid screen  03/28/2020    Potassium monitoring  05/26/2020    Creatinine monitoring  05/26/2020    Colon cancer screen colonoscopy  10/04/2020    DTaP/Tdap/Td vaccine (2 - Td) 08/25/2027    DEXA (modify frequency per FRAX score)  Completed    Flu vaccine  Completed    Pneumococcal 65+ years Vaccine  Completed     Subjective:      Review of Systems   Constitutional: Negative for chills, fatigue and fever. HENT: Negative. Eyes: Negative. Respiratory: Negative for cough, shortness of breath and wheezing. Cardiovascular: Negative for chest pain, palpitations and leg swelling. Gastrointestinal: Negative for abdominal pain, bowel incontinence, constipation, diarrhea, nausea and vomiting. Endocrine: Negative for cold intolerance, heat intolerance, polydipsia, polyphagia and polyuria. Genitourinary: Negative. Negative for bladder incontinence, dysuria, flank pain, frequency, hematuria, hesitancy and urgency. Musculoskeletal: Positive for back pain (lower right). Negative for arthralgias and myalgias. Skin: Negative. Allergic/Immunologic: Negative for environmental allergies and food allergies. Neurological: Negative for dizziness, tingling, weakness, numbness and headaches.    Psychiatric/Behavioral: Negative for dysphoric mood and sleep disturbance. The patient is not nervous/anxious. Objective:     /80 (Site: Left Upper Arm, Position: Sitting)   Pulse 64   Wt 242 lb (109.8 kg)   BMI 41.54 kg/m²     Physical Exam   Constitutional: She is oriented to person, place, and time. She appears well-developed and well-nourished. HENT:   Head: Normocephalic. Right Ear: Tympanic membrane normal.   Left Ear: Tympanic membrane normal.   Mouth/Throat: Oropharynx is clear and moist.   Eyes: Conjunctivae are normal.   Neck: Neck supple. No thyromegaly present. Cardiovascular: Normal rate, regular rhythm and normal heart sounds. Pulmonary/Chest: Effort normal and breath sounds normal. She has no wheezes. Abdominal: Soft. Bowel sounds are normal. There is no tenderness. There is no CVA tenderness. Musculoskeletal:        Lumbar back: She exhibits decreased range of motion and tenderness (on right). Lymphadenopathy:     She has no cervical adenopathy. Neurological: She is alert and oriented to person, place, and time. She has normal strength. Lab Results   Component Value Date     05/26/2019    K 4.1 05/26/2019     05/26/2019    CO2 29 05/26/2019    BUN 26 (H) 05/26/2019    CREATININE 0.9 05/26/2019    GLUCOSE 2+ (A) 05/26/2019    CALCIUM 8.8 05/26/2019    PROT 7.2 05/26/2019    LABALBU 4.2 05/26/2019    BILITOT neg 05/26/2019    ALKPHOS 103 05/26/2019    AST 13 (L) 05/26/2019    ALT 12 05/26/2019    LABGLOM 44/53 12/11/2018    AGRATIO 1.4 05/26/2019    GLOB 3.0 05/26/2019     Lab Results   Component Value Date    LABA1C 8.3 03/07/2019    LABA1C 8.6 02/07/2019    LABA1C 7.3 11/07/2018     Lab Results   Component Value Date    LABMICR 1.0 11/17/2017    LDLCALC 69.4 03/28/2019    CREATININE 0.9 05/26/2019       Assessment:     1. Acute right-sided low back pain without sciatica    2. Diabetes mellitus type 2, insulin dependent ([address]    3. Essential hypertension    4. Other hyperlipidemia    5. Anxiety and depression    6. Chronic diastolic heart failure (HCC)      Results for POC orders placed in visit on 06/13/19   POCT Urinalysis no Micro   Result Value Ref Range    Color, UA LIGHT YELLOW     Clarity, UA CLEAR     Glucose, UA POC LARGE     Bilirubin, UA NEGATIVE     Ketones, UA NEGATIVE     Spec Grav, UA 1.005     Blood, UA POC NEGATIVE     pH, UA 5.0     Protein, UA POC NEGATIVE     Urobilinogen, UA 0.2     Leukocytes, UA NEGATIVE     Nitrite, UA NEGATIVE        Plan:     Orders Placed This Encounter   Procedures   Jefe Gallo CNP, Pain Management, [place]     Referral Priority:   Routine     Referral Type:   Eval and Treat     Referral Reason:   Specialty Services Required     Referred to Provider:   ORTIZ Benson CNP     Requested Specialty:   Pain Management     Number of Visits Requested:   1    POCT Urinalysis no Micro       Orders Placed This Encounter   Medications    carvedilol (COREG) 6.25 MG tablet     Sig: Take 1 tablet by mouth 2 times daily (with meals)     Dispense:  180 tablet     Refill:  1    bumetanide (BUMEX) 1 MG tablet     Sig: Take 1 tablet by mouth 2 times daily     Dispense:  180 tablet     Refill:  1      Return if symptoms worsen or fail to improve. Patient given educational materials - see patient instructions. Discussed use, benefit, and side effects of prescribed medications. All patient questions answered. Patient voiced understanding. Health Maintenance reviewed. Instructed to continue current medications, diet and exercise. Patient agreed with treatment plan. Follow up as directed.      Electronically signed by ORTIZ Marie CNP on 6/23/2019

## 2019-06-23 PROBLEM — F41.9 ANXIETY AND DEPRESSION: Status: ACTIVE | Noted: 2019-06-23

## 2019-06-23 PROBLEM — F32.A ANXIETY AND DEPRESSION: Status: ACTIVE | Noted: 2019-06-23

## 2019-06-23 ASSESSMENT — ENCOUNTER SYMPTOMS
BACK PAIN: 1
DIARRHEA: 0
CONSTIPATION: 0
SHORTNESS OF BREATH: 0
ABDOMINAL PAIN: 0
COUGH: 0
EYES NEGATIVE: 1
WHEEZING: 0
NAUSEA: 0
VOMITING: 0
BOWEL INCONTINENCE: 0

## 2019-07-02 ENCOUNTER — OFFICE VISIT (OUTPATIENT)
Dept: FAMILY MEDICINE CLINIC | Age: 74
End: 2019-07-02
Payer: MEDICARE

## 2019-07-02 VITALS
WEIGHT: 242 LBS | SYSTOLIC BLOOD PRESSURE: 124 MMHG | HEART RATE: 61 BPM | BODY MASS INDEX: 41.54 KG/M2 | DIASTOLIC BLOOD PRESSURE: 84 MMHG | OXYGEN SATURATION: 95 %

## 2019-07-02 DIAGNOSIS — R30.0 DYSURIA: ICD-10-CM

## 2019-07-02 DIAGNOSIS — R35.0 URINARY FREQUENCY: ICD-10-CM

## 2019-07-02 DIAGNOSIS — N30.00 ACUTE CYSTITIS WITHOUT HEMATURIA: Primary | ICD-10-CM

## 2019-07-02 LAB
BILIRUBIN, POC: NEGATIVE
BLOOD URINE, POC: NEGATIVE
CLARITY, POC: ABNORMAL
COLOR, POC: YELLOW
GLUCOSE URINE, POC: NEGATIVE
KETONES, POC: NEGATIVE
LEUKOCYTE EST, POC: ABNORMAL
NITRITE, POC: NEGATIVE
PH, POC: 5
PROTEIN, POC: ABNORMAL
SPECIFIC GRAVITY, POC: 1.01
UROBILINOGEN, POC: 0.2

## 2019-07-02 PROCEDURE — G8427 DOCREV CUR MEDS BY ELIG CLIN: HCPCS | Performed by: NURSE PRACTITIONER

## 2019-07-02 PROCEDURE — 99213 OFFICE O/P EST LOW 20 MIN: CPT | Performed by: NURSE PRACTITIONER

## 2019-07-02 PROCEDURE — 1123F ACP DISCUSS/DSCN MKR DOCD: CPT | Performed by: NURSE PRACTITIONER

## 2019-07-02 PROCEDURE — 1036F TOBACCO NON-USER: CPT | Performed by: NURSE PRACTITIONER

## 2019-07-02 PROCEDURE — 1090F PRES/ABSN URINE INCON ASSESS: CPT | Performed by: NURSE PRACTITIONER

## 2019-07-02 PROCEDURE — 81002 URINALYSIS NONAUTO W/O SCOPE: CPT | Performed by: NURSE PRACTITIONER

## 2019-07-02 PROCEDURE — G8417 CALC BMI ABV UP PARAM F/U: HCPCS | Performed by: NURSE PRACTITIONER

## 2019-07-02 PROCEDURE — G8400 PT W/DXA NO RESULTS DOC: HCPCS | Performed by: NURSE PRACTITIONER

## 2019-07-02 PROCEDURE — 4040F PNEUMOC VAC/ADMIN/RCVD: CPT | Performed by: NURSE PRACTITIONER

## 2019-07-02 PROCEDURE — 3017F COLORECTAL CA SCREEN DOC REV: CPT | Performed by: NURSE PRACTITIONER

## 2019-07-02 RX ORDER — NITROFURANTOIN 25; 75 MG/1; MG/1
100 CAPSULE ORAL 2 TIMES DAILY
Qty: 14 CAPSULE | Refills: 0 | Status: SHIPPED | OUTPATIENT
Start: 2019-07-02 | End: 2020-03-24

## 2019-07-02 ASSESSMENT — ENCOUNTER SYMPTOMS
SHORTNESS OF BREATH: 0
WHEEZING: 0
NAUSEA: 0
ABDOMINAL PAIN: 1

## 2019-07-02 NOTE — PROGRESS NOTES
[address] [address] [address]  Dept: [phone]  Dept Fax: [phone]      Dre Vivas is a 68 y.o. female who presents today for her medical conditions/complaints as noted below. Chief Complaint   Patient presents with    Dysuria     x3 days    Urinary Frequency     x3 days       HPI:     Dysuria    This is a new problem. The current episode started in the past 7 days (4 days). The problem occurs every urination. The problem has been unchanged. The quality of the pain is described as burning. The pain is at a severity of 4/10. The pain is mild. There has been no fever. Associated symptoms include frequency, hesitancy (some) and urgency. Pertinent negatives include no chills, hematuria or nausea. She has tried increased fluids for the symptoms. The treatment provided no relief. Current Outpatient Medications   Medication Sig Dispense Refill    nitrofurantoin, macrocrystal-monohydrate, (MACROBID) 100 MG capsule Take 1 capsule by mouth 2 times daily for 7 days 14 capsule 0    carvedilol (COREG) 6.25 MG tablet Take 1 tablet by mouth 2 times daily (with meals) 180 tablet 1    bumetanide (BUMEX) 1 MG tablet Take 1 tablet by mouth 2 times daily 180 tablet 1    lisinopril (PRINIVIL;ZESTRIL) 2.5 MG tablet Take 1 tablet by mouth daily 90 tablet 0    meclizine (ANTIVERT) 12.5 MG tablet Take 1 tablet by mouth 3 times daily as needed for Dizziness 90 tablet 2    busPIRone (BUSPAR) 15 MG tablet Take 15 mg by mouth 3 times daily 90 tablet 5    simvastatin (ZOCOR) 40 MG tablet Take 1 tablet by mouth nightly 90 tablet 1    Insulin Degludec (TRESIBA FLEXTOUCH) 200 UNIT/ML SOPN Inject 45 Units into the skin daily 3 pen 3    blood glucose test strips (RELION CONFIRM/MICRO TEST) strip 1 each by In Vitro route 2 times daily As needed.  100 each 5    cyanocobalamin 1000 MCG/ML injection Inject 1 mL into the muscle every 30 days 1 mL 5    RELION INSULIN SYR 0.5ML/31G 31G X 5/16\" 0.5 ML MISC Inject 1 each into the skin 2 times daily 100 each 5    ferrous sulfate 325 (65 Fe) MG tablet Take 1 tablet by mouth daily (with breakfast) 90 tablet 2    Insulin Pen Needle 32G X 4 MM MISC 1 each by Does not apply route daily 100 each 3    RELION ULTRA THIN LANCETS 30G MISC 1 each by Does not apply route 2 times daily 210 each 3    acetaminophen (TYLENOL ARTHRITIS PAIN) 650 MG extended release tablet Take 650 mg by mouth every 8 hours as needed for Pain       No current facility-administered medications for this visit.       No Known Allergies    Past Medical History:   Diagnosis Date    Actinic keratitis     Ankle pain     Arthritis     Back pain     low     Chronic diastolic CHF [facility]congestive [facility] failure) [facility])     Chronic diastolic heart failure ([facility]) 5/31/2017    Chronic kidney disease     Dependent edema     Hypertension     Incontinence     in female    Osteoarthritis     knee     Type II or unspecified type diabetes mellitus without mention of complication, not stated as uncontrolled      Past Surgical History:   Procedure Laterality Date    ANKLE SURGERY Left     FINGER TRIGGER RELEASE Right      Social History     Socioeconomic History    Marital status:      Spouse name: Not on file    Number of children: Not on file    Years of education: Not on file    Highest education level: Not on file   Occupational History    Not on file   Social Needs    Financial resource strain: Not on file    Food insecurity:     Worry: Not on file     Inability: Not on file    Transportation needs:     Medical: Not on file     Non-medical: Not on file   Tobacco Use    Smoking status: Never Smoker    Smokeless tobacco: Never Used   Substance and Sexual Activity    Alcohol use: No    Drug use: No    Sexual activity: Not on file   Lifestyle    Physical activity:     Days per week: Not on file     Minutes per session: Not on file    Stress: Not on file   Relationships    Social connections:     Talks on phone: Not on file     Gets together: Not on file     Attends [employer] service: Not on file     Active member of club or organization: Not on file     Attends meetings of clubs or organizations: Not on file     Relationship status: Not on file    Intimate partner violence:     Fear of current or ex partner: Not on file     Emotionally abused: Not on file     Physically abused: Not on file     Forced sexual activity: Not on file   Other Topics Concern    Not on file   Social History Narrative    Not on file     Family History   Problem Relation Age of Onset    Diabetes Mother     Diabetes Father     Cancer Paternal Grandfather         BONE        Subjective:      Review of Systems   Constitutional: Negative for chills, fatigue and fever. HENT: Negative. Respiratory: Negative for shortness of breath and wheezing. Gastrointestinal: Positive for abdominal pain (lower). Negative for nausea. Genitourinary: Positive for dysuria, frequency, hesitancy (some) and urgency. Negative for hematuria. Neurological: Negative for light-headedness. Objective:     /84 (Site: Right Upper Arm, Position: Sitting)   Pulse 61   Wt 242 lb (109.8 kg)   SpO2 95%   BMI 41.54 kg/m²     Physical Exam   Constitutional: She is oriented to person, place, and time. She appears well-developed and well-nourished. HENT:   Head: Normocephalic. Eyes: Conjunctivae are normal.   Neck: Neck supple. Cardiovascular: Normal rate, regular rhythm and normal heart sounds. Pulmonary/Chest: Effort normal and breath sounds normal. No respiratory distress. She has no wheezes. Abdominal: Soft. Bowel sounds are normal. There is tenderness in the suprapubic area. There is no CVA tenderness. Lymphadenopathy:     She has no cervical adenopathy. Neurological: She is alert and oriented to person, place, and time. Assessment:      Diagnosis Orders   1.  Acute cystitis without hematuria  nitrofurantoin, macrocrystal-monohydrate, (MACROBID) 100 MG capsule   2. Urinary frequency  POCT Urinalysis no Micro    Urine Culture   3. Dysuria  POCT Urinalysis no Micro    Urine Culture     Results for POC orders placed in visit on 07/02/19   POCT Urinalysis no Micro   Result Value Ref Range    Color, UA YELLOW     Clarity, UA CLOUDY     Glucose, UA POC NEGATIVE     Bilirubin, UA NEGATIVE     Ketones, UA NEGATIVE     Spec Grav, UA 1.015     Blood, UA POC NEGATIVE     pH, UA 5.0     Protein, UA POC TRACE     Urobilinogen, UA 0.2     Leukocytes, UA MODERATE     Nitrite, UA NEGATIVE        Plan:     Return if symptoms worsen or fail to improve. Orders Placed This Encounter   Procedures    Urine Culture     Standing Status:   Future     Standing Expiration Date:   8/2/2019     Order Specific Question:   Specify (ex-cath, midstream, cysto, etc)? Answer:   midstream    POCT Urinalysis no Micro     Orders Placed This Encounter   Medications    nitrofurantoin, macrocrystal-monohydrate, (MACROBID) 100 MG capsule     Sig: Take 1 capsule by mouth 2 times daily for 7 days     Dispense:  14 capsule     Refill:  0      Discussed use, benefit, and side effects of prescribed medications. Monitor for worsening symptoms, call office with any concerns. All patient questions answered. Patient voiced understanding. Follow up as directed.      Electronically signed by ORTIZ Gómez CNP on 7/2/2019 at 9:43 PM

## 2019-07-31 ENCOUNTER — TELEPHONE (OUTPATIENT)
Dept: FAMILY MEDICINE CLINIC | Age: 74
End: 2019-07-31

## 2019-07-31 DIAGNOSIS — D51.8 VITAMIN B12 DEFICIENCY (DIETARY) ANEMIA: ICD-10-CM

## 2019-07-31 RX ORDER — CYANOCOBALAMIN 1000 UG/ML
1000 INJECTION INTRAMUSCULAR; SUBCUTANEOUS
Qty: 1 ML | Refills: 0 | Status: SHIPPED | OUTPATIENT
Start: 2019-07-31 | End: 2019-12-03 | Stop reason: SDUPTHER

## 2019-09-10 ENCOUNTER — TELEPHONE (OUTPATIENT)
Dept: FAMILY MEDICINE CLINIC | Age: 74
End: 2019-09-10

## 2019-09-18 ENCOUNTER — TELEPHONE (OUTPATIENT)
Dept: FAMILY MEDICINE CLINIC | Age: 74
End: 2019-09-18

## 2019-09-18 DIAGNOSIS — E11.22 TYPE 2 DIABETES MELLITUS WITH STAGE 3 CHRONIC KIDNEY DISEASE, WITH LONG-TERM CURRENT USE OF INSULIN (HCC): ICD-10-CM

## 2019-09-18 DIAGNOSIS — N18.30 TYPE 2 DIABETES MELLITUS WITH STAGE 3 CHRONIC KIDNEY DISEASE, WITH LONG-TERM CURRENT USE OF INSULIN (HCC): ICD-10-CM

## 2019-09-18 DIAGNOSIS — Z79.4 TYPE 2 DIABETES MELLITUS WITH STAGE 3 CHRONIC KIDNEY DISEASE, WITH LONG-TERM CURRENT USE OF INSULIN (HCC): ICD-10-CM

## 2019-09-24 ENCOUNTER — OFFICE VISIT (OUTPATIENT)
Dept: FAMILY MEDICINE CLINIC | Age: 74
End: 2019-09-24
Payer: MEDICARE

## 2019-09-24 VITALS
WEIGHT: 236 LBS | HEART RATE: 49 BPM | OXYGEN SATURATION: 98 % | SYSTOLIC BLOOD PRESSURE: 118 MMHG | BODY MASS INDEX: 40.51 KG/M2 | DIASTOLIC BLOOD PRESSURE: 78 MMHG

## 2019-09-24 DIAGNOSIS — N28.9 DECREASED RENAL FUNCTION: ICD-10-CM

## 2019-09-24 DIAGNOSIS — D72.829 LEUKOCYTOSIS, UNSPECIFIED TYPE: ICD-10-CM

## 2019-09-24 DIAGNOSIS — R35.0 URINARY FREQUENCY: Primary | ICD-10-CM

## 2019-09-24 DIAGNOSIS — Z23 NEED FOR INFLUENZA VACCINATION: ICD-10-CM

## 2019-09-24 DIAGNOSIS — R53.83 FATIGUE, UNSPECIFIED TYPE: ICD-10-CM

## 2019-09-24 LAB
AGE FOR GFR: 73
ANION GAP SERPL CALCULATED.3IONS-SCNC: 13 MMOL/L
APPEARANCE FLUID: CLEAR
BASOPHILS # BLD: 0.08 THOU/MM3 (ref 0–0.3)
BILIRUBIN, POC: NEGATIVE
BLOOD URINE, POC: NEGATIVE
BUN BLDV-MCNC: 30 MG/DL (ref 7–17)
CALCIUM SERPL-MCNC: 9.1 MG/DL (ref 8.4–10.2)
CHLORIDE BLD-SCNC: 99 MMOL/L (ref 98–120)
CLARITY, POC: CLEAR
CO2: 32 MMOL/L (ref 22–31)
COLOR, POC: ABNORMAL
CREAT SERPL-MCNC: 1.1 MG/DL (ref 0.5–1)
DIFFERENTIAL: AUTOMATED DIFF
EGFR BF: 59 ML/MIN/1.73 M2
EGFR BM: 80 ML/MIN/1.73 M2
EGFR WF: 49 ML/MIN/1.73 M2
EGFR WM: 66 ML/MIN/1.73 M2
EOSINOPHIL # BLD: 0.15 THOU/MM3 (ref 0–1.1)
GLUCOSE URINE, POC: 500
GLUCOSE: 237 MG/DL (ref 65–105)
HCT VFR BLD CALC: 39.7 % (ref 37–47)
HEMOGLOBIN: 12.4 G/DL (ref 12–16)
KETONES, POC: NEGATIVE
LEUKOCYTE EST, POC: ABNORMAL
LYMPHOCYTES # BLD: 2.91 THOU/MM3 (ref 1–5.5)
MCH RBC QN AUTO: 28.5 PG (ref 28.5–32)
MCHC RBC AUTO-ENTMCNC: 31.1 G/DL (ref 32–37)
MCV RBC AUTO: 91.6 FL (ref 80–94)
MONOCYTES # BLD: 0.64 THOU/MM3 (ref 0.1–1)
NEUTROPHILS: 7.02 THOU/MM3 (ref 2–8.1)
NITRITE, POC: NEGATIVE
PDW BLD-RTO: 13.2 % (ref 8.5–15.5)
PH, POC: 5
PLATELET # BLD: 221 THOU/MM3 (ref 130–400)
PMV BLD AUTO: 6.7 FL (ref 7.4–11)
POTASSIUM SERPL-SCNC: 5 MMOL/L (ref 3.6–5)
PROTEIN, POC: NEGATIVE
RBC # BLD: 4.34 M/UL (ref 4.2–5.4)
SODIUM BLD-SCNC: 139 MMOL/L (ref 135–145)
SPECIFIC GRAVITY, POC: 1.01
URINE CULTURE, ROUTINE: NORMAL
UROBILINOGEN, POC: 0.2
WBC # BLD: 10.81 THOU/ML3 (ref 4.8–10)

## 2019-09-24 PROCEDURE — G0008 ADMIN INFLUENZA VIRUS VAC: HCPCS | Performed by: NURSE PRACTITIONER

## 2019-09-24 PROCEDURE — 81002 URINALYSIS NONAUTO W/O SCOPE: CPT | Performed by: NURSE PRACTITIONER

## 2019-09-24 PROCEDURE — 99214 OFFICE O/P EST MOD 30 MIN: CPT | Performed by: NURSE PRACTITIONER

## 2019-09-24 PROCEDURE — 3017F COLORECTAL CA SCREEN DOC REV: CPT | Performed by: NURSE PRACTITIONER

## 2019-09-24 PROCEDURE — G8427 DOCREV CUR MEDS BY ELIG CLIN: HCPCS | Performed by: NURSE PRACTITIONER

## 2019-09-24 PROCEDURE — G8400 PT W/DXA NO RESULTS DOC: HCPCS | Performed by: NURSE PRACTITIONER

## 2019-09-24 PROCEDURE — 1090F PRES/ABSN URINE INCON ASSESS: CPT | Performed by: NURSE PRACTITIONER

## 2019-09-24 PROCEDURE — 90653 IIV ADJUVANT VACCINE IM: CPT | Performed by: NURSE PRACTITIONER

## 2019-09-24 PROCEDURE — 4040F PNEUMOC VAC/ADMIN/RCVD: CPT | Performed by: NURSE PRACTITIONER

## 2019-09-24 PROCEDURE — 87086 URINE CULTURE/COLONY COUNT: CPT | Performed by: NURSE PRACTITIONER

## 2019-09-24 PROCEDURE — G8417 CALC BMI ABV UP PARAM F/U: HCPCS | Performed by: NURSE PRACTITIONER

## 2019-09-24 PROCEDURE — 1123F ACP DISCUSS/DSCN MKR DOCD: CPT | Performed by: NURSE PRACTITIONER

## 2019-09-24 PROCEDURE — 1036F TOBACCO NON-USER: CPT | Performed by: NURSE PRACTITIONER

## 2019-09-24 RX ORDER — CEFDINIR 300 MG/1
300 CAPSULE ORAL 2 TIMES DAILY
Qty: 10 CAPSULE | Refills: 0 | Status: SHIPPED | OUTPATIENT
Start: 2019-09-24 | End: 2019-09-29

## 2019-09-24 NOTE — PROGRESS NOTES
[address] [facility]  [address] [facility], [address] [facility]  Dept: [phone]  Dept Fax: [phone]    Samantha Harvey is a 69 y.o. female who presents today for her medical conditions/complaints as noted below. Samantha Harvey c/o of Follow-Up from Hospital (follow up [facility] lab redone) and Diabetes      HPI  Patient presents to the office for hospital follow-up of hyperglycemia (blood sugar was running >400), dehydration, and renal insufficiency. She was discharged from [facility] 5 days ago. Medication changes include: cefdinir, 2 times daily for 3 days, increased insulin to 50 units in am, mealtime insulin 10 units if blood sugar 350-400. Today she reports feeling fatigue. She denies fever, chills, cough, sob, headache, or dizziness.         BP Readings from Last 3 Encounters:   09/24/19 118/78   07/02/19 124/84   06/13/19 118/80          ([id number])    Wt Readings from Last 3 Encounters:   09/24/19 236 lb (107 kg)   07/02/19 242 lb (109.8 kg)   06/13/19 242 lb (109.8 kg)       Past Medical History:   Diagnosis Date    Actinic keratitis     Ankle pain     Arthritis     Back pain     low     Chronic diastolic CHF (congestive heart failure) (HCC)     Chronic diastolic heart failure (HCC) 5/31/2017    Chronic kidney disease     Dependent edema     Hypertension     Incontinence     in female    Osteoarthritis     knee     Type II or unspecified type diabetes mellitus without mention of complication, not stated as uncontrolled       Past Surgical History:   Procedure Laterality Date    ANKLE SURGERY Left     FINGER TRIGGER RELEASE Right        Family History   Problem Relation Age of Onset    Diabetes Mother     Diabetes Father     Cancer Paternal Grandfather         BONE        Social History     Tobacco Use    Smoking status: Never Smoker    Smokeless tobacco: Never Used   Substance Use Topics    Alcohol use: No      Current

## 2019-09-30 ASSESSMENT — ENCOUNTER SYMPTOMS
WHEEZING: 0
COUGH: 0
CONSTIPATION: 0
SHORTNESS OF BREATH: 0
ABDOMINAL PAIN: 0
EYES NEGATIVE: 1
DIARRHEA: 0

## 2019-10-08 ENCOUNTER — OFFICE VISIT (OUTPATIENT)
Dept: FAMILY MEDICINE CLINIC | Age: 74
End: 2019-10-08
Payer: MEDICARE

## 2019-10-08 VITALS
WEIGHT: 246 LBS | OXYGEN SATURATION: 96 % | SYSTOLIC BLOOD PRESSURE: 126 MMHG | DIASTOLIC BLOOD PRESSURE: 84 MMHG | BODY MASS INDEX: 42.23 KG/M2 | HEART RATE: 53 BPM

## 2019-10-08 DIAGNOSIS — N18.30 TYPE 2 DIABETES MELLITUS WITH STAGE 3 CHRONIC KIDNEY DISEASE, WITH LONG-TERM CURRENT USE OF INSULIN (HCC): ICD-10-CM

## 2019-10-08 DIAGNOSIS — Z79.4 TYPE 2 DIABETES MELLITUS WITH STAGE 3 CHRONIC KIDNEY DISEASE, WITH LONG-TERM CURRENT USE OF INSULIN (HCC): ICD-10-CM

## 2019-10-08 DIAGNOSIS — G47.33 OSA TREATED WITH BIPAP: ICD-10-CM

## 2019-10-08 DIAGNOSIS — I10 ESSENTIAL HYPERTENSION: ICD-10-CM

## 2019-10-08 DIAGNOSIS — E11.40 NEUROPATHY DUE TO TYPE 2 DIABETES MELLITUS (HCC): ICD-10-CM

## 2019-10-08 DIAGNOSIS — M54.50 CHRONIC RIGHT-SIDED LOW BACK PAIN WITHOUT SCIATICA: Primary | ICD-10-CM

## 2019-10-08 DIAGNOSIS — G89.29 CHRONIC RIGHT-SIDED LOW BACK PAIN WITHOUT SCIATICA: Primary | ICD-10-CM

## 2019-10-08 DIAGNOSIS — E78.5 HYPERLIPIDEMIA ASSOCIATED WITH TYPE 2 DIABETES MELLITUS (HCC): ICD-10-CM

## 2019-10-08 DIAGNOSIS — M16.11 PRIMARY OSTEOARTHRITIS OF RIGHT HIP: ICD-10-CM

## 2019-10-08 DIAGNOSIS — F41.9 ANXIETY AND DEPRESSION: ICD-10-CM

## 2019-10-08 DIAGNOSIS — I50.32 CHRONIC DIASTOLIC HEART FAILURE (HCC): ICD-10-CM

## 2019-10-08 DIAGNOSIS — F32.A ANXIETY AND DEPRESSION: ICD-10-CM

## 2019-10-08 DIAGNOSIS — E11.69 HYPERLIPIDEMIA ASSOCIATED WITH TYPE 2 DIABETES MELLITUS (HCC): ICD-10-CM

## 2019-10-08 DIAGNOSIS — E78.49 OTHER HYPERLIPIDEMIA: ICD-10-CM

## 2019-10-08 DIAGNOSIS — R35.0 URINARY FREQUENCY: ICD-10-CM

## 2019-10-08 DIAGNOSIS — E11.22 TYPE 2 DIABETES MELLITUS WITH STAGE 3 CHRONIC KIDNEY DISEASE, WITH LONG-TERM CURRENT USE OF INSULIN (HCC): ICD-10-CM

## 2019-10-08 DIAGNOSIS — N28.9 DECREASED RENAL FUNCTION: ICD-10-CM

## 2019-10-08 LAB
BILIRUBIN, POC: NEGATIVE
BLOOD URINE, POC: NEGATIVE
CLARITY, POC: CLEAR
COLOR, POC: YELLOW
GLUCOSE URINE, POC: 250
KETONES, POC: NEGATIVE
LEUKOCYTE EST, POC: NEGATIVE
NITRITE, POC: NEGATIVE
PH, POC: 5
PROTEIN, POC: NEGATIVE
SPECIFIC GRAVITY, POC: 1.01
UROBILINOGEN, POC: 0.2

## 2019-10-08 PROCEDURE — 3017F COLORECTAL CA SCREEN DOC REV: CPT | Performed by: NURSE PRACTITIONER

## 2019-10-08 PROCEDURE — G8400 PT W/DXA NO RESULTS DOC: HCPCS | Performed by: NURSE PRACTITIONER

## 2019-10-08 PROCEDURE — 99214 OFFICE O/P EST MOD 30 MIN: CPT | Performed by: NURSE PRACTITIONER

## 2019-10-08 PROCEDURE — G8417 CALC BMI ABV UP PARAM F/U: HCPCS | Performed by: NURSE PRACTITIONER

## 2019-10-08 PROCEDURE — 1123F ACP DISCUSS/DSCN MKR DOCD: CPT | Performed by: NURSE PRACTITIONER

## 2019-10-08 PROCEDURE — G8427 DOCREV CUR MEDS BY ELIG CLIN: HCPCS | Performed by: NURSE PRACTITIONER

## 2019-10-08 PROCEDURE — 81002 URINALYSIS NONAUTO W/O SCOPE: CPT | Performed by: NURSE PRACTITIONER

## 2019-10-08 PROCEDURE — 1090F PRES/ABSN URINE INCON ASSESS: CPT | Performed by: NURSE PRACTITIONER

## 2019-10-08 PROCEDURE — 1036F TOBACCO NON-USER: CPT | Performed by: NURSE PRACTITIONER

## 2019-10-08 PROCEDURE — 2022F DILAT RTA XM EVC RTNOPTHY: CPT | Performed by: NURSE PRACTITIONER

## 2019-10-08 PROCEDURE — 4040F PNEUMOC VAC/ADMIN/RCVD: CPT | Performed by: NURSE PRACTITIONER

## 2019-10-08 PROCEDURE — G8482 FLU IMMUNIZE ORDER/ADMIN: HCPCS | Performed by: NURSE PRACTITIONER

## 2019-10-08 RX ORDER — SIMVASTATIN 40 MG
40 TABLET ORAL NIGHTLY
Qty: 90 TABLET | Refills: 1 | Status: SHIPPED | OUTPATIENT
Start: 2019-10-08 | End: 2020-06-30 | Stop reason: SDUPTHER

## 2019-10-08 ASSESSMENT — ENCOUNTER SYMPTOMS: BOWEL INCONTINENCE: 0

## 2019-10-18 PROBLEM — N28.9 DECREASED RENAL FUNCTION: Status: ACTIVE | Noted: 2019-10-18

## 2019-10-18 PROBLEM — M16.11 PRIMARY OSTEOARTHRITIS OF RIGHT HIP: Status: ACTIVE | Noted: 2019-10-18

## 2019-10-18 PROBLEM — G89.29 CHRONIC RIGHT-SIDED LOW BACK PAIN WITHOUT SCIATICA: Status: ACTIVE | Noted: 2019-10-18

## 2019-10-18 PROBLEM — M54.50 CHRONIC RIGHT-SIDED LOW BACK PAIN WITHOUT SCIATICA: Status: ACTIVE | Noted: 2019-10-18

## 2019-10-18 ASSESSMENT — ENCOUNTER SYMPTOMS
WHEEZING: 0
ABDOMINAL PAIN: 0
DIARRHEA: 0
SHORTNESS OF BREATH: 0
CONSTIPATION: 0
COUGH: 0
BACK PAIN: 1

## 2019-10-30 ENCOUNTER — TELEPHONE (OUTPATIENT)
Dept: INTERNAL MEDICINE | Age: 74
End: 2019-10-30

## 2019-10-30 DIAGNOSIS — N18.4 TYPE 2 DIABETES MELLITUS WITH STAGE 4 CHRONIC KIDNEY DISEASE, WITH LONG-TERM CURRENT USE OF INSULIN (HCC): ICD-10-CM

## 2019-10-30 DIAGNOSIS — E11.29 TYPE 2 DIABETES MELLITUS WITH MICROALBUMINURIA, WITH LONG-TERM CURRENT USE OF INSULIN (HCC): ICD-10-CM

## 2019-10-30 DIAGNOSIS — Z79.4 TYPE 2 DIABETES MELLITUS WITH MICROALBUMINURIA, WITH LONG-TERM CURRENT USE OF INSULIN (HCC): ICD-10-CM

## 2019-10-30 DIAGNOSIS — N18.30 TYPE 2 DIABETES MELLITUS WITH STAGE 3 CHRONIC KIDNEY DISEASE, WITH LONG-TERM CURRENT USE OF INSULIN (HCC): ICD-10-CM

## 2019-10-30 DIAGNOSIS — E11.22 TYPE 2 DIABETES MELLITUS WITH STAGE 4 CHRONIC KIDNEY DISEASE, WITH LONG-TERM CURRENT USE OF INSULIN (HCC): ICD-10-CM

## 2019-10-30 DIAGNOSIS — Z79.4 TYPE 2 DIABETES MELLITUS WITH STAGE 4 CHRONIC KIDNEY DISEASE, WITH LONG-TERM CURRENT USE OF INSULIN (HCC): ICD-10-CM

## 2019-10-30 DIAGNOSIS — R80.9 TYPE 2 DIABETES MELLITUS WITH MICROALBUMINURIA, WITH LONG-TERM CURRENT USE OF INSULIN (HCC): ICD-10-CM

## 2019-10-30 DIAGNOSIS — E11.22 TYPE 2 DIABETES MELLITUS WITH STAGE 3 CHRONIC KIDNEY DISEASE, WITH LONG-TERM CURRENT USE OF INSULIN (HCC): ICD-10-CM

## 2019-10-30 DIAGNOSIS — Z79.4 TYPE 2 DIABETES MELLITUS WITH STAGE 3 CHRONIC KIDNEY DISEASE, WITH LONG-TERM CURRENT USE OF INSULIN (HCC): ICD-10-CM

## 2019-10-30 RX ORDER — SYRINGE-NEEDLE,INSULIN,0.5 ML 31 GX5/16"
1 SYRINGE, EMPTY DISPOSABLE MISCELLANEOUS 2 TIMES DAILY
Qty: 100 EACH | Refills: 5 | Status: SHIPPED | OUTPATIENT
Start: 2019-10-30 | End: 2019-11-05 | Stop reason: ALTCHOICE

## 2019-11-05 ENCOUNTER — HOSPITAL ENCOUNTER (OUTPATIENT)
Age: 74
Setting detail: SPECIMEN
Discharge: HOME | End: 2019-11-05
Payer: MEDICARE

## 2019-11-05 ENCOUNTER — OFFICE VISIT (OUTPATIENT)
Dept: DIABETES SERVICES | Age: 74
End: 2019-11-05
Payer: MEDICARE

## 2019-11-05 VITALS
RESPIRATION RATE: 16 BRPM | DIASTOLIC BLOOD PRESSURE: 90 MMHG | BODY MASS INDEX: 41.88 KG/M2 | WEIGHT: 244 LBS | HEART RATE: 82 BPM | SYSTOLIC BLOOD PRESSURE: 144 MMHG

## 2019-11-05 DIAGNOSIS — N18.4 TYPE 2 DIABETES MELLITUS WITH STAGE 4 CHRONIC KIDNEY DISEASE, WITH LONG-TERM CURRENT USE OF INSULIN (HCC): ICD-10-CM

## 2019-11-05 DIAGNOSIS — E11.22 TYPE 2 DIABETES MELLITUS WITH STAGE 4 CHRONIC KIDNEY DISEASE, WITH LONG-TERM CURRENT USE OF INSULIN (HCC): ICD-10-CM

## 2019-11-05 DIAGNOSIS — Z79.4 DIABETES MELLITUS TYPE 2, INSULIN DEPENDENT (HCC): ICD-10-CM

## 2019-11-05 DIAGNOSIS — Z79.4 TYPE 2 DIABETES MELLITUS WITH STAGE 4 CHRONIC KIDNEY DISEASE, WITH LONG-TERM CURRENT USE OF INSULIN (HCC): ICD-10-CM

## 2019-11-05 DIAGNOSIS — E11.9 DIABETES MELLITUS TYPE 2, INSULIN DEPENDENT (HCC): ICD-10-CM

## 2019-11-05 DIAGNOSIS — E11.9 DIABETES MELLITUS TYPE 2, INSULIN DEPENDENT (HCC): Primary | ICD-10-CM

## 2019-11-05 DIAGNOSIS — E66.01 OBESITY, MORBID, BMI 40.0-49.9 (HCC): ICD-10-CM

## 2019-11-05 DIAGNOSIS — E78.49 OTHER HYPERLIPIDEMIA: ICD-10-CM

## 2019-11-05 DIAGNOSIS — I10 ESSENTIAL HYPERTENSION: ICD-10-CM

## 2019-11-05 DIAGNOSIS — Z79.4 DIABETES MELLITUS TYPE 2, INSULIN DEPENDENT (HCC): Primary | ICD-10-CM

## 2019-11-05 LAB
CREATININE URINE: 113.6 MG/DL (ref 28–217)
HBA1C MFR BLD: 8.1 %
MICROALBUMIN/CREAT 24H UR: 77 MG/L
MICROALBUMIN/CREAT UR-RTO: 68 MCG/MG CREAT

## 2019-11-05 PROCEDURE — G8400 PT W/DXA NO RESULTS DOC: HCPCS | Performed by: NURSE PRACTITIONER

## 2019-11-05 PROCEDURE — 1123F ACP DISCUSS/DSCN MKR DOCD: CPT | Performed by: NURSE PRACTITIONER

## 2019-11-05 PROCEDURE — 1036F TOBACCO NON-USER: CPT | Performed by: NURSE PRACTITIONER

## 2019-11-05 PROCEDURE — 4040F PNEUMOC VAC/ADMIN/RCVD: CPT | Performed by: NURSE PRACTITIONER

## 2019-11-05 PROCEDURE — 1090F PRES/ABSN URINE INCON ASSESS: CPT | Performed by: NURSE PRACTITIONER

## 2019-11-05 PROCEDURE — 2022F DILAT RTA XM EVC RTNOPTHY: CPT | Performed by: NURSE PRACTITIONER

## 2019-11-05 PROCEDURE — 3017F COLORECTAL CA SCREEN DOC REV: CPT | Performed by: NURSE PRACTITIONER

## 2019-11-05 PROCEDURE — G8417 CALC BMI ABV UP PARAM F/U: HCPCS | Performed by: NURSE PRACTITIONER

## 2019-11-05 PROCEDURE — G8482 FLU IMMUNIZE ORDER/ADMIN: HCPCS | Performed by: NURSE PRACTITIONER

## 2019-11-05 PROCEDURE — 82570 ASSAY OF URINE CREATININE: CPT

## 2019-11-05 PROCEDURE — 82043 UR ALBUMIN QUANTITATIVE: CPT

## 2019-11-05 PROCEDURE — G8427 DOCREV CUR MEDS BY ELIG CLIN: HCPCS | Performed by: NURSE PRACTITIONER

## 2019-11-05 PROCEDURE — 3052F HG A1C>EQUAL 8.0%<EQUAL 9.0%: CPT | Performed by: NURSE PRACTITIONER

## 2019-11-05 PROCEDURE — 83036 HEMOGLOBIN GLYCOSYLATED A1C: CPT | Performed by: NURSE PRACTITIONER

## 2019-11-05 PROCEDURE — 99215 OFFICE O/P EST HI 40 MIN: CPT | Performed by: NURSE PRACTITIONER

## 2019-11-05 RX ORDER — LANCETS 30 GAUGE
1 EACH MISCELLANEOUS 3 TIMES DAILY
Qty: 300 EACH | Refills: 3 | Status: SHIPPED | OUTPATIENT
Start: 2019-11-05

## 2019-11-05 ASSESSMENT — ENCOUNTER SYMPTOMS
VISUAL CHANGE: 0
BLURRED VISION: 1
ABDOMINAL PAIN: 0
RESPIRATORY NEGATIVE: 1
DIARRHEA: 0
SHORTNESS OF BREATH: 0

## 2019-11-06 DIAGNOSIS — I10 ESSENTIAL HYPERTENSION: ICD-10-CM

## 2019-11-06 DIAGNOSIS — Z79.4 TYPE 2 DIABETES MELLITUS WITH STAGE 3 CHRONIC KIDNEY DISEASE, WITH LONG-TERM CURRENT USE OF INSULIN (HCC): ICD-10-CM

## 2019-11-06 DIAGNOSIS — D50.8 OTHER IRON DEFICIENCY ANEMIA: ICD-10-CM

## 2019-11-06 DIAGNOSIS — E11.22 TYPE 2 DIABETES MELLITUS WITH STAGE 3 CHRONIC KIDNEY DISEASE, WITH LONG-TERM CURRENT USE OF INSULIN (HCC): ICD-10-CM

## 2019-11-06 DIAGNOSIS — I50.32 CHRONIC DIASTOLIC HEART FAILURE (HCC): ICD-10-CM

## 2019-11-06 DIAGNOSIS — N18.30 TYPE 2 DIABETES MELLITUS WITH STAGE 3 CHRONIC KIDNEY DISEASE, WITH LONG-TERM CURRENT USE OF INSULIN (HCC): ICD-10-CM

## 2019-11-06 RX ORDER — LISINOPRIL 2.5 MG/1
2.5 TABLET ORAL DAILY
Qty: 90 TABLET | Refills: 1 | Status: SHIPPED | OUTPATIENT
Start: 2019-11-06 | End: 2020-04-29 | Stop reason: SDUPTHER

## 2019-11-06 RX ORDER — CARVEDILOL 6.25 MG/1
6.25 TABLET ORAL 2 TIMES DAILY WITH MEALS
Qty: 180 TABLET | Refills: 1 | Status: SHIPPED | OUTPATIENT
Start: 2019-11-06 | End: 2020-05-21 | Stop reason: SDUPTHER

## 2019-11-06 RX ORDER — FERROUS SULFATE 325(65) MG
325 TABLET ORAL
Qty: 90 TABLET | Refills: 2 | Status: SHIPPED | OUTPATIENT
Start: 2019-11-06 | End: 2020-05-21 | Stop reason: SDUPTHER

## 2019-11-08 ENCOUNTER — TELEPHONE (OUTPATIENT)
Dept: INTERNAL MEDICINE | Age: 74
End: 2019-11-08

## 2019-11-08 DIAGNOSIS — E11.22 TYPE 2 DIABETES MELLITUS WITH STAGE 3 CHRONIC KIDNEY DISEASE, WITH LONG-TERM CURRENT USE OF INSULIN (HCC): Primary | ICD-10-CM

## 2019-11-08 DIAGNOSIS — N18.30 TYPE 2 DIABETES MELLITUS WITH STAGE 3 CHRONIC KIDNEY DISEASE, WITH LONG-TERM CURRENT USE OF INSULIN (HCC): Primary | ICD-10-CM

## 2019-11-08 DIAGNOSIS — Z79.4 TYPE 2 DIABETES MELLITUS WITH STAGE 3 CHRONIC KIDNEY DISEASE, WITH LONG-TERM CURRENT USE OF INSULIN (HCC): Primary | ICD-10-CM

## 2019-11-11 RX ORDER — GLUCOSAMINE HCL/CHONDROITIN SU 500-400 MG
CAPSULE ORAL 3 TIMES DAILY
Qty: 100 STRIP | Refills: 5 | Status: SHIPPED | OUTPATIENT
Start: 2019-11-11

## 2019-11-21 ENCOUNTER — OFFICE VISIT (OUTPATIENT)
Dept: FAMILY MEDICINE CLINIC | Age: 74
End: 2019-11-21
Payer: MEDICARE

## 2019-11-21 ENCOUNTER — HOSPITAL ENCOUNTER (OUTPATIENT)
Age: 74
Setting detail: SPECIMEN
Discharge: HOME | End: 2019-11-21
Payer: MEDICARE

## 2019-11-21 VITALS
DIASTOLIC BLOOD PRESSURE: 82 MMHG | OXYGEN SATURATION: 95 % | SYSTOLIC BLOOD PRESSURE: 124 MMHG | BODY MASS INDEX: 42.23 KG/M2 | HEART RATE: 58 BPM | WEIGHT: 246 LBS

## 2019-11-21 DIAGNOSIS — R29.6 FREQUENT FALLS: ICD-10-CM

## 2019-11-21 DIAGNOSIS — N30.00 ACUTE CYSTITIS WITHOUT HEMATURIA: ICD-10-CM

## 2019-11-21 DIAGNOSIS — N30.00 ACUTE CYSTITIS WITHOUT HEMATURIA: Primary | ICD-10-CM

## 2019-11-21 DIAGNOSIS — R53.1 GENERALIZED WEAKNESS: ICD-10-CM

## 2019-11-21 LAB
APPEARANCE FLUID: ABNORMAL
BILIRUBIN, POC: ABNORMAL
BLOOD URINE, POC: ABNORMAL
CLARITY, POC: ABNORMAL
COLOR, POC: YELLOW
GLUCOSE URINE, POC: NEGATIVE
KETONES, POC: NEGATIVE
LEUKOCYTE EST, POC: ABNORMAL
NITRITE, POC: NEGATIVE
PH, POC: 5
PROTEIN, POC: ABNORMAL
SPECIFIC GRAVITY, POC: 1.03
UROBILINOGEN, POC: 0.2

## 2019-11-21 PROCEDURE — G8400 PT W/DXA NO RESULTS DOC: HCPCS | Performed by: NURSE PRACTITIONER

## 2019-11-21 PROCEDURE — G8482 FLU IMMUNIZE ORDER/ADMIN: HCPCS | Performed by: NURSE PRACTITIONER

## 2019-11-21 PROCEDURE — 3017F COLORECTAL CA SCREEN DOC REV: CPT | Performed by: NURSE PRACTITIONER

## 2019-11-21 PROCEDURE — G8417 CALC BMI ABV UP PARAM F/U: HCPCS | Performed by: NURSE PRACTITIONER

## 2019-11-21 PROCEDURE — 1036F TOBACCO NON-USER: CPT | Performed by: NURSE PRACTITIONER

## 2019-11-21 PROCEDURE — 87086 URINE CULTURE/COLONY COUNT: CPT

## 2019-11-21 PROCEDURE — G8427 DOCREV CUR MEDS BY ELIG CLIN: HCPCS | Performed by: NURSE PRACTITIONER

## 2019-11-21 PROCEDURE — 99213 OFFICE O/P EST LOW 20 MIN: CPT | Performed by: NURSE PRACTITIONER

## 2019-11-21 PROCEDURE — 4040F PNEUMOC VAC/ADMIN/RCVD: CPT | Performed by: NURSE PRACTITIONER

## 2019-11-21 PROCEDURE — 81002 URINALYSIS NONAUTO W/O SCOPE: CPT | Performed by: NURSE PRACTITIONER

## 2019-11-21 PROCEDURE — 87086 URINE CULTURE/COLONY COUNT: CPT | Performed by: NURSE PRACTITIONER

## 2019-11-21 PROCEDURE — 1123F ACP DISCUSS/DSCN MKR DOCD: CPT | Performed by: NURSE PRACTITIONER

## 2019-11-21 PROCEDURE — 87186 SC STD MICRODIL/AGAR DIL: CPT

## 2019-11-21 PROCEDURE — 87088 URINE BACTERIA CULTURE: CPT

## 2019-11-21 PROCEDURE — 1090F PRES/ABSN URINE INCON ASSESS: CPT | Performed by: NURSE PRACTITIONER

## 2019-11-21 RX ORDER — CIPROFLOXACIN 250 MG/1
250 TABLET, FILM COATED ORAL 2 TIMES DAILY
Qty: 14 TABLET | Refills: 0 | Status: SHIPPED | OUTPATIENT
Start: 2019-11-21 | End: 2019-11-27 | Stop reason: CLARIF

## 2019-11-21 ASSESSMENT — ENCOUNTER SYMPTOMS
RHINORRHEA: 0
VOMITING: 0
CONSTIPATION: 0
COUGH: 1
WHEEZING: 0
NAUSEA: 0
DIARRHEA: 0
SHORTNESS OF BREATH: 1
ABDOMINAL PAIN: 0
BACK PAIN: 1
SORE THROAT: 0
SINUS PRESSURE: 0

## 2019-11-23 LAB
CULTURE: ABNORMAL
Lab: ABNORMAL
SPECIMEN DESCRIPTION: ABNORMAL

## 2019-11-27 ENCOUNTER — TELEPHONE (OUTPATIENT)
Dept: UROLOGY | Age: 74
End: 2019-11-27

## 2019-11-27 ENCOUNTER — OFFICE VISIT (OUTPATIENT)
Dept: UROLOGY | Age: 74
End: 2019-11-27
Payer: MEDICARE

## 2019-11-27 VITALS
BODY MASS INDEX: 41.15 KG/M2 | HEART RATE: 80 BPM | SYSTOLIC BLOOD PRESSURE: 130 MMHG | DIASTOLIC BLOOD PRESSURE: 72 MMHG | HEIGHT: 64 IN | WEIGHT: 241 LBS

## 2019-11-27 DIAGNOSIS — R35.1 NOCTURIA: ICD-10-CM

## 2019-11-27 DIAGNOSIS — N39.41 URGE INCONTINENCE: ICD-10-CM

## 2019-11-27 DIAGNOSIS — R35.0 FREQUENCY OF MICTURITION: Primary | ICD-10-CM

## 2019-11-27 DIAGNOSIS — N39.46 MIXED INCONTINENCE: ICD-10-CM

## 2019-11-27 DIAGNOSIS — N39.0 RECURRENT UTI: ICD-10-CM

## 2019-11-27 PROCEDURE — 0509F URINE INCON PLAN DOCD: CPT | Performed by: UROLOGY

## 2019-11-27 PROCEDURE — 1036F TOBACCO NON-USER: CPT | Performed by: UROLOGY

## 2019-11-27 PROCEDURE — G8427 DOCREV CUR MEDS BY ELIG CLIN: HCPCS | Performed by: UROLOGY

## 2019-11-27 PROCEDURE — 4040F PNEUMOC VAC/ADMIN/RCVD: CPT | Performed by: UROLOGY

## 2019-11-27 PROCEDURE — 1123F ACP DISCUSS/DSCN MKR DOCD: CPT | Performed by: UROLOGY

## 2019-11-27 PROCEDURE — G8417 CALC BMI ABV UP PARAM F/U: HCPCS | Performed by: UROLOGY

## 2019-11-27 PROCEDURE — G8400 PT W/DXA NO RESULTS DOC: HCPCS | Performed by: UROLOGY

## 2019-11-27 PROCEDURE — 1090F PRES/ABSN URINE INCON ASSESS: CPT | Performed by: UROLOGY

## 2019-11-27 PROCEDURE — 99204 OFFICE O/P NEW MOD 45 MIN: CPT | Performed by: UROLOGY

## 2019-11-27 PROCEDURE — G8482 FLU IMMUNIZE ORDER/ADMIN: HCPCS | Performed by: UROLOGY

## 2019-11-27 PROCEDURE — 3017F COLORECTAL CA SCREEN DOC REV: CPT | Performed by: UROLOGY

## 2019-11-27 RX ORDER — CIPROFLOXACIN 250 MG/1
250 TABLET, FILM COATED ORAL 2 TIMES DAILY
COMMUNITY
Start: 2019-11-21 | End: 2020-02-18 | Stop reason: ALTCHOICE

## 2019-11-27 RX ORDER — SOLIFENACIN SUCCINATE 10 MG/1
10 TABLET, FILM COATED ORAL DAILY
Qty: 30 TABLET | Refills: 3 | Status: SHIPPED | OUTPATIENT
Start: 2019-11-27 | End: 2019-12-04

## 2019-12-03 DIAGNOSIS — D51.8 VITAMIN B12 DEFICIENCY (DIETARY) ANEMIA: ICD-10-CM

## 2019-12-03 RX ORDER — CYANOCOBALAMIN 1000 UG/ML
1000 INJECTION INTRAMUSCULAR; SUBCUTANEOUS
Qty: 1 ML | Refills: 0 | Status: SHIPPED | OUTPATIENT
Start: 2019-12-03 | End: 2021-06-01 | Stop reason: SDUPTHER

## 2019-12-06 DIAGNOSIS — N39.0 RECURRENT UTI: ICD-10-CM

## 2019-12-09 ENCOUNTER — TELEPHONE (OUTPATIENT)
Dept: INTERNAL MEDICINE | Age: 74
End: 2019-12-09

## 2020-01-07 ENCOUNTER — OFFICE VISIT (OUTPATIENT)
Dept: DIABETES SERVICES | Age: 75
End: 2020-01-07
Payer: MEDICARE

## 2020-01-07 VITALS
HEART RATE: 100 BPM | DIASTOLIC BLOOD PRESSURE: 80 MMHG | RESPIRATION RATE: 20 BRPM | SYSTOLIC BLOOD PRESSURE: 132 MMHG | WEIGHT: 246 LBS | BODY MASS INDEX: 42.23 KG/M2

## 2020-01-07 PROCEDURE — 3017F COLORECTAL CA SCREEN DOC REV: CPT | Performed by: NURSE PRACTITIONER

## 2020-01-07 PROCEDURE — 99212 OFFICE O/P EST SF 10 MIN: CPT | Performed by: NURSE PRACTITIONER

## 2020-01-07 PROCEDURE — 3046F HEMOGLOBIN A1C LEVEL >9.0%: CPT | Performed by: NURSE PRACTITIONER

## 2020-01-07 PROCEDURE — 4040F PNEUMOC VAC/ADMIN/RCVD: CPT | Performed by: NURSE PRACTITIONER

## 2020-01-07 PROCEDURE — 99214 OFFICE O/P EST MOD 30 MIN: CPT | Performed by: NURSE PRACTITIONER

## 2020-01-07 PROCEDURE — 1090F PRES/ABSN URINE INCON ASSESS: CPT | Performed by: NURSE PRACTITIONER

## 2020-01-07 PROCEDURE — 2022F DILAT RTA XM EVC RTNOPTHY: CPT | Performed by: NURSE PRACTITIONER

## 2020-01-07 PROCEDURE — G8427 DOCREV CUR MEDS BY ELIG CLIN: HCPCS | Performed by: NURSE PRACTITIONER

## 2020-01-07 PROCEDURE — 1036F TOBACCO NON-USER: CPT | Performed by: NURSE PRACTITIONER

## 2020-01-07 PROCEDURE — G8400 PT W/DXA NO RESULTS DOC: HCPCS | Performed by: NURSE PRACTITIONER

## 2020-01-07 PROCEDURE — G8417 CALC BMI ABV UP PARAM F/U: HCPCS | Performed by: NURSE PRACTITIONER

## 2020-01-07 PROCEDURE — 1123F ACP DISCUSS/DSCN MKR DOCD: CPT | Performed by: NURSE PRACTITIONER

## 2020-01-07 PROCEDURE — G8482 FLU IMMUNIZE ORDER/ADMIN: HCPCS | Performed by: NURSE PRACTITIONER

## 2020-01-07 ASSESSMENT — ENCOUNTER SYMPTOMS
VISUAL CHANGE: 0
SHORTNESS OF BREATH: 0
BLURRED VISION: 0
DIARRHEA: 0
ABDOMINAL PAIN: 0
RESPIRATORY NEGATIVE: 1

## 2020-01-07 NOTE — PROGRESS NOTES
[address] [facility] INTERNAL MED  [address] [facility], [address]  [facility] [address]  [phone]        HISTORY:    Rae Olivia presents today for evaluation and management of:  Chief Complaint   Patient presents with    Diabetes       Diabetes   She presents for her follow-up diabetic visit. She has type 2 diabetes mellitus. No MedicAlert identification noted. There are no hypoglycemic associated symptoms. Pertinent negatives for hypoglycemia include no confusion, dizziness, headaches, seizures or tremors. Associated symptoms include fatigue. Pertinent negatives for diabetes include no blurred vision, no chest pain, no foot paresthesias, no foot ulcerations, no polydipsia, no polyphagia, no polyuria, no visual change, no weakness and no weight loss. There are no hypoglycemic complications. Symptoms are stable. Diabetic complications include nephropathy. Risk factors for coronary artery disease include diabetes mellitus, dyslipidemia, family history, hypertension, obesity, stress and sedentary lifestyle. Current diabetic treatment includes insulin injections. She is compliant with treatment all of the time. She is currently taking insulin pre-breakfast, pre-lunch, pre-dinner and at bedtime. Insulin injections are given by patient. Rotation sites for injection include the abdominal wall. Blood glucose monitoring compliance is adequate. Her breakfast blood glucose is taken between 6-7 am. Her breakfast blood glucose range is generally  mg/dl. Her bedtime blood glucose is taken between 9-10 pm. Her bedtime blood glucose range is generally >200 mg/dl. Interval History:  11/5/19  She is here for follow up diabetes. She was in the nursing home from [address] [place] [address]. She has been testing her blood sugar 2x daily morning numbers are low 100's and pm blood sugars are over 200 which she will use novolog 10 units when over 250.     1/7/19  She is here for follow up diabetes management.  She is working Drug use: No     No Known Allergies    MEDICATIONS:  Current Outpatient Medications   Medication Sig Dispense Refill    busPIRone (BUSPAR) 15 MG tablet Take 15 mg by mouth 3 times daily 90 tablet 5    mirabegron (MYRBETRIQ) 25 MG TB24 Take 1 tablet by mouth daily 30 tablet 2    cyanocobalamin 1000 MCG/ML injection Inject 1 mL into the muscle every 30 days 1 mL 0    ciprofloxacin (CIPRO) 250 MG tablet Take 250 mg by mouth 2 times daily Take for 7 days      blood glucose monitor strips by Other route 3 times daily Test 3 times a day & as needed for symptoms of irregular blood glucose.  100 strip 5    ferrous sulfate 325 (65 Fe) MG tablet Take 1 tablet by mouth daily (with breakfast) 90 tablet 2    lisinopril (PRINIVIL;ZESTRIL) 2.5 MG tablet Take 1 tablet by mouth daily 90 tablet 1    carvedilol (COREG) 6.25 MG tablet Take 1 tablet by mouth 2 times daily (with meals) 180 tablet 1    insulin aspart (NOVOLOG FLEXPEN) 100 UNIT/ML injection pen Inject 10 Units into the skin 3 times daily (before meals) With sliding scale max dose is 30 units per day (Patient taking differently: Inject into the skin Indications: using only as sliding scale With sliding scale max dose is 30 units per day) 3 pen 3    Insulin Pen Needle 32G X 4 MM MISC 1 each by Does not apply route 4 times daily 150 each 3    RELION ULTRA THIN LANCETS 30G MISC 1 each by Does not apply route 3 times daily 300 each 3    Insulin Degludec (TRESIBA FLEXTOUCH) 200 UNIT/ML SOPN Inject 50 Units into the skin daily 3 pen 3    simvastatin (ZOCOR) 40 MG tablet TAKE 1 TABLET BY MOUTH NIGHTLY 90 tablet 1    bumetanide (BUMEX) 1 MG tablet Take 1 tablet by mouth 2 times daily 180 tablet 1    meclizine (ANTIVERT) 12.5 MG tablet Take 1 tablet by mouth 3 times daily as needed for Dizziness 90 tablet 2    acetaminophen (TYLENOL ARTHRITIS PAIN) 650 MG extended release tablet Take 650 mg by mouth every 8 hours as needed for Pain       No current facility-administered medications for this visit. Review ofSymptoms:  Review of Systems   Constitutional: Positive for fatigue. Negative for unexpected weight change and weight loss. Eyes: Negative for blurred vision and visual disturbance. Respiratory: Negative. Negative for shortness of breath. Cardiovascular: Negative for chest pain and leg swelling. Gastrointestinal: Negative for abdominal pain and diarrhea. Endocrine: Negative for polydipsia, polyphagia and polyuria. Genitourinary: Negative. Musculoskeletal: Negative. Skin: Negative for rash and wound. Neurological: Negative for dizziness, tremors, seizures, weakness and headaches. Psychiatric/Behavioral: Negative. Negative for confusion and decreased concentration. Theremainder of a complete 14-point review of systems is negative. Vital Signs: /80   Pulse 100   Resp 20   Wt 246 lb (111.6 kg)   BMI 42.23 kg/m²      Wt Readings from Last 3 Encounters:   01/07/20 246 lb (111.6 kg)   11/27/19 241 lb (109.3 kg)   11/21/19 246 lb (111.6 kg)     Body mass index is 42.23 kg/m².   LABS:  Hemoglobin A1C   Date Value Ref Range Status   11/05/2019 8.1 % Final   03/07/2019 8.3 % Final     Lab Results   Component Value Date    LABMICR 68 (H) 11/05/2019     Lab Results   Component Value Date     09/24/2019    K 5.0 09/24/2019    CL 99 09/24/2019    CO2 32 (H) 09/24/2019    BUN 30 (H) 09/24/2019    CREATININE 1.1 (H) 09/24/2019    GLUCOSE 237 (H) 09/24/2019    CALCIUM 9.1 09/24/2019    PROT 7.0 07/05/2019    LABALBU 3.9 07/05/2019    BILITOT neg 09/18/2019    ALKPHOS 96 07/05/2019    AST 16 07/05/2019    ALT 10 07/05/2019    LABGLOM 44/53 12/11/2018    AGRATIO 1.3 07/05/2019    GLOB 3.1 07/05/2019     Lab Results   Component Value Date    CHOL 157 03/28/2019    CHOL 194 02/21/2018    CHOL 184 04/24/2017     Lab Results   Component Value Date    TRIG 173 03/28/2019    TRIG 110 02/21/2018    TRIG 166 04/24/2017     Lab Results   Component Value Date    HDL 45 04/24/2017    HDL 49 05/16/2016     Lab Results   Component Value Date    LDLCALC 69.4 03/28/2019    LDLCALC 125.0 02/21/2018    LDLCALC 105.8 04/24/2017     Lab Results   Component Value Date    VLDL 35 03/28/2019    VLDL 22 02/21/2018    VLDL 33 04/24/2017     Lab Results   Component Value Date    CHOLHDLRATIO 3.0 03/28/2019    CHOLHDLRATIO 4.1 02/21/2018    CHOLHDLRATIO 4.1 04/24/2017           Physical Exam  Constitutional:       Appearance: She is well-developed. Eyes:      Pupils: Pupils are equal, round, and reactive to light. Cardiovascular:      Rate and Rhythm: Normal rate and regular rhythm. Heart sounds: Normal heart sounds. Pulmonary:      Effort: Pulmonary effort is normal.      Breath sounds: Normal breath sounds. Abdominal:      General: Bowel sounds are normal.      Palpations: Abdomen is soft. Musculoskeletal:      Comments: Uses a walker   Skin:     General: Skin is warm and dry. Comments: Negative for open/nonhealing wounds. Negative for lipohypertrophy. Neurological:      Mental Status: She is alert and oriented to person, place, and time. ASSESSMENT/PLAN:     Diagnosis Orders   1. Diabetes mellitus type 2, insulin dependent ([facility] [address]     2. Diabetes education, encounter for     3. Other hyperlipidemia     4. Essential hypertension     5. BMI 40.0-44.9, adult (HCC)     6. Obesity, morbid, BMI 40.0-49.9 ([address]     7. Recurrent UTI       No orders of the defined types were placed in this encounter. No orders of the defined types were placed in this encounter. Requested Prescriptions      No prescriptions requested or ordered in this encounter       1. Diabetes mellitus type 2, insulin dependent ([facility] [address]  2. Diabetes education, encounter for  - Unstable  HbA1C goal is less than 7% and blood sugars show no change. - Encouraged patient to check BS 4 times per day.  Fasting blood glucose goal is 70-130mg/dl and postprandial blood sugar goal is less than 180 mg/dl. -Diabetic foot exam is due now. She has an upcomming appointment with podiatry   -Diabetic retinal exam  is due now, encourage patient to schedule eye exam as soon as possible. - Labs reviewed includes: most recent a1c crt 1.1 GFR 49 follows with nephrology . Repeat labs due in one month for a1c.   -We discussed in great detail dietary modifications they can make to better improve their blood sugars. -follow up diabetes education completed, all questions answered. CGM recommended for frequent insulin adjustments, compliance and simplification of diabetes management. This patient is interested and full capable to properly function a CGM. she is taking 3 or more insulin injections a day and is testing blood sugars 4 or more times per day. This patient has . BGs are high in the evening  -once established on CGM we can consider adding glp if a1c not at goal  Patient Instructions   Check blood sugars 4 times per day Fasting and before meals or at least 2 hours after eating. Keep a log and bring them with you to the next appointment. Low carb diet 45 grams at each meal with two 15 gram snacks. Keep a food log and bring with you to the next appointment. Exercise: increase physical activity gradual up to 150 minutes per week. Incorporate strength and cardio. Medication: be consistent with taking short acting insulin with every meal    I will send in order for CGM once you drop of blood sugar log showing 4 test a day in 2 weeks    Expect a call for [employer] supply company     Call to make eye appointment             Discussed signs and symptoms of hyper/hypoglycemia and how to treat. Encouraged 150 minutes of physical activity per week. Follow a low carbohydrate diet consuming 45 grams of carbohydrates at breakfast, lunch and dinner with two separate snacks [id number] grams of carbohydrates. Encouraged at least 7 hours of sleep. The patient was informed of the goals of diabetes management. This can only be accomplished by watching their diet and exercise levels. We certainly use medicines to help attain these goals. The consequences of not controlling blood sugars were discussed. These include blindness, heart disease, stroke, kidney disease, and possibly need for dialysis. They were told to be careful with their foot care as diabetics often have nerve damage, infections and risk for limb amutations . They also need a dilated eye exam yearly. We discussed the issues of diet, exercise, medication, complication avoidance, reviewed the signs and symptoms of diabetes, hypoglycemic episodes, significance of HbA1C.       3. Other hyperlipidemia  stable, lipid panel reviewed, ldl 69 tirg 173, continue current medications. Diet and exercise      4. Essential hypertension   stable, continue current medications. Diet and exercise Seek emergent care if chest pain develops. 5. BMI 40.0-44.9, adult (HCC)  6. Obesity, morbid, BMI 40.0-49.9 (HCC)  Reduce calories and increase physical activity to achieve a slow and steady weight loss to improve blood pressure, cholesterol and diabetes. 7. Recurrent UTI  She has a history of frequent asymptomatic uti and is currently following with urology. - POCT Urinalysis no Micro- unable to provide sample, will stop in when she is able to provide sample. Answered all patient questions. Agrees to follow plan of care and to follow up in 1 months, sooner if needed. Call office if unexplained blood sugars less than 70 occur or above 400. Call office or access [employer] with any further questions or concerns. Be sure to bring glucometer/food log at next appointment. Patient was seen with total face to face time of 30 minutes. More than 50%  of this visit was counseling and education regarding her diabetes.     Electronically signed by ORTIZ Hernández CNP on 2/3/2020 at 8:39 AM      (Please note that portions of this note were completed with a

## 2020-01-21 RX ORDER — BUSPIRONE HYDROCHLORIDE 15 MG/1
15 TABLET ORAL 3 TIMES DAILY
Qty: 90 TABLET | Refills: 5 | Status: SHIPPED | OUTPATIENT
Start: 2020-01-21 | End: 2020-04-29 | Stop reason: SDUPTHER

## 2020-02-05 ENCOUNTER — OFFICE VISIT (OUTPATIENT)
Dept: UROLOGY | Age: 75
End: 2020-02-05
Payer: MEDICARE

## 2020-02-05 VITALS
HEART RATE: 57 BPM | WEIGHT: 251.4 LBS | BODY MASS INDEX: 42.92 KG/M2 | DIASTOLIC BLOOD PRESSURE: 68 MMHG | OXYGEN SATURATION: 99 % | HEIGHT: 64 IN | SYSTOLIC BLOOD PRESSURE: 122 MMHG

## 2020-02-05 PROCEDURE — 4040F PNEUMOC VAC/ADMIN/RCVD: CPT | Performed by: UROLOGY

## 2020-02-05 PROCEDURE — 0509F URINE INCON PLAN DOCD: CPT | Performed by: UROLOGY

## 2020-02-05 PROCEDURE — G8400 PT W/DXA NO RESULTS DOC: HCPCS | Performed by: UROLOGY

## 2020-02-05 PROCEDURE — 1123F ACP DISCUSS/DSCN MKR DOCD: CPT | Performed by: UROLOGY

## 2020-02-05 PROCEDURE — G8427 DOCREV CUR MEDS BY ELIG CLIN: HCPCS | Performed by: UROLOGY

## 2020-02-05 PROCEDURE — 99214 OFFICE O/P EST MOD 30 MIN: CPT | Performed by: UROLOGY

## 2020-02-05 PROCEDURE — G8417 CALC BMI ABV UP PARAM F/U: HCPCS | Performed by: UROLOGY

## 2020-02-05 PROCEDURE — G8482 FLU IMMUNIZE ORDER/ADMIN: HCPCS | Performed by: UROLOGY

## 2020-02-05 PROCEDURE — 3017F COLORECTAL CA SCREEN DOC REV: CPT | Performed by: UROLOGY

## 2020-02-05 PROCEDURE — 1090F PRES/ABSN URINE INCON ASSESS: CPT | Performed by: UROLOGY

## 2020-02-05 PROCEDURE — 1036F TOBACCO NON-USER: CPT | Performed by: UROLOGY

## 2020-02-05 RX ORDER — OXYBUTYNIN CHLORIDE 5 MG/1
5 TABLET, EXTENDED RELEASE ORAL DAILY
Qty: 30 TABLET | Refills: 12 | Status: SHIPPED | OUTPATIENT
Start: 2020-02-05 | End: 2020-04-03 | Stop reason: SDUPTHER

## 2020-02-05 NOTE — PROGRESS NOTES
Dr. Nina Pablo MD  [facility] Consultation / New Patient Visit    Patient:  Alexandre Coyle  YOB: 1945  Date: 2/5/2020  Consult requested from ORTIZ Medrano CNP     HISTORY OF PRESENT ILLNESS:   The patient is a 69 y.o. female who presents today for follow-up for the following problem(s): recurrent UTI  Overall the problem(s) : are worsening. Associated Symptoms: No dysuria, gross hematuria. Pain Severity:      Today visit:   Recurrent UTI - No UTIs since following  Urge incontinence  Tolerating vesicare 10 mg ER in am  - no dry mouth and constipation  - PPD: 1 (damp)  - frequency and incontinence are improved during day time  - Night time incontinence persistent  Avoiding fluids and lasix after dinner  Lasix only in am  PVR: 20 ml    PMH: DAVID on CPAP    Summary of old records:   (Patient's old records, notes and chart reviewed and summarized above.)    Urinalysis today:  No results found for this visit on 02/05/20.     Last BUN and creatinine:  Lab Results   Component Value Date    BUN 30 (H) 09/24/2019     Lab Results   Component Value Date    CREATININE 1.1 (H) 09/24/2019       Imaging Reviewed during this Office Visit:   (results were independently reviewed by physician and radiology report verified)    PAST MEDICAL, FAMILY AND SOCIAL HISTORY:  Past Medical History:   Diagnosis Date    Actinic keratitis     Ankle pain     Arthritis     Back pain     low     Chronic diastolic CHF [facility]congestive heart failure) ([facility])     Chronic diastolic heart failure ([address] 5/31/2017    Chronic kidney disease     Dependent edema     Hypertension     Incontinence     in female    Osteoarthritis     knee     Type II or unspecified type diabetes mellitus without mention of complication, not stated as uncontrolled      Past Surgical History:   Procedure Laterality Date    ANKLE SURGERY Left     FINGER TRIGGER RELEASE Right      Family History   Problem Relation Age of Onset

## 2020-02-18 ENCOUNTER — HOSPITAL ENCOUNTER (OUTPATIENT)
Age: 75
Setting detail: SPECIMEN
Discharge: HOME | End: 2020-02-18
Payer: MEDICARE

## 2020-02-18 ENCOUNTER — OFFICE VISIT (OUTPATIENT)
Dept: DIABETES SERVICES | Age: 75
End: 2020-02-18
Payer: MEDICARE

## 2020-02-18 LAB
-: NORMAL
AMORPHOUS: NORMAL
BACTERIA: NORMAL
BILIRUBIN URINE: NEGATIVE
CASTS UA: NORMAL /LPF (ref 0–2)
CASTS UA: NORMAL /LPF (ref 0–2)
COLOR: NORMAL
COMMENT UA: NORMAL
CREATININE URINE: 19.2 MG/DL (ref 28–217)
CRYSTALS, UA: NORMAL /HPF
EPITHELIAL CELLS UA: NORMAL /HPF (ref 0–5)
GLUCOSE URINE: NEGATIVE
KETONES, URINE: NEGATIVE
LEUKOCYTE ESTERASE, URINE: NEGATIVE
MICROALBUMIN/CREAT 24H UR: <12 MG/L
MICROALBUMIN/CREAT UR-RTO: ABNORMAL MCG/MG CREAT
MUCUS: NORMAL
NITRITE, URINE: NEGATIVE
OTHER OBSERVATIONS UA: NORMAL
PH UA: 5.5 (ref 5–6)
PROTEIN UA: NEGATIVE
RBC UA: NORMAL /HPF (ref 0–4)
RENAL EPITHELIAL, UA: NORMAL /HPF
SPECIFIC GRAVITY UA: 1.01 (ref 1.01–1.02)
TRICHOMONAS: NORMAL
TURBIDITY: NORMAL
URINE HGB: NEGATIVE
UROBILINOGEN, URINE: NORMAL
WBC UA: NORMAL /HPF (ref 0–4)
YEAST: NORMAL

## 2020-02-18 PROCEDURE — 82043 UR ALBUMIN QUANTITATIVE: CPT

## 2020-02-18 PROCEDURE — 95249 CONT GLUC MNTR PT PROV EQP: CPT | Performed by: NURSE PRACTITIONER

## 2020-02-18 PROCEDURE — G8427 DOCREV CUR MEDS BY ELIG CLIN: HCPCS | Performed by: NURSE PRACTITIONER

## 2020-02-18 PROCEDURE — 1090F PRES/ABSN URINE INCON ASSESS: CPT | Performed by: NURSE PRACTITIONER

## 2020-02-18 PROCEDURE — G8417 CALC BMI ABV UP PARAM F/U: HCPCS | Performed by: NURSE PRACTITIONER

## 2020-02-18 PROCEDURE — G8400 PT W/DXA NO RESULTS DOC: HCPCS | Performed by: NURSE PRACTITIONER

## 2020-02-18 PROCEDURE — 99213 OFFICE O/P EST LOW 20 MIN: CPT | Performed by: NURSE PRACTITIONER

## 2020-02-18 PROCEDURE — G8482 FLU IMMUNIZE ORDER/ADMIN: HCPCS | Performed by: NURSE PRACTITIONER

## 2020-02-18 PROCEDURE — 4040F PNEUMOC VAC/ADMIN/RCVD: CPT | Performed by: NURSE PRACTITIONER

## 2020-02-18 PROCEDURE — 99214 OFFICE O/P EST MOD 30 MIN: CPT

## 2020-02-18 PROCEDURE — 82570 ASSAY OF URINE CREATININE: CPT

## 2020-02-18 PROCEDURE — 1123F ACP DISCUSS/DSCN MKR DOCD: CPT | Performed by: NURSE PRACTITIONER

## 2020-02-18 PROCEDURE — 3046F HEMOGLOBIN A1C LEVEL >9.0%: CPT | Performed by: NURSE PRACTITIONER

## 2020-02-18 PROCEDURE — 3017F COLORECTAL CA SCREEN DOC REV: CPT | Performed by: NURSE PRACTITIONER

## 2020-02-18 PROCEDURE — 1036F TOBACCO NON-USER: CPT | Performed by: NURSE PRACTITIONER

## 2020-02-18 PROCEDURE — 2022F DILAT RTA XM EVC RTNOPTHY: CPT | Performed by: NURSE PRACTITIONER

## 2020-02-18 PROCEDURE — 81001 URINALYSIS AUTO W/SCOPE: CPT

## 2020-02-18 ASSESSMENT — ENCOUNTER SYMPTOMS
NAUSEA: 0
VOMITING: 0
RESPIRATORY NEGATIVE: 1
DIARRHEA: 0
SHORTNESS OF BREATH: 0
ABDOMINAL PAIN: 0

## 2020-02-18 NOTE — PROGRESS NOTES
[facility] [address], [address]  [facility] [address]  [phone]        HISTORY:    Gali Loomis presents today for evaluation and management of:  Chief Complaint   Patient presents with    Diabetes       Dysuria    This is a new problem. The current episode started 1 to 4 weeks ago. The problem occurs every urination. The problem has been gradually worsening. The quality of the pain is described as aching. The pain is mild. There has been no fever. There is a history of pyelonephritis. Associated symptoms include flank pain, frequency and urgency. Pertinent negatives include no chills, discharge, hematuria, hesitancy, nausea, possible pregnancy, sweats or vomiting. She has tried nothing for the symptoms. Her past medical history is significant for recurrent UTIs. There is no history of catheterization, kidney stones, a single kidney, urinary stasis or a urological procedure. Patient is here for initial CGM start up. Patient receives supplies from [facility]. All supplies are present. Last diabetic management visit on January 7, 2020. CGM recommended for frequent insulin adjustments, compliance and simplification of diabetes management. This patient is interested and full capable to properly function a CGM. she is taking 3 or more insulin injections a day and is testing blood sugars 4 or more times per day. This patient has . BGs are running  consistent with Hgb A1C. No other issues need to be discussed currently.       Current Diabetic Medications  novolog TID with sliding scale, tresiba 50 units once daily      Past Medical History:   Diagnosis Date    Actinic keratitis     Ankle pain     Arthritis     Back pain     low     Chronic diastolic CHF (congestive heart failure) (HCC)     Chronic diastolic heart failure (HCC) 5/31/2017    Chronic kidney disease     Dependent edema     Hypertension     Incontinence in female    Osteoarthritis     knee     Type II or unspecified type diabetes mellitus without mention of complication, not stated as uncontrolled      Family History   Problem Relation Age of Onset    Diabetes Mother     Diabetes Father     Cancer Paternal Grandfather         BONE      Social History     Tobacco Use    Smoking status: Never Smoker    Smokeless tobacco: Never Used   Substance Use Topics    Alcohol use: No    Drug use: No     No Known Allergies    MEDICATIONS:  Current Outpatient Medications   Medication Sig Dispense Refill    oxybutynin (DITROPAN XL) 5 MG extended release tablet Take 1 tablet by mouth daily 30 tablet 12    busPIRone (BUSPAR) 15 MG tablet Take 15 mg by mouth 3 times daily 90 tablet 5    mirabegron (MYRBETRIQ) 25 MG TB24 Take 1 tablet by mouth daily 30 tablet 2    cyanocobalamin 1000 MCG/ML injection Inject 1 mL into the muscle every 30 days 1 mL 0    blood glucose monitor strips by Other route 3 times daily Test 3 times a day & as needed for symptoms of irregular blood glucose.  100 strip 5    ferrous sulfate 325 (65 Fe) MG tablet Take 1 tablet by mouth daily (with breakfast) 90 tablet 2    lisinopril (PRINIVIL;ZESTRIL) 2.5 MG tablet Take 1 tablet by mouth daily 90 tablet 1    carvedilol (COREG) 6.25 MG tablet Take 1 tablet by mouth 2 times daily (with meals) 180 tablet 1    insulin aspart (NOVOLOG FLEXPEN) 100 UNIT/ML injection pen Inject 10 Units into the skin 3 times daily (before meals) With sliding scale max dose is 30 units per day (Patient taking differently: Inject into the skin Indications: using only as sliding scale With sliding scale max dose is 30 units per day) 3 pen 3    Insulin Pen Needle 32G X 4 MM MISC 1 each by Does not apply route 4 times daily 150 each 3    RELION ULTRA THIN LANCETS 30G MISC 1 each by Does not apply route 3 times daily 300 each 3    Insulin Degludec (TRESIBA FLEXTOUCH) 200 UNIT/ML SOPN Inject 50 Units into the skin daily 3

## 2020-03-02 ENCOUNTER — OFFICE VISIT (OUTPATIENT)
Dept: FAMILY MEDICINE CLINIC | Age: 75
End: 2020-03-02
Payer: MEDICARE

## 2020-03-02 VITALS
RESPIRATION RATE: 18 BRPM | TEMPERATURE: 97.5 F | BODY MASS INDEX: 43.43 KG/M2 | WEIGHT: 253 LBS | SYSTOLIC BLOOD PRESSURE: 137 MMHG | OXYGEN SATURATION: 95 % | DIASTOLIC BLOOD PRESSURE: 63 MMHG | HEART RATE: 56 BPM

## 2020-03-02 PROCEDURE — 1090F PRES/ABSN URINE INCON ASSESS: CPT | Performed by: NURSE PRACTITIONER

## 2020-03-02 PROCEDURE — G8482 FLU IMMUNIZE ORDER/ADMIN: HCPCS | Performed by: NURSE PRACTITIONER

## 2020-03-02 PROCEDURE — G8417 CALC BMI ABV UP PARAM F/U: HCPCS | Performed by: NURSE PRACTITIONER

## 2020-03-02 PROCEDURE — 1123F ACP DISCUSS/DSCN MKR DOCD: CPT | Performed by: NURSE PRACTITIONER

## 2020-03-02 PROCEDURE — 99211 OFF/OP EST MAY X REQ PHY/QHP: CPT | Performed by: NURSE PRACTITIONER

## 2020-03-02 PROCEDURE — 3017F COLORECTAL CA SCREEN DOC REV: CPT | Performed by: NURSE PRACTITIONER

## 2020-03-02 PROCEDURE — 3046F HEMOGLOBIN A1C LEVEL >9.0%: CPT | Performed by: NURSE PRACTITIONER

## 2020-03-02 PROCEDURE — 1036F TOBACCO NON-USER: CPT | Performed by: NURSE PRACTITIONER

## 2020-03-02 PROCEDURE — 2022F DILAT RTA XM EVC RTNOPTHY: CPT | Performed by: NURSE PRACTITIONER

## 2020-03-02 PROCEDURE — G8427 DOCREV CUR MEDS BY ELIG CLIN: HCPCS | Performed by: NURSE PRACTITIONER

## 2020-03-02 PROCEDURE — G8400 PT W/DXA NO RESULTS DOC: HCPCS | Performed by: NURSE PRACTITIONER

## 2020-03-02 PROCEDURE — 4040F PNEUMOC VAC/ADMIN/RCVD: CPT | Performed by: NURSE PRACTITIONER

## 2020-03-02 PROCEDURE — 99214 OFFICE O/P EST MOD 30 MIN: CPT | Performed by: NURSE PRACTITIONER

## 2020-03-02 ASSESSMENT — ENCOUNTER SYMPTOMS
EYES NEGATIVE: 1
ABDOMINAL PAIN: 0
COUGH: 0
CONSTIPATION: 0
SHORTNESS OF BREATH: 0
WHEEZING: 0
DIARRHEA: 0

## 2020-03-02 ASSESSMENT — PATIENT HEALTH QUESTIONNAIRE - PHQ9
SUM OF ALL RESPONSES TO PHQ QUESTIONS 1-9: 2
SUM OF ALL RESPONSES TO PHQ QUESTIONS 1-9: 2
1. LITTLE INTEREST OR PLEASURE IN DOING THINGS: 1
SUM OF ALL RESPONSES TO PHQ9 QUESTIONS 1 & 2: 2
2. FEELING DOWN, DEPRESSED OR HOPELESS: 1

## 2020-03-02 NOTE — PROGRESS NOTES
5/31/2017    Chronic kidney disease     Dependent edema     Hypertension     Incontinence     in female    Osteoarthritis     knee     Type II or unspecified type diabetes mellitus without mention of complication, not stated as uncontrolled       Past Surgical History:   Procedure Laterality Date    ANKLE SURGERY Left     FINGER TRIGGER RELEASE Right        Family History   Problem Relation Age of Onset    Diabetes Mother     Diabetes Father     Cancer Paternal Grandfather         BONE        Social History     Tobacco Use    Smoking status: Never Smoker    Smokeless tobacco: Never Used   Substance Use Topics    Alcohol use: No        Current Outpatient Medications   Medication Sig Dispense Refill    oxybutynin (DITROPAN XL) 5 MG extended release tablet Take 1 tablet by mouth daily 30 tablet 12    busPIRone (BUSPAR) 15 MG tablet Take 15 mg by mouth 3 times daily 90 tablet 5    mirabegron (MYRBETRIQ) 25 MG TB24 Take 1 tablet by mouth daily 30 tablet 2    cyanocobalamin 1000 MCG/ML injection Inject 1 mL into the muscle every 30 days 1 mL 0    blood glucose monitor strips by Other route 3 times daily Test 3 times a day & as needed for symptoms of irregular blood glucose.  100 strip 5    ferrous sulfate 325 (65 Fe) MG tablet Take 1 tablet by mouth daily (with breakfast) 90 tablet 2    carvedilol (COREG) 6.25 MG tablet Take 1 tablet by mouth 2 times daily (with meals) 180 tablet 1    insulin aspart (NOVOLOG FLEXPEN) 100 UNIT/ML injection pen Inject 10 Units into the skin 3 times daily (before meals) With sliding scale max dose is 30 units per day (Patient taking differently: Inject into the skin Indications: using only as sliding scale With sliding scale max dose is 30 units per day) 3 pen 3    Insulin Pen Needle 32G X 4 MM MISC 1 each by Does not apply route 4 times daily 150 each 3    RELION ULTRA THIN LANCETS 30G MISC 1 each by Does not apply route 3 times daily 300 each 3    Insulin Degludec (TRESIBA FLEXTOUCH) 200 UNIT/ML SOPN Inject 50 Units into the skin daily 3 pen 3    simvastatin (ZOCOR) 40 MG tablet TAKE 1 TABLET BY MOUTH NIGHTLY 90 tablet 1    bumetanide (BUMEX) 1 MG tablet Take 1 tablet by mouth 2 times daily 180 tablet 1    meclizine (ANTIVERT) 12.5 MG tablet Take 1 tablet by mouth 3 times daily as needed for Dizziness 90 tablet 2    acetaminophen (TYLENOL ARTHRITIS PAIN) 650 MG extended release tablet Take 650 mg by mouth every 8 hours as needed for Pain      lisinopril (PRINIVIL;ZESTRIL) 2.5 MG tablet Take 1 tablet by mouth daily 90 tablet 1     No current facility-administered medications for this visit. No Known Allergies    Health Maintenance   Topic Date Due    Hepatitis B vaccine (1 of 3 - Risk 3-dose series) 09/19/1964    Shingles Vaccine (2 of 3) 02/26/2007    Breast cancer screen  01/11/2019    Annual Wellness Visit (AWV)  05/29/2019    Hepatitis C screen  06/29/2022 (Originally 1945)    Lipid screen  03/28/2020    Potassium monitoring  09/24/2020    Creatinine monitoring  09/24/2020    Colon cancer screen colonoscopy  10/04/2020    A1C test (Diabetic or Prediabetic)  11/05/2020    Diabetic foot exam  01/16/2021    Diabetic retinal exam  02/10/2021    DTaP/Tdap/Td vaccine (2 - Td) 08/25/2027    DEXA (modify frequency per FRAX score)  Completed    Flu vaccine  Completed    Pneumococcal 65+ yrs at Risk Vaccine  Completed    Hepatitis A vaccine  Aged Out    Hib vaccine  Aged Out    Meningococcal (ACWY) vaccine  Aged Out       Subjective:     Review of Systems   Constitutional: Negative for chills, fatigue and fever. HENT: Negative. Eyes: Negative. Respiratory: Negative for cough, shortness of breath and wheezing. Cardiovascular: Negative for chest pain, palpitations and leg swelling. Gastrointestinal: Negative for abdominal pain, constipation and diarrhea.    Endocrine: Negative for cold intolerance, heat intolerance, polydipsia, polyphagia and polyuria. Genitourinary: Negative. Musculoskeletal: Negative for arthralgias and myalgias. Skin: Negative. Allergic/Immunologic: Negative for environmental allergies and food allergies. Neurological: Negative for dizziness, weakness and headaches. Psychiatric/Behavioral: Negative for dysphoric mood and sleep disturbance. The patient is not nervous/anxious. Objective:     /63   Pulse 56   Temp 97.5 °F (36.4 °C)   Resp 18   Wt 253 lb (114.8 kg)   SpO2 95%   BMI 43.43 kg/m²     Physical Exam  Constitutional:       Appearance: Normal appearance. She is well-developed and well-groomed. HENT:      Head: Normocephalic. Right Ear: Tympanic membrane and ear canal normal.      Left Ear: Tympanic membrane and ear canal normal.      Nose: Nose normal.      Mouth/Throat:      Mouth: Mucous membranes are moist.   Eyes:      Conjunctiva/sclera: Conjunctivae normal.      Pupils: Pupils are equal, round, and reactive to light. Neck:      Musculoskeletal: Neck supple. Thyroid: No thyromegaly. Cardiovascular:      Rate and Rhythm: Normal rate and regular rhythm. Heart sounds: Normal heart sounds. Pulmonary:      Effort: Pulmonary effort is normal.      Breath sounds: Normal breath sounds. No wheezing. Abdominal:      General: Bowel sounds are normal.      Palpations: Abdomen is soft. Tenderness: There is no abdominal tenderness. Musculoskeletal:      Right lower leg: No edema. Left lower leg: No edema. Lymphadenopathy:      Cervical: No cervical adenopathy. Skin:     Capillary Refill: Capillary refill takes less than 2 seconds. Neurological:      Mental Status: She is alert and oriented to person, place, and time. Psychiatric:         Behavior: Behavior is cooperative.        PHQ Scores 3/2/2020 2/7/2019 1/2/2019 8/22/2018 4/5/2018 11/17/2017 8/23/2017   PHQ2 Score 2 3 4 3 3 2 6   PHQ9 Score 2 15 16 14 12 17 13     Interpretation of Total Score

## 2020-03-12 ENCOUNTER — OFFICE VISIT (OUTPATIENT)
Dept: DIABETES SERVICES | Age: 75
End: 2020-03-12
Payer: MEDICARE

## 2020-03-12 VITALS
WEIGHT: 249 LBS | RESPIRATION RATE: 16 BRPM | BODY MASS INDEX: 42.74 KG/M2 | DIASTOLIC BLOOD PRESSURE: 72 MMHG | SYSTOLIC BLOOD PRESSURE: 132 MMHG | HEART RATE: 59 BPM | OXYGEN SATURATION: 95 %

## 2020-03-12 PROCEDURE — 2022F DILAT RTA XM EVC RTNOPTHY: CPT | Performed by: NURSE PRACTITIONER

## 2020-03-12 PROCEDURE — 99214 OFFICE O/P EST MOD 30 MIN: CPT | Performed by: NURSE PRACTITIONER

## 2020-03-12 PROCEDURE — 1123F ACP DISCUSS/DSCN MKR DOCD: CPT | Performed by: NURSE PRACTITIONER

## 2020-03-12 PROCEDURE — G8482 FLU IMMUNIZE ORDER/ADMIN: HCPCS | Performed by: NURSE PRACTITIONER

## 2020-03-12 PROCEDURE — G8400 PT W/DXA NO RESULTS DOC: HCPCS | Performed by: NURSE PRACTITIONER

## 2020-03-12 PROCEDURE — G8417 CALC BMI ABV UP PARAM F/U: HCPCS | Performed by: NURSE PRACTITIONER

## 2020-03-12 PROCEDURE — 3046F HEMOGLOBIN A1C LEVEL >9.0%: CPT | Performed by: NURSE PRACTITIONER

## 2020-03-12 PROCEDURE — 95251 CONT GLUC MNTR ANALYSIS I&R: CPT | Performed by: NURSE PRACTITIONER

## 2020-03-12 PROCEDURE — 1036F TOBACCO NON-USER: CPT | Performed by: NURSE PRACTITIONER

## 2020-03-12 PROCEDURE — G8428 CUR MEDS NOT DOCUMENT: HCPCS | Performed by: NURSE PRACTITIONER

## 2020-03-12 PROCEDURE — 4040F PNEUMOC VAC/ADMIN/RCVD: CPT | Performed by: NURSE PRACTITIONER

## 2020-03-12 PROCEDURE — 3017F COLORECTAL CA SCREEN DOC REV: CPT | Performed by: NURSE PRACTITIONER

## 2020-03-12 PROCEDURE — 99213 OFFICE O/P EST LOW 20 MIN: CPT

## 2020-03-12 PROCEDURE — 1090F PRES/ABSN URINE INCON ASSESS: CPT | Performed by: NURSE PRACTITIONER

## 2020-03-12 RX ORDER — INSULIN ASPART 100 [IU]/ML
INJECTION, SOLUTION INTRAVENOUS; SUBCUTANEOUS
Qty: 3 PEN | Refills: 3
Start: 2020-03-12 | End: 2020-03-20 | Stop reason: SDUPTHER

## 2020-03-12 RX ORDER — INSULIN DEGLUDEC 200 U/ML
25 INJECTION, SOLUTION SUBCUTANEOUS DAILY
Qty: 3 PEN | Refills: 3
Start: 2020-03-12 | End: 2020-03-18 | Stop reason: SDUPTHER

## 2020-03-12 ASSESSMENT — ENCOUNTER SYMPTOMS
RESPIRATORY NEGATIVE: 1
SHORTNESS OF BREATH: 0
DIARRHEA: 0
ABDOMINAL PAIN: 0

## 2020-03-12 NOTE — PROGRESS NOTES
CHF (congestive heart failure) (HCC)     Chronic diastolic heart failure ([facility] [address] 5/31/2017    Chronic kidney disease     Dependent edema     Hypertension     Incontinence     in female    Osteoarthritis     knee     Type II or unspecified type diabetes mellitus without mention of complication, not stated as uncontrolled      Family History   Problem Relation Age of Onset    Diabetes Mother     Diabetes Father     Cancer Paternal Grandfather         BONE      Social History     Tobacco Use    Smoking status: Never Smoker    Smokeless tobacco: Never Used   Substance Use Topics    Alcohol use: No    Drug use: No     No Known Allergies    MEDICATIONS:  Current Outpatient Medications   Medication Sig Dispense Refill    Insulin Degludec (TRESIBA FLEXTOUCH) 200 UNIT/ML SOPN Inject 25 Units into the skin daily 3 pen 3    insulin aspart (NOVOLOG FLEXPEN) 100 UNIT/ML injection pen Use tid with meals With sliding scale max dose is 30 units per day 3 pen 3    mirabegron (MYRBETRIQ) 25 MG TB24 Take 1 tablet by mouth daily 30 tablet 1    oxybutynin (DITROPAN XL) 5 MG extended release tablet Take 1 tablet by mouth daily 30 tablet 12    busPIRone (BUSPAR) 15 MG tablet Take 15 mg by mouth 3 times daily 90 tablet 5    cyanocobalamin 1000 MCG/ML injection Inject 1 mL into the muscle every 30 days 1 mL 0    blood glucose monitor strips by Other route 3 times daily Test 3 times a day & as needed for symptoms of irregular blood glucose.  100 strip 5    ferrous sulfate 325 (65 Fe) MG tablet Take 1 tablet by mouth daily (with breakfast) 90 tablet 2    lisinopril (PRINIVIL;ZESTRIL) 2.5 MG tablet Take 1 tablet by mouth daily 90 tablet 1    carvedilol (COREG) 6.25 MG tablet Take 1 tablet by mouth 2 times daily (with meals) 180 tablet 1    Insulin Pen Needle 32G X 4 MM MISC 1 each by Does not apply route 4 times daily 150 each 3    RELION ULTRA THIN LANCETS 30G MISC 1 each by Does not apply route 3 times daily 300 each 3 07/05/2019    BILITOT neg 09/18/2019    ALKPHOS 96 07/05/2019    AST 16 07/05/2019    ALT 10 07/05/2019    LABGLOM 44/53 12/11/2018    AGRATIO 1.3 07/05/2019    GLOB 3.1 07/05/2019     Lab Results   Component Value Date    CHOL 157 03/28/2019    CHOL 194 02/21/2018    CHOL 184 04/24/2017     Lab Results   Component Value Date    TRIG 173 03/28/2019    TRIG 110 02/21/2018    TRIG 166 04/24/2017     Lab Results   Component Value Date    HDL 45 04/24/2017    HDL 49 05/16/2016     Lab Results   Component Value Date    LDLCALC 69.4 03/28/2019    LDLCALC 125.0 02/21/2018    LDLCALC 105.8 04/24/2017     Lab Results   Component Value Date    VLDL 35 03/28/2019    VLDL 22 02/21/2018    VLDL 33 04/24/2017     Lab Results   Component Value Date    CHOLHDLRATIO 3.0 03/28/2019    CHOLHDLRATIO 4.1 02/21/2018    CHOLHDLRATIO 4.1 04/24/2017           Physical Exam  Constitutional:       Appearance: She is well-developed. Eyes:      Pupils: Pupils are equal, round, and reactive to light. Cardiovascular:      Rate and Rhythm: Normal rate and regular rhythm. Heart sounds: Normal heart sounds. Pulmonary:      Effort: Pulmonary effort is normal.      Breath sounds: Normal breath sounds. Abdominal:      General: Bowel sounds are normal.      Palpations: Abdomen is soft. Skin:     General: Skin is warm and dry. Comments: Negative for open/nonhealing wounds. Negative for lipohypertrophy. Neurological:      Mental Status: She is alert and oriented to person, place, and time. .    ASSESSMENT/PLAN:     Diagnosis Orders   1. Type 2 diabetes mellitus with stage 3 chronic kidney disease, with long-term current use of insulin (HCC)  Insulin Degludec (TRESIBA FLEXTOUCH) 200 UNIT/ML SOPN    insulin aspart (NOVOLOG FLEXPEN) 100 UNIT/ML injection pen   2. Diabetes mellitus type 2, insulin dependent ([facility]       No orders of the defined types were placed in this encounter.     Orders Placed This Encounter   Medications    night we will reduce her Orlando Pizarro to 25 units once daily. Patient will also start using a medium dose sliding scale for mealtime coverage to also avoid hypoglycemia postprandial.  -We also reviewed using options in CGM to document insulin intake and carb intake. Discussed signs and symptoms of hyper/hypoglycemia and how to treat. Encouraged 150 minutes of physical activity per week. Follow a low carbohydrate diet consuming 45 grams of carbohydrates at breakfast, lunch and dinner with two separate snacks [id number] grams of carbohydrates. Encouraged at least 7 hours of sleep. The patient was informed of the goals of diabetes management. This can only be accomplished by watching their diet and exercise levels. We certainly use medicines to help attain these goals. The consequences of not controlling blood sugars were discussed. These include blindness, heart disease, stroke, kidney disease, and possibly need for dialysis. They were told to be careful with their foot care as diabetics often have nerve damage, infections and risk for limb amutations . They also need a dilated eye exam yearly. We discussed the issues of diet, exercise, medication, complication avoidance, reviewed the signs and symptoms of diabetes, hypoglycemic episodes, significance of HbA1C. Answered all patient questions. Agrees to follow plan of care and to follow up in 2 months, sooner if needed. Call office if unexplained blood sugars less than 70 occur or above 400. Call office or access [facility] with any further questions or concerns. Be sure to bring glucometer/food log at next appointment. Patient was seen with total face to face time of 30 minutes. More than 50%  of this visit was counseling and education regarding her diabetes.     Electronically signed by ORTIZ Yoon CNP on 3/12/2020 at 10:58 AM      (Please note that portions of this note were completed with a voice-recognition program. Efforts were made to

## 2020-03-12 NOTE — PATIENT INSTRUCTIONS
Check blood sugars 4-6 times per day Fasting and before meals or at least 2 hours after eating. Keep a log and bring them with you to the next appointment. Low carb diet 45 grams at each meal with two 15 gram snacks. Keep a food log and bring with you to the next appointment. Exercise: increase physical activity gradual up to 150 minutes per week. Incorporate strength and cardio. Medication: - decrease tresiba to 25 units once daily. Use medium dose sliding scale at each meal for novolog.

## 2020-03-18 ENCOUNTER — TELEPHONE (OUTPATIENT)
Dept: INTERNAL MEDICINE | Age: 75
End: 2020-03-18

## 2020-03-18 RX ORDER — INSULIN DEGLUDEC 200 U/ML
36 INJECTION, SOLUTION SUBCUTANEOUS DAILY
Qty: 3 PEN | Refills: 3
Start: 2020-03-18 | End: 2020-03-20 | Stop reason: SDUPTHER

## 2020-03-20 RX ORDER — INSULIN ASPART 100 [IU]/ML
INJECTION, SOLUTION INTRAVENOUS; SUBCUTANEOUS
Qty: 3 PEN | Refills: 3 | Status: SHIPPED | OUTPATIENT
Start: 2020-03-20 | End: 2020-09-21 | Stop reason: SDUPTHER

## 2020-03-20 RX ORDER — INSULIN ASPART 100 [IU]/ML
INJECTION, SOLUTION INTRAVENOUS; SUBCUTANEOUS
Qty: 3 PEN | Refills: 3 | Status: CANCELLED | OUTPATIENT
Start: 2020-03-20

## 2020-03-20 RX ORDER — INSULIN DEGLUDEC 200 U/ML
36 INJECTION, SOLUTION SUBCUTANEOUS DAILY
Qty: 3 PEN | Refills: 3 | Status: SHIPPED | OUTPATIENT
Start: 2020-03-20 | End: 2020-06-30 | Stop reason: SDUPTHER

## 2020-03-20 RX ORDER — INSULIN DEGLUDEC 200 U/ML
36 INJECTION, SOLUTION SUBCUTANEOUS DAILY
Qty: 3 PEN | Refills: 3 | Status: CANCELLED | OUTPATIENT
Start: 2020-03-20

## 2020-03-20 NOTE — TELEPHONE ENCOUNTER
Rachael Case called requesting a refill of the below medication which has been pended for you:     Requested Prescriptions     Pending Prescriptions Disp Refills    Insulin Degludec (TRESIBA FLEXTOUCH) 200 UNIT/ML SOPN 3 pen 3     Sig: Inject 36 Units into the skin daily    insulin aspart (NOVOLOG FLEXPEN) 100 UNIT/ML injection pen 3 pen 3     Sig: Use tid with meals With sliding scale max dose is 30 units per day    Insulin Pen Needle 32G X 4 MM MISC 150 each 3     Si each by Does not apply route 4 times daily       Last Appointment Date: 3/12/2020  Next Appointment Date: 2020    No Known Allergies

## 2020-03-24 ENCOUNTER — TELEPHONE (OUTPATIENT)
Dept: FAMILY MEDICINE CLINIC | Age: 75
End: 2020-03-24

## 2020-04-03 ENCOUNTER — TELEPHONE (OUTPATIENT)
Dept: UROLOGY | Age: 75
End: 2020-04-03

## 2020-04-03 RX ORDER — OXYBUTYNIN CHLORIDE 5 MG/1
5 TABLET, EXTENDED RELEASE ORAL DAILY
Qty: 90 TABLET | Refills: 0 | Status: SHIPPED | OUTPATIENT
Start: 2020-04-03 | End: 2020-04-15 | Stop reason: SDUPTHER

## 2020-04-15 RX ORDER — OXYBUTYNIN CHLORIDE 5 MG/1
5 TABLET, EXTENDED RELEASE ORAL DAILY
Qty: 90 TABLET | Refills: 0 | Status: SHIPPED | OUTPATIENT
Start: 2020-04-15 | End: 2020-04-21 | Stop reason: SDUPTHER

## 2020-04-20 ENCOUNTER — VIRTUAL VISIT (OUTPATIENT)
Dept: FAMILY MEDICINE CLINIC | Age: 75
End: 2020-04-20
Payer: MEDICARE

## 2020-04-20 VITALS — BODY MASS INDEX: 42.74 KG/M2 | WEIGHT: 249 LBS

## 2020-04-20 PROCEDURE — 4040F PNEUMOC VAC/ADMIN/RCVD: CPT | Performed by: NURSE PRACTITIONER

## 2020-04-20 PROCEDURE — G8400 PT W/DXA NO RESULTS DOC: HCPCS | Performed by: NURSE PRACTITIONER

## 2020-04-20 PROCEDURE — 99442 PR PHYS/QHP TELEPHONE EVALUATION 11-20 MIN: CPT | Performed by: NURSE PRACTITIONER

## 2020-04-20 PROCEDURE — 3017F COLORECTAL CA SCREEN DOC REV: CPT | Performed by: NURSE PRACTITIONER

## 2020-04-20 PROCEDURE — G8427 DOCREV CUR MEDS BY ELIG CLIN: HCPCS | Performed by: NURSE PRACTITIONER

## 2020-04-20 PROCEDURE — 1090F PRES/ABSN URINE INCON ASSESS: CPT | Performed by: NURSE PRACTITIONER

## 2020-04-20 PROCEDURE — 1123F ACP DISCUSS/DSCN MKR DOCD: CPT | Performed by: NURSE PRACTITIONER

## 2020-04-20 RX ORDER — TRAMADOL HYDROCHLORIDE 50 MG/1
50 TABLET ORAL EVERY 6 HOURS PRN
Qty: 28 TABLET | Refills: 0 | Status: SHIPPED | OUTPATIENT
Start: 2020-04-20 | End: 2020-04-27

## 2020-04-21 RX ORDER — OXYBUTYNIN CHLORIDE 5 MG/1
5 TABLET, EXTENDED RELEASE ORAL DAILY
Qty: 90 TABLET | Refills: 0 | Status: SHIPPED | OUTPATIENT
Start: 2020-04-21 | End: 2020-10-08 | Stop reason: SDUPTHER

## 2020-04-29 RX ORDER — LISINOPRIL 2.5 MG/1
2.5 TABLET ORAL DAILY
Qty: 90 TABLET | Refills: 1 | Status: SHIPPED | OUTPATIENT
Start: 2020-04-29 | End: 2020-11-10 | Stop reason: SDUPTHER

## 2020-04-29 RX ORDER — BUSPIRONE HYDROCHLORIDE 15 MG/1
15 TABLET ORAL 3 TIMES DAILY
Qty: 90 TABLET | Refills: 5 | Status: SHIPPED | OUTPATIENT
Start: 2020-04-29 | End: 2020-10-20

## 2020-04-29 NOTE — TELEPHONE ENCOUNTER
Mynor Herrera is requesting a refill on the following medication(s):  Requested Prescriptions     Pending Prescriptions Disp Refills    busPIRone (BUSPAR) 15 MG tablet 90 tablet 5     Sig: Take 15 mg by mouth 3 times daily    lisinopril (PRINIVIL;ZESTRIL) 2.5 MG tablet 90 tablet 1     Sig: Take 1 tablet by mouth daily       Last Visit Date (If Applicable):  3/2/7112    Next Visit Date:    6/2/2020

## 2020-05-04 ENCOUNTER — VIRTUAL VISIT (OUTPATIENT)
Dept: INTERNAL MEDICINE | Age: 75
End: 2020-05-04
Payer: MEDICARE

## 2020-05-04 VITALS — WEIGHT: 248 LBS | BODY MASS INDEX: 42.57 KG/M2

## 2020-05-04 PROCEDURE — G2012 BRIEF CHECK IN BY MD/QHP: HCPCS | Performed by: NURSE PRACTITIONER

## 2020-05-06 ENCOUNTER — VIRTUAL VISIT (OUTPATIENT)
Dept: UROLOGY | Age: 75
End: 2020-05-06
Payer: MEDICARE

## 2020-05-06 VITALS — BODY MASS INDEX: 39.7 KG/M2 | WEIGHT: 247 LBS | HEIGHT: 66 IN

## 2020-05-06 PROCEDURE — 99442 PR PHYS/QHP TELEPHONE EVALUATION 11-20 MIN: CPT | Performed by: UROLOGY

## 2020-05-06 NOTE — PROGRESS NOTES
(BUMEX) 1 MG tablet Take 1 tablet by mouth 2 times daily (Patient taking differently: Take 1 mg by mouth daily ) 180 tablet 1    meclizine (ANTIVERT) 12.5 MG tablet Take 1 tablet by mouth 3 times daily as needed for Dizziness 90 tablet 2    acetaminophen (TYLENOL ARTHRITIS PAIN) 650 MG extended release tablet Take 650 mg by mouth every 8 hours as needed for Pain         Patient has no known allergies. Social History     Tobacco Use   Smoking Status Never Smoker   Smokeless Tobacco Never Used       Social History     Substance and Sexual Activity   Alcohol Use No       REVIEW OF SYSTEMS:  Constitutional: negative  Eyes: negative  Respiratory: negative  Cardiovascular: negative  Gastrointestinal: negative  Genitourinary: negative  Musculoskeletal: negative  Skin: negative   Neurological: negative  Hematological/Lymphatic: negative  Psychological: negative    Physical Exam:    This a 69 y.o. female      Vitals:     Telephone      Assessment and Plan      1. Nocturnal enuresis    2. Urge incontinence    3. Recurrent UTI    4. Mixed incontinence           Plan:      No follow-ups on file. Vesicare 10 mg - improved Daytime symptoms  Oxybutynin 5 mg QHS - improved nocturnal enuresis.     Standing UCx order  Discussed behavioral modifications  Bumex in am - has helped night time syptoms    FU 6 months

## 2020-05-21 RX ORDER — FERROUS SULFATE 325(65) MG
325 TABLET ORAL
Qty: 90 TABLET | Refills: 2 | Status: SHIPPED | OUTPATIENT
Start: 2020-05-21 | End: 2021-06-01 | Stop reason: SDUPTHER

## 2020-05-21 RX ORDER — CARVEDILOL 6.25 MG/1
6.25 TABLET ORAL 2 TIMES DAILY WITH MEALS
Qty: 180 TABLET | Refills: 1 | Status: SHIPPED | OUTPATIENT
Start: 2020-05-21 | End: 2021-01-15 | Stop reason: SDUPTHER

## 2020-06-02 ENCOUNTER — OFFICE VISIT (OUTPATIENT)
Dept: FAMILY MEDICINE CLINIC | Age: 75
End: 2020-06-02
Payer: MEDICARE

## 2020-06-02 VITALS
BODY MASS INDEX: 40.51 KG/M2 | SYSTOLIC BLOOD PRESSURE: 132 MMHG | WEIGHT: 251 LBS | HEART RATE: 84 BPM | DIASTOLIC BLOOD PRESSURE: 86 MMHG

## 2020-06-02 PROCEDURE — G8400 PT W/DXA NO RESULTS DOC: HCPCS | Performed by: NURSE PRACTITIONER

## 2020-06-02 PROCEDURE — G8417 CALC BMI ABV UP PARAM F/U: HCPCS | Performed by: NURSE PRACTITIONER

## 2020-06-02 PROCEDURE — 99214 OFFICE O/P EST MOD 30 MIN: CPT | Performed by: NURSE PRACTITIONER

## 2020-06-02 PROCEDURE — 1090F PRES/ABSN URINE INCON ASSESS: CPT | Performed by: NURSE PRACTITIONER

## 2020-06-02 PROCEDURE — 1036F TOBACCO NON-USER: CPT | Performed by: NURSE PRACTITIONER

## 2020-06-02 PROCEDURE — 1123F ACP DISCUSS/DSCN MKR DOCD: CPT | Performed by: NURSE PRACTITIONER

## 2020-06-02 PROCEDURE — 3017F COLORECTAL CA SCREEN DOC REV: CPT | Performed by: NURSE PRACTITIONER

## 2020-06-02 PROCEDURE — 3046F HEMOGLOBIN A1C LEVEL >9.0%: CPT | Performed by: NURSE PRACTITIONER

## 2020-06-02 PROCEDURE — 4040F PNEUMOC VAC/ADMIN/RCVD: CPT | Performed by: NURSE PRACTITIONER

## 2020-06-02 PROCEDURE — G0444 DEPRESSION SCREEN ANNUAL: HCPCS | Performed by: NURSE PRACTITIONER

## 2020-06-02 PROCEDURE — 2022F DILAT RTA XM EVC RTNOPTHY: CPT | Performed by: NURSE PRACTITIONER

## 2020-06-02 PROCEDURE — G8427 DOCREV CUR MEDS BY ELIG CLIN: HCPCS | Performed by: NURSE PRACTITIONER

## 2020-06-02 PROCEDURE — 99211 OFF/OP EST MAY X REQ PHY/QHP: CPT | Performed by: NURSE PRACTITIONER

## 2020-06-02 RX ORDER — CITALOPRAM 10 MG/1
10 TABLET ORAL DAILY
Qty: 30 TABLET | Refills: 2 | Status: SHIPPED | OUTPATIENT
Start: 2020-06-02 | End: 2020-09-03

## 2020-06-02 ASSESSMENT — PATIENT HEALTH QUESTIONNAIRE - PHQ9
SUM OF ALL RESPONSES TO PHQ QUESTIONS 1-9: 11
SUM OF ALL RESPONSES TO PHQ9 QUESTIONS 1 & 2: 3
7. TROUBLE CONCENTRATING ON THINGS, SUCH AS READING THE NEWSPAPER OR WATCHING TELEVISION: 1
10. IF YOU CHECKED OFF ANY PROBLEMS, HOW DIFFICULT HAVE THESE PROBLEMS MADE IT FOR YOU TO DO YOUR WORK, TAKE CARE OF THINGS AT HOME, OR GET ALONG WITH OTHER PEOPLE: 2
9. THOUGHTS THAT YOU WOULD BE BETTER OFF DEAD, OR OF HURTING YOURSELF: 1
SUM OF ALL RESPONSES TO PHQ QUESTIONS 1-9: 11
2. FEELING DOWN, DEPRESSED OR HOPELESS: 2
4. FEELING TIRED OR HAVING LITTLE ENERGY: 1
5. POOR APPETITE OR OVEREATING: 1
1. LITTLE INTEREST OR PLEASURE IN DOING THINGS: 1
6. FEELING BAD ABOUT YOURSELF - OR THAT YOU ARE A FAILURE OR HAVE LET YOURSELF OR YOUR FAMILY DOWN: 1
8. MOVING OR SPEAKING SO SLOWLY THAT OTHER PEOPLE COULD HAVE NOTICED. OR THE OPPOSITE, BEING SO FIGETY OR RESTLESS THAT YOU HAVE BEEN MOVING AROUND A LOT MORE THAN USUAL: 1
3. TROUBLE FALLING OR STAYING ASLEEP: 2

## 2020-06-02 ASSESSMENT — COLUMBIA-SUICIDE SEVERITY RATING SCALE - C-SSRS
6. HAVE YOU EVER DONE ANYTHING, STARTED TO DO ANYTHING, OR PREPARED TO DO ANYTHING TO END YOUR LIFE?: NO
2. HAVE YOU ACTUALLY HAD ANY THOUGHTS OF KILLING YOURSELF?: NO
1. WITHIN THE PAST MONTH, HAVE YOU WISHED YOU WERE DEAD OR WISHED YOU COULD GO TO SLEEP AND NOT WAKE UP?: YES

## 2020-06-02 ASSESSMENT — ENCOUNTER SYMPTOMS
BACK PAIN: 1
BOWEL INCONTINENCE: 0

## 2020-06-02 NOTE — PROGRESS NOTES
[address] [facility]  [address] [facility], [address] [facility]  Dept: [phone]  Dept Fax: [phone]    Kishan Shipley is a 69 y.o. female who presents today for her medical conditions/complaints as noted below. Kishan Shipley c/o of Back Pain (having back lower back pain) and Depression    HPI:   Patient presents to the office with concerns for back pain. She called her pain management specialist a few weeks ago, but they were not taking appointments at that time. Back Pain   This is a chronic problem. Episode onset: getting worse over past 6-8 weeks. The problem occurs daily. The problem has been gradually worsening since onset. The pain is present in the lumbar spine. Quality: sharp. The pain does not radiate. The pain is at a severity of 8/10. The pain is moderate. The symptoms are aggravated by standing. Pertinent negatives include no abdominal pain, bladder incontinence, bowel incontinence, chest pain, fever, headaches, leg pain, numbness, tingling or weakness. Treatments tried: Tylenol. The treatment provided mild relief. Mental Health Problem   The primary symptoms include dysphoric mood. she has the following symptoms: fatigue, insomnia and irritable. Onset of symptoms was approximately several months ago. Symptoms have been unchanged since that time. she denies current suicidal and homicidal ideation. Previous treatment includes none.      Appetite: normal  Sleep disturbance: Yes  Fatigue: Yes  Loss of pleasure: No  Impulsive behavior: No  Mood: Anxious, Depressed, Irritability  Suicide Assessment: no suicidal ideation      BP Readings from Last 3 Encounters:   06/02/20 132/86   03/12/20 132/72   03/02/20 137/63           ([id number])    Wt Readings from Last 3 Encounters:   06/02/20 251 lb (113.9 kg)   05/06/20 247 lb (112 kg)   05/04/20 248 lb (112.5 kg)       Past Medical History:   Diagnosis Date    Actinic keratitis     Ankle pain     Arthritis     Back pain     low     Chronic diastolic CHF (congestive heart failure) (HCC)     Chronic diastolic heart failure (HCC) 5/31/2017    Chronic kidney disease     Dependent edema     Hypertension     Incontinence     in female    Osteoarthritis     knee     Type II or unspecified type diabetes mellitus without mention of complication, not stated as uncontrolled       Past Surgical History:   Procedure Laterality Date    ANKLE SURGERY Left     FINGER TRIGGER RELEASE Right        Family History   Problem Relation Age of Onset    Diabetes Mother     Diabetes Father     Cancer Paternal Grandfather         BONE        Social History     Tobacco Use    Smoking status: Never Smoker    Smokeless tobacco: Never Used   Substance Use Topics    Alcohol use: No      Current Outpatient Medications   Medication Sig Dispense Refill    citalopram (CELEXA) 10 MG tablet Take 1 tablet by mouth daily 30 tablet 2    ferrous sulfate (IRON 325) 325 (65 Fe) MG tablet Take 1 tablet by mouth daily (with breakfast) 90 tablet 2    carvedilol (COREG) 6.25 MG tablet Take 1 tablet by mouth 2 times daily (with meals) 180 tablet 1    busPIRone (BUSPAR) 15 MG tablet Take 15 mg by mouth 3 times daily 90 tablet 5    lisinopril (PRINIVIL;ZESTRIL) 2.5 MG tablet Take 1 tablet by mouth daily 90 tablet 1    oxybutynin (DITROPAN XL) 5 MG extended release tablet Take 1 tablet by mouth daily 90 tablet 0    mirabegron (MYRBETRIQ) 25 MG TB24 Take 1 tablet by mouth daily 90 tablet 0    Insulin Pen Needle 32G X 4 MM MISC 1 each by Does not apply route 4 times daily 150 each 3    Insulin Degludec (TRESIBA FLEXTOUCH) 200 UNIT/ML SOPN Inject 36 Units into the skin daily (Patient taking differently: Inject 38 Units into the skin daily ) 3 pen 3    insulin aspart (NOVOLOG FLEXPEN) 100 UNIT/ML injection pen Use tid with meals With sliding scale max dose is 30 units per day 3 pen 3    cyanocobalamin 1000 MCG/ML injection Inject 1 mL into the muscle Gosia Fofana - CNP on 6/7/2020

## 2020-06-07 ASSESSMENT — ENCOUNTER SYMPTOMS
CONSTIPATION: 0
ABDOMINAL PAIN: 0
WHEEZING: 0
COUGH: 0
DIARRHEA: 0
SHORTNESS OF BREATH: 0
EYES NEGATIVE: 1

## 2020-06-29 NOTE — PROGRESS NOTES
[address] [facility]  [address] [facility], [address] [facility]  Dept: [phone]  Dept Fax: [phone]    Dutch Lara is a 69 y.o. female who presents today for her medical conditions/complaints as noted below. Dutch Lara c/o of Cough (x4 days); Shortness of Breath (x4 days); and Facial Pain (sinus pain and pressure x4 days)    HPI:   Patient presents to the office for four-week follow-up of anxiety and depression. She was started on citalopram 10 mg daily. Today she reports doing well on the medication. Urinary Frequency    This is a new problem. The current episode started more than 1 month ago. The problem has been unchanged. The pain is at a severity of 0/10. The patient is experiencing no pain. There has been no fever. Associated symptoms include frequency, nausea (mild) and urgency. Pertinent negatives include no chills, flank pain, hematuria or hesitancy. She has tried increased fluids for the symptoms. The treatment provided no relief. Cough   This is a new problem. The current episode started in the past 7 days. The problem has been gradually improving. The cough is productive of sputum (small amount). Associated symptoms include chest pain (with cough) and shortness of breath. Pertinent negatives include no chills, ear pain, fever, headaches, myalgias, nasal congestion, postnasal drip, rhinorrhea, sore throat or wheezing. The symptoms are aggravated by lying down. She has tried nothing for the symptoms. The treatment provided no relief. There is no history of environmental allergies. Mental Health Problem   The primary symptoms include dysphoric mood. she has the following symptoms: fatigue, insomnia and racing thoughts. Onset of symptoms was approximately several months ago. Symptoms have been gradually improving since that time. she denies current suicidal and homicidal ideation.  she complains of the following medication side effects: tablet 1    busPIRone (BUSPAR) 15 MG tablet Take 15 mg by mouth 3 times daily 90 tablet 5    lisinopril (PRINIVIL;ZESTRIL) 2.5 MG tablet Take 1 tablet by mouth daily 90 tablet 1    oxybutynin (DITROPAN XL) 5 MG extended release tablet Take 1 tablet by mouth daily 90 tablet 0    mirabegron (MYRBETRIQ) 25 MG TB24 Take 1 tablet by mouth daily 90 tablet 0    Insulin Pen Needle 32G X 4 MM MISC 1 each by Does not apply route 4 times daily 150 each 3    insulin aspart (NOVOLOG FLEXPEN) 100 UNIT/ML injection pen Use tid with meals With sliding scale max dose is 30 units per day 3 pen 3    cyanocobalamin 1000 MCG/ML injection Inject 1 mL into the muscle every 30 days 1 mL 0    blood glucose monitor strips by Other route 3 times daily Test 3 times a day & as needed for symptoms of irregular blood glucose. 100 strip 5    RELION ULTRA THIN LANCETS 30G MISC 1 each by Does not apply route 3 times daily 300 each 3    bumetanide (BUMEX) 1 MG tablet Take 1 tablet by mouth 2 times daily (Patient taking differently: Take 1 mg by mouth daily ) 180 tablet 1    meclizine (ANTIVERT) 12.5 MG tablet Take 1 tablet by mouth 3 times daily as needed for Dizziness 90 tablet 2    acetaminophen (TYLENOL ARTHRITIS PAIN) 650 MG extended release tablet Take 650 mg by mouth every 8 hours as needed for Pain       No current facility-administered medications for this visit.       No Known Allergies    Health Maintenance   Topic Date Due    Shingles Vaccine (2 of 3) 02/26/2007    Breast cancer screen  01/11/2019    Annual Wellness Visit (AWV)  05/29/2019    Lipid screen  03/28/2020    Hepatitis C screen  06/29/2022 (Originally 1945)    Potassium monitoring  09/24/2020    Creatinine monitoring  09/24/2020    Colon cancer screen colonoscopy  10/04/2020    A1C test (Diabetic or Prediabetic)  11/05/2020    Diabetic foot exam  01/16/2021    Diabetic retinal exam  02/10/2021    DTaP/Tdap/Td vaccine (2 - Td) 08/25/2027    DEXA Assessment:     1. Anxiety and depression    2. Type 2 diabetes mellitus with stage 3 chronic kidney disease, with long-term current use of insulin (HCC)    3. Other hyperlipidemia    4. Viral URI    5. Essential hypertension    6. Screening mammogram, encounter for    7. Frequency of urination      Results for POC orders placed in visit on 06/30/20   POCT Urinalysis no Micro   Result Value Ref Range    Color, UA light yellow     Clarity, UA clear     Glucose, UA POC negative     Bilirubin, UA negative     Ketones, UA negative     Spec Grav, UA 1.010     Blood, UA POC negative     pH, UA 5.0     Protein, UA POC negative     Urobilinogen, UA 0.2     Leukocytes, UA negative     Nitrite, UA Negative     Appearance, Fluid Clear Clear, Slightly Cloudy   POCT glycosylated hemoglobin (Hb A1C)   Result Value Ref Range    Hemoglobin A1C 7.6 %       Plan:     Orders Placed This Encounter   Procedures    Lipid Panel     Standing Status:   Future     Standing Expiration Date:   6/26/2021     Order Specific Question:   Is Patient Fasting?/# of Hours     Answer:   10-12    Microalbumin, Ur     Standing Status:   Future     Standing Expiration Date:   6/30/2021    Comprehensive Metabolic Panel, Fasting     Standing Status:   Future     Standing Expiration Date:   6/30/2021    POCT glycosylated hemoglobin (Hb A1C)    POCT Urinalysis no Micro       Orders Placed This Encounter   Medications    Insulin Degludec (TRESIBA FLEXTOUCH) 200 UNIT/ML SOPN     Sig: Inject 38 Units into the skin daily     Dispense:  6 pen     Refill:  1     Discontinue soliqua    simvastatin (ZOCOR) 40 MG tablet     Sig: Take 1 tablet by mouth nightly     Dispense:  90 tablet     Refill:  1      Patient given educational materials - see patient instructions. Discussed use, benefit, and side effects of prescribed medications. All patient questions answered. Patient voiced understanding. Health Maintenance reviewed.  Instructed to continue current medications, diet and exercise. Patient agreed with treatment plan. Follow up as directed. Return in about 3 months (around 9/30/2020) for [facility].     Electronically signed by ORTIZ Alexander CNP on 6/30/2020

## 2020-06-30 ENCOUNTER — OFFICE VISIT (OUTPATIENT)
Dept: FAMILY MEDICINE CLINIC | Age: 75
End: 2020-06-30
Payer: MEDICARE

## 2020-06-30 VITALS
WEIGHT: 250 LBS | OXYGEN SATURATION: 94 % | SYSTOLIC BLOOD PRESSURE: 132 MMHG | BODY MASS INDEX: 40.35 KG/M2 | HEART RATE: 52 BPM | DIASTOLIC BLOOD PRESSURE: 84 MMHG

## 2020-06-30 LAB
APPEARANCE FLUID: CLEAR
BILIRUBIN, POC: NEGATIVE
BLOOD URINE, POC: NEGATIVE
CLARITY, POC: CLEAR
COLOR, POC: NORMAL
GLUCOSE URINE, POC: NEGATIVE
HBA1C MFR BLD: 7.6 %
KETONES, POC: NEGATIVE
LEUKOCYTE EST, POC: NEGATIVE
NITRITE, POC: NEGATIVE
PH, POC: 5
PROTEIN, POC: NEGATIVE
SPECIFIC GRAVITY, POC: 1.01
UROBILINOGEN, POC: 0.2

## 2020-06-30 PROCEDURE — 99214 OFFICE O/P EST MOD 30 MIN: CPT | Performed by: NURSE PRACTITIONER

## 2020-06-30 PROCEDURE — 3017F COLORECTAL CA SCREEN DOC REV: CPT | Performed by: NURSE PRACTITIONER

## 2020-06-30 PROCEDURE — 1090F PRES/ABSN URINE INCON ASSESS: CPT | Performed by: NURSE PRACTITIONER

## 2020-06-30 PROCEDURE — G8417 CALC BMI ABV UP PARAM F/U: HCPCS | Performed by: NURSE PRACTITIONER

## 2020-06-30 PROCEDURE — 83036 HEMOGLOBIN GLYCOSYLATED A1C: CPT | Performed by: NURSE PRACTITIONER

## 2020-06-30 PROCEDURE — G8400 PT W/DXA NO RESULTS DOC: HCPCS | Performed by: NURSE PRACTITIONER

## 2020-06-30 PROCEDURE — G8427 DOCREV CUR MEDS BY ELIG CLIN: HCPCS | Performed by: NURSE PRACTITIONER

## 2020-06-30 PROCEDURE — 2022F DILAT RTA XM EVC RTNOPTHY: CPT | Performed by: NURSE PRACTITIONER

## 2020-06-30 PROCEDURE — 3051F HG A1C>EQUAL 7.0%<8.0%: CPT | Performed by: NURSE PRACTITIONER

## 2020-06-30 PROCEDURE — 81002 URINALYSIS NONAUTO W/O SCOPE: CPT | Performed by: NURSE PRACTITIONER

## 2020-06-30 PROCEDURE — 1036F TOBACCO NON-USER: CPT | Performed by: NURSE PRACTITIONER

## 2020-06-30 PROCEDURE — 4040F PNEUMOC VAC/ADMIN/RCVD: CPT | Performed by: NURSE PRACTITIONER

## 2020-06-30 PROCEDURE — 1123F ACP DISCUSS/DSCN MKR DOCD: CPT | Performed by: NURSE PRACTITIONER

## 2020-06-30 RX ORDER — INSULIN DEGLUDEC 200 U/ML
38 INJECTION, SOLUTION SUBCUTANEOUS DAILY
Qty: 6 PEN | Refills: 1 | Status: SHIPPED | OUTPATIENT
Start: 2020-06-30 | End: 2020-09-21 | Stop reason: SDUPTHER

## 2020-06-30 RX ORDER — SIMVASTATIN 40 MG
40 TABLET ORAL NIGHTLY
Qty: 90 TABLET | Refills: 1 | Status: SHIPPED | OUTPATIENT
Start: 2020-06-30 | End: 2020-11-17

## 2020-06-30 ASSESSMENT — ENCOUNTER SYMPTOMS
COUGH: 1
WHEEZING: 0
EYES NEGATIVE: 1
RHINORRHEA: 0
NAUSEA: 1
ABDOMINAL PAIN: 0
SHORTNESS OF BREATH: 1
DIARRHEA: 0
SORE THROAT: 0
BACK PAIN: 1
CONSTIPATION: 0

## 2020-07-14 ENCOUNTER — TELEPHONE (OUTPATIENT)
Dept: FAMILY MEDICINE CLINIC | Age: 75
End: 2020-07-14

## 2020-07-14 RX ORDER — MONTELUKAST SODIUM 10 MG/1
10 TABLET ORAL DAILY
Qty: 30 TABLET | Refills: 3 | Status: SHIPPED | OUTPATIENT
Start: 2020-07-14 | End: 2020-11-10 | Stop reason: SDUPTHER

## 2020-07-14 NOTE — TELEPHONE ENCOUNTER
Please have patient start taking over-the-counter allergy medication such as Claritin, or Zyrtec. I will send Singulair to the pharmacy 10 mg at night to also help with symptoms.

## 2020-08-04 ENCOUNTER — OFFICE VISIT (OUTPATIENT)
Dept: DIABETES SERVICES | Age: 75
End: 2020-08-04
Payer: MEDICARE

## 2020-08-04 VITALS
SYSTOLIC BLOOD PRESSURE: 120 MMHG | BODY MASS INDEX: 41 KG/M2 | DIASTOLIC BLOOD PRESSURE: 68 MMHG | WEIGHT: 254 LBS | RESPIRATION RATE: 20 BRPM

## 2020-08-04 PROCEDURE — 1123F ACP DISCUSS/DSCN MKR DOCD: CPT | Performed by: NURSE PRACTITIONER

## 2020-08-04 PROCEDURE — 1090F PRES/ABSN URINE INCON ASSESS: CPT | Performed by: NURSE PRACTITIONER

## 2020-08-04 PROCEDURE — G8400 PT W/DXA NO RESULTS DOC: HCPCS | Performed by: NURSE PRACTITIONER

## 2020-08-04 PROCEDURE — 2022F DILAT RTA XM EVC RTNOPTHY: CPT | Performed by: NURSE PRACTITIONER

## 2020-08-04 PROCEDURE — 3017F COLORECTAL CA SCREEN DOC REV: CPT | Performed by: NURSE PRACTITIONER

## 2020-08-04 PROCEDURE — 3051F HG A1C>EQUAL 7.0%<8.0%: CPT | Performed by: NURSE PRACTITIONER

## 2020-08-04 PROCEDURE — G8417 CALC BMI ABV UP PARAM F/U: HCPCS | Performed by: NURSE PRACTITIONER

## 2020-08-04 PROCEDURE — 1036F TOBACCO NON-USER: CPT | Performed by: NURSE PRACTITIONER

## 2020-08-04 PROCEDURE — 4040F PNEUMOC VAC/ADMIN/RCVD: CPT | Performed by: NURSE PRACTITIONER

## 2020-08-04 PROCEDURE — 99214 OFFICE O/P EST MOD 30 MIN: CPT | Performed by: NURSE PRACTITIONER

## 2020-08-04 PROCEDURE — G8427 DOCREV CUR MEDS BY ELIG CLIN: HCPCS | Performed by: NURSE PRACTITIONER

## 2020-08-04 PROCEDURE — 95251 CONT GLUC MNTR ANALYSIS I&R: CPT | Performed by: NURSE PRACTITIONER

## 2020-08-04 ASSESSMENT — ENCOUNTER SYMPTOMS
BLURRED VISION: 0
ABDOMINAL PAIN: 0
RESPIRATORY NEGATIVE: 1
VISUAL CHANGE: 0
DIARRHEA: 0
SHORTNESS OF BREATH: 0

## 2020-08-04 NOTE — PROGRESS NOTES
[facility] [address], [address]  [facility] [id number] [phone]        HISTORY:    Joseluis Tony presents today for evaluation and management of:  Chief Complaint   Patient presents with    Diabetes       Diabetes   She presents for her follow-up diabetic visit. She has type 2 diabetes mellitus. No MedicAlert identification noted. There are no hypoglycemic associated symptoms. Pertinent negatives for hypoglycemia include no confusion, dizziness, headaches, seizures or tremors. Associated symptoms include fatigue. Pertinent negatives for diabetes include no blurred vision, no chest pain, no foot paresthesias, no foot ulcerations, no polydipsia, no polyphagia, no polyuria, no visual change, no weakness and no weight loss. There are no hypoglycemic complications. Symptoms are stable. Diabetic complications include nephropathy. Risk factors for coronary artery disease include diabetes mellitus, dyslipidemia, family history, hypertension, obesity, stress and sedentary lifestyle. Current diabetic treatment includes insulin injections. She is compliant with treatment all of the time. She is currently taking insulin pre-breakfast, pre-lunch, pre-dinner and at bedtime. Insulin injections are given by patient. Rotation sites for injection include the abdominal wall. Her weight is stable. She is following a generally unhealthy diet. When asked about meal planning, she reported none. She has not had a previous visit with a dietitian. She never participates in exercise. Blood glucose monitoring compliance is adequate. An ACE inhibitor/angiotensin II receptor blocker is being taken. Eye exam is current. Interval History:    Current Diabetic Medications  novolog TID with sliding scale, tresiba 38 units once daily    DKA episodes: 0    11/5/19  She is here for follow up diabetes. She was in the nursing home from [address] [place] [address]. She has been testing her blood sugar 2x daily morning numbers are low 100's and pm blood sugars are over 200 which she will use novolog 10 units when over 250.      1/7/19  She is here for follow up diabetes management. She is working on testing her blood sugars more regularly. She denies any hypoglycemia. Kit Avalos is not always compliant with short acting insulin and will often miss one injections per day. He diet is stable. She is active most of the day using her walker. Blood sugars are highest in the evening. She is interested in CGM. She follows with nephrology and kidney function is stable. She has a history of UTI and follows with urology. She does not typically have symptoms of UTI last asymptomatic uti was in nonmember. She would like her urine tested today.  She denies any urinary symptoms such as pain, frequency or urgency.      She is interested in CGM.  She is testing her blood sugars 4 more times daily and taking 4 insulin injections daily.     2/18/2020  CGM started no other changes      3/12/2020  Patient is here for follow-up diabetes management. She has been using her CGM with success she continues her current insulin regimen. Patient is complaining of significant and frequent hypoglycemia. Some episodes of hypoglycemia unawareness. 08/04/20   She is interested in bariatric surgery. She has not been doing well with her diet. Diet: lots of sweets  Exercise: none  BS testing: uses CGM with success. Hypoglycemia while sleeping. Issues: denies     High cholesterol-  Takes zocor and denies any adverse effects with its use.  Watches diet and exercise.      Hypertension-  Takes coreg and lisinopril and denies any adverse effects with their use. Watches diet and exercise.  Denies any chest pain, dizziness or edema.  Follows with cardiology:Yes. Yearly      Obesity- Working on weight loss.       Past Medical History:   Diagnosis Date    Actinic keratitis     Ankle pain     Arthritis     Back pain low     Chronic diastolic CHF (congestive heart failure) (HCC)     Chronic diastolic heart failure ([facility] [address] 5/31/2017    Chronic kidney disease     Dependent edema     Hypertension     Incontinence     in female    Osteoarthritis     knee     Type II or unspecified type diabetes mellitus without mention of complication, not stated as uncontrolled      Family History   Problem Relation Age of Onset    Diabetes Mother     Diabetes Father     Cancer Paternal Grandfather         BONE      Social History     Tobacco Use    Smoking status: Never Smoker    Smokeless tobacco: Never Used   Substance Use Topics    Alcohol use: No    Drug use: No     No Known Allergies    MEDICATIONS:  Current Outpatient Medications   Medication Sig Dispense Refill    montelukast (SINGULAIR) 10 MG tablet Take 1 tablet by mouth daily 30 tablet 3    Insulin Degludec (TRESIBA FLEXTOUCH) 200 UNIT/ML SOPN Inject 38 Units into the skin daily 6 pen 1    simvastatin (ZOCOR) 40 MG tablet Take 1 tablet by mouth nightly 90 tablet 1    citalopram (CELEXA) 10 MG tablet Take 1 tablet by mouth daily 30 tablet 2    ferrous sulfate (IRON 325) 325 (65 Fe) MG tablet Take 1 tablet by mouth daily (with breakfast) 90 tablet 2    carvedilol (COREG) 6.25 MG tablet Take 1 tablet by mouth 2 times daily (with meals) 180 tablet 1    busPIRone (BUSPAR) 15 MG tablet Take 15 mg by mouth 3 times daily 90 tablet 5    lisinopril (PRINIVIL;ZESTRIL) 2.5 MG tablet Take 1 tablet by mouth daily 90 tablet 1    oxybutynin (DITROPAN XL) 5 MG extended release tablet Take 1 tablet by mouth daily 90 tablet 0    mirabegron (MYRBETRIQ) 25 MG TB24 Take 1 tablet by mouth daily 90 tablet 0    Insulin Pen Needle 32G X 4 MM MISC 1 each by Does not apply route 4 times daily 150 each 3    insulin aspart (NOVOLOG FLEXPEN) 100 UNIT/ML injection pen Use tid with meals With sliding scale max dose is 30 units per day 3 pen 3    cyanocobalamin 1000 MCG/ML injection Inject 1 mL into the muscle every 30 days 1 mL 0    blood glucose monitor strips by Other route 3 times daily Test 3 times a day & as needed for symptoms of irregular blood glucose. 100 strip 5    RELION ULTRA THIN LANCETS 30G MISC 1 each by Does not apply route 3 times daily 300 each 3    bumetanide (BUMEX) 1 MG tablet Take 1 tablet by mouth 2 times daily (Patient taking differently: Take 1 mg by mouth daily ) 180 tablet 1    meclizine (ANTIVERT) 12.5 MG tablet Take 1 tablet by mouth 3 times daily as needed for Dizziness 90 tablet 2    acetaminophen (TYLENOL ARTHRITIS PAIN) 650 MG extended release tablet Take 650 mg by mouth every 8 hours as needed for Pain       No current facility-administered medications for this visit. Review ofSymptoms:  Review of Systems   Constitutional: Positive for fatigue. Negative for unexpected weight change and weight loss. Eyes: Negative for blurred vision and visual disturbance. Respiratory: Negative. Negative for shortness of breath. Cardiovascular: Negative for chest pain and leg swelling. Gastrointestinal: Negative for abdominal pain and diarrhea. Endocrine: Negative for polydipsia, polyphagia and polyuria. Genitourinary: Negative. Musculoskeletal: Negative. Skin: Negative for rash and wound. Neurological: Negative for dizziness, tremors, seizures, weakness and headaches. Psychiatric/Behavioral: Negative. Negative for confusion and decreased concentration. Theremainder of a complete 14-point review of systems is negative. Vital Signs: /68   Resp 20   Wt 254 lb (115.2 kg)   BMI 41.00 kg/m²      Wt Readings from Last 3 Encounters:   08/04/20 254 lb (115.2 kg)   06/30/20 250 lb (113.4 kg)   06/02/20 251 lb (113.9 kg)     Body mass index is 41 kg/m².   LABS:  Hemoglobin A1C   Date Value Ref Range Status   06/30/2020 7.6 % Final   11/05/2019 8.1 % Final     Lab Results   Component Value Date    LABMICR CANNOT BE CALCULATED 02/18/2020 4. Essential hypertension     5. BMI 40.0-44.9, adult ([facility])     6. Obesity, morbid, BMI 40.0-49.9 ([facility]       No orders of the defined types were placed in this encounter. No orders of the defined types were placed in this encounter. Requested Prescriptions      No prescriptions requested or ordered in this encounter       1. Diabetes mellitus type 2, insulin dependent ([facility]  2. Diabetes education, encounter for  - Unstable  HbA1C goal is less than 7% and blood sugars are improving.  - Encouraged patient to check BS 4 times per day. Fasting blood glucose goal is 70-130mg/dl and postprandial blood sugar goal is less than 180 mg/dl. -Diabetic foot exam up-to-date: Yes  -Diabetic retinal exam up-to-date: Yes  - Labs reviewed includes: most recent a1c 7.6% GFR44. Repeat labs due in 3 months.   -We discussed in great detail dietary modifications they can make to better improve their blood sugars. -follow up diabetes education completed, all questions answered. CGM report downloaded and reviewed, scanned to media tab. -frequent hypoglycemia could be r/t to much basal or pt dosing short acting insulin at bedtime. Will adjust insulin. Patient Instructions   Decrease long acting insulin to 36 units once daily  Add 2 units of short acting insulin to your sliding scale for food coverage  Do not take short acting insulin at bedtime unless you are eating a high carb snack then just use the sliding scale without adding the extra 2 units. Keep glucose tabs at your bedside           Discussed signs and symptoms of hyper/hypoglycemia and how to treat. Encouraged 150 minutes of physical activity per week. Follow a low carbohydrate diet consuming 45 grams of carbohydrates at breakfast, lunch and dinner with two separate snacks [id number] grams of carbohydrates. Encouraged at least 7 hours of sleep. The patient was informed of the goals of diabetes management.  This can only be accomplished by watching their diet and exercise levels. We certainly use medicines to help attain these goals. The consequences of not controlling blood sugars were discussed. These include blindness, heart disease, stroke, kidney disease, and possibly need for dialysis. They were told to be careful with their foot care as diabetics often have nerve damage, infections and risk for limb amutations . They also need a dilated eye exam yearly. We discussed the issues of diet, exercise, medication, complication avoidance, reviewed the signs and symptoms of diabetes, hypoglycemic episodes, significance of HbA1C.       3. Other hyperlipidemia  stable, lipid panel reviewed, continue current medications. Diet and exercise      4. Essential hypertension   stable, continue current medications. Diet and exercise Seek emergent care if chest pain develops. 5. BMI 40.0-44.9, adult (HCC)  6. Obesity, morbid, BMI 40.0-49.9 (HCC)  Reduce calories and increase physical activity to achieve a slow and steady weight loss to improve blood pressure, cholesterol and diabetes. Answered all patient questions. Agrees to follow plan of care and to follow up in 1 months, sooner if needed. Call office if unexplained blood sugars less than 70 occur or above 400. Call office or access [employer] with any further questions or concerns. Be sure to bring glucometer/food log at next appointment. Patient was seen with total face to face time of 60 minutes. More than 50%  of this visit was counseling and education regarding her diabetes.     Electronically signed by ORTIZ Bates CNP on 8/4/2020 at 9:55 AM      (Please note that portions of this note were completed with a voice-recognition program. Efforts were made to edit the dictation but occasionally words are mis-transcribed.)

## 2020-09-01 ENCOUNTER — HOSPITAL ENCOUNTER (OUTPATIENT)
Age: 75
Setting detail: SPECIMEN
Discharge: HOME | End: 2020-09-01
Payer: MEDICARE

## 2020-09-01 ENCOUNTER — OFFICE VISIT (OUTPATIENT)
Dept: DIABETES SERVICES | Age: 75
End: 2020-09-01
Payer: MEDICARE

## 2020-09-01 VITALS
WEIGHT: 251 LBS | BODY MASS INDEX: 40.51 KG/M2 | SYSTOLIC BLOOD PRESSURE: 138 MMHG | RESPIRATION RATE: 16 BRPM | DIASTOLIC BLOOD PRESSURE: 78 MMHG

## 2020-09-01 LAB
ALBUMIN SERPL-MCNC: 4.2 G/DL (ref 3.5–5.2)
ALBUMIN/GLOBULIN RATIO: 1.5 (ref 1–2.5)
ALP BLD-CCNC: 93 U/L (ref 35–104)
ALT SERPL-CCNC: 9 U/L (ref 5–33)
ANION GAP SERPL CALCULATED.3IONS-SCNC: 10 MMOL/L (ref 9–17)
AST SERPL-CCNC: 10 U/L
BILIRUB SERPL-MCNC: 0.37 MG/DL (ref 0.3–1.2)
BUN BLDV-MCNC: 30 MG/DL (ref 8–23)
BUN/CREAT BLD: 25 (ref 9–20)
CALCIUM SERPL-MCNC: 9.3 MG/DL (ref 8.6–10.4)
CHLORIDE BLD-SCNC: 103 MMOL/L (ref 98–107)
CHOLESTEROL/HDL RATIO: 2.8
CHOLESTEROL: 169 MG/DL
CO2: 26 MMOL/L (ref 20–31)
CREAT SERPL-MCNC: 1.21 MG/DL (ref 0.5–0.9)
CREATININE URINE: 135.2 MG/DL (ref 28–217)
GFR AFRICAN AMERICAN: 53 ML/MIN
GFR NON-AFRICAN AMERICAN: 44 ML/MIN
GFR SERPL CREATININE-BSD FRML MDRD: ABNORMAL ML/MIN/{1.73_M2}
GFR SERPL CREATININE-BSD FRML MDRD: ABNORMAL ML/MIN/{1.73_M2}
GLUCOSE FASTING: 167 MG/DL (ref 70–99)
HDLC SERPL-MCNC: 60 MG/DL
LDL CHOLESTEROL: 91 MG/DL (ref 0–130)
MICROALBUMIN/CREAT 24H UR: 114 MG/L
MICROALBUMIN/CREAT UR-RTO: 84 MCG/MG CREAT
POTASSIUM SERPL-SCNC: 4.8 MMOL/L (ref 3.7–5.3)
SODIUM BLD-SCNC: 139 MMOL/L (ref 135–144)
TOTAL PROTEIN: 7 G/DL (ref 6.4–8.3)
TRIGL SERPL-MCNC: 89 MG/DL
VLDLC SERPL CALC-MCNC: NORMAL MG/DL (ref 1–30)

## 2020-09-01 PROCEDURE — 80061 LIPID PANEL: CPT

## 2020-09-01 PROCEDURE — 4040F PNEUMOC VAC/ADMIN/RCVD: CPT | Performed by: NURSE PRACTITIONER

## 2020-09-01 PROCEDURE — 3051F HG A1C>EQUAL 7.0%<8.0%: CPT | Performed by: NURSE PRACTITIONER

## 2020-09-01 PROCEDURE — 1123F ACP DISCUSS/DSCN MKR DOCD: CPT | Performed by: NURSE PRACTITIONER

## 2020-09-01 PROCEDURE — 1090F PRES/ABSN URINE INCON ASSESS: CPT | Performed by: NURSE PRACTITIONER

## 2020-09-01 PROCEDURE — 2022F DILAT RTA XM EVC RTNOPTHY: CPT | Performed by: NURSE PRACTITIONER

## 2020-09-01 PROCEDURE — 82043 UR ALBUMIN QUANTITATIVE: CPT

## 2020-09-01 PROCEDURE — G8400 PT W/DXA NO RESULTS DOC: HCPCS | Performed by: NURSE PRACTITIONER

## 2020-09-01 PROCEDURE — 36415 COLL VENOUS BLD VENIPUNCTURE: CPT

## 2020-09-01 PROCEDURE — 3017F COLORECTAL CA SCREEN DOC REV: CPT | Performed by: NURSE PRACTITIONER

## 2020-09-01 PROCEDURE — 99214 OFFICE O/P EST MOD 30 MIN: CPT

## 2020-09-01 PROCEDURE — 1036F TOBACCO NON-USER: CPT | Performed by: NURSE PRACTITIONER

## 2020-09-01 PROCEDURE — 99214 OFFICE O/P EST MOD 30 MIN: CPT | Performed by: NURSE PRACTITIONER

## 2020-09-01 PROCEDURE — G8428 CUR MEDS NOT DOCUMENT: HCPCS | Performed by: NURSE PRACTITIONER

## 2020-09-01 PROCEDURE — 82570 ASSAY OF URINE CREATININE: CPT

## 2020-09-01 PROCEDURE — 80053 COMPREHEN METABOLIC PANEL: CPT

## 2020-09-01 PROCEDURE — 95251 CONT GLUC MNTR ANALYSIS I&R: CPT | Performed by: NURSE PRACTITIONER

## 2020-09-01 PROCEDURE — G8417 CALC BMI ABV UP PARAM F/U: HCPCS | Performed by: NURSE PRACTITIONER

## 2020-09-01 ASSESSMENT — ENCOUNTER SYMPTOMS
RESPIRATORY NEGATIVE: 1
SHORTNESS OF BREATH: 0
VISUAL CHANGE: 0
BLURRED VISION: 0
ABDOMINAL PAIN: 0
DIARRHEA: 0

## 2020-09-01 NOTE — PROGRESS NOTES
success she continues her current insulin regimen.  Patient is complaining of significant and frequent hypoglycemia.  Some episodes of hypoglycemia unawareness.     08/04/20   She is interested in bariatric surgery. She has not been doing well with her diet. Diet: lots of sweets  Exercise: none  BS testing: uses CGM with success. Hypoglycemia while sleeping. Issues: denies     09/01/20   Just had labs done today not due for a1c yet. She holds her short acting insulin with breakfast if only 2 units are required. Diet:continues to count carbs, sweets occasionally   Exercise: PT for knee and vertigo  BS testing: using cgm with success. Supplies from [facility]  Issues: denies     High cholesterol-  Takes zocor and denies any adverse effects with its use.  Watches diet and exercise.      Hypertension-  Takes coreg and lisinopril and denies any adverse effects with their use. Watches diet and exercise. Denies any chest pain, dizziness or edema.  Follows with cardiology:Yes. Yearly      Obesity- Working on weight loss.        Past Medical History:   Diagnosis Date    Actinic keratitis     Ankle pain     Arthritis     Back pain     low     Chronic diastolic CHF (congestive heart failure) (HCC)     Chronic diastolic heart failure (HCC) 5/31/2017    Chronic kidney disease     Dependent edema     Hypertension     Incontinence     in female    Osteoarthritis     knee     Type II or unspecified type diabetes mellitus without mention of complication, not stated as uncontrolled      Family History   Problem Relation Age of Onset    Diabetes Mother     Diabetes Father     Cancer Paternal Grandfather         BONE      Social History     Tobacco Use    Smoking status: Never Smoker    Smokeless tobacco: Never Used   Substance Use Topics    Alcohol use: No    Drug use: No     No Known Allergies    MEDICATIONS:  Current Outpatient Medications   Medication Sig Dispense Refill    montelukast (SINGULAIR) 10 MG tablet Take 1 tablet by mouth daily 30 tablet 3    Insulin Degludec (TRESIBA FLEXTOUCH) 200 UNIT/ML SOPN Inject 38 Units into the skin daily 6 pen 1    simvastatin (ZOCOR) 40 MG tablet Take 1 tablet by mouth nightly 90 tablet 1    citalopram (CELEXA) 10 MG tablet Take 1 tablet by mouth daily 30 tablet 2    ferrous sulfate (IRON 325) 325 (65 Fe) MG tablet Take 1 tablet by mouth daily (with breakfast) 90 tablet 2    carvedilol (COREG) 6.25 MG tablet Take 1 tablet by mouth 2 times daily (with meals) 180 tablet 1    busPIRone (BUSPAR) 15 MG tablet Take 15 mg by mouth 3 times daily 90 tablet 5    lisinopril (PRINIVIL;ZESTRIL) 2.5 MG tablet Take 1 tablet by mouth daily 90 tablet 1    oxybutynin (DITROPAN XL) 5 MG extended release tablet Take 1 tablet by mouth daily 90 tablet 0    mirabegron (MYRBETRIQ) 25 MG TB24 Take 1 tablet by mouth daily 90 tablet 0    Insulin Pen Needle 32G X 4 MM MISC 1 each by Does not apply route 4 times daily 150 each 3    insulin aspart (NOVOLOG FLEXPEN) 100 UNIT/ML injection pen Use tid with meals With sliding scale max dose is 30 units per day 3 pen 3    cyanocobalamin 1000 MCG/ML injection Inject 1 mL into the muscle every 30 days 1 mL 0    blood glucose monitor strips by Other route 3 times daily Test 3 times a day & as needed for symptoms of irregular blood glucose. 100 strip 5    RELION ULTRA THIN LANCETS 30G MISC 1 each by Does not apply route 3 times daily 300 each 3    bumetanide (BUMEX) 1 MG tablet Take 1 tablet by mouth 2 times daily (Patient taking differently: Take 1 mg by mouth daily ) 180 tablet 1    meclizine (ANTIVERT) 12.5 MG tablet Take 1 tablet by mouth 3 times daily as needed for Dizziness 90 tablet 2    acetaminophen (TYLENOL ARTHRITIS PAIN) 650 MG extended release tablet Take 650 mg by mouth every 8 hours as needed for Pain       No current facility-administered medications for this visit.         Review ofSymptoms:  Review of Systems   Constitutional: Positive for fatigue. Negative for unexpected weight change and weight loss. Eyes: Negative for blurred vision and visual disturbance. Respiratory: Negative. Negative for shortness of breath. Cardiovascular: Negative for chest pain and leg swelling. Gastrointestinal: Negative for abdominal pain and diarrhea. Endocrine: Negative for polydipsia, polyphagia and polyuria. Genitourinary: Negative. Musculoskeletal: Negative. Skin: Negative for rash and wound. Neurological: Negative for dizziness, tremors, seizures, weakness and headaches. Psychiatric/Behavioral: Negative. Negative for confusion and decreased concentration. Theremainder of a complete 14-point review of systems is negative. Vital Signs: /78   Resp 16   Wt 251 lb (113.9 kg)   BMI 40.51 kg/m²      Wt Readings from Last 3 Encounters:   09/01/20 251 lb (113.9 kg)   08/04/20 254 lb (115.2 kg)   06/30/20 250 lb (113.4 kg)     Body mass index is 40.51 kg/m².   LABS:  Hemoglobin A1C   Date Value Ref Range Status   06/30/2020 7.6 % Final   11/05/2019 8.1 % Final     Lab Results   Component Value Date    LABMICR CANNOT BE CALCULATED 02/18/2020     Lab Results   Component Value Date     09/24/2019    K 5.0 09/24/2019    CL 99 09/24/2019    CO2 32 (H) 09/24/2019    BUN 30 (H) 09/24/2019    CREATININE 1.1 (H) 09/24/2019    GLUCOSE 237 (H) 09/24/2019    CALCIUM 9.1 09/24/2019    PROT 7.0 07/05/2019    LABALBU 3.9 07/05/2019    BILITOT neg 09/18/2019    ALKPHOS 96 07/05/2019    AST 16 07/05/2019    ALT 10 07/05/2019    LABGLOM 44/53 12/11/2018    AGRATIO 1.3 07/05/2019    GLOB 3.1 07/05/2019     Lab Results   Component Value Date    CHOL 157 03/28/2019    CHOL 194 02/21/2018    CHOL 184 04/24/2017     Lab Results   Component Value Date    TRIG 173 03/28/2019    TRIG 110 02/21/2018    TRIG 166 04/24/2017     Lab Results   Component Value Date    HDL 45 04/24/2017    HDL 49 05/16/2016     Lab Results   Component Value Date    LDLCALC due a1c in october.   -We discussed in great detail dietary modifications they can make to better improve their blood sugars. -follow up diabetes education completed, all questions answered. CGM report downloaded and reviewed, scanned to media tab. -hypoglycemia improved, she will start taking short acting insulin with breakfast.       Discussed signs and symptoms of hyper/hypoglycemia and how to treat. Encouraged 150 minutes of physical activity per week. Follow a low carbohydrate diet consuming 45 grams of carbohydrates at breakfast, lunch and dinner with two separate snacks [id number] grams of carbohydrates. Encouraged at least 7 hours of sleep. The patient was informed of the goals of diabetes management. This can only be accomplished by watching their diet and exercise levels. We certainly use medicines to help attain these goals. The consequences of not controlling blood sugars were discussed. These include blindness, heart disease, stroke, kidney disease, and possibly need for dialysis. They were told to be careful with their foot care as diabetics often have nerve damage, infections and risk for limb amutations . They also need a dilated eye exam yearly. We discussed the issues of diet, exercise, medication, complication avoidance, reviewed the signs and symptoms of diabetes, hypoglycemic episodes, significance of HbA1C.       3. Other hyperlipidemia  stable, lipid panel reviewed, continue current medications. Diet and exercise      4. Essential hypertension   stable, continue current medications. Diet and exercise Seek emergent care if chest pain develops. 5. BMI 40.0-44.9, adult (HCC)  6. Obesity, morbid, BMI 40.0-49.9 (HCC)  Reduce calories and increase physical activity to achieve a slow and steady weight loss to improve blood pressure, cholesterol and diabetes. Answered all patient questions. Agrees to follow plan of care and to follow up in 3 months, sooner if needed.  Call office if unexplained blood sugars less than 70 occur or above 400. Call office or access MyChart with any further questions or concerns. Be sure to bring glucometer/food log at next appointment. Patient was seen with total face to face time of 30 minutes. More than 50%  of this visit was counseling and education regarding her diabetes.     Electronically signed by ORTIZ Hernandez CNP on 9/1/2020 at 10:09 AM      (Please note that portions of this note were completed with a voice-recognition program. Efforts were made to edit the dictation but occasionally words are mis-transcribed.)

## 2020-09-15 ENCOUNTER — TELEPHONE (OUTPATIENT)
Dept: UROLOGY | Age: 75
End: 2020-09-15

## 2020-09-16 NOTE — TELEPHONE ENCOUNTER
Spoke with patient, states she would like to try to get in but did not want the appointment for 10/28/2020.   Will leave on cancellation list.

## 2020-09-21 RX ORDER — INSULIN DEGLUDEC 200 U/ML
38 INJECTION, SOLUTION SUBCUTANEOUS DAILY
Qty: 6 PEN | Refills: 1 | Status: SHIPPED | OUTPATIENT
Start: 2020-09-21 | End: 2020-10-01

## 2020-09-21 RX ORDER — INSULIN ASPART 100 [IU]/ML
INJECTION, SOLUTION INTRAVENOUS; SUBCUTANEOUS
Qty: 3 PEN | Refills: 3 | Status: SHIPPED | OUTPATIENT
Start: 2020-09-21 | End: 2021-06-09 | Stop reason: SDUPTHER

## 2020-10-01 ENCOUNTER — OFFICE VISIT (OUTPATIENT)
Dept: FAMILY MEDICINE CLINIC | Age: 75
End: 2020-10-01
Payer: MEDICARE

## 2020-10-01 VITALS
OXYGEN SATURATION: 96 % | SYSTOLIC BLOOD PRESSURE: 132 MMHG | BODY MASS INDEX: 41.56 KG/M2 | DIASTOLIC BLOOD PRESSURE: 86 MMHG | HEART RATE: 50 BPM | WEIGHT: 257.5 LBS

## 2020-10-01 LAB — HBA1C MFR BLD: 7.6 %

## 2020-10-01 PROCEDURE — G8427 DOCREV CUR MEDS BY ELIG CLIN: HCPCS | Performed by: NURSE PRACTITIONER

## 2020-10-01 PROCEDURE — 90694 VACC AIIV4 NO PRSRV 0.5ML IM: CPT | Performed by: NURSE PRACTITIONER

## 2020-10-01 PROCEDURE — G0008 ADMIN INFLUENZA VIRUS VAC: HCPCS | Performed by: NURSE PRACTITIONER

## 2020-10-01 PROCEDURE — G8417 CALC BMI ABV UP PARAM F/U: HCPCS | Performed by: NURSE PRACTITIONER

## 2020-10-01 PROCEDURE — 2022F DILAT RTA XM EVC RTNOPTHY: CPT | Performed by: NURSE PRACTITIONER

## 2020-10-01 PROCEDURE — G8400 PT W/DXA NO RESULTS DOC: HCPCS | Performed by: NURSE PRACTITIONER

## 2020-10-01 PROCEDURE — 99214 OFFICE O/P EST MOD 30 MIN: CPT | Performed by: NURSE PRACTITIONER

## 2020-10-01 PROCEDURE — 1123F ACP DISCUSS/DSCN MKR DOCD: CPT | Performed by: NURSE PRACTITIONER

## 2020-10-01 PROCEDURE — 4040F PNEUMOC VAC/ADMIN/RCVD: CPT | Performed by: NURSE PRACTITIONER

## 2020-10-01 PROCEDURE — 1090F PRES/ABSN URINE INCON ASSESS: CPT | Performed by: NURSE PRACTITIONER

## 2020-10-01 PROCEDURE — 1036F TOBACCO NON-USER: CPT | Performed by: NURSE PRACTITIONER

## 2020-10-01 PROCEDURE — 3051F HG A1C>EQUAL 7.0%<8.0%: CPT | Performed by: NURSE PRACTITIONER

## 2020-10-01 PROCEDURE — 3017F COLORECTAL CA SCREEN DOC REV: CPT | Performed by: NURSE PRACTITIONER

## 2020-10-01 PROCEDURE — G8484 FLU IMMUNIZE NO ADMIN: HCPCS | Performed by: NURSE PRACTITIONER

## 2020-10-01 PROCEDURE — 83036 HEMOGLOBIN GLYCOSYLATED A1C: CPT | Performed by: NURSE PRACTITIONER

## 2020-10-01 RX ORDER — INSULIN DEGLUDEC 200 U/ML
32 INJECTION, SOLUTION SUBCUTANEOUS DAILY
Qty: 6 PEN | Refills: 1
Start: 2020-10-01 | End: 2020-12-15 | Stop reason: SDUPTHER

## 2020-10-01 ASSESSMENT — ENCOUNTER SYMPTOMS
NAUSEA: 0
SHORTNESS OF BREATH: 0
WHEEZING: 0
EYES NEGATIVE: 1
COUGH: 0
ABDOMINAL PAIN: 0
DIARRHEA: 0
CONSTIPATION: 0

## 2020-10-01 NOTE — PROGRESS NOTES
[address] [facility]  [address] [facility], [address] [facility]  Dept: [phone]  Dept Fax: [phone]    Layo Steele is a 77 y.o. female who presents today for her medical conditions/complaints as noted below. Layo Steele c/o of 3 Month Follow-Up; Diabetes (bs ck four to five times a day running good reports yesterday dropped down to 42 and doesn't know why had breakfast and lunch also reports her inverio is not working and getting a new one sent ); Hyperlipidemia; and Hypertension      HPI:   Patient presents to the office to discuss recent blood sugar fluctuations. States she placed a new Alida, the machine stated something was not working correctly. She checked her sugar around 2 pm, result 42. She was sweaty and did not feel right, fatigue. She took a glucose tablet and ate a mini candy bar. She could not get her machine to work until later around 4 pm, blood sugar was 200. BS usually runs 120-160. She called yesterday and new supplies are being sent overnight. She follows with ARIS Chacon for diabetes, next follow-up as scheduled 12/1/2020. She had cereal for breakfast 3/4 cup with milk and half of an apple. Potato soup for lunch and 4 club crackers and 2 slices of cheese. She does not always eat a snack before bedtime. She has been giving 36 units of [place] and also meal time insulin. She always gives at least 2 units of insulin at each meal, but it is usually 4-5 units. Her insulin was recently adjusted down to 36 units from 38 units.       Dr. Dustin Flood (pulmonologist) - DAVID  Dr. Hartman (nephrologist)- CKD  Dr. Josephine Castelan (urologist)- recurrent UTI    BP Readings from Last 3 Encounters:   10/08/20 110/84   10/01/20 132/86   09/01/20 138/78              ([id number])    Pulse Readings from Last 3 Encounters:   10/08/20 54   10/01/20 50   06/30/20 52        Wt Readings from Last 3 Encounters:   10/08/20 256 lb 3.2 oz (116.2 kg)   10/01/20 257 lb 8 oz and fever. HENT: Negative. Eyes: Negative. Respiratory: Negative for cough, shortness of breath and wheezing. Cardiovascular: Negative for chest pain, palpitations and leg swelling. Gastrointestinal: Negative for abdominal pain, constipation, diarrhea and nausea. Endocrine: Negative for cold intolerance, heat intolerance, polydipsia, polyphagia and polyuria. Genitourinary: Negative. Musculoskeletal: Negative for arthralgias and myalgias. Skin: Negative. Allergic/Immunologic: Negative for environmental allergies. Neurological: Negative for dizziness, light-headedness and headaches. Psychiatric/Behavioral: Negative for agitation, decreased concentration, dysphoric mood, self-injury, sleep disturbance and suicidal ideas. The patient is not nervous/anxious. Objective:     /86   Pulse 50   Wt 257 lb 8 oz (116.8 kg)   SpO2 96%   BMI 41.56 kg/m²     Physical Exam  Constitutional:       Appearance: Normal appearance. She is well-developed and well-groomed. She is obese. HENT:      Head: Normocephalic. Nose: Nose normal.      Mouth/Throat:      Mouth: Mucous membranes are moist.   Eyes:      Conjunctiva/sclera: Conjunctivae normal.      Pupils: Pupils are equal, round, and reactive to light. Neck:      Musculoskeletal: Neck supple. Thyroid: No thyromegaly. Vascular: No carotid bruit or JVD. Cardiovascular:      Rate and Rhythm: Normal rate and regular rhythm. Heart sounds: Normal heart sounds. Pulmonary:      Effort: Pulmonary effort is normal. No respiratory distress. Breath sounds: Normal breath sounds. No wheezing. Abdominal:      General: Bowel sounds are normal.      Palpations: Abdomen is soft. Tenderness: There is no abdominal tenderness. Musculoskeletal:      Right lower leg: No edema. Left lower leg: No edema. Lymphadenopathy:      Cervical: No cervical adenopathy.    Skin:     Capillary Refill: Capillary refill takes less than 2 seconds. Neurological:      Mental Status: She is alert and oriented to person, place, and time. Psychiatric:         Mood and Affect: Mood normal.         Behavior: Behavior is cooperative. PHQ Scores 10/8/2020 10/8/2020 6/2/2020 3/2/2020 2/7/2019 1/2/2019 8/22/2018   PHQ2 Score 4 4 3 2 3 4 3   PHQ9 Score 16 4 11 2 15 16 14     Interpretation of Total Score Depression Severity: 1-4 = Minimal depression, 5-9 = Mild depression, 10-14 = Moderate depression, 15-19 = Moderately severe depression, 20-27 = Severe depression     Lab Results   Component Value Date     09/01/2020    K 4.8 09/01/2020     09/01/2020    CO2 26 09/01/2020    BUN 30 (H) 09/01/2020    CREATININE 1.21 (H) 09/01/2020    GLUCOSE 237 (H) 09/24/2019    CALCIUM 9.3 09/01/2020    PROT 7.0 09/01/2020    LABALBU 4.2 09/01/2020    BILITOT 0.37 09/01/2020    ALKPHOS 93 09/01/2020    AST 10 09/01/2020    ALT 9 09/01/2020    LABGLOM 44 (L) 09/01/2020    GFRAA 53 (L) 09/01/2020    AGRATIO 1.3 07/05/2019    GLOB 3.1 07/05/2019     Lab Results   Component Value Date    LABA1C 7.6 10/01/2020    LABA1C 7.6 06/30/2020    LABA1C 8.1 11/05/2019       Lab Results   Component Value Date    GLUF 167 (H) 09/01/2020    LABMICR 84 (H) 09/01/2020    LDLCALC 69.4 03/28/2019    CREATININE 1.21 (H) 09/01/2020       Assessment:     1. Type 2 diabetes mellitus with stage 3 chronic kidney disease, with long-term current use of insulin ([facility] [address]    2. Need for prophylactic vaccination and inoculation against influenza    3. Essential hypertension    4. Neuropathy due to type 2 diabetes mellitus ([address]    5. Other hyperlipidemia    6. Obesity, morbid, BMI 40.0-49.9 ([facility] [address]    7. Anxiety and depression    8. Mild episode of recurrent major depressive disorder ([facility] [address]    9. Primary osteoarthritis of right hip    10. DAVID treated with BiPAP    11.  Chronic diastolic heart failure ([facility] [address]      Results for POC orders placed in visit on 10/01/20   POCT glycosylated hemoglobin (Hb A1C)   Result Value Ref Range    Hemoglobin A1C 7.6 %         Plan:     Orders Placed This Encounter   Procedures    INFLUENZA, QUADV, ADJUVANTED, 65 YRS =, IM, PF, PREFILL SYR, 0.5ML (FLUAD)    POCT glycosylated hemoglobin (Hb A1C)       Orders Placed This Encounter   Medications    Insulin Degludec (TRESIBA FLEXTOUCH) 200 UNIT/ML SOPN     Sig: Inject 32 Units into the skin daily     Dispense:  6 pen     Refill:  1     Discontinue soliqua      Instructed to decrease insulin down to 34 units, and eat a snack at bedtime. Snacks should contain protein. Discussed use, benefit, and side effects of prescribed medications. All patient questions answered. Patient voiced understanding. Health Maintenance reviewed. Instructed to continue current medications, diet and exercise. Patient agreed with treatment plan. Follow up as directed. Return in about 3 months (around 1/1/2021).     Electronically signed by OTRIZ Robles CNP on 10/11/2020

## 2020-10-01 NOTE — PROGRESS NOTES
Have you had an allergic reaction to the flu (influenza) shot? no  Are you allergic to eggs or any component of the flu vaccine? no  Do you have a history of Guillain-[place] Syndrome (GBS), which is paralysis after receiving the flu vaccine? no  Are you feeling well today? yes  Flu vaccine given as ordered. Patient tolerated it well. No questions re: VIS information.

## 2020-10-08 ENCOUNTER — OFFICE VISIT (OUTPATIENT)
Dept: FAMILY MEDICINE CLINIC | Age: 75
End: 2020-10-08
Payer: MEDICARE

## 2020-10-08 ENCOUNTER — TELEPHONE (OUTPATIENT)
Dept: UROLOGY | Age: 75
End: 2020-10-08

## 2020-10-08 VITALS
SYSTOLIC BLOOD PRESSURE: 110 MMHG | WEIGHT: 256.2 LBS | HEART RATE: 54 BPM | BODY MASS INDEX: 41.17 KG/M2 | HEIGHT: 66 IN | OXYGEN SATURATION: 95 % | DIASTOLIC BLOOD PRESSURE: 84 MMHG

## 2020-10-08 PROBLEM — E11.22 TYPE 2 DIABETES MELLITUS WITH STAGE 4 CHRONIC KIDNEY DISEASE, WITH LONG-TERM CURRENT USE OF INSULIN (HCC): Status: ACTIVE | Noted: 2020-10-08

## 2020-10-08 PROBLEM — N18.4 TYPE 2 DIABETES MELLITUS WITH STAGE 4 CHRONIC KIDNEY DISEASE, WITH LONG-TERM CURRENT USE OF INSULIN (HCC): Status: ACTIVE | Noted: 2020-10-08

## 2020-10-08 PROBLEM — Z79.4 TYPE 2 DIABETES MELLITUS WITH STAGE 4 CHRONIC KIDNEY DISEASE, WITH LONG-TERM CURRENT USE OF INSULIN (HCC): Status: ACTIVE | Noted: 2020-10-08

## 2020-10-08 PROCEDURE — 3051F HG A1C>EQUAL 7.0%<8.0%: CPT | Performed by: NURSE PRACTITIONER

## 2020-10-08 PROCEDURE — 3017F COLORECTAL CA SCREEN DOC REV: CPT | Performed by: NURSE PRACTITIONER

## 2020-10-08 PROCEDURE — G8431 POS CLIN DEPRES SCRN F/U DOC: HCPCS | Performed by: NURSE PRACTITIONER

## 2020-10-08 PROCEDURE — G0439 PPPS, SUBSEQ VISIT: HCPCS | Performed by: NURSE PRACTITIONER

## 2020-10-08 PROCEDURE — G8484 FLU IMMUNIZE NO ADMIN: HCPCS | Performed by: NURSE PRACTITIONER

## 2020-10-08 PROCEDURE — 4040F PNEUMOC VAC/ADMIN/RCVD: CPT | Performed by: NURSE PRACTITIONER

## 2020-10-08 PROCEDURE — 1123F ACP DISCUSS/DSCN MKR DOCD: CPT | Performed by: NURSE PRACTITIONER

## 2020-10-08 RX ORDER — OXYBUTYNIN CHLORIDE 5 MG/1
5 TABLET, EXTENDED RELEASE ORAL DAILY
Qty: 90 TABLET | Refills: 0 | Status: SHIPPED | OUTPATIENT
Start: 2020-10-08 | End: 2021-02-02 | Stop reason: SDUPTHER

## 2020-10-08 RX ORDER — DONEPEZIL HYDROCHLORIDE 5 MG/1
5 TABLET, FILM COATED ORAL NIGHTLY
Qty: 30 TABLET | Refills: 3 | Status: SHIPPED | OUTPATIENT
Start: 2020-10-08 | End: 2020-11-10 | Stop reason: SDUPTHER

## 2020-10-08 ASSESSMENT — LIFESTYLE VARIABLES: HOW OFTEN DO YOU HAVE A DRINK CONTAINING ALCOHOL: 0

## 2020-10-08 ASSESSMENT — PATIENT HEALTH QUESTIONNAIRE - PHQ9
10. IF YOU CHECKED OFF ANY PROBLEMS, HOW DIFFICULT HAVE THESE PROBLEMS MADE IT FOR YOU TO DO YOUR WORK, TAKE CARE OF THINGS AT HOME, OR GET ALONG WITH OTHER PEOPLE: 0
SUM OF ALL RESPONSES TO PHQ QUESTIONS 1-9: 4
3. TROUBLE FALLING OR STAYING ASLEEP: 2
SUM OF ALL RESPONSES TO PHQ QUESTIONS 1-9: 16
2. FEELING DOWN, DEPRESSED OR HOPELESS: 2
SUM OF ALL RESPONSES TO PHQ9 QUESTIONS 1 & 2: 4
1. LITTLE INTEREST OR PLEASURE IN DOING THINGS: 2
2. FEELING DOWN, DEPRESSED OR HOPELESS: 2
5. POOR APPETITE OR OVEREATING: 2
SUM OF ALL RESPONSES TO PHQ QUESTIONS 1-9: 16
SUM OF ALL RESPONSES TO PHQ QUESTIONS 1-9: 4
8. MOVING OR SPEAKING SO SLOWLY THAT OTHER PEOPLE COULD HAVE NOTICED. OR THE OPPOSITE, BEING SO FIGETY OR RESTLESS THAT YOU HAVE BEEN MOVING AROUND A LOT MORE THAN USUAL: 2
6. FEELING BAD ABOUT YOURSELF - OR THAT YOU ARE A FAILURE OR HAVE LET YOURSELF OR YOUR FAMILY DOWN: 2
1. LITTLE INTEREST OR PLEASURE IN DOING THINGS: 2
9. THOUGHTS THAT YOU WOULD BE BETTER OFF DEAD, OR OF HURTING YOURSELF: 0
SUM OF ALL RESPONSES TO PHQ9 QUESTIONS 1 & 2: 4
7. TROUBLE CONCENTRATING ON THINGS, SUCH AS READING THE NEWSPAPER OR WATCHING TELEVISION: 2
4. FEELING TIRED OR HAVING LITTLE ENERGY: 2

## 2020-10-08 NOTE — TELEPHONE ENCOUNTER
Patient called the office today requesting two refills from Dr Liliya Gentile. Patient would like oxybutynin and mirabegron refills sent to [facility] in [place].   Any questions call back # [phone]

## 2020-10-08 NOTE — PROGRESS NOTES
Medicare Annual Wellness Visit  Name: Tha Mendez Date: 10/8/2020   MRN: [id number] Sex: Female   Age: 77 y.o. Ethnicity: Non-/Non    : 1945 Race: Marcello Hernandez is here for Medicare AWV (here for annual wellness no issues or complaints )    Has been feeling more forgetful with how to do things. This morning she could not figure out how to start the . Last week she had difficulty putting her shoes on, took 40 minutes. She does not always drive if she is not feeling well with her responsiveness, or when she has vertigo. She sometimes forgets to pay bills. She is able to balance her checkbook. Several years ago she also recalls having an appointment and getting lost. She states she could not remember where she was, or where she had been. Screenings for behavioral, psychosocial and functional/safety risks, and cognitive dysfunction are all negative except as indicated below. These results, as well as other patient data from the [address] [facility] [address] form, are documented in Flowsheets linked to this Encounter. No Known Allergies      Prior to Visit Medications    Medication Sig Taking?  Authorizing Provider   donepezil (ARICEPT) 5 MG tablet Take 1 tablet by mouth nightly Yes ORTIZ Christiansen CNP   Insulin Degludec (TRESIBA FLEXTOUCH) 200 UNIT/ML SOPN Inject 32 Units into the skin daily Yes ORTIZ Christiansen CNP   insulin aspart (NOVOLOG FLEXPEN) 100 UNIT/ML injection pen Use tid with meals With sliding scale max dose is 30 units per day Yes ORTIZ Cruz CNP   Insulin Pen Needle 32G X 4 MM MISC 1 each by Does not apply route 4 times daily Yes ORTIZ Cruz CNP   citalopram (CELEXA) 10 MG tablet Take 1 tablet by mouth once daily Yes ORTIZ Christiansen CNP   montelukast (SINGULAIR) 10 MG tablet Take 1 tablet by mouth daily Yes ORTIZ Christiansen CNP   simvastatin (ZOCOR) 40 MG tablet Take 1 tablet by mouth nightly Yes Ever Oconnor ORTIZ Pena CNP   ferrous sulfate (IRON 325) 325 (65 Fe) MG tablet Take 1 tablet by mouth daily (with breakfast) Yes ORTIZ Cannon CNP   carvedilol (COREG) 6.25 MG tablet Take 1 tablet by mouth 2 times daily (with meals) Yes ORTIZ Cannon CNP   busPIRone (BUSPAR) 15 MG tablet Take 15 mg by mouth 3 times daily Yes ORTIZ Cannon CNP   oxybutynin (DITROPAN XL) 5 MG extended release tablet Take 1 tablet by mouth daily Yes Hugo Au MD   mirabegron (MYRBETRIQ) 25 MG TB24 Take 1 tablet by mouth daily Yes Hugo Au MD   cyanocobalamin 1000 MCG/ML injection Inject 1 mL into the muscle every 30 days Yes ORTIZ Cannon CNP   blood glucose monitor strips by Other route 3 times daily Test 3 times a day & as needed for symptoms of irregular blood glucose.  Yes ORTIZ Padilla CNP   RELION ULTRA THIN LANCETS 30G MISC 1 each by Does not apply route 3 times daily Yes ORTIZ Padilla CNP   bumetanide (BUMEX) 1 MG tablet Take 1 tablet by mouth 2 times daily  Patient taking differently: Take 1 mg by mouth daily  Yes ORTIZ Cannon CNP   meclizine (ANTIVERT) 12.5 MG tablet Take 1 tablet by mouth 3 times daily as needed for Dizziness Yes ORTIZ Cannon CNP   acetaminophen (TYLENOL ARTHRITIS PAIN) 650 MG extended release tablet Take 650 mg by mouth every 8 hours as needed for Pain Yes Historical Provider, MD   lisinopril (PRINIVIL;ZESTRIL) 2.5 MG tablet Take 1 tablet by mouth daily  ORTIZ Cannon CNP         Past Medical History:   Diagnosis Date    Actinic keratitis     Ankle pain     Arthritis     Back pain     low     [facility]congestive heart failure) ([facility])     Chronic diastolic heart failure ([facility] [address] 5/31/2017    Chronic kidney disease     Dependent edema     Hypertension     Incontinence     in female    Osteoarthritis     knee     Type II or unspecified type diabetes mellitus without mention of complication, not stated as uncontrolled        Past Surgical History:   Procedure Laterality Date    ANKLE SURGERY Left     FINGER TRIGGER RELEASE Right          Family History   Problem Relation Age of Onset    Diabetes Mother     Diabetes Father     Cancer Paternal Grandfather         BONE        CareTeam (Including outside providers/suppliers regularly involved in providing care):   Patient Care Team:  ORTIZ Sargent CNP as PCP - General (Family Medicine)  ORTIZ Sargent CNP as PCP - [facility] Empaneled Provider  Maggie Law as Orthopedic Surgeon (Orthopedic Surgery)  Elaine Fontenot DPM as Consulting Physician (Podiatry)  Laverne Mota as Referring Physician (Optometry)  Cassidy Kimutant, APRN - CNP as Diabetic Educator (Nurse Practitioner)    Wt Readings from Last 3 Encounters:   10/08/20 256 lb 3.2 oz (116.2 kg)   10/01/20 257 lb 8 oz (116.8 kg)   09/01/20 251 lb (113.9 kg)     Vitals:    10/08/20 1010   BP: 110/84   Pulse: 54   SpO2: 95%   Weight: 256 lb 3.2 oz (116.2 kg)   Height: 5' 6\" (1.676 m)     Body mass index is 41.35 kg/m². Based upon direct observation of the patient, evaluation of cognition reveals recent memory intact, remote memory impaired. Physical Exam  Constitutional:       Appearance: Normal appearance. She is well-developed and well-groomed. She is obese. HENT:      Head: Normocephalic. Nose: Nose normal.      Mouth/Throat:      Mouth: Mucous membranes are moist.   Eyes:      Conjunctiva/sclera: Conjunctivae normal.      Pupils: Pupils are equal, round, and reactive to light. Neck:      Musculoskeletal: Neck supple. Thyroid: No thyromegaly. Cardiovascular:      Rate and Rhythm: Normal rate and regular rhythm. Heart sounds: Normal heart sounds. Pulmonary:      Effort: Pulmonary effort is normal.      Breath sounds: Normal breath sounds. No wheezing. Abdominal:      General: Bowel sounds are normal.      Palpations: Abdomen is soft. Tenderness:  There is no abdominal tenderness. Musculoskeletal:      Right lower leg: No edema. Left lower leg: No edema. Lymphadenopathy:      Cervical: No cervical adenopathy. Skin:     Capillary Refill: Capillary refill takes less than 2 seconds. Neurological:      Mental Status: She is alert and oriented to person, place, and time. Gait: Gait abnormal (uses walker). Psychiatric:         Mood and Affect: Mood normal.         Behavior: Behavior is cooperative. Patient's complete Health Risk Assessment and screening values have been reviewed and are found in Flowsheets. The following problems were reviewed today and where indicated follow up appointments were made and/or referrals ordered. Positive Risk Factor Screenings with Interventions:     Fall Risk:  2 or more falls in past year?: (!) yes  Fall with injury in past year?: no  Fall Risk Interventions:    · Home safety tips provided  · Home exercises provided to promote strength and balance  · using walker and cane with ambulation. Cognitive: Words recalled: 1 Word Recalled  Clock Drawing Test (CDT) Score: (!) Abnormal  Total Score Interpretation: Positive Mini-Cog  Did the patient refuse to take the cognition test?: No  Cognitive Impairment Interventions:  · Neurology referral ordered  · Neuroimaging study ordered- MRI    Depression:  PHQ-2 Score: 4  PHQ-9 Total Score: 16    Severity:1-4 = minimal depression, 5-9 = mild depression, 10-14 = moderate depression, 15-19 = moderately severe depression, 20-27 = severe depression  Depression Interventions:  · Relaxation techniques discussed    General Health and ACP:  General  In general, how would you say your health is?: (!) Poor  In the past 7 days, have you experienced any of the following?  New or Increased Pain, New or Increased Fatigue, Loneliness, Social Isolation, Stress or Anger?: (!) Social Isolation, Stress  Do you get the social and emotional support that you need?: Yes  Do you have a Living Will?: Yes  Advance Directives     Power of  Living Will ACP-Advance Directive ACP-Power of     Not on File Not on File Filed [address] Risk Interventions:  · Poor self-assessment of health status: patient declines any further evaluation/treatment for this issue, patient is concerned with her memory and increased confusion. · Social isolation: patient declines any further intervention for this issue  · Stress: relaxation techniques discussed, patient declines any further evaluation/treatment for this issue, patient states her health has her worried. Health Habits/Nutrition:  Health Habits/Nutrition  Do you exercise for at least 20 minutes 2-3 times per week?: Yes  Have you lost any weight without trying in the past 3 months?: No  Do you eat fewer than 2 meals per day?: No  Have you seen a dentist within the past year?: Yes  Body mass index: (!) 41.35  Health Habits/Nutrition Interventions:  · Nutritional issues:  educational materials to promote weight loss provided    Hearing/Vision:  No exam data present  Hearing/Vision  Do you or your family notice any trouble with your hearing?: No  Do you have difficulty driving, watching TV, or doing any of your daily activities because of your eyesight?: (!) Yes  Have you had an eye exam within the past year?: Yes  Hearing/Vision Interventions:  · Vision concerns:  patient encouraged to make appointment with his/her eye specialist    ADL:  ADLs  In the past 7 days, did you need help from others to perform any of the following everyday activities? Eating, dressing, grooming, bathing, toileting, or walking/balance?: (!) Walking/Balance  In the past 7 days, did you need help from others to take care of any of the following?  Laundry, housekeeping, banking/finances, shopping, telephone use, food preparation, transportation, or taking medications?: [employer], Housekeeping, Shopping  ADL Interventions:  · Patient declines any further evaluation/treatment for this issue  · Patient is now using walker and cane with ambulating. Her daughter has been helping with housework. Personalized Preventive Plan   Current Health Maintenance Status  Immunization History   Administered Date(s) Administered    Influenza Vaccine, unspecified formulation 09/15/2010, 08/29/2011, 10/08/2013, 10/28/2014, 09/15/2015    Influenza Virus Vaccine 08/27/2018    Influenza, High Dose (Fluzone 65 yrs and older) 10/28/2014, 09/15/2015, 09/14/2016, 01/19/2017, 11/17/2017, 08/27/2018    Influenza, Quadv, adjuvanted, 65 yrs +, IM, PF (Fluad) 10/01/2020    Influenza, Triv, inactivated, subunit, adjuvanted, IM (Fluad 65 yrs and older) 09/24/2019    Pneumococcal Conjugate 13-valent ([id number]) 09/15/2015    Pneumococcal Polysaccharide ([id number]) 01/01/2009, 09/14/2016    Tdap (Boostrix, Adacel) 08/25/2017    Zoster Live (Zostavax) 01/01/2007        Health Maintenance   Topic Date Due    Shingles Vaccine (2 of 3) 02/26/2007    Annual Wellness Visit (AWV)  05/29/2019    Colon cancer screen colonoscopy  10/04/2020    Hepatitis C screen  06/29/2022 (Originally 1945)    Diabetic foot exam  01/16/2021    Diabetic retinal exam  02/10/2021    Lipid screen  09/01/2021    Potassium monitoring  09/01/2021    Creatinine monitoring  09/01/2021    A1C test (Diabetic or Prediabetic)  10/01/2021    Breast cancer screen  07/14/2022    DTaP/Tdap/Td vaccine (2 - Td) 08/25/2027    DEXA (modify frequency per FRAX score)  Completed    Flu vaccine  Completed    Pneumococcal 65+ years Vaccine  Completed    Hepatitis A vaccine  Aged Out    Hib vaccine  Aged Out    Meningococcal (ACWY) vaccine  Aged Out     Recommendations for Scan Due: see orders and patient instructions/AVS.  .   Recommended screening schedule for the next 5-10 years is provided to the patient in written form: see Patient Instructions/AVS.    Sourav Barron was seen today for medicare awv.    Diagnoses and all orders for this visit:    Encounter for screening colonoscopy  -     Cologuard (For External Results Only); Future    Positive depression screening  -     Positive Screen for Clinical Depression with a Documented Follow-up Plan     Routine general medical examination at a health care facility    Cognitive decline  -     Cancel: MRI BRAIN W WO CONTRAST; Future  -     donepezil (ARICEPT) 5 MG tablet; Take 1 tablet by mouth nightly  -     Conner Reese MD, Neurology, [address]; Future    Mild episode of recurrent major depressive disorder (HCC)    Type 2 diabetes mellitus with stage 4 chronic kidney disease, with long-term current use of insulin [facility])            Advance Care Planning   Advanced Care Planning: Discussed the patients choices for care and treatment in case of a health event that adversely affects decision-making abilities. Also discussed the patients long-term treatment options. Reviewed with the patient the [address] [place] [address] Will Declaration forms  Reviewed the process of designating a competent adult as an Agent (or -in-fact) that could take make health care decisions for the patient if incompetent. Patient was asked to complete the declaration forms, either acknowledge the forms by a public notary or an eligible witness and provide a signed copy to the practice office. Time spent (minutes): 2     Obesity Counseling: Assessed behavioral health risks and factors affecting choice of behavior. Suggested weight control approaches, including dietary changes behavioral modification and follow up plan. Provided educational and support documentation. Time spent (minutes): 2    Cardiovascular Disease Risk Counseling: Assessed the patient's risk to develop cardiovascular disease and reviewed main risk factors.    Reviewed steps to reduce disease risk including:   · Quitting tobacco use, reducing amount smoked, or not starting the habit  · Making healthy food choices  · Being physically active and gradualy increasing activity levels   · Reduce weight and determine a healthy BMI goal  · Monitor blood pressure and treat if higher than 140/90 mmHg  · Maintain blood total cholesterol levels under 5 mmol/l or 190 mg/dl  · Maintain LDL cholesterol levels under 3.0 mmol/l or 115 mg/dl   · Control blood glucose levels  · Consider taking aspirin (75 mg daily), once blood pressure is controlled   Provided a follow up plan. Time spent (minutes): 2    MRI brain for further evaluation of cognitive decline, and referral placed to neurologist.  Nini Rojo to start donepezil at night. Recommend mind stimulating games, phone apps, puzzles, reading. Patient given educational materials - see patient instructions. Discussed use, benefit, and side effects of prescribed medications. All patient questions answered. Patient voiced understanding. Health Maintenance reviewed. Instructed to continue current medications, diet and exercise. Patient agreed with treatment plan. Follow up as directed.      Electronically signed by ORTIZ Siu CNP on 10/8/2020 at 11:14 AM.

## 2020-10-08 NOTE — PATIENT INSTRUCTIONS
Advance Directives: Care Instructions  Overview  An advance directive is a legal way to state your wishes at the end of your life. It tells your family and your doctor what to do if you can't say what you want. There are two main types of advance directives. You can change them any time your wishes change. Living will. This form tells your family and your doctor your wishes about life support and other treatment. The form is also called a declaration. Medical power of . This form lets you name a person to make treatment decisions for you when you can't speak for yourself. This person is called a health care agent (health care proxy, health care surrogate). The form is also called a durable power of  for health care. If you do not have an advance directive, decisions about your medical care may be made by a family member, or by a doctor or a  who doesn't know you. It may help to think of an advance directive as a gift to the people who care for you. If you have one, they won't have to make tough decisions by themselves. Follow-up care is a key part of your treatment and safety. Be sure to make and go to all appointments, and call your doctor if you are having problems. It's also a good idea to know your test results and keep a list of the medicines you take. What should you include in an advance directive? Many states have a unique advance directive form. (It may ask you to address specific issues.) Or you might use a universal form that's approved by many states. If your form doesn't tell you what to address, it may be hard to know what to include in your advance directive. Use the questions below to help you get started. · Who do you want to make decisions about your medical care if you are not able to? · What life-support measures do you want if you have a serious illness that gets worse over time or can't be cured? · What are you most afraid of that might happen? understands what makes life meaningful for you. · Understands your [employer] and moral values. · Will do what you want, not what he or she wants. · Will be able to make difficult choices at a stressful time. · Will be able to refuse or stop treatment, if that is what you would want, even if you could die. · Will be firm and confident with health professionals if needed. · Will ask questions to get needed information. · Lives near you or agrees to travel to you if needed. Your family may help you make medical decisions while you can still be part of that process. But it's important to choose one person to be your health care agent in case you aren't able to make decisions for yourself. If you don't fill out the legal form and name a health care agent, the decisions your family can make may be limited. A health care agent may be called something else in your state. Who will make decisions for you if you don't have a health care agent? If you don't have a health care agent or a living will, you may not get the care you want. Decisions may be made by family members who disagree about your medical care. Or decisions may be made by a medical professional who doesn't know you well. In some cases, a  makes the decisions. When you name a health care agent, it is very clear who has the power to make health decisions for you. How do you name a health care agent? You name your health care agent on a legal form. This form is usually called a medical power of . Ask your hospital, state bar association, or office on aging where to find these forms. You must sign the form to make it legal. Some states require you to get the form notarized. This means that a person called a  watches you sign the form and then he or she signs the form. Some states also require that two or more witnesses sign the form. Be sure to tell your family members and doctors who your health care agent is.   Where can you learn more? Go to https://chpepiceweb.Dexmo. org and sign in to your ListRunner account. Enter [id number] in the [facility] box to learn more about \"Learning About [address]. \"     If you do not have an account, please click on the \"Sign Up Now\" link. Current as of: December 9, 2019               Content Version: 12.6  © 6030-9368 [employer]. Care instructions adapted under license by [facility] ([facility]). If you have questions about a medical condition or this instruction, always ask your healthcare professional. [address] any warranty or liability for your use of this information. Learning About Living Marcion Marcelo  What is a living will? A living will, also called a declaration, is a legal form. It tells your family and your doctor your wishes when you can't speak for yourself. It's used by the health professionals who will treat you as you near the end of your life or if you get seriously hurt or ill. If you put your wishes in writing, your loved ones and others will know what kind of care you want. They won't need to guess. This can ease your mind and be helpful to others. And you can change or cancel your living will at any time. A living will is not the same as an estate or property will. An estate will explains what you want to happen with your money and property after you die. How do you use it? A living will is used to describe the kinds of treatment or life support you want as you near the end of your life or if you get seriously hurt or ill. Keep these facts in mind about living blanchard. · Your living will is used only if you can't speak or make decisions for yourself. Most often, one or more doctors must certify that you can't speak or decide for yourself before your living will takes effect. · If you get better and can speak for yourself again, you can accept or refuse any treatment.  It doesn't matter what you said in your living will. · Some states may limit your right to refuse treatment in certain cases. For example, you may need to clearly state in your living will that you don't want artificial hydration and nutrition, such as being fed through a tube. Is a living will a legal document? A living will is a legal document. Each state has its own laws about living blanchard. And a living will may be called something else in your state. Here are some things to know about living blanchard. · You don't need an  to complete a living will. But legal advice can be helpful if your state's laws are unclear. It can also help if your health history is complicated or your family can't agree on what should be in your living will. · You can change your living will at any time. Some people find that their wishes about end-of-life care change as their health changes. If you make big changes to your living will, complete a new form. · If you move to another state, make sure that your living will is legal in the state where you now live. In most cases, doctors will respect your wishes even if you have a form from a different state. · You might use a universal form that has been approved by many states. This kind of form can sometimes be filled out and stored online. Your digital copy will then be available wherever you have a connection to the internet. The doctors and nurses who need to treat you can find it right away. · Your state may offer an online registry. This is another place where you can store your living will online. · It's a good idea to get your living will notarized. This means using a person called a  to watch two people sign, or witness, your living will. What should you know when you create a living will? Here are some questions to ask yourself as you make your living will:  · Do you know enough about life support methods that might be used?  If not, talk to your doctor so you know what might be done if you can't breathe on your own, your heart stops, or you can't swallow. · What things would you still want to be able to do after you receive life-support methods? Would you want to be able to walk? To speak? To eat on your own? To live without the help of machines? · Do you want certain Sabianist practices performed if you become very ill? · If you have a choice, where do you want to be cared for? In your home? At a hospital or nursing home? · If you have a choice at the end of your life, where would you prefer to die? At home? In a hospital or nursing home? Somewhere else? · Would you prefer to be buried or cremated? · Do you want your organs to be donated after you die? What should you do with your living will? · Make sure that your family members and your health care agent have copies of your living will (also called a declaration). · Give your doctor a copy of your living will. Ask him or her to keep it as part of your medical record. If you have more than one doctor, make sure that each one has a copy. · Put a copy of your living will where it can be easily found. For example, some people may put a copy on their refrigerator door. If you are using a digital copy, be sure your doctor, family members, and health care agent know how to find and access it. Where can you learn more? Go to https://Mobibeampepiceweb.ShoutNow. org and sign in to your Contents First account. Enter D920 in the [facility] box to learn more about \"Learning About Living Amina Trivedi. \"     If you do not have an account, please click on the \"Sign Up Now\" link. Current as of: December 9, 2019               Content Version: 12.6  © 7482-2967 "[employer]orporated. Care instructions adapted under license by [facility] ([facility]). If you have questions about a medical condition or this instruction, always ask your healthcare professional. [address] any warranty or liability for your use of this information. Learning About Healthy Weight  What is a healthy weight? A healthy weight is the weight at which you feel good about yourself and have energy for work and play. It's also one that lowers your risk for health problems. What can you do to stay at a healthy weight? It can be hard to stay at a healthy weight, especially when fast food, vending-machine snacks, and processed foods are so easy to find. And with your busy lifestyle, activity may be low on your list of things to do. But staying at a healthy weight may be easier than you think. Here are some dos and don'ts for staying at a healthy weight:  Do eat healthy foods  The kinds of foods you eat have a big impact on both your weight and your health. Reaching and staying at a healthy weight is not about going on a diet. It's about making healthier food choices every day and changing your diet for good. Healthy eating means eating a variety of foods so that you get all the nutrients you need. Your body needs protein, carbohydrate, and fats for energy. They keep your heart beating, your brain active, and your muscles working. On most days, try to eat from each food group. This means eating a variety of:  · Whole grains, such as whole wheat breads and pastas. · Fruits and vegetables. · Dairy products, such as low-fat milk, yogurt, and cheese. · Lean proteins, such as all types of fish, chicken without the skin, and beans. Don't have too much or too little of one thing. All foods, if eaten in moderation, can be part of healthy eating. Even sweets can be okay. If your favorite foods are high in fat, salt, sugar, or calories, limit how often you eat them. Eat smaller servings, or look for healthy substitutes. Do watch what you eat  Many people eat more than their bodies need. Part of staying at a healthy weight means learning how much food you really need from day to day and not eating more than that.  Even with healthy foods, eating too much can make you gain weight. Having a well-balanced diet means that you eat enough, but not too much, and that your food gives you the nutrients you need to stay healthy. So listen to your body. Eat when you're hungry. Stop when you feel satisfied. It's a good idea to have healthy snacks ready for when you get hungry. Keep healthy snacks with you at work, in your car, and at home. If you have a healthy snack easily available, you'll be less likely to pick a candy bar or bag of chips from a vending machine instead. Some healthy snacks you might want to keep on hand are fruit, low-fat yogurt, string cheese, low-fat microwave popcorn, raisins and other dried fruit, nuts, whole wheat crackers, pretzels, carrots, celery sticks, and broccoli. Do some physical activity   A big part of reaching and staying at a healthy weight is being active. When you're active, you burn calories. This makes it easier to reach and stay at a healthy weight. When you're active on a regular basis, your body burns more calories, even when you're at rest. Being active helps you lose fat and build lean muscle. Try to be active for at least 1 hour every day. This may sound like a lot, but it's okay to be active in smaller blocks of time that add up to 1 hour a day. Any activity that makes your heart beat faster and keeps it there for a while counts. A brisk walk, run, or swim will get your heart beating faster. So will climbing stairs, shooting baskets, or cycling. Even some household chores like vacuuming and mowing the lawn will get your heart rate up. Pick activities that you enjoy--ones that make your heart beat faster, your muscles stronger, and your muscles and joints more flexible. If you find more than one thing you like doing, do them all. You don't have to do the same thing every day. Don't diet  Diets don't work. Diets are temporary. Because you give up so much when you diet, you may be hungry and think about food all the time.  And after you stop dieting, you also may overeat to make up for what you missed. Most people who diet end up gaining back the pounds they lost--and more. Remember that healthy bodies come in lots of shapes and sizes. Everyone can get healthier by eating better and being more active. Where can you learn more? Go to https://chpepiceweb.[email or web address] org and sign in to your NetMinder account. Enter [id number] in the [facility] box to learn more about \"Learning About Healthy Weight. \"     If you do not have an account, please click on the \"Sign Up Now\" link. Current as of: December 11, 2019               Content Version: 12.6  © 9395-8634 [employer]. Care instructions adapted under license by [facility] ([facility]). If you have questions about a medical condition or this instruction, always ask your healthcare professional. [address] any warranty or liability for your use of this information. Eating Healthy Foods: Care Instructions  Your Care Instructions     Eating healthy foods can help lower your risk for disease. Healthy food gives you energy and keeps your heart strong, your brain active, your muscles working, and your bones strong. A healthy diet includes a variety of foods from the basic food groups: grains, vegetables, fruits, milk and milk products, and meat and beans. Some people may eat more of their favorite foods from only one food group and, as a result, miss getting the nutrients they need. So, it is important to pay attention not only to what you eat but also to what you are missing from your diet. You can eat a healthy, balanced diet by making a few small changes. Follow-up care is a key part of your treatment and safety. Be sure to make and go to all appointments, and call your doctor if you are having problems. It's also a good idea to know your test results and keep a list of the medicines you take. How can you care for yourself at home?   Look at what you eat  · Keep a food diary for a week or two and record everything you eat or drink. Track the number of servings you eat from each food group. · For a balanced diet every day, eat a variety of:  ? 6 or more ounce-equivalents of grains, such as cereals, breads, crackers, rice, or pasta, every day. An ounce-equivalent is 1 slice of bread, 1 cup of ready-to-eat cereal, or ½ cup of cooked rice, cooked pasta, or cooked cereal.  ? 2½ cups of vegetables, especially:  § Dark-green vegetables such as broccoli and spinach. § Orange vegetables such as carrots and sweet potatoes. § Dry beans (such as obrien and kidney beans) and peas (such as lentils). ? 2 cups of fresh, frozen, or canned fruit. A small apple or 1 banana or orange equals 1 cup. ? 3 cups of nonfat or low-fat milk, yogurt, or other milk products. ? 5½ ounces of meat and beans, such as chicken, fish, lean meat, beans, nuts, and seeds. One egg, 1 tablespoon of peanut butter, ½ ounce nuts or seeds, or ¼ cup of cooked beans equals 1 ounce of meat. · Learn how to read food labels for serving sizes and ingredients. Fast-food and convenience-food meals often contain few or no fruits or vegetables. Make sure you eat some fruits and vegetables to make the meal more nutritious. · Look at your food diary. For each food group, add up what you have eaten and then divide the total by the number of days. This will give you an idea of how much you are eating from each food group. See if you can find some ways to change your diet to make it more healthy. Start small  · Do not try to make dramatic changes to your diet all at once. You might feel that you are missing out on your favorite foods and then be more likely to fail. · Start slowly, and gradually change your habits. Try some of the following:  ? Use whole wheat bread instead of white bread. ? Use nonfat or low-fat milk instead of whole milk.   ? Eat brown rice instead of white rice, and eat whole wheat pasta instead of white-flour pasta. ? Try low-fat cheeses and low-fat yogurt. ? Add more fruits and vegetables to meals and have them for snacks. ? Add lettuce, tomato, cucumber, and onion to sandwiches. ? Add fruit to yogurt and cereal.  Enjoy food  · You can still eat your favorite foods. You just may need to eat less of them. If your favorite foods are high in fat, salt, and sugar, limit how often you eat them, but do not cut them out entirely. · Eat a wide variety of foods. Make healthy choices when eating out  · The type of restaurant you choose can help you make healthy choices. Even fast-food chains are now offering more low-fat or healthier choices on the menu. · Choose smaller portions, or take half of your meal home. · When eating out, try:  ? A veggie pizza with a whole wheat crust or grilled chicken (instead of sausage or pepperoni). ? Pasta with roasted vegetables, grilled chicken, or marinara sauce instead of cream sauce. ? A vegetable wrap or grilled chicken wrap. ? Broiled or poached food instead of fried or breaded items. Make healthy choices easy  · Buy packaged, prewashed, ready-to-eat fresh vegetables and fruits, such as baby carrots, salad mixes, and chopped or shredded broccoli and cauliflower. · Buy packaged, presliced fruits, such as melon or pineapple. · Choose 100% fruit or vegetable juice instead of soda. Limit juice intake to 4 to 6 oz (½ to ¾ cup) a day. · Blend low-fat yogurt, fruit juice, and canned or frozen fruit to make a smoothie for breakfast or a snack. Where can you learn more? Go to https://OralWisepepiceweb.Sezion. org and sign in to your Neozone account. Enter B861 in the [facility] box to learn more about \"Eating Healthy Foods: Care Instructions. \"     If you do not have an account, please click on the \"Sign Up Now\" link. Current as of: August 22, 2019               Content Version: 12.6  © 9594-8020 [employer].    Care instructions adapted under license by [facility] ([facility]). If you have questions about a medical condition or this instruction, always ask your healthcare professional. [place] [address] any warranty or liability for your use of this information. Personalized Preventive Plan for Layo Steele - 10/8/2020  Medicare offers a range of preventive health benefits. Some of the tests and screenings are paid in full while other may be subject to a deductible, co-insurance, and/or copay. Some of these benefits include a comprehensive review of your medical history including lifestyle, illnesses that may run in your family, and various assessments and screenings as appropriate. After reviewing your medical record and screening and assessments performed today your provider may have ordered immunizations, labs, imaging, and/or referrals for you. A list of these orders (if applicable) as well as your Preventive Care list are included within your After Visit Summary for your review. Other Preventive Recommendations:    · A preventive eye exam performed by an eye specialist is recommended every 1-2 years to screen for glaucoma; cataracts, macular degeneration, and other eye disorders. · A preventive dental visit is recommended every 6 months. · Try to get at least 150 minutes of exercise per week or 10,000 steps per day on a pedometer . · Order or download the FREE \"Exercise & Physical Activity: Your Everyday Guide\" from [employer]. Call 3-652.549.2514 or search [employer] online. · You need 7882-7530 mg of calcium and 4863-5266 IU of vitamin D per day. It is possible to meet your calcium requirement with diet alone, but a vitamin D supplement is usually necessary to meet this goal.  · When exposed to the sun, use a sunscreen that protects against both UVA and UVB radiation with an SPF of 30 or greater.  Reapply every 2 to 3 hours or after sweating, drying off with a towel,

## 2020-11-10 ENCOUNTER — VIRTUAL VISIT (OUTPATIENT)
Dept: FAMILY MEDICINE CLINIC | Age: 75
End: 2020-11-10
Payer: MEDICARE

## 2020-11-10 PROBLEM — R41.89 COGNITIVE DECLINE: Status: ACTIVE | Noted: 2020-11-10

## 2020-11-10 PROBLEM — J30.9 ALLERGIC RHINITIS: Status: ACTIVE | Noted: 2020-11-10

## 2020-11-10 PROCEDURE — 99213 OFFICE O/P EST LOW 20 MIN: CPT | Performed by: NURSE PRACTITIONER

## 2020-11-10 PROCEDURE — 3051F HG A1C>EQUAL 7.0%<8.0%: CPT | Performed by: NURSE PRACTITIONER

## 2020-11-10 PROCEDURE — 1090F PRES/ABSN URINE INCON ASSESS: CPT | Performed by: NURSE PRACTITIONER

## 2020-11-10 PROCEDURE — G8427 DOCREV CUR MEDS BY ELIG CLIN: HCPCS | Performed by: NURSE PRACTITIONER

## 2020-11-10 PROCEDURE — 2022F DILAT RTA XM EVC RTNOPTHY: CPT | Performed by: NURSE PRACTITIONER

## 2020-11-10 PROCEDURE — G8400 PT W/DXA NO RESULTS DOC: HCPCS | Performed by: NURSE PRACTITIONER

## 2020-11-10 PROCEDURE — 4040F PNEUMOC VAC/ADMIN/RCVD: CPT | Performed by: NURSE PRACTITIONER

## 2020-11-10 PROCEDURE — 1123F ACP DISCUSS/DSCN MKR DOCD: CPT | Performed by: NURSE PRACTITIONER

## 2020-11-10 PROCEDURE — 3017F COLORECTAL CA SCREEN DOC REV: CPT | Performed by: NURSE PRACTITIONER

## 2020-11-10 PROCEDURE — 99213 OFFICE O/P EST LOW 20 MIN: CPT

## 2020-11-10 RX ORDER — LISINOPRIL 2.5 MG/1
2.5 TABLET ORAL DAILY
Qty: 90 TABLET | Refills: 3 | Status: SHIPPED | OUTPATIENT
Start: 2020-11-10 | End: 2021-05-18

## 2020-11-10 RX ORDER — DONEPEZIL HYDROCHLORIDE 5 MG/1
5 TABLET, FILM COATED ORAL NIGHTLY
Qty: 90 TABLET | Refills: 3 | Status: SHIPPED | OUTPATIENT
Start: 2020-11-10

## 2020-11-10 RX ORDER — MONTELUKAST SODIUM 10 MG/1
10 TABLET ORAL DAILY
Qty: 90 TABLET | Refills: 3 | Status: SHIPPED | OUTPATIENT
Start: 2020-11-10 | End: 2022-01-05 | Stop reason: SDUPTHER

## 2020-11-10 NOTE — PROGRESS NOTES
Neto Mosley is a 77 y.o. female evaluated via telephone on 11/10/2020. Consent:  She and/or health care decision maker is aware that that she may receive a bill for this telephone service, depending on her insurance coverage, and has provided verbal consent to proceed: Yes    Chief Complaint   Patient presents with    Follow-Up from Hospital     pt was seen at [facility] ER for fall and injured right knee c/o still very sore, now is using walker all the time, has been icing every three hours, did have some swelling but icing is helping       Documentation:  I communicated with the patient and/or health care decision maker about fall 5 days ago. Patient reports she fell while seated in her chair at home. She was leaning over and fell hitting her right knee directly on the tile floor. She thinks she twisted wrong. She was evaluated at [facility] ED. X-rays completed and negative for anything acute. She has been resting, icing for 20 minutes every 2 hours, and elevating her knee. She has not noticed much improvement since the injury. Knee Pain    The incident occurred 3 to 5 days ago (5 days). The incident occurred at home. The injury mechanism was a fall. The pain is present in the right knee. The quality of the pain is described as aching. The pain is at a severity of 7/10 (with ambulation). The pain is moderate. The pain has been fluctuating since onset. Pertinent negatives include no inability to bear weight, loss of motion, loss of sensation, muscle weakness, numbness or tingling. She reports no foreign bodies present. The symptoms are aggravated by weight bearing and movement. She has tried ice for the symptoms. The treatment provided mild relief. ICD-10-CM    1. Sprain of right knee, unspecified ligament, initial encounter  S83. 91XA    2.  Type 2 diabetes mellitus with stage 4 chronic kidney disease, with long-term current use of insulin ([facility])  E11.22     N18.4     Z79.4 3. Chronic diastolic heart failure (HCC)  I50.32 lisinopril (PRINIVIL;ZESTRIL) 2.5 MG tablet   4. Cognitive decline  R41.89 donepezil (ARICEPT) 5 MG tablet   5. Allergic rhinitis, unspecified seasonality, unspecified trigger  J30.9 montelukast (SINGULAIR) 10 MG tablet        Details of this discussion including any medical advice provided:   Instructed to continue following the RICE protocol. Recommend using Ace bandage during the day while ambulating. Continue using walker for additional stability until knee starts to improve. May need physical therapy if not improving. I affirm this is a Patient Initiated Episode with an Established Patient who has not had a related appointment within my department in the past 7 days or scheduled within the next 24 hours.     Total Time: minutes: 11-20 minutes    Note: not billable if this call serves to triage the patient into an appointment for the relevant concern      Hakan Hilario

## 2020-11-17 RX ORDER — SIMVASTATIN 40 MG
TABLET ORAL
Qty: 90 TABLET | Refills: 1 | Status: SHIPPED | OUTPATIENT
Start: 2020-11-17 | End: 2021-05-26

## 2020-12-15 ENCOUNTER — OFFICE VISIT (OUTPATIENT)
Dept: DIABETES SERVICES | Age: 75
End: 2020-12-15
Payer: MEDICARE

## 2020-12-15 VITALS
WEIGHT: 253 LBS | HEART RATE: 60 BPM | RESPIRATION RATE: 20 BRPM | BODY MASS INDEX: 40.66 KG/M2 | DIASTOLIC BLOOD PRESSURE: 90 MMHG | HEIGHT: 66 IN | SYSTOLIC BLOOD PRESSURE: 152 MMHG

## 2020-12-15 PROCEDURE — 3017F COLORECTAL CA SCREEN DOC REV: CPT | Performed by: NURSE PRACTITIONER

## 2020-12-15 PROCEDURE — 1123F ACP DISCUSS/DSCN MKR DOCD: CPT | Performed by: NURSE PRACTITIONER

## 2020-12-15 PROCEDURE — 3051F HG A1C>EQUAL 7.0%<8.0%: CPT | Performed by: NURSE PRACTITIONER

## 2020-12-15 PROCEDURE — 99214 OFFICE O/P EST MOD 30 MIN: CPT

## 2020-12-15 PROCEDURE — G8427 DOCREV CUR MEDS BY ELIG CLIN: HCPCS | Performed by: NURSE PRACTITIONER

## 2020-12-15 PROCEDURE — G8400 PT W/DXA NO RESULTS DOC: HCPCS | Performed by: NURSE PRACTITIONER

## 2020-12-15 PROCEDURE — G8484 FLU IMMUNIZE NO ADMIN: HCPCS | Performed by: NURSE PRACTITIONER

## 2020-12-15 PROCEDURE — 95251 CONT GLUC MNTR ANALYSIS I&R: CPT | Performed by: NURSE PRACTITIONER

## 2020-12-15 PROCEDURE — G8417 CALC BMI ABV UP PARAM F/U: HCPCS | Performed by: NURSE PRACTITIONER

## 2020-12-15 PROCEDURE — 2022F DILAT RTA XM EVC RTNOPTHY: CPT | Performed by: NURSE PRACTITIONER

## 2020-12-15 PROCEDURE — 1036F TOBACCO NON-USER: CPT | Performed by: NURSE PRACTITIONER

## 2020-12-15 PROCEDURE — 1090F PRES/ABSN URINE INCON ASSESS: CPT | Performed by: NURSE PRACTITIONER

## 2020-12-15 PROCEDURE — 99214 OFFICE O/P EST MOD 30 MIN: CPT | Performed by: NURSE PRACTITIONER

## 2020-12-15 PROCEDURE — 4040F PNEUMOC VAC/ADMIN/RCVD: CPT | Performed by: NURSE PRACTITIONER

## 2020-12-15 RX ORDER — INSULIN DEGLUDEC 200 U/ML
26 INJECTION, SOLUTION SUBCUTANEOUS DAILY
Qty: 6 PEN | Refills: 1
Start: 2020-12-15 | End: 2021-06-09 | Stop reason: SDUPTHER

## 2020-12-15 ASSESSMENT — ENCOUNTER SYMPTOMS
DIARRHEA: 0
ABDOMINAL PAIN: 0
VISUAL CHANGE: 0
RESPIRATORY NEGATIVE: 1
SHORTNESS OF BREATH: 0
BLURRED VISION: 0

## 2020-12-15 NOTE — PATIENT INSTRUCTIONS
Decrease Tresiba to 26 units once daily   Teach family how to use emergency glucose kit   Have someone double check insulin dose  Do a fingerstick when you have a low blood sugar

## 2020-12-15 NOTE — PROGRESS NOTES
[facility] [address]  [address] [facility], [address]  [place] [id number] [phone]        HISTORY:    Sharon Silva presents today for evaluation and management of:  Chief Complaint   Patient presents with    Diabetes     3 month appt       Diabetes  She presents for her follow-up diabetic visit. She has type 2 diabetes mellitus. No MedicAlert identification noted. There are no hypoglycemic associated symptoms. Pertinent negatives for hypoglycemia include no confusion, dizziness, headaches, seizures or tremors. Associated symptoms include fatigue. Pertinent negatives for diabetes include no blurred vision, no chest pain, no foot paresthesias, no foot ulcerations, no polydipsia, no polyphagia, no polyuria, no visual change, no weakness and no weight loss. There are no hypoglycemic complications. Symptoms are stable. Diabetic complications include nephropathy. Risk factors for coronary artery disease include diabetes mellitus, dyslipidemia, family history, hypertension, obesity, stress and sedentary lifestyle. Current diabetic treatment includes insulin injections. She is compliant with treatment all of the time. She is currently taking insulin pre-breakfast, pre-lunch, pre-dinner and at bedtime. Insulin injections are given by patient. Rotation sites for injection include the abdominal wall. Her weight is stable. She is following a generally unhealthy diet. When asked about meal planning, she reported none. She has not had a previous visit with a dietitian. She never participates in exercise. Blood glucose monitoring compliance is adequate. An ACE inhibitor/angiotensin II receptor blocker is being taken. Eye exam is current.        Interval History:    Current Diabetic Medications  novolog TID with sliding scale, tresiba 36 units once daily    DKA episodes: 0    3/12/2020 Patient is here for follow-up diabetes management.  She has been using her CGM with success she continues her current insulin regimen.  Patient is complaining of significant and frequent hypoglycemia.  Some episodes of hypoglycemia unawareness.     08/04/20   She is interested in bariatric surgery. She has not been doing well with her diet.   Diet: lots of sweets  Exercise: none  BS testing: uses CGM with success. Hypoglycemia while sleeping.   Issues: denies      09/01/20   Just had labs done today not due for a1c yet. She holds her short acting insulin with breakfast if only 2 units are required. Diet:continues to count carbs, sweets occasionally   Exercise: PT for knee and vertigo  BS testing: using cgm with success. Supplies from [facility]  Issues: denies    12/15/20   At previous visit we adjusted insulin. She has had an episode of low blood sugar and called EMS. She states she has had more memory issues but feels like she did not mix up her insulin. Diet: low carb  Exercise:none  BS testing: uses cgm daily with sucess  Issues: hypoglycemia and memory    High cholesterol-  Takes zocor and denies any adverse effects with its use.  Watches diet and exercise.      Hypertension-  Takes coreg and lisinopril and denies any adverse effects with their use. Watches diet and exercise. Denies any chest pain, dizziness or edema.  Follows with cardiology:Yes. Yearly      Obesity- Working on weight loss.       Past Medical History:   Diagnosis Date    Actinic keratitis     Ankle pain     Arthritis     Back pain     low     Chronic diastolic CHF (congestive heart failure) (HCC)     Chronic diastolic heart failure (HCC) 5/31/2017    Chronic kidney disease     Dependent edema     Hypertension     Incontinence     in female    Osteoarthritis     knee     Type II or unspecified type diabetes mellitus without mention of complication, not stated as uncontrolled      Family History   Problem Relation Age of Onset  Diabetes Mother     Diabetes Father     Cancer Paternal Grandfather         BONE      Social History     Tobacco Use    Smoking status: Never Smoker    Smokeless tobacco: Never Used   Substance Use Topics    Alcohol use: No    Drug use: No     No Known Allergies    MEDICATIONS:  Current Outpatient Medications   Medication Sig Dispense Refill    Insulin Degludec (TRESIBA FLEXTOUCH) 200 UNIT/ML SOPN Inject 26 Units into the skin daily 6 pen 1    glucagon 1 MG injection Inject 1 mg into the muscle See Admin Instructions Follow package directions for low blood sugar.  1 kit 3    simvastatin (ZOCOR) 40 MG tablet TAKE 1 TABLET EVERY NIGHT 90 tablet 1    mirabegron (MYRBETRIQ) 25 MG TB24 Take 1 tablet by mouth daily 90 tablet 3    lisinopril (PRINIVIL;ZESTRIL) 2.5 MG tablet Take 1 tablet by mouth daily 90 tablet 3    donepezil (ARICEPT) 5 MG tablet Take 1 tablet by mouth nightly 90 tablet 3    montelukast (SINGULAIR) 10 MG tablet Take 1 tablet by mouth daily 90 tablet 3    busPIRone (BUSPAR) 15 MG tablet TAKE 1 TABLET THREE TIMES DAILY 270 tablet 1    oxybutynin (DITROPAN XL) 5 MG extended release tablet Take 1 tablet by mouth daily 90 tablet 0    insulin aspart (NOVOLOG FLEXPEN) 100 UNIT/ML injection pen Use tid with meals With sliding scale max dose is 30 units per day 3 pen 3    citalopram (CELEXA) 10 MG tablet Take 1 tablet by mouth once daily 30 tablet 5    ferrous sulfate (IRON 325) 325 (65 Fe) MG tablet Take 1 tablet by mouth daily (with breakfast) 90 tablet 2    carvedilol (COREG) 6.25 MG tablet Take 1 tablet by mouth 2 times daily (with meals) 180 tablet 1    cyanocobalamin 1000 MCG/ML injection Inject 1 mL into the muscle every 30 days 1 mL 0    bumetanide (BUMEX) 1 MG tablet Take 1 tablet by mouth 2 times daily (Patient taking differently: Take 1 mg by mouth daily ) 180 tablet 1  meclizine (ANTIVERT) 12.5 MG tablet Take 1 tablet by mouth 3 times daily as needed for Dizziness 90 tablet 2    acetaminophen (TYLENOL ARTHRITIS PAIN) 650 MG extended release tablet Take 650 mg by mouth every 8 hours as needed for Pain      Insulin Pen Needle 32G X 4 MM MISC 1 each by Does not apply route 4 times daily 400 each 3    blood glucose monitor strips by Other route 3 times daily Test 3 times a day & as needed for symptoms of irregular blood glucose. 100 strip 5    RELION ULTRA THIN LANCETS 30G MISC 1 each by Does not apply route 3 times daily 300 each 3     No current facility-administered medications for this visit. Review ofSymptoms:  Review of Systems   Constitutional: Positive for fatigue. Negative for unexpected weight change and weight loss. Eyes: Negative for blurred vision and visual disturbance. Respiratory: Negative. Negative for shortness of breath. Cardiovascular: Negative for chest pain and leg swelling. Gastrointestinal: Negative for abdominal pain and diarrhea. Endocrine: Negative for polydipsia, polyphagia and polyuria. Genitourinary: Negative. Musculoskeletal: Negative. Skin: Negative for rash and wound. Neurological: Negative for dizziness, tremors, seizures, weakness and headaches. Psychiatric/Behavioral: Negative. Negative for confusion and decreased concentration. Theremainder of a complete 14-point review of systems is negative. Vital Signs: BP (!) 152/90   Pulse 60   Resp 20   Ht 5' 6\" (1.676 m)   Wt 253 lb (114.8 kg)   BMI 40.84 kg/m²      Wt Readings from Last 3 Encounters:   12/15/20 253 lb (114.8 kg)   10/08/20 256 lb 3.2 oz (116.2 kg)   10/01/20 257 lb 8 oz (116.8 kg)     Body mass index is 40.84 kg/m².   LABS:  Hemoglobin A1C   Date Value Ref Range Status   10/01/2020 7.6 % Final   06/30/2020 7.6 % Final     Lab Results   Component Value Date    LABMICR 84 (H) 09/01/2020     Lab Results   Component Value Date  09/01/2020    K 4.8 09/01/2020     09/01/2020    CO2 26 09/01/2020    BUN 30 (H) 09/01/2020    CREATININE 1.21 (H) 09/01/2020    GLUCOSE 237 (H) 09/24/2019    CALCIUM 9.3 09/01/2020    PROT 7.0 09/01/2020    LABALBU 4.2 09/01/2020    BILITOT 0.37 09/01/2020    ALKPHOS 93 09/01/2020    AST 10 09/01/2020    ALT 9 09/01/2020    LABGLOM 44 (L) 09/01/2020    GFRAA 53 (L) 09/01/2020    AGRATIO 1.3 07/05/2019    GLOB 3.1 07/05/2019     Lab Results   Component Value Date    CHOL 169 09/01/2020    CHOL 157 03/28/2019    CHOL 194 02/21/2018     Lab Results   Component Value Date    TRIG 89 09/01/2020    TRIG 173 03/28/2019    TRIG 110 02/21/2018     Lab Results   Component Value Date    HDL 60 09/01/2020    HDL 45 04/24/2017    HDL 49 05/16/2016     Lab Results   Component Value Date    LDLCHOLESTEROL 91 09/01/2020    LDLCALC 69.4 03/28/2019    LDLCALC 125.0 02/21/2018    LDLCALC 105.8 04/24/2017     Lab Results   Component Value Date    VLDL NOT REPORTED 09/01/2020    VLDL 35 03/28/2019    VLDL 22 02/21/2018     Lab Results   Component Value Date    CHOLHDLRATIO 2.8 09/01/2020    CHOLHDLRATIO 3.0 03/28/2019    CHOLHDLRATIO 4.1 02/21/2018           Physical Exam  Constitutional:       Appearance: She is well-developed. Eyes:      Pupils: Pupils are equal, round, and reactive to light. Cardiovascular:      Rate and Rhythm: Normal rate and regular rhythm. Heart sounds: Normal heart sounds. Pulmonary:      Effort: Pulmonary effort is normal.      Breath sounds: Normal breath sounds. Abdominal:      General: Bowel sounds are normal.      Palpations: Abdomen is soft. Skin:     General: Skin is warm and dry. Comments: Negative for open/nonhealing wounds. Negative for lipohypertrophy. Neurological:      Mental Status: She is alert and oriented to person, place, and time.            ASSESSMENT/PLAN:     Diagnosis Orders 1. Type 2 diabetes mellitus with stage 3b chronic kidney disease, with long-term current use of insulin ([facility])  Insulin Degludec (TRESIBA FLEXTOUCH) 200 UNIT/ML SOPN    glucagon 1 MG injection   2. Diabetes education, encounter for     3. Other hyperlipidemia     4. Essential hypertension     5. BMI 40.0-44.9, adult ([facility])     6. Obesity, morbid, BMI 40.0-49.9 ([address]       No orders of the defined types were placed in this encounter. Orders Placed This Encounter   Medications    Insulin Degludec (TRESIBA FLEXTOUCH) 200 UNIT/ML SOPN     Sig: Inject 26 Units into the skin daily     Dispense:  6 pen     Refill:  1     Discontinue soliqua    glucagon 1 MG injection     Sig: Inject 1 mg into the muscle See Admin Instructions Follow package directions for low blood sugar. Dispense:  1 kit     Refill:  3     Requested Prescriptions     Signed Prescriptions Disp Refills    Insulin Degludec (TRESIBA FLEXTOUCH) 200 UNIT/ML SOPN 6 pen 1     Sig: Inject 26 Units into the skin daily    glucagon 1 MG injection 1 kit 3     Sig: Inject 1 mg into the muscle See Admin Instructions Follow package directions for low blood sugar. 1. Type 2 diabetes mellitus with stage 3b chronic kidney disease, with long-term current use of insulin ([facility]  2. Diabetes education, encounter for    - Insulin Degludec (TRESIBA FLEXTOUCH) 200 UNIT/ML SOPN; Inject 26 Units into the skin daily  Dispense: 6 pen; Refill: 1  - glucagon 1 MG injection; Inject 1 mg into the muscle See Admin Instructions Follow package directions for low blood sugar. Dispense: 1 kit; Refill: 3    - Unstable  HbA1C goal is less than 7% and blood sugars are worsening.  - Encouraged patient to check BS 4 times per day. Fasting blood glucose goal is 70-130mg/dl and postprandial blood sugar goal is less than 180 mg/dl. -Diabetic foot exam up-to-date: Yes  -Diabetic retinal exam up-to-date:  Yes - Labs reviewed includes: most recent a1c 7.6%GFR43. Repeat labs due in 3 months.   -We discussed in great detail dietary modifications they can make to better improve their blood sugars. -follow up diabetes education completed, all questions answered. CGM report downloaded and reviewed, scanned to media tab. -based on cgm report she has significant hypoglycemic episodes. I do question if she mixed up her insulins as the episode when she called ems was classic for short acting insulin overdose. Patient Instructions   Decrease Tresiba to 26 units once daily   Teach family how to use emergency glucose kit   Have someone double check insulin dose  Do a fingerstick when you have a low blood sugar           Discussed signs and symptoms of hyper/hypoglycemia and how to treat. Encouraged 150 minutes of physical activity per week. Follow a low carbohydrate diet consuming 45 grams of carbohydrates at breakfast, lunch and dinner with two separate snacks [id number] grams of carbohydrates. Encouraged at least 7 hours of sleep. The patient was informed of the goals of diabetes management. This can only be accomplished by watching their diet and exercise levels. We certainly use medicines to help attain these goals. The consequences of not controlling blood sugars were discussed. These include blindness, heart disease, stroke, kidney disease, and possibly need for dialysis. They were told to be careful with their foot care as diabetics often have nerve damage, infections and risk for limb amutations . They also need a dilated eye exam yearly. We discussed the issues of diet, exercise, medication, complication avoidance, reviewed the signs and symptoms of diabetes, hypoglycemic episodes, significance of HbA1C.     3. Other hyperlipidemia  stable, lipid panel reviewed, continue current medications. Diet and exercise      4.  Essential hypertension abnormal, continue current medications. Diet and exercise Seek emergent care if chest pain develops. Monitor bp     5. BMI 40.0-44.9, adult (HCC)  6. Obesity, morbid, BMI 40.0-49.9 (HCC)  Reduce calories and increase physical activity to achieve a slow and steady weight loss to improve blood pressure, cholesterol and diabetes. Answered all patient questions. Agrees to follow plan of care and to follow up in 1 months, sooner if needed. Call office if unexplained blood sugars less than 70 occur or above 400. Call office or access [facility] with any further questions or concerns. Be sure to bring glucometer/food log at next appointment. Patient was seen with total face to face time of 30 minutes. More than 50%  of this visit was counseling and education regarding her diabetes.     Electronically signed by [address] [place] [address], APRN - CNP on 12/15/2020 at 2:00 PM      (Please note that portions of this note were completed with a voice-recognition program. Efforts were made to edit the dictation but occasionally words are mis-transcribed.)

## 2020-12-23 ENCOUNTER — OFFICE VISIT (OUTPATIENT)
Dept: UROLOGY | Age: 75
End: 2020-12-23
Payer: MEDICARE

## 2020-12-23 VITALS
RESPIRATION RATE: 16 BRPM | WEIGHT: 256 LBS | HEART RATE: 64 BPM | DIASTOLIC BLOOD PRESSURE: 88 MMHG | HEIGHT: 66 IN | SYSTOLIC BLOOD PRESSURE: 138 MMHG | BODY MASS INDEX: 41.14 KG/M2 | OXYGEN SATURATION: 94 %

## 2020-12-23 PROCEDURE — G8427 DOCREV CUR MEDS BY ELIG CLIN: HCPCS | Performed by: UROLOGY

## 2020-12-23 PROCEDURE — 4040F PNEUMOC VAC/ADMIN/RCVD: CPT | Performed by: UROLOGY

## 2020-12-23 PROCEDURE — 1090F PRES/ABSN URINE INCON ASSESS: CPT | Performed by: UROLOGY

## 2020-12-23 PROCEDURE — 1036F TOBACCO NON-USER: CPT | Performed by: UROLOGY

## 2020-12-23 PROCEDURE — 0509F URINE INCON PLAN DOCD: CPT | Performed by: UROLOGY

## 2020-12-23 PROCEDURE — 1123F ACP DISCUSS/DSCN MKR DOCD: CPT | Performed by: UROLOGY

## 2020-12-23 PROCEDURE — G8400 PT W/DXA NO RESULTS DOC: HCPCS | Performed by: UROLOGY

## 2020-12-23 PROCEDURE — 99214 OFFICE O/P EST MOD 30 MIN: CPT | Performed by: UROLOGY

## 2020-12-23 PROCEDURE — G8417 CALC BMI ABV UP PARAM F/U: HCPCS | Performed by: UROLOGY

## 2020-12-23 PROCEDURE — G8484 FLU IMMUNIZE NO ADMIN: HCPCS | Performed by: UROLOGY

## 2020-12-23 PROCEDURE — 3017F COLORECTAL CA SCREEN DOC REV: CPT | Performed by: UROLOGY

## 2020-12-23 SDOH — HEALTH STABILITY: MENTAL HEALTH: HOW OFTEN DO YOU HAVE A DRINK CONTAINING ALCOHOL?: NEVER

## 2020-12-23 SDOH — HEALTH STABILITY: MENTAL HEALTH: HOW MANY STANDARD DRINKS CONTAINING ALCOHOL DO YOU HAVE ON A TYPICAL DAY?: NOT ASKED

## 2020-12-23 NOTE — PROGRESS NOTES
Dr. Bc Rios MD  [facility] Consultation / New Patient Visit    Patient:  Latrell Dennis  YOB: 1945  Date: 2020  Consult requested from ORTIZ Roman - CNP     HISTORY OF PRESENT ILLNESS:   The patient is a 77 y.o. female who presents today for follow-up for the following problem(s): recurrent UTI  Overall the problem(s) : are worsening. Associated Symptoms: No dysuria, gross hematuria. Pain Severity:      Today visit:   20   Today Karen Rivera is doing well. States Nocturia has improved with oxybutynin QHS. She is tolerating the medication well, no dry mouth or constipation. She states she is having decline of her memory, so her last visit, the information regarding improvement of her symptoms was not accurate. Urge incontinence worsenin daily, 3 at night. Avoiding fluids and lasix after dinner. Lasix only in am.  Vesicare 10 mg ER in am - no dry mouth and constipation  States failing medical therapy. PMH:   DAVID on CPAP  Transverse myelitis     Summary of old records:   (Patient's old records, notes and chart reviewed and summarized above.)    Urinalysis today:  No results found for this visit on 20.     Last BUN and creatinine:  Lab Results   Component Value Date    BUN 30 (H) 2020     Lab Results   Component Value Date    CREATININE 1.21 (H) 2020       Imaging Reviewed during this Office Visit:   (results were independently reviewed by physician and radiology report verified)    PAST MEDICAL, FAMILY AND SOCIAL HISTORY:  Past Medical History:   Diagnosis Date    Actinic keratitis     Ankle pain     Arthritis     Back pain     low     [facility]congestive [facility] failure) ([facility])     Chronic diastolic heart failure ([facility] 2017    Chronic kidney disease     Dependent edema     Hypertension     Incontinence     in female    Osteoarthritis     knee     Type II or unspecified type diabetes mellitus without mention of complication, not stated as uncontrolled      Past Surgical History:   Procedure Laterality Date    ANKLE SURGERY Left     FINGER TRIGGER RELEASE Right      Family History   Problem Relation Age of Onset    Diabetes Mother     Diabetes Father     Cancer Paternal Grandfather         BONE      Outpatient Medications Marked as Taking for the 12/23/20 encounter (Office Visit) with Abram Mcmanus MD   Medication Sig Dispense Refill    Insulin Degludec (TRESIBA FLEXTOUCH) 200 UNIT/ML SOPN Inject 26 Units into the skin daily 6 pen 1    glucagon 1 MG injection Inject 1 mg into the muscle See Admin Instructions Follow package directions for low blood sugar.  1 kit 3    simvastatin (ZOCOR) 40 MG tablet TAKE 1 TABLET EVERY NIGHT 90 tablet 1    mirabegron (MYRBETRIQ) 25 MG TB24 Take 1 tablet by mouth daily 90 tablet 3    lisinopril (PRINIVIL;ZESTRIL) 2.5 MG tablet Take 1 tablet by mouth daily 90 tablet 3    donepezil (ARICEPT) 5 MG tablet Take 1 tablet by mouth nightly 90 tablet 3    montelukast (SINGULAIR) 10 MG tablet Take 1 tablet by mouth daily 90 tablet 3    busPIRone (BUSPAR) 15 MG tablet TAKE 1 TABLET THREE TIMES DAILY 270 tablet 1    oxybutynin (DITROPAN XL) 5 MG extended release tablet Take 1 tablet by mouth daily 90 tablet 0    insulin aspart (NOVOLOG FLEXPEN) 100 UNIT/ML injection pen Use tid with meals With sliding scale max dose is 30 units per day 3 pen 3    Insulin Pen Needle 32G X 4 MM MISC 1 each by Does not apply route 4 times daily 400 each 3    citalopram (CELEXA) 10 MG tablet Take 1 tablet by mouth once daily 30 tablet 5    ferrous sulfate (IRON 325) 325 (65 Fe) MG tablet Take 1 tablet by mouth daily (with breakfast) 90 tablet 2    carvedilol (COREG) 6.25 MG tablet Take 1 tablet by mouth 2 times daily (with meals) 180 tablet 1    cyanocobalamin 1000 MCG/ML injection Inject 1 mL into the muscle every 30 days 1 mL 0    blood glucose monitor strips by Other route 3 times daily Test 3 times a day & as needed for symptoms of irregular blood glucose. 100 strip 5    RELION ULTRA THIN LANCETS 30G MISC 1 each by Does not apply route 3 times daily 300 each 3    bumetanide (BUMEX) 1 MG tablet Take 1 tablet by mouth 2 times daily (Patient taking differently: Take 1 mg by mouth daily ) 180 tablet 1    meclizine (ANTIVERT) 12.5 MG tablet Take 1 tablet by mouth 3 times daily as needed for Dizziness 90 tablet 2    acetaminophen (TYLENOL ARTHRITIS PAIN) 650 MG extended release tablet Take 650 mg by mouth every 8 hours as needed for Pain         Patient has no known allergies. Social History     Tobacco Use   Smoking Status Never Smoker   Smokeless Tobacco Never Used       Social History     Substance and Sexual Activity   Alcohol Use No    Frequency: Never    Binge frequency: Never       REVIEW OF SYSTEMS:  Constitutional: negative  Eyes: negative  Respiratory: negative  Cardiovascular: negative  Gastrointestinal: negative  Genitourinary: negative  Musculoskeletal: negative  Skin: negative   Neurological: negative  Hematological/Lymphatic: negative  Psychological: negative    Physical Exam:    This a 77 y.o. female      Vitals:    12/23/20 1122   BP: 138/88   Pulse: 64   Resp: 16   SpO2: 94%     Telephone      Assessment and Plan      1. Nocturnal enuresis    2. Urge incontinence    3. Recurrent UTI    4. Mixed incontinence    5. Frequency of micturition    6. Nocturia           Plan:      No follow-ups on file. Vesicare 10 mg - improved Daytime symptoms  Oxybutynin 5 mg QHS - improved nocturnal enuresis.     Standing UCx order  Discussed behavioral modifications  Bumex in am - has helped night time syptoms    Cysto botox (100 units)  UCx

## 2021-01-06 ENCOUNTER — TELEPHONE (OUTPATIENT)
Dept: UROLOGY | Age: 76
End: 2021-01-06

## 2021-01-06 RX ORDER — CIPROFLOXACIN 500 MG/1
500 TABLET, FILM COATED ORAL 2 TIMES DAILY
Qty: 14 TABLET | Refills: 0 | Status: SHIPPED | OUTPATIENT
Start: 2021-01-06 | End: 2021-01-13

## 2021-01-06 NOTE — TELEPHONE ENCOUNTER
Elvis           UX           Surgical/Procedure Planned: Cystoscopy with Botox 100 units    Date & Location: TBD at [facility]       Outpatient   Planned Length of OR: 1 hour    Sedation: MAC    This is a request for surgical clearance.     Estimated Cardiac Risk for Non-Cardiac Surgery/Procedure     Low______ Moderate______ High______    Medication Instructions - Clarification needed by this date:   -Insulin:  -Other medications:    Resume medications:     Lovenox indicated: _____Yes _____NO    Provider: Dr. Jory Saini of Provider Giving Orders for Medication holds:    _____________________________________________

## 2021-01-15 DIAGNOSIS — I10 ESSENTIAL HYPERTENSION: ICD-10-CM

## 2021-01-15 RX ORDER — CARVEDILOL 6.25 MG/1
6.25 TABLET ORAL 2 TIMES DAILY WITH MEALS
Qty: 180 TABLET | Refills: 1 | Status: SHIPPED | OUTPATIENT
Start: 2021-01-15 | End: 2021-05-04 | Stop reason: SDUPTHER

## 2021-01-15 NOTE — TELEPHONE ENCOUNTER
Zainab Maradiaga is calling to request a refill on the following medication(s):  Requested Prescriptions     Pending Prescriptions Disp Refills    carvedilol (COREG) 6.25 MG tablet 180 tablet 1     Sig: Take 1 tablet by mouth 2 times daily (with meals)       Last Visit Date (If Applicable):  Visit date not found    Next Visit Date:    Visit date not found

## 2021-01-19 ENCOUNTER — OFFICE VISIT (OUTPATIENT)
Dept: DIABETES SERVICES | Age: 76
End: 2021-01-19
Payer: MEDICARE

## 2021-01-19 VITALS
HEART RATE: 60 BPM | SYSTOLIC BLOOD PRESSURE: 138 MMHG | HEIGHT: 66 IN | DIASTOLIC BLOOD PRESSURE: 82 MMHG | RESPIRATION RATE: 16 BRPM | WEIGHT: 255 LBS | BODY MASS INDEX: 40.98 KG/M2

## 2021-01-19 DIAGNOSIS — E66.01 OBESITY, MORBID, BMI 40.0-49.9 (HCC): ICD-10-CM

## 2021-01-19 DIAGNOSIS — Z79.4 TYPE 2 DIABETES MELLITUS WITH STAGE 3B CHRONIC KIDNEY DISEASE, WITH LONG-TERM CURRENT USE OF INSULIN (HCC): Primary | ICD-10-CM

## 2021-01-19 DIAGNOSIS — E11.22 TYPE 2 DIABETES MELLITUS WITH STAGE 3B CHRONIC KIDNEY DISEASE, WITH LONG-TERM CURRENT USE OF INSULIN (HCC): Primary | ICD-10-CM

## 2021-01-19 DIAGNOSIS — N18.32 TYPE 2 DIABETES MELLITUS WITH STAGE 3B CHRONIC KIDNEY DISEASE, WITH LONG-TERM CURRENT USE OF INSULIN (HCC): Primary | ICD-10-CM

## 2021-01-19 DIAGNOSIS — E78.49 OTHER HYPERLIPIDEMIA: ICD-10-CM

## 2021-01-19 DIAGNOSIS — Z71.89 DIABETES EDUCATION, ENCOUNTER FOR: ICD-10-CM

## 2021-01-19 DIAGNOSIS — I10 ESSENTIAL HYPERTENSION: ICD-10-CM

## 2021-01-19 LAB — HBA1C MFR BLD: 9 %

## 2021-01-19 PROCEDURE — G8400 PT W/DXA NO RESULTS DOC: HCPCS | Performed by: NURSE PRACTITIONER

## 2021-01-19 PROCEDURE — 3052F HG A1C>EQUAL 8.0%<EQUAL 9.0%: CPT | Performed by: NURSE PRACTITIONER

## 2021-01-19 PROCEDURE — G8484 FLU IMMUNIZE NO ADMIN: HCPCS | Performed by: NURSE PRACTITIONER

## 2021-01-19 PROCEDURE — 1090F PRES/ABSN URINE INCON ASSESS: CPT | Performed by: NURSE PRACTITIONER

## 2021-01-19 PROCEDURE — G8417 CALC BMI ABV UP PARAM F/U: HCPCS | Performed by: NURSE PRACTITIONER

## 2021-01-19 PROCEDURE — G8427 DOCREV CUR MEDS BY ELIG CLIN: HCPCS | Performed by: NURSE PRACTITIONER

## 2021-01-19 PROCEDURE — 1036F TOBACCO NON-USER: CPT | Performed by: NURSE PRACTITIONER

## 2021-01-19 PROCEDURE — 95251 CONT GLUC MNTR ANALYSIS I&R: CPT | Performed by: NURSE PRACTITIONER

## 2021-01-19 PROCEDURE — 4040F PNEUMOC VAC/ADMIN/RCVD: CPT | Performed by: NURSE PRACTITIONER

## 2021-01-19 PROCEDURE — 99214 OFFICE O/P EST MOD 30 MIN: CPT | Performed by: NURSE PRACTITIONER

## 2021-01-19 PROCEDURE — 83036 HEMOGLOBIN GLYCOSYLATED A1C: CPT | Performed by: NURSE PRACTITIONER

## 2021-01-19 PROCEDURE — 1123F ACP DISCUSS/DSCN MKR DOCD: CPT | Performed by: NURSE PRACTITIONER

## 2021-01-19 PROCEDURE — 2022F DILAT RTA XM EVC RTNOPTHY: CPT | Performed by: NURSE PRACTITIONER

## 2021-01-19 PROCEDURE — 3017F COLORECTAL CA SCREEN DOC REV: CPT | Performed by: NURSE PRACTITIONER

## 2021-01-19 ASSESSMENT — ENCOUNTER SYMPTOMS
VISUAL CHANGE: 0
RESPIRATORY NEGATIVE: 1
SHORTNESS OF BREATH: 0
DIARRHEA: 0
ABDOMINAL PAIN: 0
BLURRED VISION: 0

## 2021-01-19 NOTE — PROGRESS NOTES
[address], [address]  [facility] [address] [phone]        HISTORY:    Abrahan Martínez presents today for evaluation and management of:  Chief Complaint   Patient presents with    Diabetes     1 month appt       Diabetes  She presents for her follow-up diabetic visit. She has type 2 diabetes mellitus. No MedicAlert identification noted. There are no hypoglycemic associated symptoms. Pertinent negatives for hypoglycemia include no confusion, dizziness, headaches, seizures or tremors. Associated symptoms include fatigue. Pertinent negatives for diabetes include no blurred vision, no chest pain, no foot paresthesias, no foot ulcerations, no polydipsia, no polyphagia, no polyuria, no visual change, no weakness and no weight loss. There are no hypoglycemic complications. Symptoms are stable. Diabetic complications include nephropathy. Risk factors for coronary artery disease include diabetes mellitus, dyslipidemia, family history, hypertension, obesity, stress and sedentary lifestyle. Current diabetic treatment includes insulin injections. She is compliant with treatment all of the time. She is currently taking insulin pre-breakfast, pre-lunch, pre-dinner and at bedtime. Insulin injections are given by patient. Rotation sites for injection include the abdominal wall. Her weight is stable. She is following a generally unhealthy diet. When asked about meal planning, she reported none. She has not had a previous visit with a dietitian. She never participates in exercise. Blood glucose monitoring compliance is adequate. An ACE inhibitor/angiotensin II receptor blocker is being taken. Eye exam is current.        Interval History:    Current Diabetic Medications  novolog TID with sliding scale, tresiba 24 units once daily    DKA episodes: 0    08/04/20  She is interested in bariatric surgery. She has not been doing well with her diet.   Diet: lots of sweets  Exercise: none  BS testing: uses CGM with success. Hypoglycemia while sleeping.   Issues: denies      09/01/20   Just had labs done today not due for a1c yet. She holds her short acting insulin with breakfast if only 2 units are required.   Diet:continues to count carbs, sweets occasionally   Exercise: PT for knee and vertigo  BS testing: using cgm with success. Supplies from [facility]     12/15/20   At previous visit we adjusted insulin. She has had an episode of low blood sugar and called EMS. She states she has had more memory issues but feels like she did not mix up her insulin. Diet: low carb  Exercise:none  BS testing: uses cgm daily with sucess  Issues: hypoglycemia and memory    01/19/21   At previous visit insulin was decreased due to hypoglycemia. She is now having more highs and denies hypoglcyemia. Diet: unchanged  Exercise: none  BS testing: uses  Alida cgm daily with success   Issues: denies     High cholesterol-  Takes zocor and denies any adverse effects with its use.  Watches diet and exercise.      Hypertension-  Takes coreg and lisinopril and denies any adverse effects with their use. Watches diet and exercise. Denies any chest pain, dizziness or edema.  Follows with cardiology:Yes. Yearly      Obesity- Working on weight loss.        Past Medical History:   Diagnosis Date    Actinic keratitis     Ankle pain     Arthritis     Back pain     low     Chronic diastolic CHF (congestive heart failure) (HCC)     Chronic diastolic heart failure (HCC) 5/31/2017    Chronic kidney disease     Dependent edema     Hypertension     Incontinence     in female    Osteoarthritis     knee     Type II or unspecified type diabetes mellitus without mention of complication, not stated as uncontrolled      Family History   Problem Relation Age of Onset    Diabetes Mother  Diabetes Father     Cancer Paternal Grandfather         BONE      Social History     Tobacco Use    Smoking status: Never Smoker    Smokeless tobacco: Never Used   Substance Use Topics    Alcohol use: No     Frequency: Never     Binge frequency: Never    Drug use: No     No Known Allergies    MEDICATIONS:  Current Outpatient Medications   Medication Sig Dispense Refill    carvedilol (COREG) 6.25 MG tablet Take 1 tablet by mouth 2 times daily (with meals) 180 tablet 1    Insulin Degludec (TRESIBA FLEXTOUCH) 200 UNIT/ML SOPN Inject 26 Units into the skin daily (Patient taking differently: Inject 24 Units into the skin daily ) 6 pen 1    glucagon 1 MG injection Inject 1 mg into the muscle See Admin Instructions Follow package directions for low blood sugar.  1 kit 3    simvastatin (ZOCOR) 40 MG tablet TAKE 1 TABLET EVERY NIGHT 90 tablet 1    mirabegron (MYRBETRIQ) 25 MG TB24 Take 1 tablet by mouth daily 90 tablet 3    lisinopril (PRINIVIL;ZESTRIL) 2.5 MG tablet Take 1 tablet by mouth daily 90 tablet 3    donepezil (ARICEPT) 5 MG tablet Take 1 tablet by mouth nightly 90 tablet 3    montelukast (SINGULAIR) 10 MG tablet Take 1 tablet by mouth daily 90 tablet 3    busPIRone (BUSPAR) 15 MG tablet TAKE 1 TABLET THREE TIMES DAILY (Patient taking differently: 2 times daily ) 270 tablet 1    oxybutynin (DITROPAN XL) 5 MG extended release tablet Take 1 tablet by mouth daily 90 tablet 0    insulin aspart (NOVOLOG FLEXPEN) 100 UNIT/ML injection pen Use tid with meals With sliding scale max dose is 30 units per day 3 pen 3    citalopram (CELEXA) 10 MG tablet Take 1 tablet by mouth once daily 30 tablet 5    ferrous sulfate (IRON 325) 325 (65 Fe) MG tablet Take 1 tablet by mouth daily (with breakfast) 90 tablet 2    cyanocobalamin 1000 MCG/ML injection Inject 1 mL into the muscle every 30 days 1 mL 0  bumetanide (BUMEX) 1 MG tablet Take 1 tablet by mouth 2 times daily (Patient taking differently: Take 1 mg by mouth daily ) 180 tablet 1    meclizine (ANTIVERT) 12.5 MG tablet Take 1 tablet by mouth 3 times daily as needed for Dizziness 90 tablet 2    acetaminophen (TYLENOL ARTHRITIS PAIN) 650 MG extended release tablet Take 650 mg by mouth every 8 hours as needed for Pain      Insulin Pen Needle 32G X 4 MM MISC 1 each by Does not apply route 4 times daily 400 each 3    blood glucose monitor strips by Other route 3 times daily Test 3 times a day & as needed for symptoms of irregular blood glucose. 100 strip 5    RELION ULTRA THIN LANCETS 30G MISC 1 each by Does not apply route 3 times daily 300 each 3     No current facility-administered medications for this visit. Review ofSymptoms:  Review of Systems   Constitutional: Positive for fatigue. Negative for unexpected weight change and weight loss. Eyes: Negative for blurred vision and visual disturbance. Respiratory: Negative. Negative for shortness of breath. Cardiovascular: Negative for chest pain and leg swelling. Gastrointestinal: Negative for abdominal pain and diarrhea. Endocrine: Negative for polydipsia, polyphagia and polyuria. Genitourinary: Negative. Musculoskeletal: Negative. Skin: Negative for rash and wound. Neurological: Negative for dizziness, tremors, seizures, weakness and headaches. Psychiatric/Behavioral: Negative. Negative for confusion and decreased concentration. Theremainder of a complete 14-point review of systems is negative. Vital Signs: /82 (Site: Left Upper Arm, Position: Sitting, Cuff Size: Large Adult)   Pulse 60   Resp 16   Ht 5' 6\" (1.676 m)   Wt 255 lb (115.7 kg)   LMP  (LMP Unknown)   BMI 41.16 kg/m²      Wt Readings from Last 3 Encounters:   01/19/21 255 lb (115.7 kg)   12/23/20 256 lb (116.1 kg)   12/15/20 253 lb (114.8 kg)     Body mass index is 41.16 kg/m².   LABS: Hemoglobin A1C   Date Value Ref Range Status   01/19/2021 9.0 % Final   10/01/2020 7.6 % Final     Lab Results   Component Value Date    LABMICR 84 (H) 09/01/2020     Lab Results   Component Value Date     09/01/2020    K 4.8 09/01/2020     09/01/2020    CO2 26 09/01/2020    BUN 30 (H) 09/01/2020    CREATININE 1.21 (H) 09/01/2020    GLUCOSE 237 (H) 09/24/2019    CALCIUM 9.3 09/01/2020    PROT 7.0 09/01/2020    LABALBU 4.2 09/01/2020    BILITOT 0.37 09/01/2020    ALKPHOS 93 09/01/2020    AST 10 09/01/2020    ALT 9 09/01/2020    LABGLOM 44 (L) 09/01/2020    GFRAA 53 (L) 09/01/2020    AGRATIO 1.3 07/05/2019    GLOB 3.1 07/05/2019     Lab Results   Component Value Date    CHOL 169 09/01/2020    CHOL 157 03/28/2019    CHOL 194 02/21/2018     Lab Results   Component Value Date    TRIG 89 09/01/2020    TRIG 173 03/28/2019    TRIG 110 02/21/2018     Lab Results   Component Value Date    HDL 60 09/01/2020    HDL 45 04/24/2017    HDL 49 05/16/2016     Lab Results   Component Value Date    LDLCHOLESTEROL 91 09/01/2020    LDLCALC 69.4 03/28/2019    LDLCALC 125.0 02/21/2018    LDLCALC 105.8 04/24/2017     Lab Results   Component Value Date    VLDL NOT REPORTED 09/01/2020    VLDL 35 03/28/2019    VLDL 22 02/21/2018     Lab Results   Component Value Date    CHOLHDLRATIO 2.8 09/01/2020    CHOLHDLRATIO 3.0 03/28/2019    CHOLHDLRATIO 4.1 02/21/2018           Physical Exam  Constitutional:       Appearance: She is well-developed. Eyes:      Pupils: Pupils are equal, round, and reactive to light. Cardiovascular:      Rate and Rhythm: Normal rate and regular rhythm. Heart sounds: Normal heart sounds. Pulmonary:      Effort: Pulmonary effort is normal.      Breath sounds: Normal breath sounds. Abdominal:      General: Bowel sounds are normal.      Palpations: Abdomen is soft. Skin:     General: Skin is warm and dry. Comments: Negative for open/nonhealing wounds. Negative for lipohypertrophy. > 400  12 units subcutaneous Insulin              Discussed signs and symptoms of hyper/hypoglycemia and how to treat. Encouraged 150 minutes of physical activity per week. Follow a low carbohydrate diet consuming 45 grams of carbohydrates at breakfast, lunch and dinner with two separate snacks [id number] grams of carbohydrates. Encouraged at least 7 hours of sleep. The patient was informed of the goals of diabetes management. This can only be accomplished by watching their diet and exercise levels. We certainly use medicines to help attain these goals. The consequences of not controlling blood sugars were discussed. These include blindness, heart disease, stroke, kidney disease, and possibly need for dialysis. They were told to be careful with their foot care as diabetics often have nerve damage, infections and risk for limb amutations . They also need a dilated eye exam yearly. We discussed the issues of diet, exercise, medication, complication avoidance, reviewed the signs and symptoms of diabetes, hypoglycemic episodes, significance of HbA1C.       3. Other hyperlipidemia  stable, lipid panel reviewed, continue current medications. Diet and exercise      4. Essential hypertension   stable, continue current medications. Diet and exercise Seek emergent care if chest pain develops. 5. BMI 40.0-44.9, adult (HCC)  6. Obesity, morbid, BMI 40.0-49.9 (HCC)  Reduce calories and increase physical activity to achieve a slow and steady weight loss to improve blood pressure, cholesterol and diabetes. Answered all patient questions. Agrees to follow plan of care and to follow up in 1 months, sooner if needed. Call office if unexplained blood sugars less than 70 occur or above 400. Call office or access [facility] with any further questions or concerns. Be sure to bring glucometer/food log at next appointment.          Electronically signed by ORTIZ Amin CNP on 1/19/2021 at 3:51 PM (Please note that portions of this note were completed with a voice-recognition program. Efforts were made to edit the dictation but occasionally words are mis-transcribed.)

## 2021-01-20 NOTE — TELEPHONE ENCOUNTER
Spoke with patient, instructed that she will need to schedule a pre-op clearance with her PCP before we can schedule her procedure. Voices understanding and states she will call today or tomorrow.

## 2021-01-25 ENCOUNTER — OFFICE VISIT (OUTPATIENT)
Dept: FAMILY MEDICINE CLINIC | Age: 76
End: 2021-01-25
Payer: MEDICARE

## 2021-01-25 ENCOUNTER — TELEPHONE (OUTPATIENT)
Dept: FAMILY MEDICINE CLINIC | Age: 76
End: 2021-01-25

## 2021-01-25 VITALS
BODY MASS INDEX: 41.05 KG/M2 | OXYGEN SATURATION: 97 % | WEIGHT: 254.3 LBS | DIASTOLIC BLOOD PRESSURE: 70 MMHG | SYSTOLIC BLOOD PRESSURE: 134 MMHG | HEART RATE: 55 BPM

## 2021-01-25 DIAGNOSIS — F32.A ANXIETY AND DEPRESSION: ICD-10-CM

## 2021-01-25 DIAGNOSIS — B37.2 YEAST INFECTION OF THE SKIN: ICD-10-CM

## 2021-01-25 DIAGNOSIS — E11.22 TYPE 2 DIABETES MELLITUS WITH STAGE 4 CHRONIC KIDNEY DISEASE, WITH LONG-TERM CURRENT USE OF INSULIN (HCC): Primary | ICD-10-CM

## 2021-01-25 DIAGNOSIS — J30.9 ALLERGIC RHINITIS, UNSPECIFIED SEASONALITY, UNSPECIFIED TRIGGER: ICD-10-CM

## 2021-01-25 DIAGNOSIS — F33.0 MILD EPISODE OF RECURRENT MAJOR DEPRESSIVE DISORDER (HCC): ICD-10-CM

## 2021-01-25 DIAGNOSIS — I50.32 CHRONIC DIASTOLIC HEART FAILURE (HCC): ICD-10-CM

## 2021-01-25 DIAGNOSIS — N28.9 DECREASED RENAL FUNCTION: ICD-10-CM

## 2021-01-25 DIAGNOSIS — E78.49 OTHER HYPERLIPIDEMIA: ICD-10-CM

## 2021-01-25 DIAGNOSIS — I10 ESSENTIAL HYPERTENSION: ICD-10-CM

## 2021-01-25 DIAGNOSIS — Z01.818 ENCOUNTER FOR PRE-OPERATIVE EXAMINATION: ICD-10-CM

## 2021-01-25 DIAGNOSIS — G56.01 CARPAL TUNNEL SYNDROME, RIGHT: ICD-10-CM

## 2021-01-25 DIAGNOSIS — F41.9 ANXIETY AND DEPRESSION: ICD-10-CM

## 2021-01-25 DIAGNOSIS — G47.33 OSA TREATED WITH BIPAP: ICD-10-CM

## 2021-01-25 DIAGNOSIS — E11.40 NEUROPATHY DUE TO TYPE 2 DIABETES MELLITUS (HCC): ICD-10-CM

## 2021-01-25 DIAGNOSIS — Z79.4 TYPE 2 DIABETES MELLITUS WITH STAGE 4 CHRONIC KIDNEY DISEASE, WITH LONG-TERM CURRENT USE OF INSULIN (HCC): Primary | ICD-10-CM

## 2021-01-25 DIAGNOSIS — E66.01 OBESITY, MORBID, BMI 40.0-49.9 (HCC): ICD-10-CM

## 2021-01-25 DIAGNOSIS — M16.11 PRIMARY OSTEOARTHRITIS OF RIGHT HIP: ICD-10-CM

## 2021-01-25 DIAGNOSIS — N18.4 TYPE 2 DIABETES MELLITUS WITH STAGE 4 CHRONIC KIDNEY DISEASE, WITH LONG-TERM CURRENT USE OF INSULIN (HCC): Primary | ICD-10-CM

## 2021-01-25 DIAGNOSIS — R41.89 COGNITIVE DECLINE: ICD-10-CM

## 2021-01-25 DIAGNOSIS — D51.8 VITAMIN B12 DEFICIENCY (DIETARY) ANEMIA: ICD-10-CM

## 2021-01-25 PROCEDURE — 2022F DILAT RTA XM EVC RTNOPTHY: CPT | Performed by: NURSE PRACTITIONER

## 2021-01-25 PROCEDURE — G8417 CALC BMI ABV UP PARAM F/U: HCPCS | Performed by: NURSE PRACTITIONER

## 2021-01-25 PROCEDURE — G8484 FLU IMMUNIZE NO ADMIN: HCPCS | Performed by: NURSE PRACTITIONER

## 2021-01-25 PROCEDURE — G8427 DOCREV CUR MEDS BY ELIG CLIN: HCPCS | Performed by: NURSE PRACTITIONER

## 2021-01-25 PROCEDURE — 99211 OFF/OP EST MAY X REQ PHY/QHP: CPT

## 2021-01-25 PROCEDURE — 1090F PRES/ABSN URINE INCON ASSESS: CPT | Performed by: NURSE PRACTITIONER

## 2021-01-25 PROCEDURE — 99214 OFFICE O/P EST MOD 30 MIN: CPT | Performed by: NURSE PRACTITIONER

## 2021-01-25 RX ORDER — NYSTATIN 100000 U/G
CREAM TOPICAL
Qty: 30 G | Refills: 1 | Status: SHIPPED | OUTPATIENT
Start: 2021-01-25 | End: 2022-08-02 | Stop reason: SDUPTHER

## 2021-01-25 ASSESSMENT — ENCOUNTER SYMPTOMS
SHORTNESS OF BREATH: 0
CONSTIPATION: 0
NAUSEA: 0
EYES NEGATIVE: 1
ABDOMINAL PAIN: 0
BACK PAIN: 1
COUGH: 0
DIARRHEA: 0
WHEEZING: 0

## 2021-01-25 NOTE — TELEPHONE ENCOUNTER
Please notify patient she will need to complete CBC and CMP prior to surgical clearance.   She will also need to obtain cardiac clearance through her cardiologist.

## 2021-01-25 NOTE — PROGRESS NOTES
[facility]  [address]  Dept: [phone]  Dept Fax: [phone]    Chief Complaint   Patient presents with    Pre-op Exam     has a procedure for botox in bladder with dr Shaheen Lui that has yet to be scheduled       HPI     Rupinder Hsu is a 77 y.o. female who presents for a preoperative physical examination. She is scheduled to have cysto botox bladder injections done by Dr. Sierra Mcpherson at [facility], date of procedure to be determined. She suffers from mixed incontinence, recurrent UTI, frequency of urination, nocturia. She wears pads daily and changes them as needed during the day. Patient states she avoids going places due to her symptoms. She typically uses the restroom 8-9 times daily. Nighttime symptoms have mildly improved to 3 times nightly. She reports feeling nervous about an upcoming wedding event in the family due to her symptoms. She has also had recurrent UTIs most recently treated 2 weeks ago. She has a past medical history of type 2 diabetes, hypertension, hyperlipidemia, depression, anxiety, obstructive sleep apnea treated with BiPAP, diabetic related neuropathy, arthritis, chronic diastolic heart failure, chronic renal failure, B12 deficiency, and cognitive decline. She follows with ARIS Fuller for diabetes management. Last A1C completed 1/19/2021, result 9. Tresiba increased to 26 units daily. Mealtime insulin increased by 2 units. Needs to schedule B12 injection. Rupinder Hsu is a 77 y.o. female who presents for follow-up of hypertension, diabetes, and hyperlipidemia. She indicates that she is feeling fatigue, frequent urination, and generalized weakness. She denies chest pain, headaches, palpitations, dyspnea, peripheral edema, and blurred vision. Current medication regimen is as listed below.  She denies any side effects of medication, and has been compliant, taking it regularly. Home glucose readings have been 120-203. Checks blood sugars 4 times daily. Last eye exam: up to date. Diabetic complications include: neuropathy, CKD. Hyperlipidemia:  No new myalgias or GI upset on simvastatin (Zocor). Treatment Adherence:   Medication compliance:  compliant all of the time  Diet compliance:  compliant most of the time  Weight trend: stable  Current exercise: no regular exercise    Dr. Anastasia Castellanos (pulmonologist) - [facility]  Dr. Hartman (nephrologist)- CKD  Dr. Simona Pompa (urologist)  [address] [place] [address] cardiology - diastolic heart failure     The 10-year ASCVD risk score (Patrick Ayoub, et al., 2013) is: 35.4%    Values used to calculate the score:      Age: 77 years      Sex: Female      Is Non- : No      Diabetic: Yes      Tobacco smoker: No      Systolic Blood Pressure: 148 mmHg      Is BP treated: Yes      HDL Cholesterol: 60 mg/dL      Total Cholesterol: 169 mg/dL    Past Medical History:   Diagnosis Date    Actinic keratitis     Ankle pain     Arthritis     Back pain     low     Chronic [facility]congestive heart failure) [facility])     Chronic diastolic heart failure ([facility] [address] 5/31/2017    Chronic kidney disease     Dependent edema     Hypertension     Incontinence     in female    Osteoarthritis     knee     Type II or unspecified type diabetes mellitus without mention of complication, not stated as uncontrolled         Review of patient's past surgical history indicates:     Past Surgical History:   Procedure Laterality Date    ANKLE SURGERY Left     FINGER TRIGGER RELEASE Right                                                    Current Outpatient Medications   Medication Sig Dispense Refill    nystatin (MYCOSTATIN) 648035 UNIT/GM cream Apply topically 4 times daily.  Continue to apply for 3 days after rash resolves 30 g 1    carvedilol (COREG) 6.25 MG tablet Take 1 tablet by mouth 2 times daily (with meals) 180 tablet 1    Insulin Degludec (TRESIBA FLEXTOUCH) 200 UNIT/ML SOPN Inject 26 Units into the skin daily (Patient taking differently: Inject 24 Units into the skin daily ) 6 pen 1    glucagon 1 MG injection Inject 1 mg into the muscle See Admin Instructions Follow package directions for low blood sugar. 1 kit 3    simvastatin (ZOCOR) 40 MG tablet TAKE 1 TABLET EVERY NIGHT 90 tablet 1    mirabegron (MYRBETRIQ) 25 MG TB24 Take 1 tablet by mouth daily 90 tablet 3    lisinopril (PRINIVIL;ZESTRIL) 2.5 MG tablet Take 1 tablet by mouth daily 90 tablet 3    donepezil (ARICEPT) 5 MG tablet Take 1 tablet by mouth nightly 90 tablet 3    montelukast (SINGULAIR) 10 MG tablet Take 1 tablet by mouth daily 90 tablet 3    busPIRone (BUSPAR) 15 MG tablet TAKE 1 TABLET THREE TIMES DAILY (Patient taking differently: 2 times daily ) 270 tablet 1    oxybutynin (DITROPAN XL) 5 MG extended release tablet Take 1 tablet by mouth daily 90 tablet 0    insulin aspart (NOVOLOG FLEXPEN) 100 UNIT/ML injection pen Use tid with meals With sliding scale max dose is 30 units per day 3 pen 3    Insulin Pen Needle 32G X 4 MM MISC 1 each by Does not apply route 4 times daily 400 each 3    citalopram (CELEXA) 10 MG tablet Take 1 tablet by mouth once daily 30 tablet 5    ferrous sulfate (IRON 325) 325 (65 Fe) MG tablet Take 1 tablet by mouth daily (with breakfast) 90 tablet 2    cyanocobalamin 1000 MCG/ML injection Inject 1 mL into the muscle every 30 days 1 mL 0    blood glucose monitor strips by Other route 3 times daily Test 3 times a day & as needed for symptoms of irregular blood glucose.  100 strip 5    RELION ULTRA THIN LANCETS 30G MISC 1 each by Does not apply route 3 times daily 300 each 3    bumetanide (BUMEX) 1 MG tablet Take 1 tablet by mouth 2 times daily (Patient taking differently: Take 1 mg by mouth daily ) 180 tablet 1    meclizine (ANTIVERT) 12.5 MG tablet Take 1 tablet by mouth 3 times daily as needed for Dizziness 90 tablet 2    acetaminophen (TYLENOL ARTHRITIS PAIN) 650 MG extended release tablet Take 650 mg by mouth every 8 hours as needed for Pain       No current facility-administered medications for this visit. No Known Allergies    Social History     Tobacco Use    Smoking status: Never Smoker    Smokeless tobacco: Never Used   Substance Use Topics    Alcohol use: No     Frequency: Never     Binge frequency: Never    Drug use: No        Family History   Problem Relation Age of Onset    Diabetes Mother     Diabetes Father     Cancer Paternal Grandfather         BONE         Subjective:     Review of Systems   Constitutional: Positive for fatigue. Negative for chills, diaphoresis and fever. HENT: Negative. Eyes: Negative. Respiratory: Negative for cough, shortness of breath and wheezing. Cardiovascular: Negative for chest pain, palpitations and leg swelling. Gastrointestinal: Negative for abdominal pain, constipation, diarrhea and nausea. Endocrine: Negative for cold intolerance, heat intolerance, polydipsia, polyphagia and polyuria. Genitourinary: Positive for difficulty urinating, frequency and urgency. Negative for dysuria and hematuria. Musculoskeletal: Positive for back pain (follows with pain management and doing okay. ). Negative for arthralgias and myalgias. Skin: Negative. Allergic/Immunologic: Negative for environmental allergies. Neurological: Positive for weakness (generalized). Negative for dizziness, light-headedness and headaches. Psychiatric/Behavioral: Positive for dysphoric mood (improving). Negative for agitation, decreased concentration, self-injury, sleep disturbance and suicidal ideas. The patient is nervous/anxious (mild). Objective:        /70   Pulse 55   Wt 254 lb 4.8 oz (115.3 kg)   LMP  (LMP Unknown)   SpO2 97%   BMI 41.05 kg/m²     Physical Exam  Constitutional:       Appearance: Normal appearance. She is well-developed and well-groomed. She is obese.    HENT:      Head: Normocephalic. Right Ear: Tympanic membrane, ear canal and external ear normal.      Left Ear: Tympanic membrane, ear canal and external ear normal.      Nose: Nose normal.      Mouth/Throat:      Mouth: Mucous membranes are moist.      Dentition: Normal dentition. Does not have dentures. Tongue: No lesions. Pharynx: Oropharynx is clear. Eyes:      Conjunctiva/sclera: Conjunctivae normal.      Pupils: Pupils are equal, round, and reactive to light. Neck:      Musculoskeletal: Normal range of motion and neck supple. Thyroid: No thyromegaly. Cardiovascular:      Rate and Rhythm: Normal rate and regular rhythm. Heart sounds: Heart sounds are distant. Pulmonary:      Effort: Pulmonary effort is normal.      Breath sounds: Examination of the right-lower field reveals decreased breath sounds. Examination of the left-lower field reveals decreased breath sounds. Decreased breath sounds present. No wheezing. Abdominal:      General: Bowel sounds are normal.      Palpations: Abdomen is soft. Tenderness: There is no abdominal tenderness. There is no right CVA tenderness or left CVA tenderness. Musculoskeletal:      Right lower leg: No edema. Left lower leg: No edema. Lymphadenopathy:      Cervical: No cervical adenopathy. Skin:     Capillary Refill: Capillary refill takes less than 2 seconds. Findings: Rash (significant redness noted to bilateral groin skin folds. White cream covering the area) present. Neurological:      Mental Status: She is alert and oriented to person, place, and time. Gait: Gait abnormal (uses walker/cane). Psychiatric:         Mood and Affect: Mood normal.         Behavior: Behavior is cooperative. Assessment:       ICD-10-CM    1. Type 2 diabetes mellitus with stage 4 chronic kidney disease, with long-term current use of insulin ([facility])  E11.22 Comprehensive Metabolic Panel    M55.0     Z79.4    2.  Essential hypertension  I10

## 2021-01-27 ENCOUNTER — TELEPHONE (OUTPATIENT)
Dept: FAMILY MEDICINE CLINIC | Age: 76
End: 2021-01-27

## 2021-01-27 NOTE — TELEPHONE ENCOUNTER
Patient was last evaluated by Dr. Live Pena at [address] [place] [address] cardiology 4/2019.

## 2021-01-28 ENCOUNTER — TELEPHONE (OUTPATIENT)
Dept: UROLOGY | Age: 76
End: 2021-01-28

## 2021-01-28 NOTE — TELEPHONE ENCOUNTER
Patient called stating [facility] to contact Dr Jenny Echevarria office as they need form/copy to cardiac clearance. She states she is scheduled with [facility] on Feb 14, 2021. Writer asked caller if she had a phone or fax number for them and she states she was told that we should have it.   Call patient with any questions  [phone]

## 2021-01-28 NOTE — TELEPHONE ENCOUNTER
Spoke with Cardiology, requested clearance to be faxed to their [place] office at [phone]. Her appt is on 2/15/2021 at 10 AM at [facility]. Clearance form faxed as requested.

## 2021-02-02 DIAGNOSIS — N39.41 URGE INCONTINENCE: ICD-10-CM

## 2021-02-02 DIAGNOSIS — N39.44 NOCTURNAL ENURESIS: ICD-10-CM

## 2021-02-02 RX ORDER — OXYBUTYNIN CHLORIDE 5 MG/1
5 TABLET, EXTENDED RELEASE ORAL DAILY
Qty: 90 TABLET | Refills: 3 | Status: SHIPPED | OUTPATIENT
Start: 2021-02-02 | End: 2021-05-18 | Stop reason: ALTCHOICE

## 2021-02-02 NOTE — TELEPHONE ENCOUNTER
2/2/2021  Patient called requesting refill from Dr Adrian Sagastume for oxybutynin 5mg. She would like a 90 day supply sent to her mail in pharmacy:   [facility]   Call patient at [phone] with any questions.

## 2021-02-17 DIAGNOSIS — N18.32 TYPE 2 DIABETES MELLITUS WITH STAGE 3B CHRONIC KIDNEY DISEASE, WITH LONG-TERM CURRENT USE OF INSULIN (HCC): Primary | ICD-10-CM

## 2021-02-17 DIAGNOSIS — Z79.4 TYPE 2 DIABETES MELLITUS WITH STAGE 3B CHRONIC KIDNEY DISEASE, WITH LONG-TERM CURRENT USE OF INSULIN (HCC): Primary | ICD-10-CM

## 2021-02-17 DIAGNOSIS — E11.22 TYPE 2 DIABETES MELLITUS WITH STAGE 3B CHRONIC KIDNEY DISEASE, WITH LONG-TERM CURRENT USE OF INSULIN (HCC): Primary | ICD-10-CM

## 2021-02-17 RX ORDER — FLASH GLUCOSE SENSOR
6 KIT MISCELLANEOUS DAILY
Qty: 6 EACH | Refills: 0 | Status: SHIPPED | OUTPATIENT
Start: 2021-02-17 | End: 2021-05-25 | Stop reason: SDUPTHER

## 2021-02-17 NOTE — TELEPHONE ENCOUNTER
Neftali Grier MD - 02/15/2021 10:00 AM EST  Formatting of this note might be different from the original.  Waleska Singleton    Date of visit: 2/15/2021 YOB: 1945  Age: 77 y.o. MRN: [id number]  _______________________  Patient Active Problem List   Diagnosis    Abnormal electrocardiogram    Chronic diastolic heart failure (CMS-HCC)    Edema    Hyperkalemia    Shortness of breath    Essential hypertension    Precordial pain     _______________________  Allergies   Allergen Reactions    Prednisone   Other reaction(s): Hyperglycemia (high blood sugar)     _______________________  Current Outpatient Medications   Medication Sig Dispense Refill    acetaminophen (TYLENOL) 650 mg 8 hr tablet Take 650 mg by mouth every 8 (eight) hours as needed for pain.  bumetanide (BUMEX) 1 mg tablet Take 1 mg by mouth daily.  busPIRone (BUSPAR) 10 mg tablet Take 10 mg by mouth daily.  carvedilol (COREG) 6.25 mg tablet Take 6.25 mg by mouth 2 (two) times a day with meals.  citalopram (CeleXA) 10 mg tablet    cyanocobalamin (VITAMIN B-12) 1,000 mcg/mL injection Inject 1,000 mcg into the appropriate muscle once.  donepeziL (ARICEPT) 5 mg tablet Take 5 mg by mouth.  ferrous sulfate 325 (65 FE) mg EC tablet Take 325 mg by mouth daily with breakfast.    glucagon, human recombinant, 1 mg injection Infuse 1 mg into a venous catheter once.  lisinopril (PRINIVIL,ZESTRIL) 2.5 mg tablet Take 2.5 mg by mouth daily.  meclizine (ANTIVERT) 12.5 mg tablet Take 12.5 mg by mouth 3 (three) times a day as needed for dizziness.  montelukast (SINGULAIR) 10 mg tablet Take 10 mg by mouth.  MYRBETRIQ 25 mg tablet extended release 24 hr    NovoLOG Flexpen U-100 Insulin 100 unit/mL (3 mL) insulin pen    nystatin (MYCOSTATIN) cream Apply topically 4 times daily. Continue to apply for 3 days after rash resolves    oxybutynin XL (DITROPAN-XL) 5 mg 24 hr tablet Take 5 mg by mouth daily.     simvastatin (ZOCOR) 40 mg tablet Take 40 mg by mouth nightly.  TRESIBA FLEXTOUCH U-200 200 unit/mL (3 mL) insulin pen    ergocalciferol (DRISDOL) 50,000 unit capsule Take 50,000 Units by mouth once a week.  escitalopram (LEXAPRO) 10 mg tablet Take 10 mg by mouth daily.  INSULIN NPH HUM/REG INSULIN HM (HUMULIN 70/30 PEN SUBQ) Inject under the skin.  potassium chloride (K-DUR) 10 MEQ CR tablet Take 10 mEq by mouth daily.  sitaGLIPtin (JANUVIA) 100 mg tablet Take 100 mg by mouth daily. No current facility-administered medications for this visit.     _______________________  Chief Complaint   Patient presents with    Pre-op Exam   po- 24m of/u pt having procedure with Dr. Moran Notice TBD    Congestive Heart Failure    Shortness of Breath   all of the time     _______________________  History of Present Illness    This 80-year-old presents for preoperative risk evaluation prior to planned cystoscopy with Botox    She was last seen in this office 2019. She has a history of heart failure with preserved ejection fraction, diabetes, hypertension and hyperlipidemia. Echo in 2018 showed normal left ventricular systolic function with aortic valve sclerosis without stenosis    Stress test was also done in 2018. With the ECG portion and are working on obtaining the nuclear portion    She reports shortness of breath that has been stable. She denies worsening shortness of breath. She denies chest pain. She denies syncope or presyncope    She is retired from the [employer] at [employer]. She lives with her  and daughter.    _______________________    CV TESTING HISTORY:    ECHO:   No results found. STRESS:   No results found. HOLTER:   No results found. CARDIAC CATH:   No results found. CAROTID:   No results found. CXR:  No results found.      Lipid Profile:      EK/15/2021  Sinus bradycardia with RSR prime and nonspecific T-wave abnormality    _______________________  Past Medical History: Diagnosis Date    Arthritis    Diabetes mellitus (CMS-HCC)    Hyperlipidemia    Hypertension    SOB (shortness of breath)     _______________________  Past Surgical History:   Procedure Laterality Date    FOOT SURGERY    TONSILLECTOMY     _______________________  Family History   Problem Relation Age of Onset    Diabetes Mother    Diabetes Father     _______________________  Social History     Socioeconomic History    Marital status:    Spouse name: Not on file    Number of children: Not on file    Years of education: Not on file    Highest education level: Not on file   Occupational History    Not on file   Social Needs    Financial resource strain: Not on file    Food insecurity   Worry: Not on file   Inability: Not on file    Transportation needs   Medical: Not on file   Non-medical: Not on file   Tobacco Use    Smoking status: Never Smoker    Smokeless tobacco: Never Used   Substance and Sexual Activity    Alcohol use: No    Drug use: No    Sexual activity: Not on file   Lifestyle    Physical activity   Days per week: Not on file   Minutes per session: Not on file    Stress: Not on file   Relationships    Social connections   Talks on phone: Not on file   Gets together: Not on file   Attends Jain service: Not on file   Active member of club or organization: Not on file   Attends meetings of clubs or organizations: Not on file   Relationship status: Not on file    Intimate partner violence   Fear of current or ex partner: Not on file   Emotionally abused: Not on file   Physically abused: Not on file   Forced sexual activity: Not on file   Other Topics Concern    Caffeine Use Yes   Social History Narrative    Not on file     _______________________  Review of Systems  Review of Systems   Constitution: Positive for malaise/fatigue. HENT: Negative for nosebleeds. Respiratory: Negative for cough, hemoptysis and shortness of breath.    Musculoskeletal: Negative for joint swelling, muscle cramps and muscle weakness. Gastrointestinal: Negative for hematochezia. Genitourinary: Negative for hematuria. Neurological: Negative for dizziness, headaches and light-headedness. CARDIOVASCULAR: Please review HPI.  _______________________  Physical Examination  General appearance: Alert, oriented and cooperative. In no acute distress. Skin: Warm and dry to touch. Head: Normocephalic, without obvious abnormality, atraumatic. Ears, Nose, Mouth, Throat: Deferred secondary to coronavirus  Eyes: Conjunctivae unremarkable, EOM intact. Neck: No JVD, No carotid bruit. Neck supple, trachea midline. Respiratory: Clear to auscultation bilaterally, no use of accessory muscles. Cardiovascular: RRR with normal S1 and S2 with no murmurs. Gastrointestinal: Soft, non-tender. Bowel sounds normal.  Musculoskeletal: No peripheral edema. Neurologic: Ambulates with a walker  _______________________  VITAL SIGNS:  /78 (BP Site: Left Arm, BP Postition: Sitting, BP CUFF SIZE: S (7-9 inches))  Pulse 52  Ht 170.2 cm (5' 7\")  Wt 115.7 kg (255 lb)  SpO2 96%  BMI 39.94 kg/m²   _______________________  Orders Placed or Reconciled This Encounter   Medications    citalopram (CeleXA) 10 mg tablet    donepeziL (ARICEPT) 5 mg tablet   Sig: Take 5 mg by mouth.  NovoLOG Flexpen U-100 Insulin 100 unit/mL (3 mL) insulin pen    TRESIBA FLEXTOUCH U-200 200 unit/mL (3 mL) insulin pen    MYRBETRIQ 25 mg tablet extended release 24 hr    montelukast (SINGULAIR) 10 mg tablet   Sig: Take 10 mg by mouth.  nystatin (MYCOSTATIN) cream   Sig: Apply topically 4 times daily. Continue to apply for 3 days after rash resolves    glucagon, human recombinant, 1 mg injection   Sig: Infuse 1 mg into a venous catheter once. There are no discontinued medications. _______________________  IMPRESSIONS/PLAN  1. Chronic diastolic heart failure (CMS-HCC)  - POCT EKG    2. Essential hypertension  - POCT EKG    3.  SOB (shortness of breath)  - POCT EKG    4. Pre-op exam  - POCT EKG    1. Hyperlipidemia, on Zocor  2. Type 2 diabetes  3. Essential hypertension, controlled  4. Chronic heart failure with preserved ejection fraction  5. Lower extremity edema, not currently an issue  6. Memory issues, on Aricept    She is at low risk for the anticipated cystoscopy. I have asked her to return in 6 months for continued risk factor modification  _______________________  TODAYS ORDERS  Orders Placed This Encounter   Procedures    POCT EKG     _______________________  FOLLOW UP  Return in about 6 months (around 8/15/2021).     PCP: RUBY Goodman  Referring Physician: RUBY Goodman [address]  [place]        Sasha Frazier, BOB  [birth year] [id number]      Electronically signed by Jenny Julian MD at 02/15/2021 10:25 AM EST

## 2021-02-17 NOTE — TELEPHONE ENCOUNTER
Abrahan Martínez is calling to request a refill on the following medication(s):  Requested Prescriptions     Pending Prescriptions Disp Refills    Continuous Blood Gluc Sensor (FREESTYLE VERA 14 DAY SENSOR) MISC 6 each 0     Si each by Does not apply route daily       Last Visit Date (If Applicable):      Next Visit Date:    Visit date not found

## 2021-02-22 DIAGNOSIS — F41.9 ANXIETY AND DEPRESSION: ICD-10-CM

## 2021-02-22 DIAGNOSIS — F32.A ANXIETY AND DEPRESSION: ICD-10-CM

## 2021-02-22 RX ORDER — BUSPIRONE HYDROCHLORIDE 15 MG/1
TABLET ORAL
Qty: 270 TABLET | Refills: 1 | Status: SHIPPED | OUTPATIENT
Start: 2021-02-22 | End: 2021-09-01

## 2021-02-22 NOTE — TELEPHONE ENCOUNTER
Hemalatha Doctor is calling to request a refill on the following medication(s):  Requested Prescriptions     Pending Prescriptions Disp Refills    busPIRone (BUSPAR) 15 MG tablet [Pharmacy Med Name: BUSPIRONE HCL 15 MG Tablet] 270 tablet 1     Sig: TAKE 1 TABLET THREE TIMES DAILY       Last Visit Date (If Applicable):  9/27/8276    Next Visit Date:    Visit date not found

## 2021-02-26 ENCOUNTER — TELEPHONE (OUTPATIENT)
Dept: INTERNAL MEDICINE | Age: 76
End: 2021-02-26

## 2021-02-26 NOTE — TELEPHONE ENCOUNTER
Patient called in stating that she has still not received her free style cora and that she has to have it by Wednesday she is unsure what she should do. Patient aware crystal is out until Monday.

## 2021-03-01 NOTE — TELEPHONE ENCOUNTER
Can we call or have the pt call the supply company to see why there a is delay. If she does not get it by Wednesday, she will need to use fingerstick method until delivery.

## 2021-03-01 NOTE — TELEPHONE ENCOUNTER
Spoke with Woodrow at [employer] and stated that it appears that they did try to call multiple times. Everything is ready to go, just needs her approval to send out. Spoke with patient, states she will give them a call.  Number given to patient [phone]

## 2021-03-08 ENCOUNTER — TELEPHONE (OUTPATIENT)
Dept: INTERNAL MEDICINE | Age: 76
End: 2021-03-08

## 2021-03-08 NOTE — TELEPHONE ENCOUNTER
Patient is having surgery this Thursday.   Needs to know if she should take her insulin or how should she handle it?  [phone]

## 2021-03-08 NOTE — TELEPHONE ENCOUNTER
What surgery is she having and what is the prep for it? She should have received instructions from surgery with instructions.

## 2021-03-09 NOTE — TELEPHONE ENCOUNTER
Patient stated that she is having Cystoscopy with Botox. She said that she did ask about all the medications and they notified her of all med instructions besides the insulin. She states that they \"were not sure\" and to contact provider. Please advise.

## 2021-03-09 NOTE — TELEPHONE ENCOUNTER
The day prior to surgery she should take half the dose of her long acting insulin and continue short acting as usual. The day of the procedure she should hold insulin and she can resume once she is eating that day.

## 2021-03-23 NOTE — PATIENT INSTRUCTIONS
If you need to reach the Urologist or Urology Nurses for any health care questions or concerns please call [phone] and ask to speak with the [facility] Urology Nurse. The phone line is open from 8a-430p Monday thru Friday.

## 2021-03-24 ENCOUNTER — OFFICE VISIT (OUTPATIENT)
Dept: UROLOGY | Age: 76
End: 2021-03-24
Payer: MEDICARE

## 2021-03-24 VITALS — HEART RATE: 52 BPM | OXYGEN SATURATION: 95 % | WEIGHT: 254 LBS | BODY MASS INDEX: 40.82 KG/M2 | HEIGHT: 66 IN

## 2021-03-24 DIAGNOSIS — N39.41 URGE INCONTINENCE: ICD-10-CM

## 2021-03-24 DIAGNOSIS — N39.44 NOCTURNAL ENURESIS: Primary | ICD-10-CM

## 2021-03-24 PROCEDURE — 3017F COLORECTAL CA SCREEN DOC REV: CPT | Performed by: UROLOGY

## 2021-03-24 PROCEDURE — 51798 US URINE CAPACITY MEASURE: CPT | Performed by: UROLOGY

## 2021-03-24 PROCEDURE — 1036F TOBACCO NON-USER: CPT | Performed by: UROLOGY

## 2021-03-24 PROCEDURE — G8417 CALC BMI ABV UP PARAM F/U: HCPCS | Performed by: UROLOGY

## 2021-03-24 PROCEDURE — 1123F ACP DISCUSS/DSCN MKR DOCD: CPT | Performed by: UROLOGY

## 2021-03-24 PROCEDURE — 4040F PNEUMOC VAC/ADMIN/RCVD: CPT | Performed by: UROLOGY

## 2021-03-24 PROCEDURE — G8400 PT W/DXA NO RESULTS DOC: HCPCS | Performed by: UROLOGY

## 2021-03-24 PROCEDURE — 1090F PRES/ABSN URINE INCON ASSESS: CPT | Performed by: UROLOGY

## 2021-03-24 PROCEDURE — 99213 OFFICE O/P EST LOW 20 MIN: CPT | Performed by: UROLOGY

## 2021-03-24 PROCEDURE — 0509F URINE INCON PLAN DOCD: CPT | Performed by: UROLOGY

## 2021-03-24 PROCEDURE — G8427 DOCREV CUR MEDS BY ELIG CLIN: HCPCS | Performed by: UROLOGY

## 2021-03-24 PROCEDURE — G8484 FLU IMMUNIZE NO ADMIN: HCPCS | Performed by: UROLOGY

## 2021-03-24 NOTE — PROGRESS NOTES
Dr. Miranda Power MD  [facility] Clinic Consultation / New Patient Visit    Patient:  Mike Schafer  YOB: 1945  Date: 3/24/2021  Consult requested from ORTIZ Watson - CNP     HISTORY OF PRESENT ILLNESS:   The patient is a 77 y.o. female who presents today for follow-up for the following problem(s): recurrent UTI  Overall the problem(s) : are worsening. Associated Symptoms: No dysuria, gross hematuria. Pain Severity: Pain Score:   0 - No pain    Today visit:   3/24/21   Botox (3/10/21) - incontinence and urgency has improved. PPD: 1 for confidence. Nocturnal enuresis has improved. 20   Today Gamaliel Smith is doing well. States Nocturia has improved with oxybutynin QHS. She is tolerating the medication well, no dry mouth or constipation. She states she is having decline of her memory, so her last visit, the information regarding improvement of her symptoms was not accurate. Urge incontinence worsenin daily, 3 at night. Avoiding fluids and lasix after dinner. Lasix only in am.  Vesicare 10 mg ER in am - no dry mouth and constipation  States failing medical therapy. PMH:   DAVID on CPAP  Transverse myelitis     Summary of old records:   (Patient's old records, notes and chart reviewed and summarized above.)    Urinalysis today:  No results found for this visit on 21.     Last BUN and creatinine:  Lab Results   Component Value Date    BUN 30 (H) 2020     Lab Results   Component Value Date    CREATININE 1.21 (H) 2020       Imaging Reviewed during this Office Visit:   (results were independently reviewed by physician and radiology report verified)    PAST MEDICAL, FAMILY AND SOCIAL HISTORY:  Past Medical History:   Diagnosis Date    Actinic keratitis     Ankle pain     Arthritis     Back pain     low     [facility]estive [facility]     Chronic diastolic heart failure ([facility] [address] 2017    Chronic kidney disease     Dependent edema     Hypertension     Incontinence     in female    Osteoarthritis     knee     Type II or unspecified type diabetes mellitus without mention of complication, not stated as uncontrolled      Past Surgical History:   Procedure Laterality Date    ANKLE SURGERY Left     FINGER TRIGGER RELEASE Right      Family History   Problem Relation Age of Onset    Diabetes Mother     Diabetes Father     Cancer Paternal Grandfather         BONE      No outpatient medications have been marked as taking for the 3/24/21 encounter (Office Visit) with Michael Nobles MD.       Patient has no known allergies. Social History     Tobacco Use   Smoking Status Never Smoker   Smokeless Tobacco Never Used       Social History     Substance and Sexual Activity   Alcohol Use No    Frequency: Never    Binge frequency: Never       REVIEW OF SYSTEMS:  Constitutional: negative  Eyes: negative  Respiratory: negative  Cardiovascular: negative  Gastrointestinal: negative  Genitourinary: negative  Musculoskeletal: negative  Skin: negative   Neurological: negative  Hematological/Lymphatic: negative  Psychological: negative    Physical Exam:    This a 77 y.o. female      Vitals:    03/24/21 1203   Pulse: 52   SpO2: 95%     Telephone      Assessment and Plan      1. Nocturnal enuresis    2. Urge incontinence           Plan:      No follow-ups on file. Follow up 3 months    May discontinue:  Vesicare 10 mg - improved Daytime symptoms  Oxybutynin 5 mg QHS - improved nocturnal enuresis.       Discussed behavioral modifications  Bumex in am - has helped night time syptoms      Cysto botox (100 units)  UCx

## 2021-04-15 ENCOUNTER — TELEPHONE (OUTPATIENT)
Dept: UROLOGY | Age: 76
End: 2021-04-15

## 2021-04-15 NOTE — TELEPHONE ENCOUNTER
Please call patient back at [phone]. Patient states the botox is no longer working and has started taking the Myrbetriq 25 mg and Oxybutynin 5 mg again.

## 2021-04-19 ENCOUNTER — TELEPHONE (OUTPATIENT)
Dept: FAMILY MEDICINE CLINIC | Age: 76
End: 2021-04-19

## 2021-04-19 ENCOUNTER — TELEPHONE (OUTPATIENT)
Dept: UROLOGY | Age: 76
End: 2021-04-19

## 2021-04-19 DIAGNOSIS — R29.898 LEG WEAKNESS, BILATERAL: Primary | ICD-10-CM

## 2021-04-19 NOTE — TELEPHONE ENCOUNTER
Pt called and stated she would like a referral to PT at [facility] that she discussed with you at last visit.  Irma Dural you knew why she needed to go

## 2021-04-28 ENCOUNTER — TELEPHONE (OUTPATIENT)
Dept: FAMILY MEDICINE CLINIC | Age: 76
End: 2021-04-28

## 2021-04-29 DIAGNOSIS — R42 VERTIGO: Primary | ICD-10-CM

## 2021-05-03 PROBLEM — N18.4 TYPE 2 DIABETES MELLITUS WITH STAGE 4 CHRONIC KIDNEY DISEASE, WITH LONG-TERM CURRENT USE OF INSULIN (HCC): Status: RESOLVED | Noted: 2020-10-08 | Resolved: 2021-05-03

## 2021-05-03 PROBLEM — Z79.4 TYPE 2 DIABETES MELLITUS WITH STAGE 4 CHRONIC KIDNEY DISEASE, WITH LONG-TERM CURRENT USE OF INSULIN (HCC): Status: RESOLVED | Noted: 2020-10-08 | Resolved: 2021-05-03

## 2021-05-03 PROBLEM — N28.9 DECREASED RENAL FUNCTION: Status: RESOLVED | Noted: 2019-10-18 | Resolved: 2021-05-03

## 2021-05-03 PROBLEM — E11.22 TYPE 2 DIABETES MELLITUS WITH STAGE 4 CHRONIC KIDNEY DISEASE, WITH LONG-TERM CURRENT USE OF INSULIN (HCC): Status: RESOLVED | Noted: 2020-10-08 | Resolved: 2021-05-03

## 2021-05-03 PROBLEM — F41.9 ANXIETY AND DEPRESSION: Status: RESOLVED | Noted: 2019-06-23 | Resolved: 2021-05-03

## 2021-05-03 PROBLEM — F32.A ANXIETY AND DEPRESSION: Status: RESOLVED | Noted: 2019-06-23 | Resolved: 2021-05-03

## 2021-05-03 NOTE — PROGRESS NOTES
[address] [facility]  [address] [place]  Dept: [phone]  Dept Fax: [phone]    Chief Complaint   Patient presents with    Diabetes     bs today was 157, is continuously monitoring with new device    Hypertension     denies chest pains sob leg edema has had some dizziness was given order for PT to help with dizziness    Hyperlipidemia       HPI:   Patient presents to the office for routine 6-month follow-up. She has been working with physical therapy 3 days a week to help improve balance and strength. She is feeling better and states it is helping. She had bladder issues and recently had a Botox injection. She thinks maybe this has increased her blood sugars. She also complains of a significant increase in her stress due to marital problems, \"among other things\". Hypertension  This is a chronic problem. The current episode started more than 1 year ago. The problem has been waxing and waning since onset. Associated symptoms include anxiety and malaise/fatigue. Pertinent negatives include no chest pain, headaches, orthopnea, palpitations, peripheral edema, PND or shortness of breath. There are no associated agents to hypertension. Risk factors for coronary artery disease include obesity, post-menopausal state, dyslipidemia, diabetes mellitus, sedentary lifestyle and stress. Past treatments include beta blockers and ACE inhibitors. The current treatment provides moderate improvement. Compliance problems include exercise and diet. Hyperlipidemia  This is a chronic problem. The current episode started more than 1 year ago. Recent lipid tests were reviewed and are normal. Exacerbating diseases include diabetes and obesity. Factors aggravating her hyperlipidemia include beta blockers. Pertinent negatives include no chest pain, focal sensory loss, myalgias or shortness of breath. Current antihyperlipidemic treatment includes statins.  The current Readings from Last 3 Encounters:   05/04/21 (!) 142/80   05/04/21 (!) 144/70   01/25/21 134/70          (goal 120/80)      Pulse Readings from Last 3 Encounters:   05/04/21 56   05/04/21 56   03/24/21 52        Wt Readings from Last 3 Encounters:   05/04/21 250 lb (113.4 kg)   05/04/21 250 lb 4.8 oz (113.5 kg)   03/24/21 254 lb (115.2 kg)       Past Medical History:   Diagnosis Date    Actinic keratitis     Ankle pain     Arthritis     Back pain     low     Chronic diastolic CHF [facility]congestive heart failure) ([facility])     Chronic diastolic heart failure (HCC) 5/31/2017    Chronic kidney disease     Dependent edema     Hypertension     Incontinence     in female    Osteoarthritis     knee     Type II or unspecified type diabetes mellitus without mention of complication, not stated as uncontrolled       Past Surgical History:   Procedure Laterality Date    ANKLE SURGERY Left     FINGER TRIGGER RELEASE Right        Family History   Problem Relation Age of Onset    Diabetes Mother     Diabetes Father     Cancer Paternal Grandfather         BONE        Social History     Tobacco Use    Smoking status: Never Smoker    Smokeless tobacco: Never Used   Substance Use Topics    Alcohol use: No     Frequency: Never     Binge frequency: Never        Current Outpatient Medications   Medication Sig Dispense Refill    ergocalciferol (ERGOCALCIFEROL) 1.25 MG (41831 UT) capsule Take 50,000 Units by mouth once a week      potassium chloride (KLOR-CON) 10 MEQ extended release tablet Take 10 mEq by mouth daily      citalopram (CELEXA) 10 MG tablet Take 1 tablet by mouth daily 90 tablet 3    carvedilol (COREG) 6.25 MG tablet Take 1 tablet by mouth 2 times daily (with meals) 180 tablet 3    busPIRone (BUSPAR) 15 MG tablet TAKE 1 TABLET THREE TIMES DAILY 270 tablet 1    Continuous Blood Gluc Sensor (FREESTYLE VERA 14 DAY SENSOR) MISC 6 each by Does not apply route daily 6 each 0    nystatin (MYCOSTATIN) 479251 UNIT/GM cream Apply topically 4 times daily. Continue to apply for 3 days after rash resolves 30 g 1    Insulin Degludec (TRESIBA FLEXTOUCH) 200 UNIT/ML SOPN Inject 26 Units into the skin daily (Patient taking differently: Inject 24 Units into the skin daily ) 6 pen 1    glucagon 1 MG injection Inject 1 mg into the muscle See Admin Instructions Follow package directions for low blood sugar. 1 kit 3    simvastatin (ZOCOR) 40 MG tablet TAKE 1 TABLET EVERY NIGHT 90 tablet 1    mirabegron (MYRBETRIQ) 25 MG TB24 Take 1 tablet by mouth daily 90 tablet 3    donepezil (ARICEPT) 5 MG tablet Take 1 tablet by mouth nightly 90 tablet 3    montelukast (SINGULAIR) 10 MG tablet Take 1 tablet by mouth daily 90 tablet 3    insulin aspart (NOVOLOG FLEXPEN) 100 UNIT/ML injection pen Use tid with meals With sliding scale max dose is 30 units per day 3 pen 3    Insulin Pen Needle 32G X 4 MM MISC 1 each by Does not apply route 4 times daily 400 each 3    ferrous sulfate (IRON 325) 325 (65 Fe) MG tablet Take 1 tablet by mouth daily (with breakfast) 90 tablet 2    cyanocobalamin 1000 MCG/ML injection Inject 1 mL into the muscle every 30 days 1 mL 0    blood glucose monitor strips by Other route 3 times daily Test 3 times a day & as needed for symptoms of irregular blood glucose.  100 strip 5    RELION ULTRA THIN LANCETS 30G MISC 1 each by Does not apply route 3 times daily 300 each 3    bumetanide (BUMEX) 1 MG tablet Take 1 tablet by mouth 2 times daily (Patient taking differently: Take 1 mg by mouth daily ) 180 tablet 1    meclizine (ANTIVERT) 12.5 MG tablet Take 1 tablet by mouth 3 times daily as needed for Dizziness 90 tablet 2    acetaminophen (TYLENOL ARTHRITIS PAIN) 650 MG extended release tablet Take 650 mg by mouth every 8 hours as needed for Pain      oxybutynin (DITROPAN XL) 5 MG extended release tablet Take 1 tablet by mouth daily 90 tablet 3    lisinopril (PRINIVIL;ZESTRIL) 2.5 MG tablet Take 1 tablet by mouth daily 90 tablet 3     No current facility-administered medications for this visit. No Known Allergies    Health Maintenance   Topic Date Due    Shingles Vaccine (2 of 3) 02/26/2007    Diabetic foot exam  01/16/2021    Diabetic retinal exam  02/10/2021    Hepatitis C screen  06/29/2022 (Originally 1945)    A1C test (Diabetic or Prediabetic)  08/04/2021    Diabetic microalbuminuria test  09/01/2021    Lipid screen  09/01/2021    Annual Wellness Visit (AWV)  10/09/2021    Potassium monitoring  05/05/2022    Creatinine monitoring  05/05/2022    Breast cancer screen  07/14/2022    Colon cancer screen fecal DNA test (Cologuard)  10/27/2023    DTaP/Tdap/Td vaccine (2 - Td) 08/25/2027    DEXA (modify frequency per FRAX score)  Completed    Flu vaccine  Completed    Pneumococcal 65+ yrs at Risk Vaccine  Completed    COVID-19 Vaccine  Completed    Hepatitis A vaccine  Aged Out    Hib vaccine  Aged Out    Meningococcal (ACWY) vaccine  Aged Out       Subjective:     Review of Systems   Constitutional: Positive for fatigue and malaise/fatigue. Negative for chills and fever. HENT: Negative. Eyes: Negative. Respiratory: Negative for cough, shortness of breath and wheezing. Cardiovascular: Negative for chest pain, palpitations, orthopnea, leg swelling and PND. Gastrointestinal: Negative for abdominal pain, constipation, diarrhea and nausea. Endocrine: Negative for cold intolerance, heat intolerance, polydipsia, polyphagia and polyuria. Genitourinary: Negative. Musculoskeletal: Negative for arthralgias and myalgias. Skin: Negative. Allergic/Immunologic: Negative for environmental allergies and food allergies. Neurological: Negative for dizziness, weakness and headaches. Psychiatric/Behavioral: Positive for agitation and sleep disturbance. Negative for decreased concentration, dysphoric mood, self-injury and suicidal ideas. The patient is nervous/anxious.          Objective: Vitals:    05/04/21 1044   BP: (!) 144/70   Pulse: 56   SpO2: 96%   Weight: 250 lb 4.8 oz (113.5 kg)        Estimated body mass index is 40.4 kg/m² as calculated from the following:    Height as of 3/24/21: 5' 6\" (1.676 m). Weight as of this encounter: 250 lb 4.8 oz (113.5 kg). Physical Exam  Constitutional:       Appearance: Normal appearance. She is well-developed and well-groomed. She is obese. HENT:      Head: Normocephalic. Eyes:      Conjunctiva/sclera: Conjunctivae normal.   Neck:      Musculoskeletal: Neck supple. Thyroid: No thyromegaly. Vascular: No carotid bruit or JVD. Cardiovascular:      Rate and Rhythm: Normal rate and regular rhythm. Heart sounds: Normal heart sounds. Pulmonary:      Effort: Pulmonary effort is normal. No respiratory distress. Breath sounds: Normal breath sounds. No wheezing. Abdominal:      General: Bowel sounds are normal.      Palpations: Abdomen is soft. Tenderness: There is no abdominal tenderness. Musculoskeletal:      Right lower leg: No edema. Left lower leg: No edema. Lymphadenopathy:      Cervical: No cervical adenopathy. Skin:     Capillary Refill: Capillary refill takes less than 2 seconds. Neurological:      Mental Status: She is alert and oriented to person, place, and time. Psychiatric:         Mood and Affect: Mood is anxious and depressed. Affect is tearful. Behavior: Behavior is cooperative.          PHQ Scores 5/4/2021 10/8/2020 10/8/2020 6/2/2020 3/2/2020 2/7/2019 1/2/2019   PHQ2 Score 1 4 4 3 2 3 4   PHQ9 Score 1 16 4 11 2 15 16     Interpretation of Total Score Depression Severity: 1-4 = Minimal depression, 5-9 = Mild depression, 10-14 = Moderate depression, 15-19 = Moderately severe depression, 20-27 = Severe depression    Lab Results   Component Value Date    LABA1C 9.1 05/04/2021    LABA1C 9.0 01/19/2021    LABA1C 7.6 10/01/2020     Lab Results   Component Value Date    GLUF 167 (H) 09/01/2020 LABMICR 84 (H) 09/01/2020    LDLCALC 69.4 03/28/2019    CREATININE 1.3 (H) 05/05/2021       Lab Results   Component Value Date     05/05/2021    K 4.5 05/05/2021     05/05/2021    CO2 29 05/05/2021    BUN 26 (H) 05/05/2021    CREATININE 1.3 (H) 05/05/2021    GLUCOSE 233 (H) 05/05/2021    CALCIUM 9.1 05/05/2021    PROT 7.0 09/01/2020    LABALBU 3.7 05/05/2021    BILITOT 0.3 05/05/2021    ALKPHOS 88 05/05/2021    AST 14 05/05/2021    ALT 9 05/05/2021    LABGLOM 42.6 05/05/2021    GFRAA 53 (L) 09/01/2020    AGRATIO 1.3 07/05/2019    GLOB 2.9 05/05/2021       Lab Results   Component Value Date    LABA1C 9.1 05/04/2021    LABA1C 9.0 01/19/2021    LABA1C 7.6 10/01/2020     Lab Results   Component Value Date    LABMICR 84 (H) 09/01/2020    CREATININE 1.3 (H) 05/05/2021     Lab Results   Component Value Date     05/05/2021    K 4.5 05/05/2021     05/05/2021    CO2 29 05/05/2021    BUN 26 (H) 05/05/2021    CREATININE 1.3 (H) 05/05/2021    GLUCOSE 233 (H) 05/05/2021    CALCIUM 9.1 05/05/2021    CALCIUM 9.3 09/01/2020    CALCIUM 9.1 09/24/2019     Lab Results   Component Value Date    CHOL 169 09/01/2020    TRIG 89 09/01/2020    HDL 60 09/01/2020    LDLCALC 69.4 03/28/2019       Assessment:     Kenny Newell was seen today for diabetes, hypertension and hyperlipidemia. Diagnoses and all orders for this visit:    Type 2 diabetes mellitus with stage 3b chronic kidney disease, with long-term current use of insulin (HCC)  -     POCT glycosylated hemoglobin (Hb A1C)    Essential hypertension  -     carvedilol (COREG) 6.25 MG tablet;  Take 1 tablet by mouth 2 times daily (with meals)    Other hyperlipidemia    Chronic diastolic heart failure (HCC)    Obesity, morbid, BMI 40.0-49.9 (HCC)    Arthritis    Mild episode of recurrent major depressive disorder (HCC)    DAVID treated with BiPAP    Neuropathy due to type 2 diabetes mellitus (HCC)    Vitamin B12 deficiency (dietary) anemia    Allergic rhinitis, unspecified

## 2021-05-04 ENCOUNTER — OFFICE VISIT (OUTPATIENT)
Dept: FAMILY MEDICINE CLINIC | Age: 76
End: 2021-05-04
Payer: MEDICARE

## 2021-05-04 ENCOUNTER — OFFICE VISIT (OUTPATIENT)
Dept: DIABETES SERVICES | Age: 76
End: 2021-05-04
Payer: MEDICARE

## 2021-05-04 VITALS
RESPIRATION RATE: 14 BRPM | DIASTOLIC BLOOD PRESSURE: 80 MMHG | SYSTOLIC BLOOD PRESSURE: 142 MMHG | HEIGHT: 66 IN | WEIGHT: 250 LBS | HEART RATE: 56 BPM | BODY MASS INDEX: 40.18 KG/M2

## 2021-05-04 VITALS
OXYGEN SATURATION: 96 % | HEART RATE: 56 BPM | BODY MASS INDEX: 40.4 KG/M2 | WEIGHT: 250.3 LBS | DIASTOLIC BLOOD PRESSURE: 70 MMHG | SYSTOLIC BLOOD PRESSURE: 144 MMHG

## 2021-05-04 DIAGNOSIS — E11.22 TYPE 2 DIABETES MELLITUS WITH STAGE 3B CHRONIC KIDNEY DISEASE, WITH LONG-TERM CURRENT USE OF INSULIN (HCC): Primary | ICD-10-CM

## 2021-05-04 DIAGNOSIS — N18.32 TYPE 2 DIABETES MELLITUS WITH STAGE 3B CHRONIC KIDNEY DISEASE, WITH LONG-TERM CURRENT USE OF INSULIN (HCC): Primary | ICD-10-CM

## 2021-05-04 DIAGNOSIS — G47.33 OSA TREATED WITH BIPAP: ICD-10-CM

## 2021-05-04 DIAGNOSIS — I10 ESSENTIAL HYPERTENSION: ICD-10-CM

## 2021-05-04 DIAGNOSIS — M19.90 ARTHRITIS: ICD-10-CM

## 2021-05-04 DIAGNOSIS — D51.8 VITAMIN B12 DEFICIENCY (DIETARY) ANEMIA: ICD-10-CM

## 2021-05-04 DIAGNOSIS — Z79.4 TYPE 2 DIABETES MELLITUS WITH STAGE 3B CHRONIC KIDNEY DISEASE, WITH LONG-TERM CURRENT USE OF INSULIN (HCC): Primary | ICD-10-CM

## 2021-05-04 DIAGNOSIS — I50.32 CHRONIC DIASTOLIC HEART FAILURE (HCC): ICD-10-CM

## 2021-05-04 DIAGNOSIS — F33.0 MILD EPISODE OF RECURRENT MAJOR DEPRESSIVE DISORDER (HCC): ICD-10-CM

## 2021-05-04 DIAGNOSIS — E78.49 OTHER HYPERLIPIDEMIA: ICD-10-CM

## 2021-05-04 DIAGNOSIS — E66.01 OBESITY, MORBID, BMI 40.0-49.9 (HCC): ICD-10-CM

## 2021-05-04 DIAGNOSIS — E11.40 NEUROPATHY DUE TO TYPE 2 DIABETES MELLITUS (HCC): ICD-10-CM

## 2021-05-04 DIAGNOSIS — M16.11 PRIMARY OSTEOARTHRITIS OF RIGHT HIP: ICD-10-CM

## 2021-05-04 DIAGNOSIS — F41.9 ANXIETY: ICD-10-CM

## 2021-05-04 DIAGNOSIS — J30.9 ALLERGIC RHINITIS, UNSPECIFIED SEASONALITY, UNSPECIFIED TRIGGER: ICD-10-CM

## 2021-05-04 LAB — HBA1C MFR BLD: 9.1 %

## 2021-05-04 PROCEDURE — 4040F PNEUMOC VAC/ADMIN/RCVD: CPT | Performed by: NURSE PRACTITIONER

## 2021-05-04 PROCEDURE — 95251 CONT GLUC MNTR ANALYSIS I&R: CPT | Performed by: NURSE PRACTITIONER

## 2021-05-04 PROCEDURE — 1123F ACP DISCUSS/DSCN MKR DOCD: CPT | Performed by: NURSE PRACTITIONER

## 2021-05-04 PROCEDURE — 3046F HEMOGLOBIN A1C LEVEL >9.0%: CPT | Performed by: NURSE PRACTITIONER

## 2021-05-04 PROCEDURE — 1090F PRES/ABSN URINE INCON ASSESS: CPT | Performed by: NURSE PRACTITIONER

## 2021-05-04 PROCEDURE — G8400 PT W/DXA NO RESULTS DOC: HCPCS | Performed by: NURSE PRACTITIONER

## 2021-05-04 PROCEDURE — G8417 CALC BMI ABV UP PARAM F/U: HCPCS | Performed by: NURSE PRACTITIONER

## 2021-05-04 PROCEDURE — 99214 OFFICE O/P EST MOD 30 MIN: CPT | Performed by: NURSE PRACTITIONER

## 2021-05-04 PROCEDURE — 1036F TOBACCO NON-USER: CPT | Performed by: NURSE PRACTITIONER

## 2021-05-04 PROCEDURE — G8427 DOCREV CUR MEDS BY ELIG CLIN: HCPCS | Performed by: NURSE PRACTITIONER

## 2021-05-04 PROCEDURE — 99214 OFFICE O/P EST MOD 30 MIN: CPT

## 2021-05-04 PROCEDURE — 2022F DILAT RTA XM EVC RTNOPTHY: CPT | Performed by: NURSE PRACTITIONER

## 2021-05-04 PROCEDURE — 3017F COLORECTAL CA SCREEN DOC REV: CPT | Performed by: NURSE PRACTITIONER

## 2021-05-04 PROCEDURE — 83036 HEMOGLOBIN GLYCOSYLATED A1C: CPT | Performed by: NURSE PRACTITIONER

## 2021-05-04 RX ORDER — CARVEDILOL 6.25 MG/1
6.25 TABLET ORAL 2 TIMES DAILY WITH MEALS
Qty: 180 TABLET | Refills: 3 | Status: SHIPPED | OUTPATIENT
Start: 2021-05-04 | End: 2021-09-01

## 2021-05-04 RX ORDER — ERGOCALCIFEROL (VITAMIN D2) 1250 MCG
50000 CAPSULE ORAL WEEKLY
COMMUNITY
End: 2022-02-15

## 2021-05-04 RX ORDER — POTASSIUM CHLORIDE 750 MG/1
10 TABLET, FILM COATED, EXTENDED RELEASE ORAL DAILY
COMMUNITY
End: 2021-08-03

## 2021-05-04 RX ORDER — CITALOPRAM 10 MG/1
10 TABLET ORAL DAILY
Qty: 90 TABLET | Refills: 3 | Status: SHIPPED | OUTPATIENT
Start: 2021-05-04 | End: 2021-08-24

## 2021-05-04 ASSESSMENT — PATIENT HEALTH QUESTIONNAIRE - PHQ9
SUM OF ALL RESPONSES TO PHQ QUESTIONS 1-9: 1
SUM OF ALL RESPONSES TO PHQ9 QUESTIONS 1 & 2: 1
SUM OF ALL RESPONSES TO PHQ QUESTIONS 1-9: 1

## 2021-05-04 ASSESSMENT — ENCOUNTER SYMPTOMS
ABDOMINAL PAIN: 0
DIARRHEA: 0
SHORTNESS OF BREATH: 0
RESPIRATORY NEGATIVE: 1

## 2021-05-04 NOTE — PATIENT INSTRUCTIONS
Take glucose and short acting insulin 15 minutes prior to meals   Rotate injection sites around     -Carry rapidly absorbed carbohydrate source at all times and tach friends and family about how to treat low glucose. Treat low glucose (<70) per rule of 15's: Give 15 grams of rapildy absorbed carbohydrate (Ex: 1/2 cup juice or 4 glucose tabs). Recheck glucose in 15 minutes. Give another 15 grams of carbohydrates if glucose still <70. Repeat until glucose is higher than 70. Once glucose level returns to normal, follow with a protein. If severe hypoglycemia symptoms call 911.

## 2021-05-04 NOTE — PROGRESS NOTES
[address]  [address] DIABETES MGMT [facility]  [address] [facility], [address]  [facility] [address] [phone]        HISTORY:    Gibsno Hopper presents today for evaluation and management of:  Chief Complaint   Patient presents with    Diabetes     1 mo f/u        HPI    Interval History:    Current Diabetic Medications  novolog TID with sliding scale, tresiba 24 units once daily     DKA episodes: 0      09/01/20   Just had labs done today not due for a1c yet. She holds her short acting insulin with breakfast if only 2 units are required.   Diet:continues to count carbs, sweets occasionally   Exercise: PT for knee and vertigo  BS testing: using cgm with success. Supplies from [facility]     12/15/20   At previous visit we adjusted insulin. She has had an episode of low blood sugar and called EMS. She states she has had more memory issues but feels like she did not mix up her insulin. Diet: low carb  Exercise:none  BS testing: uses cgm daily with sucess  Issues: hypoglycemia and memory     01/19/21   At previous visit insulin was decreased due to hypoglycemia. She is now having more highs and denies hypoglcyemia. Diet: unchanged  Exercise: none  BS testing: uses  Alida cgm daily with success   Issues: denies     05/04/21   At previous visit dm counseling was provided. Insulin was adjusted. She has been having a lot of stress recently and has recent steroid use. Glucose is fluctuating. She admits to taking insulin sometimes after eating and basing the scale off of peak glucose. She also does not rotate injection sites and has mild lipodystrophy. Follows with nephrology.    Diet: unchanged  Exercise: none  BS testing: uses cgm daily with sucess  Issues: denies     High cholesterol-  Takes zocor and denies any adverse effects with its use.  Watches diet and exercise.      Hypertension-  Takes coreg and lisinopril and denies any adverse effects with their use. Watches diet and exercise. Denies any chest pain, dizziness or edema.  Follows with cardiology:Yes. Yearly      Obesity- Working on weight loss. .       Past Medical History:   Diagnosis Date    Actinic keratitis     Ankle pain     Arthritis     Back pain     low     Chronic diastolic CHF (congestive heart failure) (HCC)     Chronic diastolic heart failure (HCC) 5/31/2017    Chronic kidney disease     Dependent edema     Hypertension     Incontinence     in female    Osteoarthritis     knee     Type II or unspecified type diabetes mellitus without mention of complication, not stated as uncontrolled      Family History   Problem Relation Age of Onset    Diabetes Mother     Diabetes Father     Cancer Paternal Grandfather         BONE      Social History     Tobacco Use    Smoking status: Never Smoker    Smokeless tobacco: Never Used   Substance Use Topics    Alcohol use: No     Frequency: Never     Binge frequency: Never    Drug use: No     No Known Allergies    MEDICATIONS:  Current Outpatient Medications   Medication Sig Dispense Refill    ergocalciferol (ERGOCALCIFEROL) 1.25 MG (85488 UT) capsule Take 50,000 Units by mouth once a week      potassium chloride (KLOR-CON) 10 MEQ extended release tablet Take 10 mEq by mouth daily      citalopram (CELEXA) 10 MG tablet Take 1 tablet by mouth daily 90 tablet 3    carvedilol (COREG) 6.25 MG tablet Take 1 tablet by mouth 2 times daily (with meals) 180 tablet 3    busPIRone (BUSPAR) 15 MG tablet TAKE 1 TABLET THREE TIMES DAILY 270 tablet 1    Continuous Blood Gluc Sensor (FREESTYLE VERA 14 DAY SENSOR) MISC 6 each by Does not apply route daily 6 each 0    nystatin (MYCOSTATIN) 932751 UNIT/GM cream Apply topically 4 times daily.  Continue to apply for 3 days after rash resolves 30 g 1    Insulin Degludec (TRESIBA FLEXTOUCH) 200 UNIT/ML SOPN Inject 26 Units into the skin daily (Patient taking differently: Inject 24 Units into the skin daily ) 6 pen 1    glucagon 1 MG injection Inject 1 mg into the muscle See Admin Instructions Follow package directions for low blood sugar. 1 kit 3    simvastatin (ZOCOR) 40 MG tablet TAKE 1 TABLET EVERY NIGHT 90 tablet 1    mirabegron (MYRBETRIQ) 25 MG TB24 Take 1 tablet by mouth daily 90 tablet 3    donepezil (ARICEPT) 5 MG tablet Take 1 tablet by mouth nightly 90 tablet 3    montelukast (SINGULAIR) 10 MG tablet Take 1 tablet by mouth daily 90 tablet 3    insulin aspart (NOVOLOG FLEXPEN) 100 UNIT/ML injection pen Use tid with meals With sliding scale max dose is 30 units per day 3 pen 3    Insulin Pen Needle 32G X 4 MM MISC 1 each by Does not apply route 4 times daily 400 each 3    ferrous sulfate (IRON 325) 325 (65 Fe) MG tablet Take 1 tablet by mouth daily (with breakfast) 90 tablet 2    cyanocobalamin 1000 MCG/ML injection Inject 1 mL into the muscle every 30 days 1 mL 0    blood glucose monitor strips by Other route 3 times daily Test 3 times a day & as needed for symptoms of irregular blood glucose. 100 strip 5    RELION ULTRA THIN LANCETS 30G MISC 1 each by Does not apply route 3 times daily 300 each 3    bumetanide (BUMEX) 1 MG tablet Take 1 tablet by mouth 2 times daily (Patient taking differently: Take 1 mg by mouth daily ) 180 tablet 1    meclizine (ANTIVERT) 12.5 MG tablet Take 1 tablet by mouth 3 times daily as needed for Dizziness 90 tablet 2    acetaminophen (TYLENOL ARTHRITIS PAIN) 650 MG extended release tablet Take 650 mg by mouth every 8 hours as needed for Pain      oxybutynin (DITROPAN XL) 5 MG extended release tablet Take 1 tablet by mouth daily 90 tablet 3    lisinopril (PRINIVIL;ZESTRIL) 2.5 MG tablet Take 1 tablet by mouth daily 90 tablet 3     No current facility-administered medications for this visit. Review ofSymptoms:  Review of Systems   Constitutional: Positive for fatigue. Negative for unexpected weight change. Eyes: Negative for visual disturbance. Respiratory: Negative. Negative for shortness of breath. Cardiovascular: Negative for chest pain and leg swelling. Gastrointestinal: Negative for abdominal pain and diarrhea. Endocrine: Negative for polydipsia, polyphagia and polyuria. Genitourinary: Negative. Musculoskeletal: Negative. Skin: Negative for rash and wound. Neurological: Negative for dizziness, tremors, seizures and headaches. Psychiatric/Behavioral: Negative. Negative for confusion and decreased concentration. Theremainder of a complete 14-point review of systems is negative. Vital Signs: BP (!) 142/80   Pulse 56   Resp 14   Ht 5' 6\" (1.676 m)   Wt 250 lb (113.4 kg)   LMP  (LMP Unknown)   BMI 40.35 kg/m²      Wt Readings from Last 3 Encounters:   05/04/21 250 lb (113.4 kg)   05/04/21 250 lb 4.8 oz (113.5 kg)   03/24/21 254 lb (115.2 kg)     Body mass index is 40.35 kg/m².   LABS:  Hemoglobin A1C   Date Value Ref Range Status   05/04/2021 9.1 % Final   01/19/2021 9.0 % Final     Lab Results   Component Value Date    LABMICR 84 (H) 09/01/2020     Lab Results   Component Value Date     09/01/2020    K 4.8 09/01/2020     09/01/2020    CO2 26 09/01/2020    BUN 30 (H) 09/01/2020    CREATININE 1.21 (H) 09/01/2020    GLUCOSE 237 (H) 09/24/2019    CALCIUM 9.3 09/01/2020    PROT 7.0 09/01/2020    LABALBU 4.2 09/01/2020    BILITOT 0.37 09/01/2020    ALKPHOS 93 09/01/2020    AST 10 09/01/2020    ALT 9 09/01/2020    LABGLOM 44 (L) 09/01/2020    GFRAA 53 (L) 09/01/2020    AGRATIO 1.3 07/05/2019    GLOB 3.1 07/05/2019     Lab Results   Component Value Date    CHOL 169 09/01/2020    CHOL 157 03/28/2019    CHOL 194 02/21/2018     Lab Results   Component Value Date    TRIG 89 09/01/2020    TRIG 173 03/28/2019    TRIG 110 02/21/2018     Lab Results   Component Value Date    HDL 60 09/01/2020    HDL 45 04/24/2017    HDL 49 05/16/2016     Lab Results   Component Value Date    LDLCHOLESTEROL 91 09/01/2020    LDLCALC 69.4 03/28/2019    LDLCALC 125.0 02/21/2018    LDLCALC 105.8 04/24/2017     Lab Results   Component Value Date    VLDL NOT REPORTED 09/01/2020    VLDL 35 03/28/2019    VLDL 22 02/21/2018     Lab Results   Component Value Date    CHOLHDLRATIO 2.8 09/01/2020    CHOLHDLRATIO 3.0 03/28/2019    CHOLHDLRATIO 4.1 02/21/2018           Physical Exam  Constitutional:       Appearance: She is well-developed. Eyes:      Pupils: Pupils are equal, round, and reactive to light. Neck:      Thyroid: No thyroid mass, thyromegaly or thyroid tenderness. Cardiovascular:      Rate and Rhythm: Normal rate and regular rhythm. Heart sounds: Normal heart sounds. Pulmonary:      Effort: Pulmonary effort is normal.      Breath sounds: Normal breath sounds. Abdominal:      General: Bowel sounds are normal.      Palpations: Abdomen is soft. Skin:     General: Skin is warm and dry. Comments: Negative for open/nonhealing wounds. Negative for lipohypertrophy. Neurological:      Mental Status: She is alert and oriented to person, place, and time. ASSESSMENT/PLAN:     Diagnosis Orders   1. Type 2 diabetes mellitus with stage 3b chronic kidney disease, with long-term current use of insulin ([facility] [address]     2. Other hyperlipidemia     3. Essential hypertension     4. BMI 40.0-44.9, adult ([facility])     5. Obesity, morbid, BMI 40.0-49.9 ([address]       No orders of the defined types were placed in this encounter. No orders of the defined types were placed in this encounter. Requested Prescriptions      No prescriptions requested or ordered in this encounter       1. Type 2 diabetes mellitus with stage 3b chronic kidney disease, with long-term current use of insulin ([facility])  - Unstable  HbA1C goal is less than 7%. - Fasting blood glucose goal is 70-130mg/dl and postprandial blood sugar goal is less than 180 mg/dl.    -Diabetic foot exam up-to-date: No  -Diabetic retinal exam up-to-date: No  - Labs reviewed includes: Most recent A1C 9.1%, Microalb/Crt. Ratio 84 mcg/mg creat and GFR 44 mL/min. Repeat labs due in 3 months.    -We discussed in great detail dietary modifications they can make to better improve their blood sugars. -follow up diabetes education completed, all questions answered.  -kidney function stable  CGM report downloaded and reviewed, scanned to media tab. Time in range 47% and hypoglycemia 0%. Predicted A1c per CGM report 7.9%. Patient Instructions   Take glucose and short acting insulin 15 minutes prior to meals   Rotate injection sites around     -Carry rapidly absorbed carbohydrate source at all times and tach friends and family about how to treat low glucose. Treat low glucose (<70) per rule of 15's: Give 15 grams of rapildy absorbed carbohydrate (Ex: 1/2 cup juice or 4 glucose tabs). Recheck glucose in 15 minutes. Give another 15 grams of carbohydrates if glucose still <70. Repeat until glucose is higher than 70. Once glucose level returns to normal, follow with a protein. If severe hypoglycemia symptoms call 911. Discussed signs and symptoms of hyper/hypoglycemia and how to treat. Encouraged 150 minutes of physical activity per week. Follow a low carbohydrate diet. Encouraged at least 7 hours of sleep. The patient was informed of the goals of diabetes management. This can only be accomplished by watching their diet and exercise levels. We certainly use medicines to help attain these goals. The consequences of not controlling blood sugars were discussed. These include blindness, heart disease, stroke, kidney disease, and possibly need for dialysis. They were told to be careful with their foot care as diabetics often have nerve damage, infections and risk for limb amutations . They also need a dilated eye exam yearly.  We discussed the issues of diet, exercise, medication, complication avoidance, reviewed the signs and symptoms of diabetes, hypoglycemic episodes, significance of HbA1C.         2. Other hyperlipidemia  stable, lipid panel reviewed, continue current medications. Diet and exercise      3. Essential hypertension   abnormal , continue current medications. Diet and exercise Seek emergent care if chest pain develops. -monitor at home. 4. BMI 40.0-44.9, adult (HCC)  5. Obesity, morbid, BMI 40.0-49.9 (HCC)  Reduce calories and increase physical activity to achieve a slow and steady weight loss to improve blood pressure, cholesterol and diabetes. Answered all patient questions. Agrees to follow plan of care and to follow up in 1 months, sooner if needed. Call office if unexplained blood sugars less than 70 occur or above 400. Call office or access [employer] with any further questions or concerns. Be sure to bring glucometer/food log at next appointment. Total time spent reviewing chart, labs, counseling patient and documenting on the date of the encounter: 30 min.      Electronically signed by ORTIZ Gutierrez CNP on 5/4/2021 at 1:13 PM      (Please note that portions of this note were completed with a voice-recognition program. Efforts were made to edit the dictation but occasionally words are mis-transcribed.)

## 2021-05-05 ENCOUNTER — TELEPHONE (OUTPATIENT)
Dept: UROLOGY | Age: 76
End: 2021-05-05

## 2021-05-08 ASSESSMENT — ENCOUNTER SYMPTOMS
SHORTNESS OF BREATH: 0
ABDOMINAL PAIN: 0
WHEEZING: 0
CONSTIPATION: 0
NAUSEA: 0
COUGH: 0
ORTHOPNEA: 0
EYES NEGATIVE: 1
DIARRHEA: 0

## 2021-05-17 NOTE — PROGRESS NOTES
[address] [facility]  [address] [facility], [address] [facility]  Dept: [phone]  Dept Fax: [phone]    Shine Alba is a 77 y.o. female who presents today for her medical conditions/complaints as noted below. Shine Alba c/o of Hypertension (has been cking bp at home and states has been elevated , denies chest pains. does report sob, dizziness but going to PT for vertigo has slight edema in ankles)      HPI:   Patient presents to the office with concerns for elevated blood pressure readings. She has been monitoring her blood pressure at home. Home readings: 147/96, 172/78, 174/84, 161/81, 148/75, 171/85, 187/86, 164/90. She has been under an increased amount of stress lately. She also complains of intermittent shortness of breath. She denies chest pain with this. She describes the shortness of breath as needing to take a few deep breaths. It does not last long. Hypertension  This is a chronic problem. The problem has been gradually worsening since onset. Associated symptoms include anxiety, headaches (mild), malaise/fatigue, peripheral edema and shortness of breath (intermittent). Pertinent negatives include no blurred vision, chest pain, orthopnea or palpitations. There are no associated agents to hypertension. Risk factors for coronary artery disease include sedentary lifestyle, obesity, post-menopausal state, diabetes mellitus, dyslipidemia and stress. Past treatments include ACE inhibitors and beta blockers. The current treatment provides mild improvement. Compliance problems include exercise and diet. Hypertensive end-organ damage includes heart failure. Identifiable causes of hypertension include chronic renal disease. Shortness of Breath  This is a new problem. The current episode started 1 to 4 weeks ago. The problem occurs intermittently. The problem has been unchanged. Associated symptoms include headaches (mild) and leg swelling (bilateral leg). Pertinent negatives include no abdominal pain, chest pain, fever, leg pain, orthopnea, rhinorrhea, sore throat, sputum production, syncope or wheezing. Nothing aggravates the symptoms. The patient has no known risk factors for DVT/PE. She has tried rest for the symptoms. Her past medical history is significant for a heart failure.        BP Readings from Last 3 Encounters:   05/18/21 (!) 160/66   05/04/21 (!) 142/80   05/04/21 (!) 144/70              (logm085/80)    Pulse Readings from Last 3 Encounters:   05/18/21 53   05/04/21 56   05/04/21 56        Wt Readings from Last 3 Encounters:   05/18/21 258 lb 1.6 oz (117.1 kg)   05/04/21 250 lb (113.4 kg)   05/04/21 250 lb 4.8 oz (113.5 kg)       Past Medical History:   Diagnosis Date    Actinic keratitis     Ankle pain     Arthritis     Back pain     low     Chronic diastolic CHF (congestive heart failure) (HCC)     Chronic diastolic heart failure (HCC) 5/31/2017    Chronic kidney disease     Dependent edema     Hypertension     Incontinence     in female    Osteoarthritis     knee     Type II or unspecified type diabetes mellitus without mention of complication, not stated as uncontrolled       Past Surgical History:   Procedure Laterality Date    ANKLE SURGERY Left     FINGER TRIGGER RELEASE Right        Family History   Problem Relation Age of Onset    Diabetes Mother     Diabetes Father     Cancer Paternal Grandfather         BONE        Social History     Tobacco Use    Smoking status: Never Smoker    Smokeless tobacco: Never Used   Substance Use Topics    Alcohol use: No      Current Outpatient Medications   Medication Sig Dispense Refill    lisinopril (PRINIVIL;ZESTRIL) 10 MG tablet Take 1 tablet by mouth daily 30 tablet 1    ergocalciferol (ERGOCALCIFEROL) 1.25 MG (91414 UT) capsule Take 50,000 Units by mouth once a week      potassium chloride (KLOR-CON) 10 MEQ extended release tablet Take 10 mEq by mouth daily      citalopram (CELEXA) 10 MG tablet Take 1 tablet by mouth daily 90 tablet 3    carvedilol (COREG) 6.25 MG tablet Take 1 tablet by mouth 2 times daily (with meals) 180 tablet 3    busPIRone (BUSPAR) 15 MG tablet TAKE 1 TABLET THREE TIMES DAILY 270 tablet 1    Continuous Blood Gluc Sensor (FREESTYLE VERA 14 DAY SENSOR) MISC 6 each by Does not apply route daily 6 each 0    nystatin (MYCOSTATIN) 201074 UNIT/GM cream Apply topically 4 times daily. Continue to apply for 3 days after rash resolves 30 g 1    Insulin Degludec (TRESIBA FLEXTOUCH) 200 UNIT/ML SOPN Inject 26 Units into the skin daily (Patient taking differently: Inject 24 Units into the skin daily ) 6 pen 1    glucagon 1 MG injection Inject 1 mg into the muscle See Admin Instructions Follow package directions for low blood sugar. 1 kit 3    simvastatin (ZOCOR) 40 MG tablet TAKE 1 TABLET EVERY NIGHT 90 tablet 1    mirabegron (MYRBETRIQ) 25 MG TB24 Take 1 tablet by mouth daily 90 tablet 3    donepezil (ARICEPT) 5 MG tablet Take 1 tablet by mouth nightly 90 tablet 3    montelukast (SINGULAIR) 10 MG tablet Take 1 tablet by mouth daily 90 tablet 3    insulin aspart (NOVOLOG FLEXPEN) 100 UNIT/ML injection pen Use tid with meals With sliding scale max dose is 30 units per day 3 pen 3    Insulin Pen Needle 32G X 4 MM MISC 1 each by Does not apply route 4 times daily 400 each 3    ferrous sulfate (IRON 325) 325 (65 Fe) MG tablet Take 1 tablet by mouth daily (with breakfast) 90 tablet 2    cyanocobalamin 1000 MCG/ML injection Inject 1 mL into the muscle every 30 days 1 mL 0    blood glucose monitor strips by Other route 3 times daily Test 3 times a day & as needed for symptoms of irregular blood glucose.  100 strip 5    RELION ULTRA THIN LANCETS 30G MISC 1 each by Does not apply route 3 times daily 300 each 3    bumetanide (BUMEX) 1 MG tablet Take 1 tablet by mouth 2 times daily (Patient taking differently: Take 1 mg by mouth daily ) 180 tablet 1    meclizine (ANTIVERT) 12.5 MG tablet Take 1 tablet by mouth 3 times daily as needed for Dizziness 90 tablet 2    acetaminophen (TYLENOL ARTHRITIS PAIN) 650 MG extended release tablet Take 650 mg by mouth every 8 hours as needed for Pain       No current facility-administered medications for this visit. No Known Allergies    Health Maintenance   Topic Date Due    Shingles Vaccine (2 of 3) 02/26/2007    Diabetic foot exam  01/16/2021    Diabetic retinal exam  02/10/2021    Hepatitis C screen  06/29/2022 (Originally 1945)    A1C test (Diabetic or Prediabetic)  08/04/2021    Lipid screen  09/01/2021    Annual Wellness Visit (AWV)  10/09/2021    Potassium monitoring  05/05/2022    Creatinine monitoring  05/05/2022    Breast cancer screen  07/14/2022    Colon cancer screen fecal DNA test (Cologuard)  10/27/2023    DTaP/Tdap/Td vaccine (2 - Td) 08/25/2027    DEXA (modify frequency per FRAX score)  Completed    Flu vaccine  Completed    Pneumococcal 65+ yrs at Risk Vaccine  Completed    COVID-19 Vaccine  Completed    Hepatitis A vaccine  Aged Out    Hib vaccine  Aged Out    Meningococcal (ACWY) vaccine  Aged Out       Subjective:      Review of Systems   Constitutional: Positive for malaise/fatigue. Negative for chills, fatigue and fever. HENT: Negative. Negative for rhinorrhea and sore throat. Eyes: Negative. Negative for blurred vision. Respiratory: Positive for shortness of breath (intermittent). Negative for cough, sputum production and wheezing. Cardiovascular: Positive for leg swelling (bilateral leg). Negative for chest pain, palpitations, orthopnea and syncope. Gastrointestinal: Negative for abdominal pain, constipation and diarrhea. Endocrine: Negative for cold intolerance, heat intolerance, polydipsia, polyphagia and polyuria. Genitourinary: Negative. Skin: Negative. Allergic/Immunologic: Negative for environmental allergies and food allergies.    Neurological: Positive for headaches (mild). Negative for dizziness, weakness and light-headedness. Psychiatric/Behavioral: Positive for agitation, decreased concentration and dysphoric mood. Negative for self-injury, sleep disturbance and suicidal ideas. The patient is nervous/anxious. Objective:     Vitals:    21 0927   BP: (!) 160/66   Pulse: 53   SpO2: 96%   Weight: 258 lb 1.6 oz (117.1 kg)        Estimated body mass index is 41.66 kg/m² as calculated from the following:    Height as of 21: 5' 6\" (1.676 m). Weight as of this encounter: 258 lb 1.6 oz (117.1 kg). Physical Exam  Constitutional:       Appearance: Normal appearance. She is well-developed and well-groomed. She is obese. HENT:      Head: Normocephalic. Eyes:      Conjunctiva/sclera: Conjunctivae normal.   Neck:      Thyroid: No thyromegaly. Vascular: No carotid bruit. Cardiovascular:      Rate and Rhythm: Normal rate and regular rhythm. Heart sounds: Normal heart sounds. Pulmonary:      Effort: Pulmonary effort is normal.      Breath sounds: Normal breath sounds. No wheezing. Abdominal:      General: Bowel sounds are normal.      Palpations: Abdomen is soft. Tenderness: There is no abdominal tenderness. Musculoskeletal:      Cervical back: Neck supple. Right lower le+ Pitting Edema present. Left lower le+ Pitting Edema present. Lymphadenopathy:      Cervical: No cervical adenopathy. Skin:     Capillary Refill: Capillary refill takes less than 2 seconds. Neurological:      Mental Status: She is alert and oriented to person, place, and time. Gait: Gait abnormal.   Psychiatric:         Mood and Affect: Mood is depressed. Behavior: Behavior is cooperative.          PHQ Scores 2021 10/8/2020 10/8/2020 2020 3/2/2020 2019 2019   PHQ2 Score 1 4 4 3 2 3 4   PHQ9 Score 1 16 4 11 2 15 16     Interpretation of Total Score Depression Severity: 1-4 = Minimal depression, 5-9 = Mild depression, 10-14 = Moderate depression, 15-19 = Moderately severe depression, 20-27 = Severe depression     Lab Results   Component Value Date     05/05/2021    K 4.5 05/05/2021     05/05/2021    CO2 29 05/05/2021    BUN 26 (H) 05/05/2021    CREATININE 1.3 (H) 05/05/2021    GLUCOSE 233 (H) 05/05/2021    CALCIUM 9.1 05/05/2021    PROT 7.0 09/01/2020    LABALBU 3.7 05/05/2021    BILITOT 0.3 05/05/2021    ALKPHOS 88 05/05/2021    AST 14 05/05/2021    ALT 9 05/05/2021    LABGLOM 42.6 05/05/2021    GFRAA 53 (L) 09/01/2020    AGRATIO 1.3 07/05/2019    GLOB 2.9 05/05/2021     Lab Results   Component Value Date    LABA1C 9.1 05/04/2021    LABA1C 9.0 01/19/2021    LABA1C 7.6 10/01/2020       Lab Results   Component Value Date    GLUF 167 (H) 09/01/2020    LABMICR 84 (H) 09/01/2020    LDLCALC 69.4 03/28/2019    CREATININE 1.3 (H) 05/05/2021       Assessment:     1. Essential hypertension    2. Shortness of breath    3. Peripheral edema    4. Chronic diastolic heart failure (HCC)        Plan:     Orders Placed This Encounter   Procedures    XR CHEST (2 VW)     Standing Status:   Future     Standing Expiration Date:   5/18/2022    Basic Metabolic Panel     Standing Status:   Future     Standing Expiration Date:   5/18/2022    Brain Natriuretic Peptide     Standing Status:   Future     Standing Expiration Date:   5/18/2022    D-Dimer, Quantitative     Standing Status:   Future     Standing Expiration Date:   6/18/2021    CBC Auto Differential     Standing Status:   Future     Standing Expiration Date:   5/18/2022       Orders Placed This Encounter   Medications    lisinopril (PRINIVIL;ZESTRIL) 10 MG tablet     Sig: Take 1 tablet by mouth daily     Dispense:  30 tablet     Refill:  1      Increase lisinopril to 10 mg daily. Continue to monitor blood pressure at home. Decrease salt in diet, elevate legs when seated, recommend compression stocking to help with swelling.      Discussed use, benefit, and side effects of prescribed medications. All patient questions answered. Patient voiced understanding. Instructed to continue current medications, diet and exercise. Patient agreed with treatment plan. Follow up as directed. Return in about 2 weeks (around 6/1/2021). This note was generated completely or in part utilizing Dragon dictation speech recognition software. Occasionally, words are mistranscribed and despite editing, the text may contain inaccuracies due to incorrect word recognition. If further clarification is needed please contact the office at [phone].     Electronically signed by ORTIZ Vences CNP on 5/18/2021

## 2021-05-18 ENCOUNTER — OFFICE VISIT (OUTPATIENT)
Dept: FAMILY MEDICINE CLINIC | Age: 76
End: 2021-05-18
Payer: MEDICARE

## 2021-05-18 VITALS
SYSTOLIC BLOOD PRESSURE: 160 MMHG | WEIGHT: 258.1 LBS | DIASTOLIC BLOOD PRESSURE: 66 MMHG | BODY MASS INDEX: 41.66 KG/M2 | OXYGEN SATURATION: 96 % | HEART RATE: 53 BPM

## 2021-05-18 DIAGNOSIS — I50.32 CHRONIC DIASTOLIC HEART FAILURE (HCC): ICD-10-CM

## 2021-05-18 DIAGNOSIS — R60.9 PERIPHERAL EDEMA: ICD-10-CM

## 2021-05-18 DIAGNOSIS — R06.02 SHORTNESS OF BREATH: ICD-10-CM

## 2021-05-18 DIAGNOSIS — I10 ESSENTIAL HYPERTENSION: Primary | ICD-10-CM

## 2021-05-18 PROBLEM — R60.0 PERIPHERAL EDEMA: Status: ACTIVE | Noted: 2017-03-08

## 2021-05-18 PROCEDURE — 3017F COLORECTAL CA SCREEN DOC REV: CPT | Performed by: NURSE PRACTITIONER

## 2021-05-18 PROCEDURE — 99214 OFFICE O/P EST MOD 30 MIN: CPT | Performed by: NURSE PRACTITIONER

## 2021-05-18 PROCEDURE — 1090F PRES/ABSN URINE INCON ASSESS: CPT | Performed by: NURSE PRACTITIONER

## 2021-05-18 PROCEDURE — 1036F TOBACCO NON-USER: CPT | Performed by: NURSE PRACTITIONER

## 2021-05-18 PROCEDURE — G8427 DOCREV CUR MEDS BY ELIG CLIN: HCPCS | Performed by: NURSE PRACTITIONER

## 2021-05-18 PROCEDURE — 4040F PNEUMOC VAC/ADMIN/RCVD: CPT | Performed by: NURSE PRACTITIONER

## 2021-05-18 PROCEDURE — G8400 PT W/DXA NO RESULTS DOC: HCPCS | Performed by: NURSE PRACTITIONER

## 2021-05-18 PROCEDURE — G8417 CALC BMI ABV UP PARAM F/U: HCPCS | Performed by: NURSE PRACTITIONER

## 2021-05-18 PROCEDURE — 1123F ACP DISCUSS/DSCN MKR DOCD: CPT | Performed by: NURSE PRACTITIONER

## 2021-05-18 RX ORDER — LISINOPRIL 10 MG/1
10 TABLET ORAL DAILY
Qty: 30 TABLET | Refills: 1 | Status: SHIPPED | OUTPATIENT
Start: 2021-05-18 | End: 2021-06-01

## 2021-05-18 SDOH — ECONOMIC STABILITY: FOOD INSECURITY: WITHIN THE PAST 12 MONTHS, THE FOOD YOU BOUGHT JUST DIDN'T LAST AND YOU DIDN'T HAVE MONEY TO GET MORE.: NEVER TRUE

## 2021-05-18 SDOH — ECONOMIC STABILITY: FOOD INSECURITY: WITHIN THE PAST 12 MONTHS, YOU WORRIED THAT YOUR FOOD WOULD RUN OUT BEFORE YOU GOT MONEY TO BUY MORE.: NEVER TRUE

## 2021-05-18 ASSESSMENT — ENCOUNTER SYMPTOMS
BLURRED VISION: 0
SHORTNESS OF BREATH: 1
ABDOMINAL PAIN: 0
RHINORRHEA: 0
WHEEZING: 0
SORE THROAT: 0
SPUTUM PRODUCTION: 0
CONSTIPATION: 0
COUGH: 0
ORTHOPNEA: 0
EYES NEGATIVE: 1
DIARRHEA: 0

## 2021-05-18 ASSESSMENT — SOCIAL DETERMINANTS OF HEALTH (SDOH): HOW HARD IS IT FOR YOU TO PAY FOR THE VERY BASICS LIKE FOOD, HOUSING, MEDICAL CARE, AND HEATING?: NOT HARD AT ALL

## 2021-05-21 ENCOUNTER — TELEPHONE (OUTPATIENT)
Dept: FAMILY MEDICINE CLINIC | Age: 76
End: 2021-05-21

## 2021-05-21 ENCOUNTER — HOSPITAL ENCOUNTER (OUTPATIENT)
Age: 76
Setting detail: SPECIMEN
Discharge: HOME | End: 2021-05-21
Payer: MEDICARE

## 2021-05-21 DIAGNOSIS — R60.9 PERIPHERAL EDEMA: ICD-10-CM

## 2021-05-21 DIAGNOSIS — I50.32 CHRONIC DIASTOLIC HEART FAILURE (HCC): ICD-10-CM

## 2021-05-21 DIAGNOSIS — I10 ESSENTIAL HYPERTENSION: ICD-10-CM

## 2021-05-21 DIAGNOSIS — R06.02 SHORTNESS OF BREATH: ICD-10-CM

## 2021-05-21 LAB
ABSOLUTE EOS #: 0.1 K/UL (ref 0–0.44)
ABSOLUTE IMMATURE GRANULOCYTE: 0.03 K/UL (ref 0–0.3)
ABSOLUTE LYMPH #: 2.19 K/UL (ref 1.1–3.7)
ABSOLUTE MONO #: 0.75 K/UL (ref 0.1–1.2)
ANION GAP SERPL CALCULATED.3IONS-SCNC: 7 MMOL/L (ref 9–17)
BASOPHILS # BLD: 1 % (ref 0–2)
BASOPHILS ABSOLUTE: 0.05 K/UL (ref 0–0.2)
BNP INTERPRETATION: ABNORMAL
BUN BLDV-MCNC: 18 MG/DL (ref 8–23)
BUN/CREAT BLD: 19 (ref 9–20)
CALCIUM SERPL-MCNC: 9.4 MG/DL (ref 8.6–10.4)
CHLORIDE BLD-SCNC: 104 MMOL/L (ref 98–107)
CO2: 31 MMOL/L (ref 20–31)
CREAT SERPL-MCNC: 0.97 MG/DL (ref 0.5–0.9)
D-DIMER QUANTITATIVE: 423.27 NG/DL (ref 0–245)
DIFFERENTIAL TYPE: NORMAL
EOSINOPHILS RELATIVE PERCENT: 1 % (ref 1–4)
GFR AFRICAN AMERICAN: >60 ML/MIN
GFR NON-AFRICAN AMERICAN: 56 ML/MIN
GFR SERPL CREATININE-BSD FRML MDRD: ABNORMAL ML/MIN/{1.73_M2}
GFR SERPL CREATININE-BSD FRML MDRD: ABNORMAL ML/MIN/{1.73_M2}
GLUCOSE BLD-MCNC: 131 MG/DL (ref 70–99)
HCT VFR BLD CALC: 39.3 % (ref 36.3–47.1)
HEMOGLOBIN: 12.1 G/DL (ref 11.9–15.1)
IMMATURE GRANULOCYTES: 0 %
LYMPHOCYTES # BLD: 25 % (ref 24–43)
MCH RBC QN AUTO: 28.9 PG (ref 25.2–33.5)
MCHC RBC AUTO-ENTMCNC: 30.8 G/DL (ref 25.2–33.5)
MCV RBC AUTO: 94 FL (ref 82.6–102.9)
MONOCYTES # BLD: 9 % (ref 3–12)
NRBC AUTOMATED: 0 PER 100 WBC
PDW BLD-RTO: 14.4 % (ref 11.8–14.4)
PLATELET # BLD: 236 K/UL (ref 138–453)
PLATELET ESTIMATE: NORMAL
PMV BLD AUTO: 9.9 FL (ref 8.1–13.5)
POTASSIUM SERPL-SCNC: 4.8 MMOL/L (ref 3.7–5.3)
PRO-BNP: 747 PG/ML
RBC # BLD: 4.18 M/UL (ref 3.95–5.11)
RBC # BLD: NORMAL 10*6/UL
SEG NEUTROPHILS: 64 % (ref 36–65)
SEGMENTED NEUTROPHILS ABSOLUTE COUNT: 5.68 K/UL (ref 1.5–8.1)
SODIUM BLD-SCNC: 142 MMOL/L (ref 135–144)
WBC # BLD: 8.8 K/UL (ref 3.5–11.3)
WBC # BLD: NORMAL 10*3/UL

## 2021-05-21 PROCEDURE — 80048 BASIC METABOLIC PNL TOTAL CA: CPT

## 2021-05-21 PROCEDURE — 83880 ASSAY OF NATRIURETIC PEPTIDE: CPT

## 2021-05-21 PROCEDURE — 85025 COMPLETE CBC W/AUTO DIFF WBC: CPT

## 2021-05-21 PROCEDURE — 36415 COLL VENOUS BLD VENIPUNCTURE: CPT

## 2021-05-21 NOTE — TELEPHONE ENCOUNTER
Chief Complaint - ear wax removal    History of Present Illness - Arnold Lozada is a 66 year old male who presents to me today for ear wax removal in both ears. They have noted symptoms for approximately years. Has to have ears cleaned at routine intervals. I cleaned his ears 3/2016. He tried a wax removal kit. He wears aids. The patient denies otorrhea, otalgia.  No history of ear surgery or chronic ear disease.     Past Medical History -   Patient Active Problem List   Diagnosis     Hyperlipidemia LDL goal <130     Crohn's disease of colon (H)     Adenomatous colon polyp     Low back strain     GUILLERMINA (obstructive sleep apnea)- Mild/moderate ( AHI 16.2)     Family history of colon cancer     Advanced directives, counseling/discussion     Essential hypertension with goal blood pressure less than 140/90     Adjustment disorder with mixed anxiety and depressed mood     Cerebral ataxia (H)       Current Medications -   Current Outpatient Prescriptions:      metoprolol (TOPROL-XL) 25 MG 24 hr tablet, Take 1 tablet (25 mg) by mouth daily, Disp: 30 tablet, Rfl: 1     [START ON 4/3/2017] amphetamine-dextroamphetamine (ADDERALL XR) 30 MG per 24 hr capsule, Take 1 capsule (30 mg) by mouth daily, Disp: 30 capsule, Rfl: 0     [START ON 5/3/2017] amphetamine-dextroamphetamine (ADDERALL XR) 30 MG per 24 hr capsule, Take 1 capsule (30 mg) by mouth daily, Disp: 30 capsule, Rfl: 0     order for DME, Equipment being ordered: shower bench [ long ], Disp: 1 Device, Rfl: 0     lisinopril (PRINIVIL/ZESTRIL) 20 MG tablet, Take 1 tablet (20 mg) by mouth daily, Disp: 90 tablet, Rfl: 0     sertraline (ZOLOFT) 50 MG tablet, Take 1 tablet (50 mg) by mouth daily, Disp: 90 tablet, Rfl: 1     hydrochlorothiazide (HYDRODIURIL) 25 MG tablet, Take 1 tablet (25 mg) by mouth daily, Disp: 90 tablet, Rfl: 1     simvastatin (ZOCOR) 20 MG tablet, ONE TABLET DAILY IN THE EVENING, Disp: 90 tablet, Rfl: 3     order for DME, Equipment being ordered:  Lab called with a critical ddimer for the pt. I did try calling the pt to instruct her to go to the ER for evaluation. I called the pts cell phone no vm, home phone and left an urgent message to call the office asap. I then tried calling the pt contacts daughter- left vm and son no vm available. quadcane, Disp: 1 Device, Rfl: 0     traZODone (DESYREL) 50 MG tablet, Take 1 tablet (50 mg) by mouth nightly as needed for sleep, Disp: 90 tablet, Rfl: 1     Caprylic Acid LIQD, 2,000 mg, Disp: , Rfl:      Coenzyme Q10 (COQ-10 PO), , Disp: , Rfl:      5-Hydroxytryptophan (5-HTP MAXIMUM STRENGTH) 200 MG CAPS, Take by mouth daily, Disp: , Rfl:      ORDER FOR DME, Respironics REMSTAR 60 Series Auto CPAP 5-18cm H2O, Nuance pillow nasal mask large, Disp: , Rfl:      ascorbic acid (VITAMIN C) 1000 MG TABS, Take 1,000 mg by mouth daily, Disp: , Rfl:      cholecalciferol 2000 UNITS tablet, Take 1 tablet by mouth daily Vitamin D3, Disp: , Rfl:      folic acid 800 MCG TABS, Take 1 tablet by mouth daily, Disp: , Rfl:      POTASSIUM PO, Take 595 mcg by mouth daily, Disp: , Rfl:      sulfaSALAzine (AZULFIDINE) 500 MG tablet, Take 500 mg by mouth 4 times daily., Disp: , Rfl:      aspirin 325 MG tablet, Take 325 mg by mouth daily., Disp: , Rfl:      MULTI-VITAMIN PO TABS, 1 TABLET DAILY, Disp: , Rfl:     Allergies - No Known Allergies    Social History -   Social History     Social History     Marital status:      Spouse name: N/A     Number of children: N/A     Years of education: N/A     Social History Main Topics     Smoking status: Never Smoker     Smokeless tobacco: Never Used     Alcohol use Yes      Comment: 3 times per week     Drug use: No     Sexual activity: Yes     Partners: Female     Birth control/ protection: Other     Other Topics Concern     Parent/Sibling W/ Cabg, Mi Or Angioplasty Before 65f 55m? No     Social History Narrative       Family History -   Family History   Problem Relation Age of Onset     CANCER Mother      brain tumor, age 79     CANCER Father      colon     Pneumonia Father       age 90 from pneumonia and sepsis     C.A.D. Father      Age 80 have MI and triple bypass     DIABETES Maternal Grandmother      Coronary Artery Disease Father      Heart Attack     Hypertension Father       ???     Hyperlipidemia Father      ???     Colon Cancer Father      Cured     Other Cancer Mother      Brain tumor     Depression Mother      Asthma Mother        Review of Systems - As per HPI and PMHx, otherwise 7 system review of the head and neck negative.    Physical Exam  Resp 16  General - The patient is in no distress.  Alert and oriented to person and place, answers questions and cooperates with examination appropriately.   Voice and Breathing - The patient was breathing comfortably without the use of accessory muscles. There was no wheezing, stridor, or stertor.  The patients voice was clear and strong.  Ears - The auricles are normal in appearance, no erythema. Both ears were impacted with cerumen. On the left I used a suction and removed copious wax from the left ear canal. I worked medially and carefully suctioned wax from the medial canal. THen on the right side I did the same. I suctioned from lateral to medial. Lots of wax, mostly soft. All the wax was removed. Once the cerumen was removed the tympanic membranes are normal in appearance, bony landmarks are intact.  No retraction, perforation, or masses.  No fluid or purulence was seen in the external canal or the middle ear.   Eyes - Extraocular movements intact. Sclera were not icteric or injected.  Neck - Palpation of the occipital, submental, submandibular, internal jugular chain, and supraclavicular nodes did not demonstrate any abnormal lymph nodes or masses. Parotid glands were without masses.  The trachea was mobile and midline.  Neurological - Cranial nerves 2 through 12 were grossly intact. House-Brackmann grade 1 out of 6 bilaterally.     I reviewed his audiogram from 2/23/2017 with him. Bilateral symmetric down-sloping high frequency sensorineural hearing loss. Some negative pressure as well, but no real retraction today.     Assessment and Plan - Arnold Lozada is a 66 year old male who returns to me today with cerumen impaction,  and this was removed. We spent the remainder of today's visit on education including not putting any instrument in the ear. The patient can use over-the-counter items such as Debrox or Ceruminex.   He can resume his aids. Return in 1 year.     Braden Mason MD  Otolaryngology  [facility]

## 2021-05-23 NOTE — TELEPHONE ENCOUNTER
Called patient to notify of abnormal lab result. Instructed to go directly to [facility] ED for further evaluation. Patient states she would head over now. Called [facility] ED and notified patient would be coming.

## 2021-05-25 DIAGNOSIS — Z79.4 TYPE 2 DIABETES MELLITUS WITH STAGE 3B CHRONIC KIDNEY DISEASE, WITH LONG-TERM CURRENT USE OF INSULIN (HCC): ICD-10-CM

## 2021-05-25 DIAGNOSIS — N18.32 TYPE 2 DIABETES MELLITUS WITH STAGE 3B CHRONIC KIDNEY DISEASE, WITH LONG-TERM CURRENT USE OF INSULIN (HCC): ICD-10-CM

## 2021-05-25 DIAGNOSIS — E11.22 TYPE 2 DIABETES MELLITUS WITH STAGE 3B CHRONIC KIDNEY DISEASE, WITH LONG-TERM CURRENT USE OF INSULIN (HCC): ICD-10-CM

## 2021-05-25 RX ORDER — FLASH GLUCOSE SENSOR
6 KIT MISCELLANEOUS DAILY
Qty: 6 EACH | Refills: 5 | Status: SHIPPED | OUTPATIENT
Start: 2021-05-25

## 2021-06-01 ENCOUNTER — OFFICE VISIT (OUTPATIENT)
Dept: FAMILY MEDICINE CLINIC | Age: 76
End: 2021-06-01
Payer: MEDICARE

## 2021-06-01 ENCOUNTER — HOSPITAL ENCOUNTER (OUTPATIENT)
Age: 76
Setting detail: SPECIMEN
Discharge: HOME | End: 2021-06-01
Payer: MEDICARE

## 2021-06-01 VITALS
OXYGEN SATURATION: 93 % | SYSTOLIC BLOOD PRESSURE: 148 MMHG | WEIGHT: 257 LBS | BODY MASS INDEX: 41.48 KG/M2 | DIASTOLIC BLOOD PRESSURE: 90 MMHG | HEART RATE: 50 BPM

## 2021-06-01 DIAGNOSIS — F33.0 MILD EPISODE OF RECURRENT MAJOR DEPRESSIVE DISORDER (HCC): ICD-10-CM

## 2021-06-01 DIAGNOSIS — M19.90 ARTHRITIS: ICD-10-CM

## 2021-06-01 DIAGNOSIS — Z79.4 TYPE 2 DIABETES MELLITUS WITH STAGE 3B CHRONIC KIDNEY DISEASE, WITH LONG-TERM CURRENT USE OF INSULIN (HCC): ICD-10-CM

## 2021-06-01 DIAGNOSIS — M16.11 PRIMARY OSTEOARTHRITIS OF RIGHT HIP: ICD-10-CM

## 2021-06-01 DIAGNOSIS — D51.8 VITAMIN B12 DEFICIENCY (DIETARY) ANEMIA: ICD-10-CM

## 2021-06-01 DIAGNOSIS — E11.40 NEUROPATHY DUE TO TYPE 2 DIABETES MELLITUS (HCC): ICD-10-CM

## 2021-06-01 DIAGNOSIS — E11.22 TYPE 2 DIABETES MELLITUS WITH STAGE 3B CHRONIC KIDNEY DISEASE, WITH LONG-TERM CURRENT USE OF INSULIN (HCC): ICD-10-CM

## 2021-06-01 DIAGNOSIS — R30.0 DYSURIA: ICD-10-CM

## 2021-06-01 DIAGNOSIS — E66.01 OBESITY, MORBID, BMI 40.0-49.9 (HCC): ICD-10-CM

## 2021-06-01 DIAGNOSIS — E78.49 OTHER HYPERLIPIDEMIA: ICD-10-CM

## 2021-06-01 DIAGNOSIS — N18.32 TYPE 2 DIABETES MELLITUS WITH STAGE 3B CHRONIC KIDNEY DISEASE, WITH LONG-TERM CURRENT USE OF INSULIN (HCC): ICD-10-CM

## 2021-06-01 DIAGNOSIS — F41.9 ANXIETY: ICD-10-CM

## 2021-06-01 DIAGNOSIS — I10 ESSENTIAL HYPERTENSION: Primary | ICD-10-CM

## 2021-06-01 DIAGNOSIS — I50.32 CHRONIC DIASTOLIC HEART FAILURE (HCC): ICD-10-CM

## 2021-06-01 DIAGNOSIS — G47.33 OSA TREATED WITH BIPAP: ICD-10-CM

## 2021-06-01 DIAGNOSIS — D50.8 OTHER IRON DEFICIENCY ANEMIA: ICD-10-CM

## 2021-06-01 DIAGNOSIS — R60.9 PERIPHERAL EDEMA: ICD-10-CM

## 2021-06-01 DIAGNOSIS — R42 VERTIGO: ICD-10-CM

## 2021-06-01 LAB
APPEARANCE FLUID: CLEAR
BILIRUBIN, POC: NORMAL
BLOOD URINE, POC: NORMAL
CLARITY, POC: CLEAR
COLOR, POC: NORMAL
GLUCOSE URINE, POC: 1000
KETONES, POC: NORMAL
LEUKOCYTE EST, POC: NORMAL
NITRITE, POC: NORMAL
PH, POC: 5
PROTEIN, POC: NORMAL
SPECIFIC GRAVITY, POC: 1.02
UROBILINOGEN, POC: NORMAL

## 2021-06-01 PROCEDURE — G8400 PT W/DXA NO RESULTS DOC: HCPCS | Performed by: NURSE PRACTITIONER

## 2021-06-01 PROCEDURE — 99214 OFFICE O/P EST MOD 30 MIN: CPT

## 2021-06-01 PROCEDURE — G8417 CALC BMI ABV UP PARAM F/U: HCPCS | Performed by: NURSE PRACTITIONER

## 2021-06-01 PROCEDURE — 4040F PNEUMOC VAC/ADMIN/RCVD: CPT | Performed by: NURSE PRACTITIONER

## 2021-06-01 PROCEDURE — 1036F TOBACCO NON-USER: CPT | Performed by: NURSE PRACTITIONER

## 2021-06-01 PROCEDURE — 1090F PRES/ABSN URINE INCON ASSESS: CPT | Performed by: NURSE PRACTITIONER

## 2021-06-01 PROCEDURE — 87086 URINE CULTURE/COLONY COUNT: CPT | Performed by: NURSE PRACTITIONER

## 2021-06-01 PROCEDURE — G8427 DOCREV CUR MEDS BY ELIG CLIN: HCPCS | Performed by: NURSE PRACTITIONER

## 2021-06-01 PROCEDURE — 81002 URINALYSIS NONAUTO W/O SCOPE: CPT | Performed by: NURSE PRACTITIONER

## 2021-06-01 PROCEDURE — 2022F DILAT RTA XM EVC RTNOPTHY: CPT | Performed by: NURSE PRACTITIONER

## 2021-06-01 PROCEDURE — 3046F HEMOGLOBIN A1C LEVEL >9.0%: CPT | Performed by: NURSE PRACTITIONER

## 2021-06-01 PROCEDURE — 3017F COLORECTAL CA SCREEN DOC REV: CPT | Performed by: NURSE PRACTITIONER

## 2021-06-01 PROCEDURE — 1123F ACP DISCUSS/DSCN MKR DOCD: CPT | Performed by: NURSE PRACTITIONER

## 2021-06-01 PROCEDURE — 87086 URINE CULTURE/COLONY COUNT: CPT

## 2021-06-01 PROCEDURE — 99214 OFFICE O/P EST MOD 30 MIN: CPT | Performed by: NURSE PRACTITIONER

## 2021-06-01 RX ORDER — FERROUS SULFATE 325(65) MG
325 TABLET ORAL
Qty: 30 TABLET | Refills: 2 | Status: SHIPPED | OUTPATIENT
Start: 2021-06-01 | End: 2021-07-22 | Stop reason: SDUPTHER

## 2021-06-01 RX ORDER — LISINOPRIL 20 MG/1
20 TABLET ORAL DAILY
Qty: 30 TABLET | Refills: 2 | Status: SHIPPED | OUTPATIENT
Start: 2021-06-01 | End: 2021-08-03 | Stop reason: SDUPTHER

## 2021-06-01 RX ORDER — CYANOCOBALAMIN 1000 UG/ML
1000 INJECTION INTRAMUSCULAR; SUBCUTANEOUS
Qty: 1 ML | Refills: 2 | Status: SHIPPED | OUTPATIENT
Start: 2021-06-01 | End: 2021-06-11 | Stop reason: SDUPTHER

## 2021-06-01 ASSESSMENT — ENCOUNTER SYMPTOMS: NAUSEA: 0

## 2021-06-01 NOTE — PROGRESS NOTES
renal disease. Dysuria   This is a new problem. The current episode started 1 to 4 weeks ago (1 week). The problem has been unchanged. The quality of the pain is described as burning. The pain is at a severity of 4/10. The pain is mild. There has been no fever. Associated symptoms include hesitancy and urgency. Pertinent negatives include no chills, flank pain, frequency, hematuria or nausea. She has tried increased fluids for the symptoms. The treatment provided no relief.        BP Readings from Last 3 Encounters:   06/01/21 (!) 148/90   05/18/21 (!) 160/66   05/04/21 (!) 142/80              ([id number])    Pulse Readings from Last 3 Encounters:   06/01/21 50   05/18/21 53   05/04/21 56        Wt Readings from Last 3 Encounters:   06/01/21 257 lb (116.6 kg)   05/18/21 258 lb 1.6 oz (117.1 kg)   05/04/21 250 lb (113.4 kg)       Past Medical History:   Diagnosis Date    Actinic keratitis     Ankle pain     Arthritis     Back pain     low     Chronic diastolic CHF (congestive heart failure) (HCC)     Chronic diastolic heart failure (HCC) 5/31/2017    Chronic kidney disease     Dependent edema     Hypertension     Incontinence     in female    Osteoarthritis     knee     Type II or unspecified type diabetes mellitus without mention of complication, not stated as uncontrolled       Past Surgical History:   Procedure Laterality Date    ANKLE SURGERY Left     FINGER TRIGGER RELEASE Right        Family History   Problem Relation Age of Onset    Diabetes Mother     Diabetes Father     Cancer Paternal Grandfather         BONE        Social History     Tobacco Use    Smoking status: Never Smoker    Smokeless tobacco: Never Used   Substance Use Topics    Alcohol use: No      Current Outpatient Medications   Medication Sig Dispense Refill    lisinopril (PRINIVIL;ZESTRIL) 20 MG tablet Take 1 tablet by mouth daily 30 tablet 2    ferrous sulfate (IRON 325) 325 (65 Fe) MG tablet Take 1 tablet by mouth daily tablet Take 1 tablet by mouth 2 times daily (Patient taking differently: Take 1 mg by mouth daily ) 180 tablet 1    meclizine (ANTIVERT) 12.5 MG tablet Take 1 tablet by mouth 3 times daily as needed for Dizziness 90 tablet 2    acetaminophen (TYLENOL ARTHRITIS PAIN) 650 MG extended release tablet Take 650 mg by mouth every 8 hours as needed for Pain       No current facility-administered medications for this visit. No Known Allergies    Health Maintenance   Topic Date Due    Shingles Vaccine (2 of 3) 02/26/2007    Diabetic foot exam  01/16/2021    Diabetic retinal exam  02/10/2021    Hepatitis C screen  06/29/2022 (Originally 1945)    A1C test (Diabetic or Prediabetic)  08/04/2021    Lipid screen  09/01/2021    Annual Wellness Visit (AWV)  10/09/2021    Potassium monitoring  05/23/2022    Creatinine monitoring  05/23/2022    Breast cancer screen  07/14/2022    Colon cancer screen fecal DNA test (Cologuard)  10/27/2023    DTaP/Tdap/Td vaccine (2 - Td or Tdap) 08/25/2027    DEXA (modify frequency per FRAX score)  Completed    Flu vaccine  Completed    Pneumococcal 65+ yrs at Risk Vaccine  Completed    COVID-19 Vaccine  Completed    Hepatitis A vaccine  Aged Out    Hib vaccine  Aged Out    Meningococcal (ACWY) vaccine  Aged Out       Subjective:      Review of Systems   Constitutional: Positive for fatigue and malaise/fatigue. Negative for chills and fever. HENT: Negative. Eyes: Negative. Respiratory: Negative for cough, shortness of breath and wheezing. Cardiovascular: Positive for leg swelling. Negative for chest pain and palpitations. Gastrointestinal: Negative for abdominal pain, constipation, diarrhea and nausea. Endocrine: Negative for cold intolerance, heat intolerance, polydipsia, polyphagia and polyuria. Genitourinary: Positive for dysuria, hesitancy and urgency. Negative for flank pain, frequency and hematuria.    Musculoskeletal: Negative for arthralgias and myalgias. Skin: Negative. Allergic/Immunologic: Negative for environmental allergies and food allergies. Neurological: Negative for dizziness, weakness and headaches. Psychiatric/Behavioral: Positive for dysphoric mood. Negative for agitation, self-injury, sleep disturbance and suicidal ideas. The patient is nervous/anxious. Objective:     Vitals:    06/01/21 1006 06/01/21 1015   BP: (!) 150/84 (!) 148/90   Pulse: 50    SpO2: 93%    Weight: 257 lb (116.6 kg)         Estimated body mass index is 41.48 kg/m² as calculated from the following:    Height as of 5/4/21: 5' 6\" (1.676 m). Weight as of this encounter: 257 lb (116.6 kg). Physical Exam  Constitutional:       Appearance: Normal appearance. She is well-developed and well-groomed. She is obese. HENT:      Head: Normocephalic. Nose: Nose normal.      Mouth/Throat:      Mouth: Mucous membranes are moist.   Eyes:      Conjunctiva/sclera: Conjunctivae normal.   Neck:      Thyroid: No thyromegaly. Vascular: No carotid bruit. Cardiovascular:      Rate and Rhythm: Normal rate and regular rhythm. Heart sounds: Normal heart sounds. Comments: Mild swelling to bilateral lower extremities. Pulmonary:      Effort: Pulmonary effort is normal.      Breath sounds: Normal breath sounds. No wheezing. Abdominal:      General: Bowel sounds are normal.      Palpations: Abdomen is soft. Tenderness: There is no abdominal tenderness. Musculoskeletal:      Cervical back: Neck supple. Lymphadenopathy:      Cervical: No cervical adenopathy. Skin:     Capillary Refill: Capillary refill takes less than 2 seconds. Neurological:      Mental Status: She is alert and oriented to person, place, and time. Psychiatric:         Mood and Affect: Mood is anxious and depressed. Behavior: Behavior is cooperative.          PHQ Scores 5/4/2021 10/8/2020 10/8/2020 6/2/2020 3/2/2020 2/7/2019 1/2/2019   PHQ2 Score 1 4 4 3 2 3 4   PHQ9 Score 1 16 4 11 2 15 16     Interpretation of Total Score Depression Severity: 1-4 = Minimal depression, 5-9 = Mild depression, 10-14 = Moderate depression, 15-19 = Moderately severe depression, 20-27 = Severe depression     Lab Results   Component Value Date     05/23/2021    K 4.8 05/23/2021     05/23/2021    CO2 28 05/23/2021    BUN 23 (H) 05/23/2021    CREATININE 1.0 05/23/2021    GLUCOSE 210 (H) 05/23/2021    CALCIUM 8.8 05/23/2021    PROT 7.0 09/01/2020    LABALBU 3.9 05/23/2021    BILITOT 0.4 05/23/2021    ALKPHOS 85 05/23/2021    AST 17 05/23/2021    ALT 10 05/23/2021    LABGLOM 57.6 05/23/2021    GFRAA >60 05/21/2021    AGRATIO 1.3 07/05/2019    GLOB 2.7 05/23/2021     Lab Results   Component Value Date    LABA1C 9.1 05/04/2021    LABA1C 9.0 01/19/2021    LABA1C 7.6 10/01/2020       Lab Results   Component Value Date    GLUF 167 (H) 09/01/2020    LABMICR 84 (H) 09/01/2020    LDLCALC 69.4 03/28/2019    CREATININE 1.0 05/23/2021       Assessment:     1. Essential hypertension    2. Other hyperlipidemia    3. Type 2 diabetes mellitus with stage 3b chronic kidney disease, with long-term current use of insulin (HCC)    4. Obesity, morbid, BMI 40.0-49.9 ([facility] [address]    5. Mild episode of recurrent major depressive disorder ([facility] [address]    6. Neuropathy due to type 2 diabetes mellitus ([facility] [address]    7. Chronic diastolic heart failure ([facility] [address]    8. Arthritis    9. Vertigo    10. Peripheral edema    11. Primary osteoarthritis of right hip    12. Anxiety    13. DAVID treated with BiPAP    14. Vitamin B12 deficiency (dietary) anemia    15. Dysuria    16.  Other iron deficiency anemia      Results for POC orders placed in visit on 06/01/21   POCT Urinalysis no Micro   Result Value Ref Range    Color, UA lt yellow     Clarity, UA clear     Glucose, UA POC 1,000     Bilirubin, UA neg     Ketones, UA neg     Spec Grav, UA 1.020     Blood, UA POC neg     pH, UA 5     Protein, UA POC neg     Urobilinogen, UA neg     Leukocytes, UA neg Nitrite, UA neg     Appearance, Fluid Clear Clear, Slightly Cloudy       Plan:     Orders Placed This Encounter   Procedures    Culture, Urine     Standing Status:   Future     Number of Occurrences:   1     Standing Expiration Date:   7/1/2021     Order Specific Question:   Specify (ex-cath, midstream, cysto, etc)? Answer:   Midstream    POCT Urinalysis no Micro       Orders Placed This Encounter   Medications    lisinopril (PRINIVIL;ZESTRIL) 20 MG tablet     Sig: Take 1 tablet by mouth daily     Dispense:  30 tablet     Refill:  2    ferrous sulfate (IRON 325) 325 (65 Fe) MG tablet     Sig: Take 1 tablet by mouth daily (with breakfast)     Dispense:  30 tablet     Refill:  2    cyanocobalamin 1000 MCG/ML injection     Sig: Inject 1 mL into the muscle every 30 days     Dispense:  1 mL     Refill:  2     Patient is to get injections [facility] at [facility]        Increase lisinopril to 20 mg daily for better control of blood pressure. Instructed to monitor blood pressure daily and keep log. Follow-up if blood pressure runs above 140/90. Encouraged increase activity, and decrease salt in diet. Discussed use, benefit, and side effects of prescribed medications. All patient questions answered. Patient voiced understanding. Health Maintenance reviewed. Instructed to continue current medications, diet and exercise. Patient agreed with treatment plan. Follow up as directed. Return in about 3 months (around 9/1/2021). This note was generated completely or in part utilizing Dragon dictation speech recognition software. Occasionally, words are mistranscribed and despite editing, the text may contain inaccuracies due to incorrect word recognition. If further clarification is needed please contact the office at [phone].     Electronically signed by ORTIZ Duncan CNP on 6/7/2021

## 2021-06-03 ENCOUNTER — TELEPHONE (OUTPATIENT)
Dept: FAMILY MEDICINE CLINIC | Age: 76
End: 2021-06-03

## 2021-06-03 LAB
CULTURE: NORMAL
Lab: NORMAL
SPECIMEN DESCRIPTION: NORMAL

## 2021-06-03 NOTE — TELEPHONE ENCOUNTER
Injection room at [facility] [place]. called. Patient stopped in to have B12 shot and their order is .   Please fax to [phone]

## 2021-06-07 ASSESSMENT — ENCOUNTER SYMPTOMS
COUGH: 0
ABDOMINAL PAIN: 0
SHORTNESS OF BREATH: 0
WHEEZING: 0
DIARRHEA: 0
EYES NEGATIVE: 1
CONSTIPATION: 0

## 2021-06-09 ENCOUNTER — OFFICE VISIT (OUTPATIENT)
Dept: DIABETES SERVICES | Age: 76
End: 2021-06-09
Payer: MEDICARE

## 2021-06-09 VITALS
DIASTOLIC BLOOD PRESSURE: 82 MMHG | RESPIRATION RATE: 16 BRPM | HEIGHT: 65 IN | SYSTOLIC BLOOD PRESSURE: 129 MMHG | HEART RATE: 76 BPM | BODY MASS INDEX: 42.49 KG/M2 | WEIGHT: 255 LBS

## 2021-06-09 DIAGNOSIS — N18.32 TYPE 2 DIABETES MELLITUS WITH STAGE 3B CHRONIC KIDNEY DISEASE, WITH LONG-TERM CURRENT USE OF INSULIN (HCC): Primary | ICD-10-CM

## 2021-06-09 DIAGNOSIS — Z79.4 TYPE 2 DIABETES MELLITUS WITH STAGE 3B CHRONIC KIDNEY DISEASE, WITH LONG-TERM CURRENT USE OF INSULIN (HCC): Primary | ICD-10-CM

## 2021-06-09 DIAGNOSIS — E78.49 OTHER HYPERLIPIDEMIA: ICD-10-CM

## 2021-06-09 DIAGNOSIS — I10 ESSENTIAL HYPERTENSION: ICD-10-CM

## 2021-06-09 DIAGNOSIS — E11.22 TYPE 2 DIABETES MELLITUS WITH STAGE 3B CHRONIC KIDNEY DISEASE, WITH LONG-TERM CURRENT USE OF INSULIN (HCC): Primary | ICD-10-CM

## 2021-06-09 DIAGNOSIS — E66.01 OBESITY, MORBID, BMI 40.0-49.9 (HCC): ICD-10-CM

## 2021-06-09 DIAGNOSIS — D51.8 VITAMIN B12 DEFICIENCY (DIETARY) ANEMIA: ICD-10-CM

## 2021-06-09 PROCEDURE — 3046F HEMOGLOBIN A1C LEVEL >9.0%: CPT | Performed by: NURSE PRACTITIONER

## 2021-06-09 PROCEDURE — 3017F COLORECTAL CA SCREEN DOC REV: CPT | Performed by: NURSE PRACTITIONER

## 2021-06-09 PROCEDURE — 4040F PNEUMOC VAC/ADMIN/RCVD: CPT | Performed by: NURSE PRACTITIONER

## 2021-06-09 PROCEDURE — 99214 OFFICE O/P EST MOD 30 MIN: CPT | Performed by: NURSE PRACTITIONER

## 2021-06-09 PROCEDURE — 1036F TOBACCO NON-USER: CPT | Performed by: NURSE PRACTITIONER

## 2021-06-09 PROCEDURE — 95251 CONT GLUC MNTR ANALYSIS I&R: CPT | Performed by: NURSE PRACTITIONER

## 2021-06-09 PROCEDURE — G8427 DOCREV CUR MEDS BY ELIG CLIN: HCPCS | Performed by: NURSE PRACTITIONER

## 2021-06-09 PROCEDURE — 1090F PRES/ABSN URINE INCON ASSESS: CPT | Performed by: NURSE PRACTITIONER

## 2021-06-09 PROCEDURE — G8417 CALC BMI ABV UP PARAM F/U: HCPCS | Performed by: NURSE PRACTITIONER

## 2021-06-09 PROCEDURE — 2022F DILAT RTA XM EVC RTNOPTHY: CPT | Performed by: NURSE PRACTITIONER

## 2021-06-09 PROCEDURE — G8400 PT W/DXA NO RESULTS DOC: HCPCS | Performed by: NURSE PRACTITIONER

## 2021-06-09 PROCEDURE — 1123F ACP DISCUSS/DSCN MKR DOCD: CPT | Performed by: NURSE PRACTITIONER

## 2021-06-09 RX ORDER — CYANOCOBALAMIN 1000 UG/ML
1000 INJECTION INTRAMUSCULAR; SUBCUTANEOUS
Qty: 1 ML | Refills: 2 | Status: CANCELLED | OUTPATIENT
Start: 2021-06-09

## 2021-06-09 RX ORDER — INSULIN DEGLUDEC 200 U/ML
26 INJECTION, SOLUTION SUBCUTANEOUS DAILY
Qty: 6 PEN | Refills: 5 | Status: SHIPPED | OUTPATIENT
Start: 2021-06-09 | End: 2022-02-15 | Stop reason: SDUPTHER

## 2021-06-09 RX ORDER — INSULIN ASPART 100 [IU]/ML
INJECTION, SOLUTION INTRAVENOUS; SUBCUTANEOUS
Qty: 3 PEN | Refills: 5 | Status: SHIPPED | OUTPATIENT
Start: 2021-06-09 | End: 2022-03-14 | Stop reason: SDUPTHER

## 2021-06-09 ASSESSMENT — ENCOUNTER SYMPTOMS
SHORTNESS OF BREATH: 0
ABDOMINAL PAIN: 0
RESPIRATORY NEGATIVE: 1
DIARRHEA: 0

## 2021-06-09 NOTE — TELEPHONE ENCOUNTER
[facility] is calling to request a refill on the following medication(s):  Requested Prescriptions     Pending Prescriptions Disp Refills    cyanocobalamin 1000 MCG/ML injection 1 mL 2     Sig: Inject 1 mL into the muscle every 30 days       Last Visit Date (If Applicable):  1/7/9167    Next Visit Date:    9/1/2021

## 2021-06-10 ENCOUNTER — NURSE ONLY (OUTPATIENT)
Dept: FAMILY MEDICINE CLINIC | Age: 76
End: 2021-06-10
Payer: MEDICARE

## 2021-06-10 DIAGNOSIS — D51.8 VITAMIN B12 DEFICIENCY (DIETARY) ANEMIA: Primary | ICD-10-CM

## 2021-06-10 PROCEDURE — 96372 THER/PROPH/DIAG INJ SC/IM: CPT

## 2021-06-10 RX ORDER — CYANOCOBALAMIN 1000 UG/ML
1000 INJECTION INTRAMUSCULAR; SUBCUTANEOUS ONCE
Status: COMPLETED | OUTPATIENT
Start: 2021-06-10 | End: 2021-06-10

## 2021-06-10 RX ADMIN — CYANOCOBALAMIN 1000 MCG: 1000 INJECTION, SOLUTION INTRAMUSCULAR at 11:11

## 2021-06-10 NOTE — PROGRESS NOTES
After obtaining consent, and per orders of Dr. Satish Jeffrey, injection of b12 given in Right deltoid by Evans Castaneda MA. Patient instructed to remain in clinic for 20 minutes afterwards, and to report any adverse reaction to me immediately.

## 2021-06-11 ENCOUNTER — TELEPHONE (OUTPATIENT)
Dept: FAMILY MEDICINE CLINIC | Age: 76
End: 2021-06-11

## 2021-06-11 DIAGNOSIS — D51.8 VITAMIN B12 DEFICIENCY (DIETARY) ANEMIA: ICD-10-CM

## 2021-06-11 RX ORDER — CYANOCOBALAMIN 1000 UG/ML
1000 INJECTION INTRAMUSCULAR; SUBCUTANEOUS
Qty: 1 ML | Refills: 5 | Status: SHIPPED | OUTPATIENT
Start: 2021-06-11 | End: 2022-03-31 | Stop reason: SDUPTHER

## 2021-06-18 NOTE — PATIENT INSTRUCTIONS
If you need to reach the Urologist or Urology Nurses for any health care questions or concerns please call [phone] and ask to speak with the [facility] Urology Nurse. The phone line is open from 8a-430p Monday thru Friday.

## 2021-06-23 ENCOUNTER — OFFICE VISIT (OUTPATIENT)
Dept: UROLOGY | Age: 76
End: 2021-06-23
Payer: MEDICARE

## 2021-06-23 VITALS
RESPIRATION RATE: 16 BRPM | HEART RATE: 61 BPM | SYSTOLIC BLOOD PRESSURE: 136 MMHG | HEIGHT: 65 IN | DIASTOLIC BLOOD PRESSURE: 72 MMHG | WEIGHT: 255 LBS | BODY MASS INDEX: 42.49 KG/M2 | OXYGEN SATURATION: 98 %

## 2021-06-23 DIAGNOSIS — N39.41 URGE INCONTINENCE: ICD-10-CM

## 2021-06-23 DIAGNOSIS — N39.44 NOCTURNAL ENURESIS: Primary | ICD-10-CM

## 2021-06-23 DIAGNOSIS — N39.0 RECURRENT UTI: ICD-10-CM

## 2021-06-23 PROCEDURE — 4040F PNEUMOC VAC/ADMIN/RCVD: CPT | Performed by: UROLOGY

## 2021-06-23 PROCEDURE — 0509F URINE INCON PLAN DOCD: CPT | Performed by: UROLOGY

## 2021-06-23 PROCEDURE — G8427 DOCREV CUR MEDS BY ELIG CLIN: HCPCS | Performed by: UROLOGY

## 2021-06-23 PROCEDURE — 1123F ACP DISCUSS/DSCN MKR DOCD: CPT | Performed by: UROLOGY

## 2021-06-23 PROCEDURE — 1036F TOBACCO NON-USER: CPT | Performed by: UROLOGY

## 2021-06-23 PROCEDURE — 1090F PRES/ABSN URINE INCON ASSESS: CPT | Performed by: UROLOGY

## 2021-06-23 PROCEDURE — 99214 OFFICE O/P EST MOD 30 MIN: CPT | Performed by: UROLOGY

## 2021-06-23 PROCEDURE — 3017F COLORECTAL CA SCREEN DOC REV: CPT | Performed by: UROLOGY

## 2021-06-23 PROCEDURE — G8400 PT W/DXA NO RESULTS DOC: HCPCS | Performed by: UROLOGY

## 2021-06-23 PROCEDURE — G8417 CALC BMI ABV UP PARAM F/U: HCPCS | Performed by: UROLOGY

## 2021-06-23 NOTE — PROGRESS NOTES
MD MD [facility] Consultation / New Patient Visit    Patient:  Kathia Cabral  YOB: 1945  Date: 2021  Consult requested from ORTIZ Chris - CNP     HISTORY OF PRESENT ILLNESS:   The patient is a 77 y.o. female who presents today for follow-up for the following problem(s): recurrent UTI  Overall the problem(s) : are worsening. Associated Symptoms: No dysuria, gross hematuria. Pain Severity:      Today visit:   21    Botox 200 units (3/10/21) - unfortunately she has failed botox with early return of symptoms < 3 months. She is back to PPD: 1 for confidenc2-3, wet pads daily. Nocturnal enuresis has returned. She is very bothered by these symptoms. She is now interested in interstim, we discuss risks and benefits. 20   Today Jeff Cai is doing well. States Nocturia has improved with oxybutynin QHS. She is tolerating the medication well, no dry mouth or constipation. She states she is having decline of her memory, so her last visit, the information regarding improvement of her symptoms was not accurate. Urge incontinence worsenin daily, 3 at night. Avoiding fluids and lasix after dinner. Lasix only in am.  Vesicare 10 mg ER in am - no dry mouth and constipation  States failing medical therapy. PMH:   DAVID on CPAP  Transverse myelitis     Summary of old records:   (Patient's old records, notes and chart reviewed and summarized above.)    Urinalysis today:  No results found for this visit on 21.     Last BUN and creatinine:  Lab Results   Component Value Date    BUN 23 (H) 2021     Lab Results   Component Value Date    CREATININE 1.0 2021       Imaging Reviewed during this Office Visit:   (results were independently reviewed by physician and radiology report verified)    PAST MEDICAL, FAMILY AND SOCIAL HISTORY:  Past Medical History:   Diagnosis Date    Actinic keratitis     Ankle pain     Arthritis     Back pain     low     Chronic diastolic CHF (congestive heart failure) (HCC)     Chronic diastolic heart failure (HCC) 5/31/2017    Chronic kidney disease     Dependent edema     Hypertension     Incontinence     in female    Osteoarthritis     knee     Type II or unspecified type diabetes mellitus without mention of complication, not stated as uncontrolled      Past Surgical History:   Procedure Laterality Date    ANKLE SURGERY Left     FINGER TRIGGER RELEASE Right      Family History   Problem Relation Age of Onset    Diabetes Mother     Diabetes Father     Cancer Paternal Grandfather         BONE      Outpatient Medications Marked as Taking for the 6/23/21 encounter (Office Visit) with Adonay Reddy MD   Medication Sig Dispense Refill    cyanocobalamin 1000 MCG/ML injection Inject 1 mL into the muscle every 30 days 1 mL 5    Insulin Degludec (TRESIBA FLEXTOUCH) 200 UNIT/ML SOPN Inject 26 Units into the skin daily 6 pen 5    insulin aspart (NOVOLOG FLEXPEN) 100 UNIT/ML injection pen Use tid with meals With sliding scale max dose is 30 units per day 3 pen 5    dapagliflozin (FARXIGA) 10 MG tablet Take 1 tablet by mouth every morning 90 tablet 5    lisinopril (PRINIVIL;ZESTRIL) 20 MG tablet Take 1 tablet by mouth daily 30 tablet 2    ferrous sulfate (IRON 325) 325 (65 Fe) MG tablet Take 1 tablet by mouth daily (with breakfast) 30 tablet 2    simvastatin (ZOCOR) 40 MG tablet TAKE 1 TABLET EVERY NIGHT 90 tablet 3    Continuous Blood Gluc Sensor (FREESTYLE VERA 14 DAY SENSOR) MISC 6 each by Does not apply route daily 6 each 5    ergocalciferol (ERGOCALCIFEROL) 1.25 MG (82630 UT) capsule Take 50,000 Units by mouth once a week      citalopram (CELEXA) 10 MG tablet Take 1 tablet by mouth daily 90 tablet 3    carvedilol (COREG) 6.25 MG tablet Take 1 tablet by mouth 2 times daily (with meals) 180 tablet 3    busPIRone (BUSPAR) 15 MG tablet TAKE 1 TABLET THREE TIMES DAILY 270 tablet 1    donepezil (ARICEPT) 5 MG tablet Take 1 tablet by mouth nightly 90 tablet 3    montelukast (SINGULAIR) 10 MG tablet Take 1 tablet by mouth daily 90 tablet 3    Insulin Pen Needle 32G X 4 MM MISC 1 each by Does not apply route 4 times daily 400 each 3    blood glucose monitor strips by Other route 3 times daily Test 3 times a day & as needed for symptoms of irregular blood glucose. 100 strip 5    RELION ULTRA THIN LANCETS 30G MISC 1 each by Does not apply route 3 times daily 300 each 3    bumetanide (BUMEX) 1 MG tablet Take 1 tablet by mouth 2 times daily (Patient taking differently: Take 1 mg by mouth daily ) 180 tablet 1    meclizine (ANTIVERT) 12.5 MG tablet Take 1 tablet by mouth 3 times daily as needed for Dizziness 90 tablet 2    acetaminophen (TYLENOL ARTHRITIS PAIN) 650 MG extended release tablet Take 650 mg by mouth every 8 hours as needed for Pain         Patient has no known allergies. Social History     Tobacco Use   Smoking Status Never Smoker   Smokeless Tobacco Never Used       Social History     Substance and Sexual Activity   Alcohol Use No       REVIEW OF SYSTEMS:  Constitutional: negative  Eyes: negative  Respiratory: negative  Cardiovascular: negative  Gastrointestinal: negative  Genitourinary: negative  Musculoskeletal: negative  Skin: negative   Neurological: negative  Hematological/Lymphatic: negative  Psychological: negative    Physical Exam:    This a 77 y.o. female      Vitals:    06/23/21 1114   BP: 136/72   Pulse: 61   Resp: 16   SpO2: 98%     Telephone      Assessment and Plan      1. Nocturnal enuresis    2. Urge incontinence    3. Recurrent UTI           Plan:      No follow-ups on file. Interstim for urinary urge incontinence refractory to medical therapy.      Discussed behavioral modifications  Bumex in am - has helped night time syptoms    Cysto botox (200 units) - failed

## 2021-06-25 ENCOUNTER — TELEPHONE (OUTPATIENT)
Dept: UROLOGY | Age: 76
End: 2021-06-25

## 2021-06-25 NOTE — TELEPHONE ENCOUNTER
Elvis           AXF:1[birth year]01           Surgical/Procedure Planned: Interstim Stage I and II    Date & Location: TBD at [place] [address]       Outpatient   Planned Length of OR: 1 hour    Sedation: general    This is a request for medical clearance.       Estimated Cardiac Risk for Non-Cardiac Surgery/Procedure     Low______ Moderate______ High______    Medication Instructions - Clarification needed by this date:     -Insulin:    -Other medications:    Resume medications:     Lovenox indicated: _____Yes _____NO    Provider: Dr. Bela Escobar of Provider Giving Orders for Medication holds:    _____________________________________________

## 2021-07-08 ENCOUNTER — OFFICE VISIT (OUTPATIENT)
Dept: FAMILY MEDICINE CLINIC | Age: 76
End: 2021-07-08
Payer: MEDICARE

## 2021-07-08 VITALS
SYSTOLIC BLOOD PRESSURE: 124 MMHG | OXYGEN SATURATION: 94 % | DIASTOLIC BLOOD PRESSURE: 64 MMHG | BODY MASS INDEX: 42.27 KG/M2 | WEIGHT: 254 LBS | HEART RATE: 55 BPM

## 2021-07-08 DIAGNOSIS — M16.11 PRIMARY OSTEOARTHRITIS OF RIGHT HIP: ICD-10-CM

## 2021-07-08 DIAGNOSIS — E78.49 OTHER HYPERLIPIDEMIA: ICD-10-CM

## 2021-07-08 DIAGNOSIS — G47.33 OSA TREATED WITH BIPAP: ICD-10-CM

## 2021-07-08 DIAGNOSIS — Z01.818 ENCOUNTER FOR PRE-OPERATIVE EXAMINATION: Primary | ICD-10-CM

## 2021-07-08 DIAGNOSIS — R60.9 PERIPHERAL EDEMA: ICD-10-CM

## 2021-07-08 DIAGNOSIS — R42 VERTIGO: ICD-10-CM

## 2021-07-08 DIAGNOSIS — E11.40 NEUROPATHY DUE TO TYPE 2 DIABETES MELLITUS (HCC): ICD-10-CM

## 2021-07-08 DIAGNOSIS — M21.371 FOOT DROP, RIGHT FOOT: ICD-10-CM

## 2021-07-08 DIAGNOSIS — N18.32 TYPE 2 DIABETES MELLITUS WITH STAGE 3B CHRONIC KIDNEY DISEASE, WITH LONG-TERM CURRENT USE OF INSULIN (HCC): ICD-10-CM

## 2021-07-08 DIAGNOSIS — Z79.4 TYPE 2 DIABETES MELLITUS WITH STAGE 3B CHRONIC KIDNEY DISEASE, WITH LONG-TERM CURRENT USE OF INSULIN (HCC): ICD-10-CM

## 2021-07-08 DIAGNOSIS — N39.41 URGE INCONTINENCE OF URINE: ICD-10-CM

## 2021-07-08 DIAGNOSIS — E11.22 TYPE 2 DIABETES MELLITUS WITH STAGE 3B CHRONIC KIDNEY DISEASE, WITH LONG-TERM CURRENT USE OF INSULIN (HCC): ICD-10-CM

## 2021-07-08 DIAGNOSIS — N39.44 NOCTURNAL ENURESIS: ICD-10-CM

## 2021-07-08 DIAGNOSIS — I10 ESSENTIAL HYPERTENSION: ICD-10-CM

## 2021-07-08 DIAGNOSIS — M54.41 CHRONIC RIGHT-SIDED LOW BACK PAIN WITH RIGHT-SIDED SCIATICA: ICD-10-CM

## 2021-07-08 DIAGNOSIS — F41.9 ANXIETY: ICD-10-CM

## 2021-07-08 DIAGNOSIS — F33.0 MILD EPISODE OF RECURRENT MAJOR DEPRESSIVE DISORDER (HCC): ICD-10-CM

## 2021-07-08 DIAGNOSIS — J30.9 ALLERGIC RHINITIS, UNSPECIFIED SEASONALITY, UNSPECIFIED TRIGGER: ICD-10-CM

## 2021-07-08 DIAGNOSIS — I50.32 CHRONIC DIASTOLIC HEART FAILURE (HCC): ICD-10-CM

## 2021-07-08 DIAGNOSIS — G89.29 CHRONIC RIGHT-SIDED LOW BACK PAIN WITH RIGHT-SIDED SCIATICA: ICD-10-CM

## 2021-07-08 DIAGNOSIS — E66.01 OBESITY, MORBID, BMI 40.0-49.9 (HCC): ICD-10-CM

## 2021-07-08 PROCEDURE — 99211 OFF/OP EST MAY X REQ PHY/QHP: CPT

## 2021-07-08 PROCEDURE — 2022F DILAT RTA XM EVC RTNOPTHY: CPT | Performed by: NURSE PRACTITIONER

## 2021-07-08 PROCEDURE — 99213 OFFICE O/P EST LOW 20 MIN: CPT | Performed by: NURSE PRACTITIONER

## 2021-07-08 PROCEDURE — G8417 CALC BMI ABV UP PARAM F/U: HCPCS | Performed by: NURSE PRACTITIONER

## 2021-07-08 PROCEDURE — 1090F PRES/ABSN URINE INCON ASSESS: CPT | Performed by: NURSE PRACTITIONER

## 2021-07-08 PROCEDURE — 0509F URINE INCON PLAN DOCD: CPT | Performed by: NURSE PRACTITIONER

## 2021-07-08 PROCEDURE — G8427 DOCREV CUR MEDS BY ELIG CLIN: HCPCS | Performed by: NURSE PRACTITIONER

## 2021-07-08 RX ORDER — LISINOPRIL 2.5 MG/1
TABLET ORAL
COMMUNITY
Start: 2021-06-29 | End: 2021-07-08 | Stop reason: CLARIF

## 2021-07-08 NOTE — PROGRESS NOTES
[address] [address] [address]  Dept: [phone]  Dept Fax: [phone]      Layo Steele  YOB: 1945    Date of Service:  7/8/2021      Chief Complaint   Patient presents with    Pre-op Exam     preop clearance for upcoming procedure with urology        HPI   Patient presents to the office today for a preoperative examination at the request of Dr. Josephine Castelan, who plans on performing Interstim Stage I and II. The procedure date is TBD at [facility]. The patient complains of urge incontinence, nocturnal enuresis, and recurrent UTI. The current problem began 2-3 years ago, and symptoms have been worsening with time. Conservative therapy: Yes: medication, behavioral modifications, cysto botox, which has been not very effective. She wears pads and brace daily and changes them at least 2 times during the day. Planned anesthesia: General     Known anesthesia problems:None   Bleeding risk: No recent or remote history of abnormal bleeding  Personal or FH of DVT/PE: FH - sister has DVT, also has cancer.     Hypertension  This is a chronic problem. The current episode started more than 1 year ago. The problem has been waxing and waning since onset. Associated symptoms include anxiety and malaise/fatigue. Pertinent negatives include no chest pain, headaches, orthopnea, palpitations, PND or shortness of breath. There are no associated agents to hypertension. Risk factors for coronary artery disease include obesity, post-menopausal state, dyslipidemia, diabetes mellitus, sedentary lifestyle and stress. Past treatments include beta blockers and ACE inhibitors. The current treatment provides moderate improvement. Compliance problems include exercise and diet. Hyperlipidemia  This is a chronic problem. The current episode started more than 1 year ago.  Recent lipid tests were reviewed and are normal. Exacerbating diseases include diabetes and obesity. Factors aggravating her hyperlipidemia include beta blockers. Pertinent negatives include no chest pain, focal sensory loss, myalgias or shortness of breath. Current antihyperlipidemic treatment includes statins. The current treatment provides significant improvement of lipids. Compliance problems include adherence to exercise and adherence to diet. Risk factors for coronary artery disease include diabetes mellitus, dyslipidemia, obesity, hypertension, post-menopausal, a sedentary lifestyle and stress.      Type 2 diabetes  Follows every 3 months with diabetic specialist MARCELA Julian. Her last A1C completed 5/4/2021, result: 9.1. She reports home glucose readings have been fluctuating between 130-200. She has been monitoring her blood sugars > 4 times a day using a CGM.  Her last eye exam was completed on 6/11/2021.      Treatment Adherence:   Medication compliance:  compliant all of the time  Diet compliance:  Sometimes compliant  Weight trend: stable  Current exercise: no regular exercise  Barriers: lack of motivation, lack of support and stress     Dr. Ryan Harrington (urologist)  [address] [place] [address] cardiology - diastolic heart failure   Dr. Fabio Salmeron (pulmonologist) - DAVID  Dr. Hartman (nephrologist)- CKD  Dr. Agustina Chau (pain management)     The 10-year ASCVD risk score (Bess Mcrae et al., 2013) is: 31.2%    Values used to calculate the score:      Age: 77 years      Sex: Female      Is Non- : No      Diabetic: Yes      Tobacco smoker: No      Systolic Blood Pressure: 461 mmHg      Is BP treated: Yes      HDL Cholesterol: 60 mg/dL      Total Cholesterol: 169 mg/dL      BP Readings from Last 3 Encounters:   07/08/21 124/64   06/23/21 136/72   06/09/21 129/82        Pulse Readings from Last 3 Encounters:   07/08/21 55   06/23/21 61   06/09/21 76        Wt Readings from Last 3 Encounters:   07/08/21 254 lb (115.2 kg)   06/23/21 255 lb (115.7 kg)   06/09/21 255 lb (115.7 kg)        Past Medical History:   Diagnosis Date    Actinic keratitis     Ankle pain     Arthritis     Back pain     low     Chronic diastolic CHF (congestive heart failure) (HCC)     Chronic diastolic heart failure (HCC) 5/31/2017    Chronic kidney disease     Dependent edema     Hypertension     Incontinence     in female    Osteoarthritis     knee     Type II or unspecified type diabetes mellitus without mention of complication, not stated as uncontrolled      Past Surgical History:   Procedure Laterality Date    ANKLE SURGERY Left     FINGER TRIGGER RELEASE Right      Family History   Problem Relation Age of Onset    Diabetes Mother     Diabetes Father     Cancer Paternal Grandfather         BONE      Social History     Tobacco Use    Smoking status: Never Smoker    Smokeless tobacco: Never Used   Vaping Use    Vaping Use: Never used   Substance Use Topics    Alcohol use: No    Drug use: No       No Known Allergies  Outpatient Medications Marked as Taking for the 7/8/21 encounter (Office Visit) with ORTIZ Samuel CNP   Medication Sig Dispense Refill    cyanocobalamin 1000 MCG/ML injection Inject 1 mL into the muscle every 30 days 1 mL 5    Insulin Degludec (TRESIBA FLEXTOUCH) 200 UNIT/ML SOPN Inject 26 Units into the skin daily 6 pen 5    insulin aspart (NOVOLOG FLEXPEN) 100 UNIT/ML injection pen Use tid with meals With sliding scale max dose is 30 units per day 3 pen 5    dapagliflozin (FARXIGA) 10 MG tablet Take 1 tablet by mouth every morning 90 tablet 5    lisinopril (PRINIVIL;ZESTRIL) 20 MG tablet Take 1 tablet by mouth daily 30 tablet 2    ferrous sulfate (IRON 325) 325 (65 Fe) MG tablet Take 1 tablet by mouth daily (with breakfast) 30 tablet 2    simvastatin (ZOCOR) 40 MG tablet TAKE 1 TABLET EVERY NIGHT 90 tablet 3    Continuous Blood Gluc Sensor (FREESTYLE VERA 14 DAY SENSOR) MISC 6 each by Does not apply route daily 6 each 5    ergocalciferol (ERGOCALCIFEROL) 1.25 MG (23278 UT) capsule Take 50,000 Units by mouth once a week      potassium chloride (KLOR-CON) 10 MEQ extended release tablet Take 10 mEq by mouth daily       citalopram (CELEXA) 10 MG tablet Take 1 tablet by mouth daily 90 tablet 3    carvedilol (COREG) 6.25 MG tablet Take 1 tablet by mouth 2 times daily (with meals) 180 tablet 3    busPIRone (BUSPAR) 15 MG tablet TAKE 1 TABLET THREE TIMES DAILY 270 tablet 1    nystatin (MYCOSTATIN) 573820 UNIT/GM cream Apply topically 4 times daily. Continue to apply for 3 days after rash resolves 30 g 1    glucagon 1 MG injection Inject 1 mg into the muscle See Admin Instructions Follow package directions for low blood sugar. 1 kit 3    donepezil (ARICEPT) 5 MG tablet Take 1 tablet by mouth nightly 90 tablet 3    montelukast (SINGULAIR) 10 MG tablet Take 1 tablet by mouth daily 90 tablet 3    Insulin Pen Needle 32G X 4 MM MISC 1 each by Does not apply route 4 times daily 400 each 3    blood glucose monitor strips by Other route 3 times daily Test 3 times a day & as needed for symptoms of irregular blood glucose. 100 strip 5    RELION ULTRA THIN LANCETS 30G MISC 1 each by Does not apply route 3 times daily 300 each 3    bumetanide (BUMEX) 1 MG tablet Take 1 tablet by mouth 2 times daily (Patient taking differently: Take 1 mg by mouth daily ) 180 tablet 1    meclizine (ANTIVERT) 12.5 MG tablet Take 1 tablet by mouth 3 times daily as needed for Dizziness 90 tablet 2    acetaminophen (TYLENOL ARTHRITIS PAIN) 650 MG extended release tablet Take 650 mg by mouth every 8 hours as needed for Pain         Subjective:     Review of Systems   Constitutional: Positive for fatigue. Negative for chills, diaphoresis and fever. HENT: Negative. Eyes: Negative. Respiratory: Negative for cough, shortness of breath and wheezing. Cardiovascular: Positive for leg swelling (bilateral). Negative for chest pain and palpitations.    Gastrointestinal: Negative for abdominal pain, blood in stool, constipation, diarrhea and nausea. Endocrine: Negative for cold intolerance, heat intolerance, polydipsia, polyphagia and polyuria. Genitourinary: Positive for enuresis and urgency (With incontinence). Negative for hematuria. Musculoskeletal: Positive for arthralgias (right hip), back pain and gait problem. Skin: Negative. Allergic/Immunologic: Negative for environmental allergies. Neurological: Negative for dizziness, weakness, light-headedness and headaches. Psychiatric/Behavioral: Positive for dysphoric mood. Negative for agitation, decreased concentration, self-injury, sleep disturbance and suicidal ideas. The patient is nervous/anxious. Objective:     Vitals:    07/08/21 1108   BP: 124/64   Pulse: 55   SpO2: 94%   Weight: 254 lb (115.2 kg)         Physical Exam   Constitutional: She is oriented to person, place, and time. She appears well-developed and well-nourished. No distress. HENT:   Head: Normocephalic and atraumatic. Mouth/Throat: Uvula is midline, oropharynx is clear and moist and mucous membranes are normal.   Eyes: Conjunctivae and EOM arenormal. Pupils are equal, round, and reactive to light. Neck: Trachea normal and normal range of motion. Neck supple. No JVD present. Carotid bruit is not present. No mass and no thyromegaly present. Cardiovascular: Normal rate, regular rhythm, normal heart sounds and intact distal pulses. Exam reveals no gallop and no friction rub. No murmur heard. Pulmonary/Chest: Effort normal and breath sounds normal. No respiratory distress. She has no wheezes. She has no rales. Abdominal: Soft. Normal bowel sounds. She exhibits no distension and no mass. There is no hepatosplenomegaly. No tenderness. Musculoskeletal: She exhibits mild swelling to bilateral lower extremities, no tenderness. Neurological: She is alert and oriented to person, place, and time. Strength is normal to bilateral upper extremities and left lower extremity.   There is mild foot drop noted to the right. Gait is abnormal, uses walker. Skin: Skin is warm and dry. No erythema. Psychiatric: She has a somewhat depressed and anxious mood. Her behavior is normal.     EKG Interpretation: To be completed by cardiologist    Lab Review   Lab Results   Component Value Date    WBC 9.4 05/23/2021    HGB 12.3 05/23/2021    HCT 38.3 05/23/2021    MCV 89.9 05/23/2021    .5 05/23/2021     Lab Results   Component Value Date     05/23/2021    K 4.8 05/23/2021     05/23/2021    CO2 28 05/23/2021    BUN 23 (H) 05/23/2021    CREATININE 1.0 05/23/2021    GLUCOSE 210 (H) 05/23/2021    CALCIUM 8.8 05/23/2021    PROT 7.0 09/01/2020    LABALBU 3.9 05/23/2021    BILITOT 0.4 05/23/2021    ALKPHOS 85 05/23/2021    AST 17 05/23/2021    ALT 10 05/23/2021    LABGLOM 57.6 05/23/2021    GFRAA >60 05/21/2021    AGRATIO 1.3 07/05/2019    GLOB 2.7 05/23/2021       Lab Results   Component Value Date    LABA1C 9.1 05/04/2021    LABA1C 9.0 01/19/2021    LABA1C 7.6 10/01/2020     Lab Results   Component Value Date    GLUF 167 (H) 09/01/2020    LABMICR 84 (H) 09/01/2020    LDLCALC 69.4 03/28/2019    CREATININE 1.0 05/23/2021       Assessment:         ICD-10-CM    1. Encounter for pre-operative examination  Z01.818 CBC Auto Differential     Basic Metabolic Panel     Hemoglobin A1C   2. Urge incontinence of urine  N39.41    3. Nocturnal enuresis  N39.44    4. Type 2 diabetes mellitus with stage 3b chronic kidney disease, with long-term current use of insulin (HCC)  E11.22 CBC Auto Differential    B59.33 Basic Metabolic Panel    E09.9 Hemoglobin A1C   5. Essential hypertension  I10 CBC Auto Differential     Basic Metabolic Panel   6. Other hyperlipidemia  E78.49 CBC Auto Differential   7. DAVID treated with BiPAP  G47.33 CBC Auto Differential   8. Obesity, morbid, BMI 40.0-49.9 ([facility])  E66.01    9. Allergic rhinitis, unspecified seasonality, unspecified trigger  J30.9    10.  Chronic diastolic heart failure ([facility]  I50.32    11. Peripheral edema  R60.9    12. Mild episode of recurrent major depressive disorder ([facility]  F33.0    13. Anxiety  F41.9    14. Neuropathy due to type 2 diabetes mellitus (HCC)  E11.40    15. Primary osteoarthritis of right hip  M16.11    16. Chronic right-sided low back pain with right-sided sciatica  M54.41     G89.29    17. Foot drop, right foot  M21.371    18. Vertigo  R42         77 y.o. patient with planned surgery as above. Known risk factors for perioperative complications: Congestive heart failure, Diabetes mellitus, Hypertension, Obstructive sleep apnea, Renal dysfunction    Plan:     Preoperative workup as follows: CBC, BMP, EKG per cardiology. Orders Placed This Encounter   Procedures    CBC Auto Differential     Standing Status:   Future     Standing Expiration Date:   9/7/2021    Basic Metabolic Panel     Standing Status:   Future     Standing Expiration Date:   7/9/2022    Hemoglobin A1C     Standing Status:   Future     Standing Expiration Date:   7/9/2022     Patient instructed to complete above ordered labs. Will clear for surgery once results have been reviewed. Cardiac clearance per cardiology. Addendum: 9/2/2021 Labs reviewed, patient  is medically cleared for the proposed surgery and anesthesia. Electronically signed by ORTIZ Reynolds CNP on 9/2/2021 at 1:19 PM.     This note was generated completely or in part utilizing Dragon dictation speech recognition software. Occasionally, words are mistranscribed and despite editing, the text may contain inaccuracies due to incorrect word recognition. If further clarification is needed please contact the office at [phone].     Electronically signed by OTRIZ Reynolds CNP on 7/9/2021 at 7:46 AM.

## 2021-07-09 PROBLEM — N39.44 NOCTURNAL ENURESIS: Status: ACTIVE | Noted: 2021-07-09

## 2021-07-09 PROBLEM — N39.41 URGE INCONTINENCE OF URINE: Status: ACTIVE | Noted: 2021-07-09

## 2021-07-09 PROBLEM — M21.371 FOOT DROP, RIGHT FOOT: Status: ACTIVE | Noted: 2020-06-30

## 2021-07-09 ASSESSMENT — ENCOUNTER SYMPTOMS
DIARRHEA: 0
EYES NEGATIVE: 1
NAUSEA: 0
WHEEZING: 0
SHORTNESS OF BREATH: 0
BACK PAIN: 1
ABDOMINAL PAIN: 0
CONSTIPATION: 0
COUGH: 0
BLOOD IN STOOL: 0

## 2021-07-15 ENCOUNTER — TELEPHONE (OUTPATIENT)
Dept: UROLOGY | Age: 76
End: 2021-07-15

## 2021-07-22 DIAGNOSIS — D50.8 OTHER IRON DEFICIENCY ANEMIA: ICD-10-CM

## 2021-07-22 NOTE — TELEPHONE ENCOUNTER
Layo Steele is requesting a refill on the following medication(s):  Requested Prescriptions     Pending Prescriptions Disp Refills    ferrous sulfate (IRON 325) 325 (65 Fe) MG tablet 30 tablet 2     Sig: Take 1 tablet by mouth daily (with breakfast)       Last Visit Date (If Applicable):  7/0/3312    Next Visit Date:    9/1/2021

## 2021-07-23 RX ORDER — FERROUS SULFATE 325(65) MG
325 TABLET ORAL
Qty: 90 TABLET | Refills: 1 | Status: SHIPPED | OUTPATIENT
Start: 2021-07-23 | End: 2022-02-09 | Stop reason: SDUPTHER

## 2021-08-03 ENCOUNTER — OFFICE VISIT (OUTPATIENT)
Dept: DIABETES SERVICES | Age: 76
End: 2021-08-03
Payer: MEDICARE

## 2021-08-03 VITALS
HEART RATE: 64 BPM | SYSTOLIC BLOOD PRESSURE: 182 MMHG | WEIGHT: 251.2 LBS | BODY MASS INDEX: 41.85 KG/M2 | DIASTOLIC BLOOD PRESSURE: 90 MMHG | HEIGHT: 65 IN | RESPIRATION RATE: 16 BRPM

## 2021-08-03 DIAGNOSIS — E78.49 OTHER HYPERLIPIDEMIA: ICD-10-CM

## 2021-08-03 DIAGNOSIS — Z79.4 TYPE 2 DIABETES MELLITUS WITH STAGE 3B CHRONIC KIDNEY DISEASE, WITH LONG-TERM CURRENT USE OF INSULIN (HCC): Primary | ICD-10-CM

## 2021-08-03 DIAGNOSIS — I10 ESSENTIAL HYPERTENSION: ICD-10-CM

## 2021-08-03 DIAGNOSIS — N18.32 TYPE 2 DIABETES MELLITUS WITH STAGE 3B CHRONIC KIDNEY DISEASE, WITH LONG-TERM CURRENT USE OF INSULIN (HCC): Primary | ICD-10-CM

## 2021-08-03 DIAGNOSIS — E11.22 TYPE 2 DIABETES MELLITUS WITH STAGE 3B CHRONIC KIDNEY DISEASE, WITH LONG-TERM CURRENT USE OF INSULIN (HCC): Primary | ICD-10-CM

## 2021-08-03 PROCEDURE — 4040F PNEUMOC VAC/ADMIN/RCVD: CPT | Performed by: NURSE PRACTITIONER

## 2021-08-03 PROCEDURE — 1090F PRES/ABSN URINE INCON ASSESS: CPT | Performed by: NURSE PRACTITIONER

## 2021-08-03 PROCEDURE — G8417 CALC BMI ABV UP PARAM F/U: HCPCS | Performed by: NURSE PRACTITIONER

## 2021-08-03 PROCEDURE — G8427 DOCREV CUR MEDS BY ELIG CLIN: HCPCS | Performed by: NURSE PRACTITIONER

## 2021-08-03 PROCEDURE — G8400 PT W/DXA NO RESULTS DOC: HCPCS | Performed by: NURSE PRACTITIONER

## 2021-08-03 PROCEDURE — 3046F HEMOGLOBIN A1C LEVEL >9.0%: CPT | Performed by: NURSE PRACTITIONER

## 2021-08-03 PROCEDURE — 99214 OFFICE O/P EST MOD 30 MIN: CPT | Performed by: NURSE PRACTITIONER

## 2021-08-03 PROCEDURE — 1123F ACP DISCUSS/DSCN MKR DOCD: CPT | Performed by: NURSE PRACTITIONER

## 2021-08-03 PROCEDURE — 3017F COLORECTAL CA SCREEN DOC REV: CPT | Performed by: NURSE PRACTITIONER

## 2021-08-03 PROCEDURE — 1036F TOBACCO NON-USER: CPT | Performed by: NURSE PRACTITIONER

## 2021-08-03 PROCEDURE — 95251 CONT GLUC MNTR ANALYSIS I&R: CPT | Performed by: NURSE PRACTITIONER

## 2021-08-03 PROCEDURE — 2022F DILAT RTA XM EVC RTNOPTHY: CPT | Performed by: NURSE PRACTITIONER

## 2021-08-03 RX ORDER — OXYBUTYNIN CHLORIDE 5 MG/1
5 TABLET, EXTENDED RELEASE ORAL DAILY
COMMUNITY
Start: 2021-07-15 | End: 2022-03-23

## 2021-08-03 RX ORDER — LISINOPRIL 20 MG/1
20 TABLET ORAL DAILY
Qty: 90 TABLET | Refills: 1 | Status: SHIPPED | OUTPATIENT
Start: 2021-08-03 | End: 2022-02-09 | Stop reason: SDUPTHER

## 2021-08-03 ASSESSMENT — ENCOUNTER SYMPTOMS
RESPIRATORY NEGATIVE: 1
SHORTNESS OF BREATH: 0
DIARRHEA: 0
ABDOMINAL PAIN: 0

## 2021-08-03 NOTE — PROGRESS NOTES
[facility] [address] INTERNAL  [facility]  [address] [facility], [address]  [place] [address] [facility]  [phone]        HISTORY:    Humble Barcenas presents today for evaluation and management of:  Chief Complaint   Patient presents with    Diabetes     2 month appt    Fall     Last thursday. No pain at this time. Hit head. Tripped       HPI    Interval History:    Past DM Medications   Metformin- ckd  Glimepiride-therapy completed   janvuia- glp1 started  victoza-cost  soliqua- cost     Current Diabetic Medications  novolog TID with sliding scale, tresiba 26 units once daily    DKA episodes: 0    01/19/21   At previous visit insulin was decreased due to hypoglycemia. She is now having more highs and denies hypoglcyemia.   Diet: unchanged  Exercise: none  BS testing: uses  Alida cgm daily with success   Issues: denies      05/04/21   At previous visit dm counseling was provided. Insulin was adjusted. She has been having a lot of stress recently and has recent steroid use. Glucose is fluctuating. She admits to taking insulin sometimes after eating and basing the scale off of peak glucose. She also does not rotate injection sites and has mild lipodystrophy. Follows with nephrology.   Diet: unchanged  Exercise: none  BS testing: uses cgm daily with sucess  Issues: denies      06/09/21   At previous visit diabetes counseling was provided. She does admit to taking insulin after meals. Denies any s/s of hyper/hypoglycemia  Diet: unchanged. Exercise: PT  BS testing: Uses CGM daily with success and is adherent to cgm regimen. Issues: denies     08/03/21   At previous visit Roz Kayli was started. Doing well other than cost. She is due for a1c after tomorrow. Denies any s/s of hyper/hypoglcemia. She follows with podiatry every 3 months for dm foot care. Diet: unchanged. Exercise: PT  BS testing: Uses CGM daily with success and is adherent to cgm regimen.    Issues: she fell last week and has follow up with neurology on 8/16/21. High cholesterol-  Takes zocor and denies any adverse effects with its use.  Watches diet and exercise.      Hypertension-  Takes coreg and lisinopril and denies any adverse effects with their use. Watches diet and exercise.  Denies any chest pain, dizziness or edema.  Follows with cardiology:Yes. Yearly      Obesity- Working on weight loss. .          Past Medical History:   Diagnosis Date    Actinic keratitis     Ankle pain     Arthritis     Back pain     low     Chronic diastolic CHF (congestive heart failure) (HCC)     Chronic diastolic heart failure (HCC) 5/31/2017    Chronic kidney disease     Dependent edema     Hypertension     Incontinence     in female    Osteoarthritis     knee     Type II or unspecified type diabetes mellitus without mention of complication, not stated as uncontrolled      Family History   Problem Relation Age of Onset    Diabetes Mother     Diabetes Father     Cancer Paternal Grandfather         BONE      Social History     Tobacco Use    Smoking status: Never Smoker    Smokeless tobacco: Never Used   Vaping Use    Vaping Use: Never used   Substance Use Topics    Alcohol use: No    Drug use: No     No Known Allergies    MEDICATIONS:  Current Outpatient Medications   Medication Sig Dispense Refill    lisinopril (PRINIVIL;ZESTRIL) 20 MG tablet Take 1 tablet by mouth daily 90 tablet 1    ferrous sulfate (IRON 325) 325 (65 Fe) MG tablet Take 1 tablet by mouth daily (with breakfast) 90 tablet 1    cyanocobalamin 1000 MCG/ML injection Inject 1 mL into the muscle every 30 days 1 mL 5    Insulin Degludec (TRESIBA FLEXTOUCH) 200 UNIT/ML SOPN Inject 26 Units into the skin daily 6 pen 5    insulin aspart (NOVOLOG FLEXPEN) 100 UNIT/ML injection pen Use tid with meals With sliding scale max dose is 30 units per day 3 pen 5    dapagliflozin (FARXIGA) 10 MG tablet Take 1 tablet by mouth every morning 90 tablet 5    citalopram (CELEXA) 10 MG tablet Take 1 tablet by mouth daily 90 tablet 3    carvedilol (COREG) 6.25 MG tablet Take 1 tablet by mouth 2 times daily (with meals) 180 tablet 3    busPIRone (BUSPAR) 15 MG tablet TAKE 1 TABLET THREE TIMES DAILY (Patient taking differently: 2 times daily ) 270 tablet 1    nystatin (MYCOSTATIN) 435769 UNIT/GM cream Apply topically 4 times daily. Continue to apply for 3 days after rash resolves 30 g 1    donepezil (ARICEPT) 5 MG tablet Take 1 tablet by mouth nightly 90 tablet 3    montelukast (SINGULAIR) 10 MG tablet Take 1 tablet by mouth daily 90 tablet 3    bumetanide (BUMEX) 1 MG tablet Take 1 tablet by mouth 2 times daily (Patient taking differently: Take 1 mg by mouth daily ) 180 tablet 1    meclizine (ANTIVERT) 12.5 MG tablet Take 1 tablet by mouth 3 times daily as needed for Dizziness 90 tablet 2    acetaminophen (TYLENOL ARTHRITIS PAIN) 650 MG extended release tablet Take 650 mg by mouth every 8 hours as needed for Pain      oxybutynin (DITROPAN-XL) 5 MG extended release tablet       simvastatin (ZOCOR) 40 MG tablet TAKE 1 TABLET EVERY NIGHT 90 tablet 3    Continuous Blood Gluc Sensor (FREESTYLE VERA 14 DAY SENSOR) MISC 6 each by Does not apply route daily 6 each 5    ergocalciferol (ERGOCALCIFEROL) 1.25 MG (18846 UT) capsule Take 50,000 Units by mouth once a week (Patient not taking: Reported on 8/3/2021)      glucagon 1 MG injection Inject 1 mg into the muscle See Admin Instructions Follow package directions for low blood sugar. (Patient not taking: Reported on 8/3/2021) 1 kit 3    Insulin Pen Needle 32G X 4 MM MISC 1 each by Does not apply route 4 times daily 400 each 3    blood glucose monitor strips by Other route 3 times daily Test 3 times a day & as needed for symptoms of irregular blood glucose.  100 strip 5    RELION ULTRA THIN LANCETS 30G MISC 1 each by Does not apply route 3 times daily 300 each 3     No current facility-administered medications for this visit. Review ofSymptoms:  Review of Systems   Constitutional: Positive for fatigue. Negative for unexpected weight change. Eyes: Negative for visual disturbance. Respiratory: Negative. Negative for shortness of breath. Cardiovascular: Negative for chest pain and leg swelling. Gastrointestinal: Negative for abdominal pain and diarrhea. Endocrine: Negative for polydipsia, polyphagia and polyuria. Genitourinary: Negative. Musculoskeletal: Negative. Skin: Negative for rash and wound. Neurological: Negative for dizziness, tremors, seizures and headaches. Psychiatric/Behavioral: Negative. Negative for confusion and decreased concentration. Theremainder of a complete 14-point review of systems is negative. Vital Signs: BP (!) 182/90 (Site: Right Upper Arm, Position: Sitting, Cuff Size: Medium Adult)   Pulse 64   Resp 16   Ht 5' 5\" (1.651 m)   Wt 251 lb 3.2 oz (113.9 kg)   LMP  (LMP Unknown)   BMI 41.80 kg/m²      Wt Readings from Last 3 Encounters:   08/03/21 251 lb 3.2 oz (113.9 kg)   07/08/21 254 lb (115.2 kg)   06/23/21 255 lb (115.7 kg)     Body mass index is 41.8 kg/m².   LABS:  Hemoglobin A1C   Date Value Ref Range Status   05/04/2021 9.1 % Final   01/19/2021 9.0 % Final     Lab Results   Component Value Date    LABMICR 84 (H) 09/01/2020     Lab Results   Component Value Date     05/23/2021    K 4.8 05/23/2021     05/23/2021    CO2 28 05/23/2021    BUN 23 (H) 05/23/2021    CREATININE 1.0 05/23/2021    GLUCOSE 210 (H) 05/23/2021    CALCIUM 8.8 05/23/2021    PROT 7.0 09/01/2020    LABALBU 3.9 05/23/2021    BILITOT 0.4 05/23/2021    ALKPHOS 85 05/23/2021    AST 17 05/23/2021    ALT 10 05/23/2021    LABGLOM 57.6 05/23/2021    GFRAA >60 05/21/2021    AGRATIO 1.3 07/05/2019    GLOB 2.7 05/23/2021     Lab Results   Component Value Date    CHOL 169 09/01/2020    CHOL 157 03/28/2019    CHOL 194 02/21/2018     Lab Results   Component Value Date    TRIG 89 09/01/2020 TRIG 173 03/28/2019    TRIG 110 02/21/2018     Lab Results   Component Value Date    HDL 60 09/01/2020    HDL 45 04/24/2017    HDL 49 05/16/2016     Lab Results   Component Value Date    LDLCHOLESTEROL 91 09/01/2020    LDLCALC 69.4 03/28/2019    LDLCALC 125.0 02/21/2018    LDLCALC 105.8 04/24/2017     Lab Results   Component Value Date    VLDL NOT REPORTED 09/01/2020    VLDL 35 03/28/2019    VLDL 22 02/21/2018     Lab Results   Component Value Date    CHOLHDLRATIO 2.8 09/01/2020    CHOLHDLRATIO 3.0 03/28/2019    CHOLHDLRATIO 4.1 02/21/2018           Physical Exam  Constitutional:       Appearance: She is well-developed. Eyes:      Pupils: Pupils are equal, round, and reactive to light. Neck:      Thyroid: No thyroid mass, thyromegaly or thyroid tenderness. Cardiovascular:      Rate and Rhythm: Normal rate and regular rhythm. Heart sounds: Normal heart sounds. Pulmonary:      Effort: Pulmonary effort is normal.      Breath sounds: Normal breath sounds. Abdominal:      General: Bowel sounds are normal.      Palpations: Abdomen is soft. Skin:     General: Skin is warm and dry. Comments: Negative for open/nonhealing wounds. Negative for lipohypertrophy. Neurological:      Mental Status: She is alert and oriented to person, place, and time. ASSESSMENT/PLAN:     Diagnosis Orders   1. Type 2 diabetes mellitus with stage 3b chronic kidney disease, with long-term current use of insulin ([facility]     2. Other hyperlipidemia     3. Essential hypertension  lisinopril (PRINIVIL;ZESTRIL) 20 MG tablet   4. BMI 40.0-44.9, adult ([facility]       No orders of the defined types were placed in this encounter.     Orders Placed This Encounter   Medications    lisinopril (PRINIVIL;ZESTRIL) 20 MG tablet     Sig: Take 1 tablet by mouth daily     Dispense:  90 tablet     Refill:  1     Requested Prescriptions     Signed Prescriptions Disp Refills    lisinopril (PRINIVIL;ZESTRIL) 20 MG tablet 90 tablet 1     Sig:

## 2021-08-10 NOTE — TELEPHONE ENCOUNTER
Notified patient that we have still not received medical clearance from Geoff Garcia to date. Received cardiac clearance. Will send orders to [facility] for procedure as soon as we receive clearance from PCP.

## 2021-08-24 ENCOUNTER — TELEPHONE (OUTPATIENT)
Dept: FAMILY MEDICINE CLINIC | Age: 76
End: 2021-08-24

## 2021-08-24 ENCOUNTER — TELEPHONE (OUTPATIENT)
Dept: INTERNAL MEDICINE | Age: 76
End: 2021-08-24

## 2021-08-24 NOTE — TELEPHONE ENCOUNTER
Patient called in stating that she called her insurance to see what they would cover and it would cost her $420 dollars for a 3 months supply. Please advise.

## 2021-08-24 NOTE — TELEPHONE ENCOUNTER
Sees a foot specialist, Dr Amarjit Fraser. She is eligible for diabetic shoes, but need a script from you. Left message for his office at [phone] to call us and let us know where this script needs sent.

## 2021-09-01 ENCOUNTER — OFFICE VISIT (OUTPATIENT)
Dept: FAMILY MEDICINE CLINIC | Age: 76
End: 2021-09-01
Payer: MEDICARE

## 2021-09-01 ENCOUNTER — TELEPHONE (OUTPATIENT)
Dept: UROLOGY | Age: 76
End: 2021-09-01

## 2021-09-01 VITALS
SYSTOLIC BLOOD PRESSURE: 132 MMHG | HEART RATE: 55 BPM | WEIGHT: 253.6 LBS | OXYGEN SATURATION: 96 % | DIASTOLIC BLOOD PRESSURE: 68 MMHG | BODY MASS INDEX: 42.2 KG/M2

## 2021-09-01 DIAGNOSIS — D51.8 VITAMIN B12 DEFICIENCY (DIETARY) ANEMIA: ICD-10-CM

## 2021-09-01 DIAGNOSIS — R41.89 COGNITIVE DECLINE: ICD-10-CM

## 2021-09-01 DIAGNOSIS — E66.01 OBESITY, MORBID, BMI 40.0-49.9 (HCC): ICD-10-CM

## 2021-09-01 DIAGNOSIS — I50.32 CHRONIC DIASTOLIC HEART FAILURE (HCC): ICD-10-CM

## 2021-09-01 DIAGNOSIS — N18.32 TYPE 2 DIABETES MELLITUS WITH STAGE 3B CHRONIC KIDNEY DISEASE, WITH LONG-TERM CURRENT USE OF INSULIN (HCC): Primary | ICD-10-CM

## 2021-09-01 DIAGNOSIS — F33.0 MILD EPISODE OF RECURRENT MAJOR DEPRESSIVE DISORDER (HCC): ICD-10-CM

## 2021-09-01 DIAGNOSIS — I10 ESSENTIAL HYPERTENSION: ICD-10-CM

## 2021-09-01 DIAGNOSIS — Z79.4 TYPE 2 DIABETES MELLITUS WITH STAGE 3B CHRONIC KIDNEY DISEASE, WITH LONG-TERM CURRENT USE OF INSULIN (HCC): Primary | ICD-10-CM

## 2021-09-01 DIAGNOSIS — F41.9 ANXIETY: ICD-10-CM

## 2021-09-01 DIAGNOSIS — N39.44 NOCTURNAL ENURESIS: ICD-10-CM

## 2021-09-01 DIAGNOSIS — G56.01 CARPAL TUNNEL SYNDROME, RIGHT: ICD-10-CM

## 2021-09-01 DIAGNOSIS — F32.A ANXIETY AND DEPRESSION: ICD-10-CM

## 2021-09-01 DIAGNOSIS — E11.40 NEUROPATHY DUE TO TYPE 2 DIABETES MELLITUS (HCC): ICD-10-CM

## 2021-09-01 DIAGNOSIS — E78.49 OTHER HYPERLIPIDEMIA: ICD-10-CM

## 2021-09-01 DIAGNOSIS — G47.33 OSA TREATED WITH BIPAP: ICD-10-CM

## 2021-09-01 DIAGNOSIS — M21.371 FOOT DROP, RIGHT FOOT: ICD-10-CM

## 2021-09-01 DIAGNOSIS — R60.9 PERIPHERAL EDEMA: ICD-10-CM

## 2021-09-01 DIAGNOSIS — N39.41 URGE INCONTINENCE OF URINE: ICD-10-CM

## 2021-09-01 DIAGNOSIS — M16.11 PRIMARY OSTEOARTHRITIS OF RIGHT HIP: ICD-10-CM

## 2021-09-01 DIAGNOSIS — F41.9 ANXIETY AND DEPRESSION: ICD-10-CM

## 2021-09-01 DIAGNOSIS — E11.22 TYPE 2 DIABETES MELLITUS WITH STAGE 3B CHRONIC KIDNEY DISEASE, WITH LONG-TERM CURRENT USE OF INSULIN (HCC): Primary | ICD-10-CM

## 2021-09-01 DIAGNOSIS — J30.9 ALLERGIC RHINITIS, UNSPECIFIED SEASONALITY, UNSPECIFIED TRIGGER: ICD-10-CM

## 2021-09-01 PROCEDURE — 3017F COLORECTAL CA SCREEN DOC REV: CPT | Performed by: NURSE PRACTITIONER

## 2021-09-01 PROCEDURE — G8400 PT W/DXA NO RESULTS DOC: HCPCS | Performed by: NURSE PRACTITIONER

## 2021-09-01 PROCEDURE — 2022F DILAT RTA XM EVC RTNOPTHY: CPT | Performed by: NURSE PRACTITIONER

## 2021-09-01 PROCEDURE — G8417 CALC BMI ABV UP PARAM F/U: HCPCS | Performed by: NURSE PRACTITIONER

## 2021-09-01 PROCEDURE — 1123F ACP DISCUSS/DSCN MKR DOCD: CPT | Performed by: NURSE PRACTITIONER

## 2021-09-01 PROCEDURE — 99214 OFFICE O/P EST MOD 30 MIN: CPT | Performed by: NURSE PRACTITIONER

## 2021-09-01 PROCEDURE — 3052F HG A1C>EQUAL 8.0%<EQUAL 9.0%: CPT | Performed by: NURSE PRACTITIONER

## 2021-09-01 PROCEDURE — G8427 DOCREV CUR MEDS BY ELIG CLIN: HCPCS | Performed by: NURSE PRACTITIONER

## 2021-09-01 PROCEDURE — 4040F PNEUMOC VAC/ADMIN/RCVD: CPT | Performed by: NURSE PRACTITIONER

## 2021-09-01 PROCEDURE — 1090F PRES/ABSN URINE INCON ASSESS: CPT | Performed by: NURSE PRACTITIONER

## 2021-09-01 PROCEDURE — 99213 OFFICE O/P EST LOW 20 MIN: CPT

## 2021-09-01 PROCEDURE — 1036F TOBACCO NON-USER: CPT | Performed by: NURSE PRACTITIONER

## 2021-09-01 PROCEDURE — 0509F URINE INCON PLAN DOCD: CPT | Performed by: NURSE PRACTITIONER

## 2021-09-01 RX ORDER — BUSPIRONE HYDROCHLORIDE 15 MG/1
15 TABLET ORAL 2 TIMES DAILY
Qty: 60 TABLET | Refills: 0
Start: 2021-09-01 | End: 2021-10-13

## 2021-09-01 RX ORDER — CARVEDILOL 3.12 MG/1
3.12 TABLET ORAL 2 TIMES DAILY WITH MEALS
Qty: 180 TABLET | Refills: 3 | Status: SHIPPED | OUTPATIENT
Start: 2021-09-01 | End: 2021-12-09 | Stop reason: SDUPTHER

## 2021-09-01 RX ORDER — BUMETANIDE 1 MG/1
1 TABLET ORAL 2 TIMES DAILY
Qty: 180 TABLET | Refills: 1 | Status: SHIPPED
Start: 2021-09-01

## 2021-09-01 RX ORDER — MEMANTINE HYDROCHLORIDE 5 MG/1
5 TABLET ORAL DAILY
COMMUNITY
Start: 2021-08-16 | End: 2021-11-02 | Stop reason: DRUGHIGH

## 2021-09-01 NOTE — PATIENT INSTRUCTIONS
Thank you for enrolling in [address] Please follow the instructions below to securely access your online medical record. [employer] allows you to send messages to your doctor, view your test results, renew your prescriptions, schedule appointments, and more. How Do I Sign Up? 1. In your Internet browser, go to [email or web address] to [employer] cit[employer]  2. Click on the Sign Up Now link in the Sign In box. You will see the New Member Sign Up page. 3. Enter your [employer] Access Code exactly as it appears below. You will not need to use this code after youve completed the sign-up process. If you do not sign up before the expiration date, you must request a new code. [employer] Access Code: Activation code not generated  Current [employer] Status: Patient Declined    4. Enter your Social Security Number ([id number]) and Date of Birth (mm/dd/yyyy) as indicated and click Submit. You will be taken to the next sign-up page. 5. Create a [employer] ID. This will be your [employer] login ID and cannot be changed, so think of one that is secure and easy to remember. 6. Create a [employer] password. You can change your password at any time. 7. Enter your Password Reset Question and Answer. This can be used at a later time if you forget your password. 8. Enter your e-mail address. You will receive e-mail notification when new information is available in [address] 9. Click Sign Up. You can now view your medical record. Additional Information  If you have questions, please contact your physician practice where you receive care. Remember, [employer] is NOT to be used for urgent needs. For medical emergencies, dial 911.

## 2021-09-01 NOTE — PROGRESS NOTES
[address] [facility]  [address] [facility], [address] [facility]  Dept: [phone]  Dept Fax: [phone]    Leo Marmolejo is a 77 y.o. female who presents today for her medical conditions/complaints as noted below. Leo Marmolejo c/o of 3 Month Follow-Up (pt reports no issues or complaints), Anxiety, Hypertension, Hyperlipidemia, and Diabetes (followed by Jens Esteban recent A1C was 8.4)      HPI:   Patient presents to the office for 3-month follow-up of hypertension. During the last office visit her blood pressure was 150/84. Previous readings were also noted to be elevated. Lisinopril increased to 20 mg daily. Patient was asked to watch salt in diet and monitor blood pressure daily. Readings at home have been 140/68-70. She recently started taking 325 mg aspirin daily. She is scheduled for carotid scan, echo, and EKG. carvedilol recently reduced by cardiology to 3.125 mg twice daily. Hypertension  This is a chronic problem. The current episode started more than 1 year ago. The problem has been waxing and waning since onset. Associated symptoms include anxiety and malaise/fatigue. Pertinent negatives include no chest pain, headaches, orthopnea, palpitations, PND or shortness of breath. There are no associated agents to hypertension. Risk factors for coronary artery disease include obesity, post-menopausal state, dyslipidemia, diabetes mellitus, sedentary lifestyle and stress. Past treatments include beta blockers and ACE inhibitors. The current treatment provides moderate improvement.  Compliance problems include exercise and diet.     The 10-year ASCVD risk score (Bradley Fishman, et al., 2013) is: 34.4%    Values used to calculate the score:      Age: 77 years      Sex: Female      Is Non- : No      Diabetic: Yes      Tobacco smoker: No      Systolic Blood Pressure: 174 mmHg      Is BP treated: Yes      HDL Cholesterol: 54 mg/dL      Total Cholesterol: 160 mg/dL    BP Readings from Last 3 Encounters:   09/01/21 132/68   08/03/21 (!) 182/90   07/08/21 124/64              ([id number])    Pulse Readings from Last 3 Encounters:   09/01/21 55   08/03/21 64   07/08/21 55        Wt Readings from Last 3 Encounters:   09/01/21 253 lb 9.6 oz (115 kg)   08/03/21 251 lb 3.2 oz (113.9 kg)   07/08/21 254 lb (115.2 kg)       Past Medical History:   Diagnosis Date    Actinic keratitis     Ankle pain     Arthritis     Back pain     low     Chronic diastolic CHF (congestive heart failure) (HCC)     Chronic diastolic heart failure (HCC) 5/31/2017    Chronic kidney disease     Dependent edema     Hypertension     Incontinence     in female    Osteoarthritis     knee     Type II or unspecified type diabetes mellitus without mention of complication, not stated as uncontrolled       Past Surgical History:   Procedure Laterality Date    ANKLE SURGERY Left     FINGER TRIGGER RELEASE Right        Family History   Problem Relation Age of Onset    Diabetes Mother     Diabetes Father     Cancer Paternal Grandfather         BONE        Social History     Tobacco Use    Smoking status: Never Smoker    Smokeless tobacco: Never Used   Vaping Use    Vaping Use: Never used   Substance Use Topics    Alcohol use: No    Drug use: No      Current Outpatient Medications   Medication Sig Dispense Refill    memantine (NAMENDA) 5 MG tablet Take 5 mg by mouth daily      carvedilol (COREG) 3.125 MG tablet Take 1 tablet by mouth 2 times daily (with meals) 180 tablet 3    busPIRone (BUSPAR) 15 MG tablet Take 15 mg by mouth 2 times daily 60 tablet 0    bumetanide (BUMEX) 1 MG tablet Take 1 tablet by mouth 2 times daily 180 tablet 1    citalopram (CELEXA) 10 MG tablet Take 1 tablet by mouth once daily 90 tablet 3    oxybutynin (DITROPAN-XL) 5 MG extended release tablet       lisinopril (PRINIVIL;ZESTRIL) 20 MG tablet Take 1 tablet by mouth daily 90 tablet 1    ferrous sulfate (IRON 325) 325 (65 Fe) MG tablet Take 1 tablet by mouth daily (with breakfast) 90 tablet 1    cyanocobalamin 1000 MCG/ML injection Inject 1 mL into the muscle every 30 days 1 mL 5    Insulin Degludec (TRESIBA FLEXTOUCH) 200 UNIT/ML SOPN Inject 26 Units into the skin daily 6 pen 5    insulin aspart (NOVOLOG FLEXPEN) 100 UNIT/ML injection pen Use tid with meals With sliding scale max dose is 30 units per day 3 pen 5    dapagliflozin (FARXIGA) 10 MG tablet Take 1 tablet by mouth every morning 90 tablet 5    simvastatin (ZOCOR) 40 MG tablet TAKE 1 TABLET EVERY NIGHT 90 tablet 3    Continuous Blood Gluc Sensor (FREESTYLE VERA 14 DAY SENSOR) MISC 6 each by Does not apply route daily 6 each 5    ergocalciferol (ERGOCALCIFEROL) 1.25 MG (70089 UT) capsule Take 50,000 Units by mouth once a week       nystatin (MYCOSTATIN) 866493 UNIT/GM cream Apply topically 4 times daily. Continue to apply for 3 days after rash resolves 30 g 1    glucagon 1 MG injection Inject 1 mg into the muscle See Admin Instructions Follow package directions for low blood sugar. 1 kit 3    donepezil (ARICEPT) 5 MG tablet Take 1 tablet by mouth nightly 90 tablet 3    montelukast (SINGULAIR) 10 MG tablet Take 1 tablet by mouth daily 90 tablet 3    Insulin Pen Needle 32G X 4 MM MISC 1 each by Does not apply route 4 times daily 400 each 3    blood glucose monitor strips by Other route 3 times daily Test 3 times a day & as needed for symptoms of irregular blood glucose. 100 strip 5    RELION ULTRA THIN LANCETS 30G MISC 1 each by Does not apply route 3 times daily 300 each 3    meclizine (ANTIVERT) 12.5 MG tablet Take 1 tablet by mouth 3 times daily as needed for Dizziness 90 tablet 2    acetaminophen (TYLENOL ARTHRITIS PAIN) 650 MG extended release tablet Take 650 mg by mouth every 8 hours as needed for Pain       No current facility-administered medications for this visit.      No Known Allergies    Health Maintenance   Topic Date Due  Shingles Vaccine (2 of 3) 02/26/2007    Diabetic foot exam  01/16/2021    Flu vaccine (1) 09/01/2021    Hepatitis C screen  06/29/2022 (Originally 1945)    Annual Wellness Visit (AWV)  10/09/2021    Diabetic retinal exam  06/11/2022    Breast cancer screen  07/14/2022    A1C test (Diabetic or Prediabetic)  08/19/2022    Lipid screen  08/19/2022    Potassium monitoring  08/19/2022    Creatinine monitoring  08/19/2022    Colon cancer screen fecal DNA test (Cologuard)  10/27/2023    DTaP/Tdap/Td vaccine (2 - Td or Tdap) 08/25/2027    DEXA (modify frequency per FRAX score)  Completed    Pneumococcal 65+ yrs at Risk Vaccine  Completed    COVID-19 Vaccine  Completed    Hepatitis A vaccine  Aged Out    Hib vaccine  Aged Out    Meningococcal (ACWY) vaccine  Aged Out       Subjective:      Review of Systems   Constitutional: Positive for fatigue. Negative for appetite change, chills and fever. HENT: Negative. Eyes: Negative. Respiratory: Negative for cough, shortness of breath and wheezing. Cardiovascular: Positive for leg swelling. Negative for chest pain and palpitations. Gastrointestinal: Negative for abdominal pain, constipation and diarrhea. Endocrine: Negative for cold intolerance, heat intolerance, polydipsia, polyphagia and polyuria. Genitourinary: Negative. Musculoskeletal: Positive for arthralgias and back pain. Negative for myalgias. Skin: Negative. Allergic/Immunologic: Negative for environmental allergies and food allergies. Neurological: Negative for dizziness, weakness, light-headedness and headaches. Psychiatric/Behavioral: Negative for agitation, dysphoric mood, self-injury, sleep disturbance and suicidal ideas. The patient is not nervous/anxious.         Objective:     Vitals:    09/01/21 1028   BP: 132/68   Pulse: 55   SpO2: 96%   Weight: 253 lb 9.6 oz (115 kg)        Estimated body mass index is 42.2 kg/m² as calculated from the following:    Height as of 8/3/21: 5' 5\" (1.651 m). Weight as of this encounter: 253 lb 9.6 oz (115 kg). Physical Exam  Constitutional:       Appearance: Normal appearance. She is well-developed and well-groomed. She is obese. HENT:      Head: Normocephalic. Mouth/Throat:      Pharynx: Oropharynx is clear. Eyes:      Conjunctiva/sclera: Conjunctivae normal.   Neck:      Thyroid: No thyromegaly. Vascular: No carotid bruit. Cardiovascular:      Rate and Rhythm: Normal rate and regular rhythm. Heart sounds: Normal heart sounds. Pulmonary:      Effort: Pulmonary effort is normal.      Breath sounds: Normal breath sounds. No wheezing. Abdominal:      General: Bowel sounds are normal.      Palpations: Abdomen is soft. Tenderness: There is no abdominal tenderness. Musculoskeletal:      Cervical back: Neck supple. Right lower leg: Edema present. Left lower leg: Edema present. Lymphadenopathy:      Cervical: No cervical adenopathy. Skin:     Capillary Refill: Capillary refill takes less than 2 seconds. Neurological:      Mental Status: She is alert and oriented to person, place, and time. Psychiatric:         Mood and Affect: Mood normal.         Behavior: Behavior is cooperative.          PHQ Scores 5/4/2021 10/8/2020 10/8/2020 6/2/2020 3/2/2020 2/7/2019 1/2/2019   PHQ2 Score 1 4 4 3 2 3 4   PHQ9 Score 1 16 4 11 2 15 16     Interpretation of Total Score Depression Severity: 1-4 = Minimal depression, 5-9 = Mild depression, 10-14 = Moderate depression, 15-19 = Moderately severe depression, 20-27 = Severe depression     Lab Results   Component Value Date     08/19/2021    K 5.1 (H) 08/19/2021     08/19/2021    CO2 27 08/19/2021    BUN 27 (H) 08/19/2021    CREATININE 1.1 (H) 08/19/2021    GLUCOSE 188 (H) 08/19/2021    CALCIUM 9.2 08/19/2021    PROT 7.0 09/01/2020    LABALBU 3.9 08/19/2021    BILITOT 0.5 08/19/2021    ALKPHOS 94 08/19/2021    AST 16 08/19/2021    ALT 10 08/19/2021 LABGLOM 51.6 08/19/2021    GFRAA >60 05/21/2021    AGRATIO 1.3 07/05/2019    GLOB 2.5 08/19/2021     Lab Results   Component Value Date    LABA1C 8.4 (H) 08/19/2021    LABA1C 9.1 05/04/2021    LABA1C 9.0 01/19/2021       Lab Results   Component Value Date    GLUF 167 (H) 09/01/2020    LABMICR 59.3 (H) 08/19/2021    LDLCALC 80.4 08/19/2021    CREATININE 1.1 (H) 08/19/2021         Assessment:     1. Type 2 diabetes mellitus with stage 3b chronic kidney disease, with long-term current use of insulin ([facility]    2. Essential hypertension    3. Chronic diastolic heart failure ([facility]    4. Anxiety and depression    5. 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 Carpal tunnel syndrome, right    7. Anxiety    8. Allergic rhinitis, unspecified seasonality, unspecified trigger    9. Mild episode of recurrent major depressive disorder ([facility]    10. Other hyperlipidemia    11. Peripheral edema    12. Vitamin B12 deficiency (dietary) anemia    13. Cognitive decline    14. Nocturnal enuresis    15. Urge incontinence of urine    16. DAVID treated with BiPAP    17. Primary osteoarthritis of right hip    18. Foot drop, right foot    19. Obesity, morbid, BMI 40.0-49.9 ([facility]        Plan:       Orders Placed This Encounter   Medications    carvedilol (COREG) 3.125 MG tablet     Sig: Take 1 tablet by mouth 2 times daily (with meals)     Dispense:  180 tablet     Refill:  3    busPIRone (BUSPAR) 15 MG tablet     Sig: Take 15 mg by mouth 2 times daily     Dispense:  60 tablet     Refill:  0    bumetanide (BUMEX) 1 MG tablet     Sig: Take 1 tablet by mouth 2 times daily     Dispense:  180 tablet     Refill:  1      Blood pressure appears to be improving. Patient given educational materials - see patient instructions. Discussed use, benefit, and side effects of prescribed medications. All patient questions answered. Patient voiced understanding. Health Maintenance reviewed.  Instructed to continue current medications, diet and exercise. Patient agreed with treatment plan. Follow up as directed. Return in about 2 months (around 11/1/2021). This note was generated completely or in part utilizing Dragon dictation speech recognition software. Occasionally, words are mistranscribed and despite editing, the text may contain inaccuracies due to incorrect word recognition. If further clarification is needed please contact the office at [phone].     Electronically signed by ORTIZ Restrepo CNP on 9/6/2021

## 2021-09-01 NOTE — TELEPHONE ENCOUNTER
Patient calling to see if surgery has been scheduled. Unable to schedule surgery until patient has medical clearance. Per last note, patient could not be cleared until labs have been reviewed. Labs appear to have been completed on 8/19/21, but nothing has been noted. Has patient been cleared for procedure?

## 2021-09-03 NOTE — TELEPHONE ENCOUNTER
Left message to notify patient that she will get a phone call from surgery center to schedule procedure in the next week or so.

## 2021-09-06 ASSESSMENT — ENCOUNTER SYMPTOMS
WHEEZING: 0
EYES NEGATIVE: 1
CONSTIPATION: 0
BACK PAIN: 1
SHORTNESS OF BREATH: 0
COUGH: 0
DIARRHEA: 0
ABDOMINAL PAIN: 0

## 2021-09-08 LAB — POTASSIUM SERPL-SCNC: 4.4 MMOL/L (ref 3.6–5)

## 2021-09-10 DIAGNOSIS — Z79.4 DIABETES MELLITUS TYPE 2, INSULIN DEPENDENT (HCC): ICD-10-CM

## 2021-09-10 DIAGNOSIS — E11.9 DIABETES MELLITUS TYPE 2, INSULIN DEPENDENT (HCC): ICD-10-CM

## 2021-09-13 RX ORDER — PEN NEEDLE, DIABETIC 32GX 5/32"
NEEDLE, DISPOSABLE MISCELLANEOUS
Qty: 100 EACH | Refills: 3 | Status: SHIPPED | OUTPATIENT
Start: 2021-09-13 | End: 2021-12-06

## 2021-09-18 LAB
ALBUMIN/GLOBULIN RATIO: 1.4 G/DL
ALBUMIN: 4.1 G/DL (ref 3.5–5)
ALP BLD-CCNC: 97 UNITS/L (ref 38–126)
ALT SERPL-CCNC: 10 UNITS/L (ref 4–35)
ANION GAP SERPL CALCULATED.3IONS-SCNC: 10.1 MMOL/L
AST SERPL-CCNC: 17 UNITS/L (ref 14–36)
BASOPHILS %: 1.54 (ref 0–3)
BASOPHILS ABSOLUTE: 0.16 (ref 0–0.3)
BILIRUB SERPL-MCNC: 0.5 MG/DL (ref 0.2–1.3)
BUN BLDV-MCNC: 29 MG/DL (ref 7–17)
CALCIUM SERPL-MCNC: 9.3 MG/DL (ref 8.4–10.2)
CHLORIDE BLD-SCNC: 110 MMOL/L (ref 98–120)
CO2: 23 MMOL/L (ref 22–31)
CREAT SERPL-MCNC: 1.1 MG/DL (ref 0.5–1)
EOSINOPHILS %: 2.4 (ref 0–10)
EOSINOPHILS ABSOLUTE: 0.25 (ref 0–1.1)
GFR CALCULATED: 51.6
GLOBULIN: 2.9 G/DL
GLUCOSE: 169 MG/DL (ref 65–105)
HCT VFR BLD CALC: 41.4 % (ref 37–47)
HEMOGLOBIN: 13.6 (ref 12–16)
LYMPHOCYTE %: 18.83 (ref 20–51.1)
LYMPHOCYTES ABSOLUTE: 1.95 (ref 1–5.5)
MCH RBC QN AUTO: 28.7 PG (ref 28.5–32.5)
MCHC RBC AUTO-ENTMCNC: 32.9 G/DL (ref 32–37)
MCV RBC AUTO: 87.4 FL (ref 80–94)
MONOCYTES %: 7.96 (ref 1.7–9.3)
MONOCYTES ABSOLUTE: 0.82 (ref 0.1–1)
NEUTROPHILS %: 69.26 (ref 42.2–75.2)
NEUTROPHILS ABSOLUTE: 7.16 (ref 2–8.1)
PDW BLD-RTO: 13.4 % (ref 8.5–15.5)
PLATELET # BLD: 243 THOU/MM3 (ref 130–400)
POTASSIUM SERPL-SCNC: 4.8 MMOL/L (ref 3.6–5)
RBC: 4.74 M/UL (ref 4.2–5.4)
SODIUM BLD-SCNC: 142 MMOL/L (ref 135–145)
TOTAL PROTEIN, SERUM: 7 G/DL (ref 6.3–8.2)
WBC: 10.3 THOU/ML3 (ref 4.8–10.8)

## 2021-10-13 DIAGNOSIS — F32.A ANXIETY AND DEPRESSION: ICD-10-CM

## 2021-10-13 DIAGNOSIS — F41.9 ANXIETY AND DEPRESSION: ICD-10-CM

## 2021-10-13 RX ORDER — BUSPIRONE HYDROCHLORIDE 15 MG/1
TABLET ORAL
Qty: 270 TABLET | Refills: 3 | Status: SHIPPED | OUTPATIENT
Start: 2021-10-13 | End: 2022-11-03 | Stop reason: SDUPTHER

## 2021-10-13 NOTE — TELEPHONE ENCOUNTER
Monik Dean is requesting a refill on the following medication(s):  Requested Prescriptions     Pending Prescriptions Disp Refills    busPIRone (BUSPAR) 15 MG tablet [Pharmacy Med Name: BUSPIRONE HCL 15 MG Tablet] 270 tablet 3     Sig: TAKE 1 TABLET THREE TIMES DAILY       Last Visit Date (If Applicable):  9/3/8836    Next Visit Date:    11/3/2021

## 2021-11-02 ENCOUNTER — OFFICE VISIT (OUTPATIENT)
Dept: DIABETES SERVICES | Age: 76
End: 2021-11-02
Payer: MEDICARE

## 2021-11-02 VITALS
HEART RATE: 50 BPM | SYSTOLIC BLOOD PRESSURE: 118 MMHG | DIASTOLIC BLOOD PRESSURE: 72 MMHG | HEIGHT: 66 IN | RESPIRATION RATE: 18 BRPM | BODY MASS INDEX: 40.18 KG/M2 | WEIGHT: 250 LBS

## 2021-11-02 DIAGNOSIS — E78.49 OTHER HYPERLIPIDEMIA: ICD-10-CM

## 2021-11-02 DIAGNOSIS — E11.22 TYPE 2 DIABETES MELLITUS WITH STAGE 3B CHRONIC KIDNEY DISEASE, WITH LONG-TERM CURRENT USE OF INSULIN (HCC): Primary | ICD-10-CM

## 2021-11-02 DIAGNOSIS — N18.32 TYPE 2 DIABETES MELLITUS WITH STAGE 3B CHRONIC KIDNEY DISEASE, WITH LONG-TERM CURRENT USE OF INSULIN (HCC): Primary | ICD-10-CM

## 2021-11-02 DIAGNOSIS — Z79.4 TYPE 2 DIABETES MELLITUS WITH STAGE 3B CHRONIC KIDNEY DISEASE, WITH LONG-TERM CURRENT USE OF INSULIN (HCC): Primary | ICD-10-CM

## 2021-11-02 DIAGNOSIS — I10 ESSENTIAL HYPERTENSION: ICD-10-CM

## 2021-11-02 PROCEDURE — 95251 CONT GLUC MNTR ANALYSIS I&R: CPT | Performed by: NURSE PRACTITIONER

## 2021-11-02 PROCEDURE — G8427 DOCREV CUR MEDS BY ELIG CLIN: HCPCS | Performed by: NURSE PRACTITIONER

## 2021-11-02 PROCEDURE — 4040F PNEUMOC VAC/ADMIN/RCVD: CPT | Performed by: NURSE PRACTITIONER

## 2021-11-02 PROCEDURE — 2022F DILAT RTA XM EVC RTNOPTHY: CPT | Performed by: NURSE PRACTITIONER

## 2021-11-02 PROCEDURE — 3017F COLORECTAL CA SCREEN DOC REV: CPT | Performed by: NURSE PRACTITIONER

## 2021-11-02 PROCEDURE — 99214 OFFICE O/P EST MOD 30 MIN: CPT | Performed by: NURSE PRACTITIONER

## 2021-11-02 PROCEDURE — 1123F ACP DISCUSS/DSCN MKR DOCD: CPT | Performed by: NURSE PRACTITIONER

## 2021-11-02 PROCEDURE — 3052F HG A1C>EQUAL 8.0%<EQUAL 9.0%: CPT | Performed by: NURSE PRACTITIONER

## 2021-11-02 PROCEDURE — 1036F TOBACCO NON-USER: CPT | Performed by: NURSE PRACTITIONER

## 2021-11-02 PROCEDURE — G8484 FLU IMMUNIZE NO ADMIN: HCPCS | Performed by: NURSE PRACTITIONER

## 2021-11-02 PROCEDURE — G8400 PT W/DXA NO RESULTS DOC: HCPCS | Performed by: NURSE PRACTITIONER

## 2021-11-02 PROCEDURE — 1090F PRES/ABSN URINE INCON ASSESS: CPT | Performed by: NURSE PRACTITIONER

## 2021-11-02 PROCEDURE — G8417 CALC BMI ABV UP PARAM F/U: HCPCS | Performed by: NURSE PRACTITIONER

## 2021-11-02 RX ORDER — MEMANTINE HYDROCHLORIDE 10 MG/1
10 TABLET ORAL 2 TIMES DAILY
COMMUNITY
Start: 2021-11-01 | End: 2022-09-15 | Stop reason: SDUPTHER

## 2021-11-02 ASSESSMENT — ENCOUNTER SYMPTOMS
ABDOMINAL PAIN: 0
RESPIRATORY NEGATIVE: 1
SHORTNESS OF BREATH: 0
DIARRHEA: 0

## 2021-11-02 NOTE — LETTER
[facility] department of [address] [address]  Phone: [phone]    ORTIZ Vargas CNP        March 17, 2022     Maya Cartagena      Dear Romeo Cover: This letter is a reminder that your Microalbumin, Urine and Basic Metabolic Profile tests are due. Please call our office to schedule an appointment. If you've already underwent or scheduled this procedure, please disregard this notice.     Sincerely,        ORTIZ Vargas CNP

## 2021-11-02 NOTE — PROGRESS NOTES
[facility] [address], [address]  [facility] [address] [phone]        HISTORY:    Sean Thakkar presents today for evaluation and management of:  Chief Complaint   Patient presents with    Diabetes     3 month appt. HPI    Interval History:    Past DM Medications   Metformin- ckd  Glimepiride-therapy completed   janvuia- glp1 started  victoza-cost  soliqua- cost     Current Diabetic Medications  novolog TID with sliding scale  Max daily dose 24 units, tresiba 26 units once daily, Farxiga 10 mg once daily.      DKA episodes: 0       05/04/21   At previous visit dm counseling was provided. Insulin was adjusted. She has been having a lot of stress recently and has recent steroid use. Glucose is fluctuating. She admits to taking insulin sometimes after eating and basing the scale off of peak glucose. She also does not rotate injection sites and has mild lipodystrophy. Follows with nephrology.   Diet: unchanged  Exercise: none  BS testing: uses cgm daily with sucess  Issues: denies      06/09/21   At previous visit diabetes counseling was provided. She does admit to taking insulin after meals. Denies any s/s of hyper/hypoglycemia  Diet: unchanged.   Exercise: PT  BS testing: Uses CGM daily with success and is adherent to cgm regimen.   Issues: denies      08/03/21   At previous visit Jair Garayet was started. Doing well other than cost. She is due for a1c after tomorrow. Denies any s/s of hyper/hypoglcemia. She follows with podiatry every 3 months for dm foot care. Diet: unchanged.   Exercise: PT  BS testing: Uses CGM daily with success and is adherent to cgm regimen.   Issues: she fell last week and has follow up with neurology on 8/16/21.     11/02/21   At previous visit dm counseling was provided. She does admit to not taking short acting insulin before meals. She denies any s/s of hyper/hypoglcyemia.    Diet: regular, high carb high fat  Exercise: none  BS testing: uses cgm daily with success and is adherent to cgm therapy  Issues: deneis  Diabetic foot exam up-to-date: No  Diabetic retinal exam up-to-date: Yes  Hypoglycemia as needed treatment: glucose tabs    High cholesterol-  Takes zocor and denies any adverse effects with its use.  Watches diet and exercise.      Hypertension-  Takes coreg and lisinopril and denies any adverse effects with their use. Watches diet and exercise.  Denies any chest pain, dizziness or edema.  Follows with cardiology:Yes. Yearly      Obesity- Working on weight loss. .       Past Medical History:   Diagnosis Date    Actinic keratitis     Ankle pain     Arthritis     Back pain     low     Chronic diastolic CHF (congestive heart failure) (HCC)     Chronic diastolic heart failure (HCC) 5/31/2017    Chronic kidney disease     Dependent edema     Hypertension     Incontinence     in female    Osteoarthritis     knee     Type II or unspecified type diabetes mellitus without mention of complication, not stated as uncontrolled      Family History   Problem Relation Age of Onset    Diabetes Mother     Diabetes Father     Cancer Paternal Grandfather         BONE      Social History     Tobacco Use    Smoking status: Never Smoker    Smokeless tobacco: Never Used   Vaping Use    Vaping Use: Never used   Substance Use Topics    Alcohol use: No    Drug use: No     No Known Allergies    MEDICATIONS:  Current Outpatient Medications   Medication Sig Dispense Refill    busPIRone (BUSPAR) 15 MG tablet TAKE 1 TABLET THREE TIMES DAILY (Patient taking differently: Indications: twice daily ) 270 tablet 3    dapagliflozin (FARXIGA) 10 MG tablet Take 1 tablet by mouth every morning 90 tablet 5    carvedilol (COREG) 3.125 MG tablet Take 1 tablet by mouth 2 times daily (with meals) 180 tablet 3    bumetanide (BUMEX) 1 MG tablet Take 1 tablet by mouth 2 times daily 180 tablet 1    citalopram (CELEXA) 10 MG tablet Take 1 tablet by mouth once daily 90 tablet 3    oxybutynin (DITROPAN-XL) 5 MG extended release tablet Take 5 mg by mouth daily       lisinopril (PRINIVIL;ZESTRIL) 20 MG tablet Take 1 tablet by mouth daily 90 tablet 1    ferrous sulfate (IRON 325) 325 (65 Fe) MG tablet Take 1 tablet by mouth daily (with breakfast) 90 tablet 1    cyanocobalamin 1000 MCG/ML injection Inject 1 mL into the muscle every 30 days 1 mL 5    Insulin Degludec (TRESIBA FLEXTOUCH) 200 UNIT/ML SOPN Inject 26 Units into the skin daily 6 pen 5    insulin aspart (NOVOLOG FLEXPEN) 100 UNIT/ML injection pen Use tid with meals With sliding scale max dose is 30 units per day 3 pen 5    simvastatin (ZOCOR) 40 MG tablet TAKE 1 TABLET EVERY NIGHT 90 tablet 3    donepezil (ARICEPT) 5 MG tablet Take 1 tablet by mouth nightly 90 tablet 3    montelukast (SINGULAIR) 10 MG tablet Take 1 tablet by mouth daily 90 tablet 3    meclizine (ANTIVERT) 12.5 MG tablet Take 1 tablet by mouth 3 times daily as needed for Dizziness 90 tablet 2    acetaminophen (TYLENOL ARTHRITIS PAIN) 650 MG extended release tablet Take 650 mg by mouth every 8 hours as needed for Pain      memantine (NAMENDA) 10 MG tablet Take 10 mg by mouth 2 times daily       DROPLET PEN NEEDLES 32G X 4 MM MISC USE 4 TIMES DAILY 100 each 3    Continuous Blood Gluc Sensor (FREESTYLE VERA 14 DAY SENSOR) MISC 6 each by Does not apply route daily 6 each 5    ergocalciferol (ERGOCALCIFEROL) 1.25 MG (03244 UT) capsule Take 50,000 Units by mouth once a week  (Patient not taking: Reported on 11/2/2021)      nystatin (MYCOSTATIN) 991249 UNIT/GM cream Apply topically 4 times daily. Continue to apply for 3 days after rash resolves 30 g 1    glucagon 1 MG injection Inject 1 mg into the muscle See Admin Instructions Follow package directions for low blood sugar.  1 kit 3    blood glucose monitor strips by Other route 3 times daily Test 3 times a day & as needed for symptoms of irregular blood glucose. 100 strip 5    RELION ULTRA THIN LANCETS 30G MISC 1 each by Does not apply route 3 times daily 300 each 3     No current facility-administered medications for this visit. Review ofSymptoms:  Review of Systems   Constitutional: Positive for fatigue. Negative for unexpected weight change. Eyes: Negative for visual disturbance. Respiratory: Negative. Negative for shortness of breath. Cardiovascular: Negative for chest pain and leg swelling. Gastrointestinal: Negative for abdominal pain and diarrhea. Endocrine: Negative for polydipsia, polyphagia and polyuria. Genitourinary: Negative. Musculoskeletal: Negative. Skin: Negative for rash and wound. Neurological: Negative for dizziness, tremors, seizures and headaches. Psychiatric/Behavioral: Negative. Negative for confusion and decreased concentration. Theremainder of a complete 14-point review of systems is negative. Vital Signs: /72 (Site: Right Upper Arm, Position: Sitting, Cuff Size: Medium Adult)   Pulse 50   Resp 18   Ht 5' 6\" (1.676 m)   Wt 250 lb (113.4 kg)   LMP  (LMP Unknown)   BMI 40.35 kg/m²      Wt Readings from Last 3 Encounters:   11/02/21 250 lb (113.4 kg)   09/01/21 253 lb 9.6 oz (115 kg)   08/03/21 251 lb 3.2 oz (113.9 kg)     Body mass index is 40.35 kg/m².   LABS:  Hemoglobin A1C   Date Value Ref Range Status   08/19/2021 8.4 (H) 4.4 - 6.4 % Final   05/04/2021 9.1 % Final     Lab Results   Component Value Date    LABMICR 59.3 (H) 08/19/2021     Lab Results   Component Value Date     09/18/2021    K 4.8 09/18/2021     09/18/2021    CO2 23 09/18/2021    BUN 29 (H) 09/18/2021    CREATININE 1.1 (H) 09/18/2021    GLUCOSE 169 (H) 09/18/2021    CALCIUM 9.3 09/18/2021    PROT 7.0 09/01/2020    LABALBU 4.1 09/18/2021    BILITOT 0.5 09/18/2021    ALKPHOS 97 09/18/2021    AST 17 09/18/2021    ALT 10 09/18/2021    LABGLOM 51.6 09/18/2021    GFRAA >60 05/21/2021    AGRATIO 1.3 07/05/2019 GLOB 2.9 09/18/2021     Lab Results   Component Value Date    CHOL 160 08/19/2021    CHOL 169 09/01/2020    CHOL 157 03/28/2019     Lab Results   Component Value Date    TRIG 128 08/19/2021    TRIG 89 09/01/2020    TRIG 173 03/28/2019     Lab Results   Component Value Date    HDL 54 08/19/2021    HDL 60 09/01/2020    HDL 45 04/24/2017     Lab Results   Component Value Date    LDLCHOLESTEROL 91 09/01/2020    LDLCALC 80.4 08/19/2021    LDLCALC 69.4 03/28/2019    LDLCALC 125.0 02/21/2018     Lab Results   Component Value Date    VLDL 26 08/19/2021    VLDL NOT REPORTED 09/01/2020    VLDL 35 03/28/2019     Lab Results   Component Value Date    CHOLHDLRATIO 2.96 08/19/2021    CHOLHDLRATIO 2.8 09/01/2020    CHOLHDLRATIO 3.0 03/28/2019           Physical Exam  Constitutional:       Appearance: She is well-developed. Eyes:      Pupils: Pupils are equal, round, and reactive to light. Neck:      Thyroid: No thyroid mass, thyromegaly or thyroid tenderness. Cardiovascular:      Rate and Rhythm: Normal rate and regular rhythm. Heart sounds: Normal heart sounds. Pulmonary:      Effort: Pulmonary effort is normal.      Breath sounds: Normal breath sounds. Abdominal:      General: Bowel sounds are normal.      Palpations: Abdomen is soft. Skin:     General: Skin is warm and dry. Comments: Negative for open/nonhealing wounds. Negative for lipohypertrophy. Neurological:      Mental Status: She is alert and oriented to person, place, and time. ASSESSMENT/PLAN:     Diagnosis Orders   1. Type 2 diabetes mellitus with stage 3b chronic kidney disease, with long-term current use of insulin ([facility] [address]     2. Other hyperlipidemia     3. Essential hypertension     4. BMI 40.0-44.9, adult ([address]       Orders Placed This Encounter   Procedures    Basic Metabolic Panel    Hemoglobin A1C    Microalbumin, Ur     No orders of the defined types were placed in this encounter.     Requested Prescriptions      No prescriptions requested or ordered in this encounter       1. Type 2 diabetes mellitus with stage 3b chronic kidney disease, with long-term current use of insulin ([facility])  - Unstable  HbA1C goal is less than 7%. - Fasting blood glucose goal is 70-120mg/dl and postprandial blood sugar goal is less than 180 mg/dl. - Labs reviewed including most recent A1c, microalbumin and kidney function. Repeat labs due in 3 months.    -We discussed in great detail dietary modifications they can make to better improve their blood sugars. -follow up diabetes education completed, all questions answered. CGM report downloaded and reviewed, scanned to media tab. Time in range 64% and hypoglycemia 1%. Predicted A1c per CGM report 7.4%. -she will work on low carb high protein breakfast and taking her insulin with each meal.   -CKD,-stable, avoid nephrotoxic drugs, keep other chronic conditions well controlled and keep follow up appointment with nephrology. Discussed signs and symptoms of hyper/hypoglycemia and how to treat. Encouraged 150 minutes of physical activity per week. Follow a low carbohydrate diet. Encouraged at least 7 hours of sleep. The patient was informed of the goals of diabetes management. This can only be accomplished by watching their diet and exercise levels. We certainly use medicines to help attain these goals. The consequences of not controlling blood sugars were discussed. These include blindness, heart disease, stroke, kidney disease, and possibly need for dialysis. They were told to be careful with their foot care as diabetics often have nerve damage, infections and risk for limb amutations . They also need a dilated eye exam yearly. We discussed the issues of diet, exercise, medication, complication avoidance, reviewed the signs and symptoms of diabetes, hypoglycemic episodes, significance of HbA1C.         2. Other hyperlipidemia  stable, lipid panel reviewed, continue current medications.  Diet and

## 2021-12-01 ENCOUNTER — OFFICE VISIT (OUTPATIENT)
Dept: UROLOGY | Age: 76
End: 2021-12-01
Payer: MEDICARE

## 2021-12-01 VITALS
DIASTOLIC BLOOD PRESSURE: 80 MMHG | OXYGEN SATURATION: 96 % | SYSTOLIC BLOOD PRESSURE: 128 MMHG | WEIGHT: 250 LBS | RESPIRATION RATE: 20 BRPM | BODY MASS INDEX: 40.18 KG/M2 | HEIGHT: 66 IN | HEART RATE: 61 BPM

## 2021-12-01 DIAGNOSIS — N39.0 RECURRENT UTI: ICD-10-CM

## 2021-12-01 DIAGNOSIS — N39.41 URGE INCONTINENCE: Primary | ICD-10-CM

## 2021-12-01 PROCEDURE — 3017F COLORECTAL CA SCREEN DOC REV: CPT | Performed by: UROLOGY

## 2021-12-01 PROCEDURE — G8427 DOCREV CUR MEDS BY ELIG CLIN: HCPCS | Performed by: UROLOGY

## 2021-12-01 PROCEDURE — G8484 FLU IMMUNIZE NO ADMIN: HCPCS | Performed by: UROLOGY

## 2021-12-01 PROCEDURE — 4040F PNEUMOC VAC/ADMIN/RCVD: CPT | Performed by: UROLOGY

## 2021-12-01 PROCEDURE — G8400 PT W/DXA NO RESULTS DOC: HCPCS | Performed by: UROLOGY

## 2021-12-01 PROCEDURE — 1036F TOBACCO NON-USER: CPT | Performed by: UROLOGY

## 2021-12-01 PROCEDURE — 0509F URINE INCON PLAN DOCD: CPT | Performed by: UROLOGY

## 2021-12-01 PROCEDURE — 1123F ACP DISCUSS/DSCN MKR DOCD: CPT | Performed by: UROLOGY

## 2021-12-01 PROCEDURE — 1090F PRES/ABSN URINE INCON ASSESS: CPT | Performed by: UROLOGY

## 2021-12-01 PROCEDURE — G8417 CALC BMI ABV UP PARAM F/U: HCPCS | Performed by: UROLOGY

## 2021-12-01 PROCEDURE — 99024 POSTOP FOLLOW-UP VISIT: CPT | Performed by: UROLOGY

## 2021-12-01 NOTE — PATIENT INSTRUCTIONS
If you need to reach the Urologist or Urology Nurses for any health care questions or concerns please call [phone] and ask to speak with the [facility] Urology Nurse. The phone line is open from [phone] Monday thru Friday.

## 2021-12-03 DIAGNOSIS — E11.9 DIABETES MELLITUS TYPE 2, INSULIN DEPENDENT (HCC): ICD-10-CM

## 2021-12-03 DIAGNOSIS — Z79.4 DIABETES MELLITUS TYPE 2, INSULIN DEPENDENT (HCC): ICD-10-CM

## 2021-12-06 RX ORDER — PEN NEEDLE, DIABETIC 32GX 5/32"
NEEDLE, DISPOSABLE MISCELLANEOUS
Qty: 400 EACH | Refills: 1 | Status: SHIPPED | OUTPATIENT
Start: 2021-12-06

## 2021-12-09 ENCOUNTER — OFFICE VISIT (OUTPATIENT)
Dept: FAMILY MEDICINE CLINIC | Age: 76
End: 2021-12-09
Payer: MEDICARE

## 2021-12-09 VITALS
OXYGEN SATURATION: 94 % | DIASTOLIC BLOOD PRESSURE: 76 MMHG | SYSTOLIC BLOOD PRESSURE: 128 MMHG | HEART RATE: 69 BPM | BODY MASS INDEX: 40.47 KG/M2 | HEIGHT: 66 IN | WEIGHT: 251.8 LBS

## 2021-12-09 DIAGNOSIS — I50.32 CHRONIC DIASTOLIC HEART FAILURE (HCC): ICD-10-CM

## 2021-12-09 DIAGNOSIS — G47.33 OSA TREATED WITH BIPAP: ICD-10-CM

## 2021-12-09 DIAGNOSIS — I10 ESSENTIAL HYPERTENSION: ICD-10-CM

## 2021-12-09 DIAGNOSIS — J30.9 ALLERGIC RHINITIS, UNSPECIFIED SEASONALITY, UNSPECIFIED TRIGGER: ICD-10-CM

## 2021-12-09 DIAGNOSIS — M16.11 PRIMARY OSTEOARTHRITIS OF RIGHT HIP: ICD-10-CM

## 2021-12-09 DIAGNOSIS — F33.0 MILD EPISODE OF RECURRENT MAJOR DEPRESSIVE DISORDER (HCC): ICD-10-CM

## 2021-12-09 DIAGNOSIS — N18.32 TYPE 2 DIABETES MELLITUS WITH STAGE 3B CHRONIC KIDNEY DISEASE, WITH LONG-TERM CURRENT USE OF INSULIN (HCC): ICD-10-CM

## 2021-12-09 DIAGNOSIS — Z00.00 ROUTINE GENERAL MEDICAL EXAMINATION AT A HEALTH CARE FACILITY: Primary | ICD-10-CM

## 2021-12-09 DIAGNOSIS — E66.01 OBESITY, MORBID, BMI 40.0-49.9 (HCC): ICD-10-CM

## 2021-12-09 DIAGNOSIS — N39.41 URGE INCONTINENCE OF URINE: ICD-10-CM

## 2021-12-09 DIAGNOSIS — E78.49 OTHER HYPERLIPIDEMIA: ICD-10-CM

## 2021-12-09 DIAGNOSIS — Z79.4 TYPE 2 DIABETES MELLITUS WITH STAGE 3B CHRONIC KIDNEY DISEASE, WITH LONG-TERM CURRENT USE OF INSULIN (HCC): ICD-10-CM

## 2021-12-09 DIAGNOSIS — E11.22 TYPE 2 DIABETES MELLITUS WITH STAGE 3B CHRONIC KIDNEY DISEASE, WITH LONG-TERM CURRENT USE OF INSULIN (HCC): ICD-10-CM

## 2021-12-09 PROCEDURE — G0439 PPPS, SUBSEQ VISIT: HCPCS | Performed by: NURSE PRACTITIONER

## 2021-12-09 PROCEDURE — 4040F PNEUMOC VAC/ADMIN/RCVD: CPT | Performed by: NURSE PRACTITIONER

## 2021-12-09 PROCEDURE — 3017F COLORECTAL CA SCREEN DOC REV: CPT | Performed by: NURSE PRACTITIONER

## 2021-12-09 PROCEDURE — 1123F ACP DISCUSS/DSCN MKR DOCD: CPT | Performed by: NURSE PRACTITIONER

## 2021-12-09 PROCEDURE — G8484 FLU IMMUNIZE NO ADMIN: HCPCS | Performed by: NURSE PRACTITIONER

## 2021-12-09 PROCEDURE — 3052F HG A1C>EQUAL 8.0%<EQUAL 9.0%: CPT | Performed by: NURSE PRACTITIONER

## 2021-12-09 RX ORDER — CARVEDILOL 3.12 MG/1
3.12 TABLET ORAL 2 TIMES DAILY WITH MEALS
Qty: 180 TABLET | Refills: 3 | Status: SHIPPED | OUTPATIENT
Start: 2021-12-09

## 2021-12-09 ASSESSMENT — PATIENT HEALTH QUESTIONNAIRE - PHQ9
1. LITTLE INTEREST OR PLEASURE IN DOING THINGS: 1
SUM OF ALL RESPONSES TO PHQ QUESTIONS 1-9: 2
2. FEELING DOWN, DEPRESSED OR HOPELESS: 1
SUM OF ALL RESPONSES TO PHQ9 QUESTIONS 1 & 2: 2

## 2021-12-09 ASSESSMENT — LIFESTYLE VARIABLES: HOW OFTEN DO YOU HAVE A DRINK CONTAINING ALCOHOL: 0

## 2021-12-09 NOTE — PATIENT INSTRUCTIONS
Advance Directives: Care Instructions  Overview  An advance directive is a legal way to state your wishes at the end of your life. It tells your family and your doctor what to do if you can't say what you want. There are two main types of advance directives. You can change them any time your wishes change. Living will. This form tells your family and your doctor your wishes about life support and other treatment. The form is also called a declaration. Medical power of . This form lets you name a person to make treatment decisions for you when you can't speak for yourself. This person is called a health care agent (health care proxy, health care surrogate). The form is also called a durable power of  for health care. If you do not have an advance directive, decisions about your medical care may be made by a family member, or by a doctor or a  who doesn't know you. It may help to think of an advance directive as a gift to the people who care for you. If you have one, they won't have to make tough decisions by themselves. Follow-up care is a key part of your treatment and safety. Be sure to make and go to all appointments, and call your doctor if you are having problems. It's also a good idea to know your test results and keep a list of the medicines you take. What should you include in an advance directive? Many states have a unique advance directive form. (It may ask you to address specific issues.) Or you might use a universal form that's approved by many states. If your form doesn't tell you what to address, it may be hard to know what to include in your advance directive. Use the questions below to help you get started. · Who do you want to make decisions about your medical care if you are not able to? · What life-support measures do you want if you have a serious illness that gets worse over time or can't be cured? · What are you most afraid of that might happen? (Maybe you're afraid of having pain, losing your independence, or being kept alive by machines.)  · Where would you prefer to die? (Your home? A hospital? A nursing home?)  · Do you want to donate your organs when you die? · Do you want certain Baptism practices performed before you die? When should you call for help? Be sure to contact your doctor if you have any questions. Where can you learn more? Go to https://[email or web address] org and sign in to your [employer] account. Enter R264 in the [employer] box to learn more about \"Advance Directives: Care Instructions. \"     If you do not have an account, please click on the \"Sign Up Now\" link. Current as of: March 17, 2021               Content Version: 13.0  © 2006-2021 Healthwise, [employer]. Care instructions adapted under license by [facility] [place] ([facility]). If you have questions about a medical condition or this instruction, always ask your healthcare professional. [place] [address] any warranty or liability for your use of this information. Learning About Medical Power of   What is a medical power of ? A medical power of , also called a durable power of  for health care, is one type of the legal forms called advance directives. It lets you name the person you want to make treatment decisions for you if you can't speak or decide for yourself. The person you choose is called your health care agent. This person is also called a health care proxy or health care surrogate. A medical power of  may be called something else in your state. How do you choose a health care agent? Choose your health care agent carefully. This person may or may not be a family member. Talk to the person before you make your final decision. Make sure he or she is comfortable with this responsibility. It's a good idea to choose someone who:  · Is at least 25years old.   · Knows you well and understands what makes life meaningful for you. · Understands your Holiness and moral values. · Will do what you want, not what he or she wants. · Will be able to make difficult choices at a stressful time. · Will be able to refuse or stop treatment, if that is what you would want, even if you could die. · Will be firm and confident with health professionals if needed. · Will ask questions to get needed information. · Lives near you or agrees to travel to you if needed. Your family may help you make medical decisions while you can still be part of that process. But it's important to choose one person to be your health care agent in case you aren't able to make decisions for yourself. If you don't fill out the legal form and name a health care agent, the decisions your family can make may be limited. A health care agent may be called something else in your state. Who will make decisions for you if you don't have a health care agent? If you don't have a health care agent or a living will, you may not get the care you want. Decisions may be made by family members who disagree about your medical care. Or decisions may be made by a medical professional who doesn't know you well. In some cases, a  makes the decisions. When you name a health care agent, it is very clear who has the power to make health decisions for you. How do you name a health care agent? You name your health care agent on a legal form. This form is usually called a medical power of . Ask your hospital, state bar association, or office on aging where to find these forms. You must sign the form to make it legal. Some states require you to get the form notarized. This means that a person called a  watches you sign the form and then he or she signs the form. Some states also require that two or more witnesses sign the form. Be sure to tell your family members and doctors who your health care agent is.   Where can you learn more? Go to https://[email or web address].[email or web address]. org and sign in to your Alg[employer] account. Enter [id number] in the [facility] box to learn more about \"Learning About [address]. \"     If you do not have an account, please click on the \"Sign Up Now\" link. Current as of: March 17, 2021               Content Version: 13.0  © 2006-2021 Healthwise, [employer]. Care instructions adapted under license by [facility] ([facility]). If you have questions about a medical condition or this instruction, always ask your healthcare professional. [address] any warranty or liability for your use of this information. Learning About Living [place]  What is a living will? A living will, also called a declaration, is a legal form. It tells your family and your doctor your wishes when you can't speak for yourself. It's used by the health professionals who will treat you as you near the end of your life or if you get seriously hurt or ill. If you put your wishes in writing, your loved ones and others will know what kind of care you want. They won't need to guess. This can ease your mind and be helpful to others. And you can change or cancel your living will at any time. A living will is not the same as an estate or property will. An estate will explains what you want to happen with your money and property after you die. How do you use it? A living will is used to describe the kinds of treatment or life support you want as you near the end of your life or if you get seriously hurt or ill. Keep these facts in mind about living blanchard. · Your living will is used only if you can't speak or make decisions for yourself. Most often, one or more doctors must certify that you can't speak or decide for yourself before your living will takes effect. · If you get better and can speak for yourself again, you can accept or refuse any treatment.  It doesn't matter what you said in your living will. · Some states may limit your right to refuse treatment in certain cases. For example, you may need to clearly state in your living will that you don't want artificial hydration and nutrition, such as being fed through a tube. Is a living will a legal document? A living will is a legal document. Each state has its own laws about living blanchard. And a living will may be called something else in your state. Here are some things to know about living blanchard. · You don't need an  to complete a living will. But legal advice can be helpful if your state's laws are unclear. It can also help if your health history is complicated or your family can't agree on what should be in your living will. · You can change your living will at any time. Some people find that their wishes about end-of-life care change as their health changes. If you make big changes to your living will, complete a new form. · If you move to another state, make sure that your living will is legal in the state where you now live. In most cases, doctors will respect your wishes even if you have a form from a different state. · You might use a universal form that has been approved by many states. This kind of form can sometimes be filled out and stored online. Your digital copy will then be available wherever you have a connection to the internet. The doctors and nurses who need to treat you can find it right away. · Your state may offer an online registry. This is another place where you can store your living will online. · It's a good idea to get your living will notarized. This means using a person called a  to watch two people sign, or witness, your living will. What should you know when you create a living will? Here are some questions to ask yourself as you make your living will:  · Do you know enough about life support methods that might be used?  If not, talk to your doctor so you know what might be done if you can't breathe on your own, your heart stops, or you can't swallow. · What things would you still want to be able to do after you receive life-support methods? Would you want to be able to walk? To speak? To eat on your own? To live without the help of machines? · Do you want certain Roman Catholic practices performed if you become very ill? · If you have a choice, where do you want to be cared for? In your home? At a hospital or nursing home? · If you have a choice at the end of your life, where would you prefer to die? At home? In a hospital or nursing home? Somewhere else? · Would you prefer to be buried or cremated? · Do you want your organs to be donated after you die? What should you do with your living will? · Make sure that your family members and your health care agent have copies of your living will (also called a declaration). · Give your doctor a copy of your living will. Ask him or her to keep it as part of your medical record. If you have more than one doctor, make sure that each one has a copy. · Put a copy of your living will where it can be easily found. For example, some people may put a copy on their refrigerator door. If you are using a digital copy, be sure your doctor, family members, and health care agent know how to find and access it. Where can you learn more? Go to [email or web address] org and sign in to your FarmersWeb account. Enter X575 in the [facility] box to learn more about \"Learning About Living Reese Sanchez. \"     If you do not have an account, please click on the \"Sign Up Now\" link. Current as of: March 17, 2021               Content Version: 13.0  © 1172-9949 Healthwise, Incorporated. Care instructions adapted under license by [facility] [place] ([facility]). If you have questions about a medical condition or this instruction, always ask your healthcare professional. [address] any warranty or liability for your use of this information. Learning About Healthy Weight  What is a healthy weight? A healthy weight is the weight at which you feel good about yourself and have energy for work and play. It's also one that lowers your risk for health problems. What can you do to stay at a healthy weight? It can be hard to stay at a healthy weight, especially when fast food, vending-machine snacks, and processed foods are so easy to find. And with your busy lifestyle, activity may be low on your list of things to do. But staying at a healthy weight may be easier than you think. Here are some dos and don'ts for staying at a healthy weight. Do eat healthy foods  The kinds of foods you eat have a big impact on both your weight and your health. Reaching and staying at a healthy weight is not about going on a diet. It's about making healthier food choices every day and changing your diet for good. Healthy eating means eating a variety of foods so that you get all the nutrients you need. Your body needs protein, carbohydrate, and fats for energy. They keep your heart beating, your brain active, and your muscles working. On most days, try to eat from each food group. This means eating a variety of:  · Whole grains, such as whole wheat breads and pastas. · Fruits and vegetables. · Dairy products, such as low-fat milk, yogurt, and cheese. · Lean proteins, such as all types of fish, chicken without the skin, and beans. Don't have too much or too little of one thing. All foods, if eaten in moderation, can be part of healthy eating. Even sweets can be okay. If your favorite foods are high in fat, salt, sugar, or calories, limit how often you eat them. Eat smaller servings, or look for healthy substitutes. Do watch what you eat  Many people eat more than their bodies need. Part of staying at a healthy weight means learning how much food you really need from day to day and not eating more than that.  Even with healthy foods, eating too much can make you gain weight. Having a well-balanced diet means that you eat enough, but not too much, and that your food gives you the nutrients you need to stay healthy. So listen to your body. Eat when you're hungry. Stop when you feel satisfied. It's a good idea to have healthy snacks ready for when you get hungry. Keep healthy snacks with you at work, in your car, and at home. If you have a healthy snack easily available, you'll be less likely to pick a candy bar or bag of chips from a vending machine instead. Some healthy snacks you might want to keep on hand are fruit, low-fat yogurt, string cheese, low-fat microwave popcorn, raisins and other dried fruit, nuts, whole wheat crackers, pretzels, carrots, celery sticks, and broccoli. Do some physical activity  A big part of reaching and staying at a healthy weight is being active. When you're active, you burn calories. This makes it easier to reach and stay at a healthy weight. When you're active on a regular basis, your body burns more calories, even when you're at rest. Being active helps you lose fat and build lean muscle. Try to be active for at least 1 hour every day. This may sound like a lot, but it's okay to be active in smaller blocks of time that add up to 1 hour a day. Any activity that makes your heart beat faster and keeps it there for a while counts. A brisk walk, run, or swim will get your heart beating faster. So will climbing stairs, shooting baskets, or cycling. Even some household chores like vacuuming and mowing the lawn will get your heart rate up. Pick activities that you enjoy--ones that make your heart beat faster, your muscles stronger, and your muscles and joints more flexible. If you find more than one thing you like doing, do them all. You don't have to do the same thing every day. Don't diet  Diets don't work. Diets are temporary. Because you give up so much when you diet, you may be hungry and think about food all the time.  And after you stop dieting, you also may overeat to make up for what you missed. Most people who diet end up gaining back the pounds they lost--and more. Remember that healthy bodies come in lots of shapes and sizes. Everyone can get healthier by eating better and being more active. Where can you learn more? Go to [email or web address] org and sign in to your Saranas account. Enter [id number] in the [employer] box to learn more about \"Learning About Healthy Weight. \"     If you do not have an account, please click on the \"Sign Up Now\" link. Current as of: March 17, 2021               Content Version: 13.0  © 2006-2021 [employer]. Care instructions adapted under license by [facility] ([facility]). If you have questions about a medical condition or this instruction, always ask your healthcare professional. [address] any warranty or liability for your use of this information. Eating Healthy Foods: Care Instructions  Your Care Instructions     Eating healthy foods can help lower your risk for disease. Healthy food gives you energy and keeps your heart strong, your brain active, your muscles working, and your bones strong. A healthy diet includes a variety of foods from the basic food groups: grains, vegetables, fruits, milk and milk products, and meat and beans. Some people may eat more of their favorite foods from only one food group and, as a result, miss getting the nutrients they need. So, it is important to pay attention not only to what you eat but also to what you are missing from your diet. You can eat a healthy, balanced diet by making a few small changes. Follow-up care is a key part of your treatment and safety. Be sure to make and go to all appointments, and call your doctor if you are having problems. It's also a good idea to know your test results and keep a list of the medicines you take. How can you care for yourself at home?   Look at what you eat  · Keep a food diary for a week or two and record everything you eat or drink. Track the number of servings you eat from each food group. · For a balanced diet every day, eat a variety of:  ? 6 or more ounce-equivalents of grains, such as cereals, breads, crackers, rice, or pasta, every day. An ounce-equivalent is 1 slice of bread, 1 cup of ready-to-eat cereal, or ½ cup of cooked rice, cooked pasta, or cooked cereal.  ? 2½ cups of vegetables, especially:  § Dark-green vegetables such as broccoli and spinach. § Orange vegetables such as carrots and sweet potatoes. § Dry beans (such as obrien and kidney beans) and peas (such as lentils). ? 2 cups of fresh, frozen, or canned fruit. A small apple or 1 banana or orange equals 1 cup. ? 3 cups of nonfat or low-fat milk, yogurt, or other milk products. ? 5½ ounces of meat and beans, such as chicken, fish, lean meat, beans, nuts, and seeds. One egg, 1 tablespoon of peanut butter, ½ ounce nuts or seeds, or ¼ cup of cooked beans equals 1 ounce of meat. · Learn how to read food labels for serving sizes and ingredients. Fast-food and convenience-food meals often contain few or no fruits or vegetables. Make sure you eat some fruits and vegetables to make the meal more nutritious. · Look at your food diary. For each food group, add up what you have eaten and then divide the total by the number of days. This will give you an idea of how much you are eating from each food group. See if you can find some ways to change your diet to make it more healthy. Start small  · Do not try to make dramatic changes to your diet all at once. You might feel that you are missing out on your favorite foods and then be more likely to fail. · Start slowly, and gradually change your habits. Try some of the following:  ? Use whole wheat bread instead of white bread. ? Use nonfat or low-fat milk instead of whole milk.   ? Eat brown rice instead of white rice, and eat whole wheat pasta instead of white-flour pasta. ? Try low-fat cheeses and low-fat yogurt. ? Add more fruits and vegetables to meals and have them for snacks. ? Add lettuce, tomato, cucumber, and onion to sandwiches. ? Add fruit to yogurt and cereal.  Enjoy food  · You can still eat your favorite foods. You just may need to eat less of them. If your favorite foods are high in fat, salt, and sugar, limit how often you eat them, but do not cut them out entirely. · Eat a wide variety of foods. Make healthy choices when eating out  · The type of restaurant you choose can help you make healthy choices. Even fast-food chains are now offering more low-fat or healthier choices on the menu. · Choose smaller portions, or take half of your meal home. · When eating out, try:  ? A veggie pizza with a whole wheat crust or grilled chicken (instead of sausage or pepperoni). ? Pasta with roasted vegetables, grilled chicken, or marinara sauce instead of cream sauce. ? A vegetable wrap or grilled chicken wrap. ? Broiled or poached food instead of fried or breaded items. Make healthy choices easy  · Buy packaged, prewashed, ready-to-eat fresh vegetables and fruits, such as baby carrots, salad mixes, and chopped or shredded broccoli and cauliflower. · Buy packaged, presliced fruits, such as melon or pineapple. · Choose 100% fruit or vegetable juice instead of soda. Limit juice intake to 4 to 6 oz (½ to ¾ cup) a day. · Blend low-fat yogurt, fruit juice, and canned or frozen fruit to make a smoothie for breakfast or a snack. Where can you learn more? Go to https://Stereotaxispepiceweb.View3. org and sign in to your TimeGenius account. Enter K338 in the [employer] box to learn more about \"Eating Healthy Foods: Care Instructions. \"     If you do not have an account, please click on the \"Sign Up Now\" link. Current as of: December 17, 2020               Content Version: 13.0  © 7111-4427 Healthwise, Incorporated.    Care instructions adapted under license by [facility] [place] ([facility]). If you have questions about a medical condition or this instruction, always ask your healthcare professional. [place] [address] any warranty or liability for your use of this information. Personalized Preventive Plan for [place] - 12/9/2021  Medicare offers a range of preventive health benefits. Some of the tests and screenings are paid in full while other may be subject to a deductible, co-insurance, and/or copay. Some of these benefits include a comprehensive review of your medical history including lifestyle, illnesses that may run in your family, and various assessments and screenings as appropriate. After reviewing your medical record and screening and assessments performed today your provider may have ordered immunizations, labs, imaging, and/or referrals for you. A list of these orders (if applicable) as well as your Preventive Care list are included within your After Visit Summary for your review. Other Preventive Recommendations:    · A preventive eye exam performed by an eye specialist is recommended every 1-2 years to screen for glaucoma; cataracts, macular degeneration, and other eye disorders. · A preventive dental visit is recommended every 6 months. · Try to get at least 150 minutes of exercise per week or 10,000 steps per day on a pedometer . · Order or download the FREE \"Exercise & Physical Activity: Your Everyday Guide\" from [employer]. Call 6-292.947.6598 or search [employer] online. · You need 8252-8666 mg of calcium and 0673-9231 IU of vitamin D per day. It is possible to meet your calcium requirement with diet alone, but a vitamin D supplement is usually necessary to meet this goal.  · When exposed to the sun, use a sunscreen that protects against both UVA and UVB radiation with an SPF of 30 or greater.  Reapply every 2 to 3 hours or after sweating, drying off with a towel, or swimming. · Always wear a seat belt when traveling in a car. Always wear a helmet when riding a bicycle or motorcycle. [address]  [address] [facility]   [address]   [phone]

## 2021-12-09 NOTE — PROGRESS NOTES
Medicare Annual Wellness Visit  Name: Aditi Brown Date: 2021   MRN: [id number] Sex: Female   Age: 77 y.o. Ethnicity: Non- / Non    : 1945 Race: White (non-)      Yuval Mace is here for Medicare AWV (denies any issues or complaints, says thinks she had shingles)    Screenings for behavioral, psychosocial and functional/safety risks, and cognitive dysfunction are all negative except as indicated below. These results, as well as other patient data from the [address] form, are documented in Flowsheets linked to this Encounter. No Known Allergies         Prior to Visit Medications    Medication Sig Taking?  Authorizing Provider   aspirin 325 MG tablet Take 325 mg by mouth daily Yes Historical Provider, MD   carvedilol (COREG) 3.125 MG tablet Take 1 tablet by mouth 2 times daily (with meals) Yes Keshia Escudero APRN - CNP   DROPLET PEN NEEDLES 32G X 4 MM MISC USE 4 TIMES DAILY Yes Shanell Stone APRN - CNP   memantine (NAMENDA) 10 MG tablet Take 10 mg by mouth 2 times daily  Yes Historical Provider, MD   busPIRone (BUSPAR) 15 MG tablet TAKE 1 TABLET THREE TIMES DAILY  Patient taking differently: Indications: twice daily  Yes Keshia Escudero APRN - CNP   dapagliflozin (FARXIGA) 10 MG tablet Take 1 tablet by mouth every morning Yes Shanell Stone APRN - CNP   bumetanide (BUMEX) 1 MG tablet Take 1 tablet by mouth 2 times daily Yes Keshia Escudero APRN - CNP   citalopram (CELEXA) 10 MG tablet Take 1 tablet by mouth once daily Yes Keshia Escudero APRN - CNP   oxybutynin (DITROPAN-XL) 5 MG extended release tablet Take 5 mg by mouth daily  Yes Historical Provider, MD   ferrous sulfate (IRON 325) 325 (65 Fe) MG tablet Take 1 tablet by mouth daily (with breakfast) Yes Keshia Escudero APRN - CNP   cyanocobalamin 1000 MCG/ML injection Inject 1 mL into the muscle every 30 days Yes Keshia Escudero APRN - CNP   Insulin Degludec (TRESIBA FLEXTOUCH) 200 UNIT/ML SOPN Inject 26 Units into the skin daily Yes ORTIZ Cline CNP   insulin aspart (NOVOLOG FLEXPEN) 100 UNIT/ML injection pen Use tid with meals With sliding scale max dose is 30 units per day Yes ORTIZ Cline CNP   simvastatin (ZOCOR) 40 MG tablet TAKE 1 TABLET EVERY NIGHT Yes ORTIZ Penny CNP   Continuous Blood Gluc Sensor (FREESTYLE VERA 14 DAY SENSOR) MISC 6 each by Does not apply route daily Yes ORTIZ Cline CNP   ergocalciferol (ERGOCALCIFEROL) 1.25 MG (76766 UT) capsule Take 50,000 Units by mouth once a week  Yes Historical Provider, MD   nystatin (MYCOSTATIN) 313798 UNIT/GM cream Apply topically 4 times daily. Continue to apply for 3 days after rash resolves Yes ORTIZ Penny CNP   glucagon 1 MG injection Inject 1 mg into the muscle See Admin Instructions Follow package directions for low blood sugar. Yes ORTIZ Cline CNP   donepezil (ARICEPT) 5 MG tablet Take 1 tablet by mouth nightly Yes ORTIZ Penny CNP   montelukast (SINGULAIR) 10 MG tablet Take 1 tablet by mouth daily Yes ORTIZ Penny CNP   blood glucose monitor strips by Other route 3 times daily Test 3 times a day & as needed for symptoms of irregular blood glucose.  Yes ORTIZ Cline CNP   RELION ULTRA THIN LANCETS 30G MISC 1 each by Does not apply route 3 times daily Yes ORTIZ Cline CNP   meclizine (ANTIVERT) 12.5 MG tablet Take 1 tablet by mouth 3 times daily as needed for Dizziness Yes ORTIZ Penny CNP   acetaminophen (TYLENOL ARTHRITIS PAIN) 650 MG extended release tablet Take 650 mg by mouth every 8 hours as needed for Pain Yes Historical Provider, MD   lisinopril (PRINIVIL;ZESTRIL) 20 MG tablet Take 1 tablet by mouth daily  ORTIZ Cline CNP         Past Medical History:   Diagnosis Date    Actinic keratitis     Ankle pain     Anxiety 6/23/2019    Arthritis     Back pain     low     Carpal tunnel syndrome, right 6/29/2017    Chronic diastolic CHF (congestive heart failure) (HCC)     Chronic diastolic heart failure ([facility] [address] 5/31/2017    Chronic kidney disease     Dependent edema     Hypertension     Incontinence     in female    Nocturnal enuresis 7/9/2021    Osteoarthritis     knee     Type II or unspecified type diabetes mellitus without mention of complication, not stated as uncontrolled     Vertigo 8/1/2017    Vitamin B12 deficiency (dietary) anemia 11/17/2018       Past Surgical History:   Procedure Laterality Date    ANKLE SURGERY Left     FINGER TRIGGER RELEASE Right          Family History   Problem Relation Age of Onset    Diabetes Mother     Diabetes Father     Cancer Paternal Grandfather         BONE        CareTeam (Including outside providers/suppliers regularly involved in providing care):   Patient Care Team:  ORTIZ Penny CNP as PCP - General (Family Medicine)  ORTIZ Penny CNP as PCP - [facility] Empaneled Provider  Dorene Saucedo as Orthopedic Surgeon (Orthopedic Surgery)  Ziyad Lovell DPM as Consulting Physician (Podiatry)  Jd Sam as Referring Physician (Optometry)  ORTIZ Thompson CNP as Diabetic Educator (Nurse Practitioner)    Wt Readings from Last 3 Encounters:   12/09/21 251 lb 12.8 oz (114.2 kg)   12/01/21 250 lb (113.4 kg)   11/02/21 250 lb (113.4 kg)     Vitals:    12/09/21 1019   BP: 128/76   Pulse: 69   SpO2: 94%   Weight: 251 lb 12.8 oz (114.2 kg)   Height: 5' 6\" (1.676 m)     Body mass index is 40.64 kg/m². Based upon direct observation of the patient, evaluation of cognition reveals recent and remote memory intact. Physical Exam  Constitutional:       Appearance: Normal appearance. She is well-developed and well-groomed. She is obese. HENT:      Head: Normocephalic. Eyes:      Conjunctiva/sclera: Conjunctivae normal.      Pupils: Pupils are equal, round, and reactive to light. Neck:      Thyroid: No thyromegaly.    Cardiovascular:      Rate and Rhythm: Normal rate and regular rhythm. Heart sounds: Normal heart sounds. No murmur heard. Pulmonary:      Effort: Pulmonary effort is normal.      Breath sounds: Normal breath sounds. No wheezing. Abdominal:      General: Bowel sounds are normal.      Palpations: Abdomen is soft. Tenderness: There is no abdominal tenderness. Musculoskeletal:      Cervical back: Neck supple. Right lower leg: No edema. Left lower leg: No edema. Lymphadenopathy:      Cervical: No cervical adenopathy. Skin:     Capillary Refill: Capillary refill takes less than 2 seconds. Neurological:      Mental Status: She is alert and oriented to person, place, and time. Gait: Gait normal.   Psychiatric:         Mood and Affect: Mood normal.         Behavior: Behavior is cooperative. Patient's complete Health Risk Assessment and screening values have been reviewed and are found in Flowsheets. The following problems were reviewed today and where indicated follow up appointments were made and/or referrals ordered. Positive Risk Factor Screenings with Interventions:     Fall Risk:  2 or more falls in past year?: (!) yes  Fall with injury in past year?: no  Fall Risk Interventions:    · Home safety tips provided  · Patient declines any further evaluation/treatment for this issue          General Health and ACP:  General  In general, how would you say your health is?: Good  In the past 7 days, have you experienced any of the following?  New or Increased Pain, New or Increased Fatigue, Loneliness, Social Isolation, Stress or Anger?: None of These  Do you get the social and emotional support that you need?: Yes  Do you have a Living Will?: Yes  Advance Directives     Power of [facility] Will ACP-Advance Directive ACP-Power of     Not on File Not on File Not on File Not on File      General Health Risk Interventions:  · No Living Will: ACP documents already completed- patient asked to provide copy to the 02/26/2007    Diabetic foot exam  01/16/2021    Annual Wellness Visit (AWV)  10/09/2021    Hepatitis C screen  06/29/2022 (Originally 1945)    Diabetic retinal exam  06/11/2022    A1C test (Diabetic or Prediabetic)  08/19/2022    Lipid screen  08/19/2022    Potassium monitoring  09/18/2022    Creatinine monitoring  09/18/2022    Colon cancer screen fecal DNA test (Cologuard)  10/27/2023    DTaP/Tdap/Td vaccine (2 - Td or Tdap) 08/25/2027    DEXA (modify frequency per FRAX score)  Completed    Flu vaccine  Completed    Pneumococcal 65+ yrs at Risk Vaccine  Completed    COVID-19 Vaccine  Completed    Hepatitis A vaccine  Aged Out    Hib vaccine  Aged Out    Meningococcal (ACWY) vaccine  Aged Out     Recommendations for [employer] Due: see orders and patient instructions/AVS.  . Recommended screening schedule for the next 5-10 years is provided to the patient in written form: see Patient Instructions/AVS.    1. Routine general medical examination at a health care facility  2. Essential hypertension  Assessment & Plan:   Well-controlled, continue current medications  Orders:  -     carvedilol (COREG) 3.125 MG tablet; Take 1 tablet by mouth 2 times daily (with meals), Disp-180 tablet, R-3Normal  3. Chronic diastolic heart failure ([facility] [address]  Assessment & Plan:   Monitored by specialist- no acute findings meriting change in the plan  4. Allergic rhinitis, unspecified seasonality, unspecified trigger  Assessment & Plan:   Well-controlled, continue current medications  5. Mild episode of recurrent major depressive disorder (HCC)  6. Obesity, morbid, BMI 40.0-49.9 ([address]  7. Other hyperlipidemia  8. Urge incontinence of urine  9. DAVID treated with BiPAP  10. Type 2 diabetes mellitus with stage 3b chronic kidney disease, with long-term current use of insulin ([address]  Assessment & Plan:   Monitored by specialist- no acute findings meriting change in the plan  11.  Primary osteoarthritis of right hip Advance Care Planning   Advanced Care Planning: Discussed the patients choices for care and treatment in case of a health event that adversely affects decision-making abilities. Also discussed the patients long-term treatment options. Reviewed with the patient the [facility] of [address] [place] [address] Declaration forms  Reviewed the process of designating a competent adult as an Agent (or -in-fact) that could take make health care decisions for the patient if incompetent. Patient was asked to complete the declaration forms, either acknowledge the forms by a public notary or an eligible witness and provide a signed copy to the practice office. Time spent (minutes): 2     Obesity Counseling: Assessed behavioral health risks and factors affecting choice of behavior. Suggested weight control approaches, including dietary changes behavioral modification and follow up plan. Provided educational and support documentation. Time spent (minutes): 3    Cardiovascular Disease Risk Counseling: Assessed the patient's risk to develop cardiovascular disease and reviewed main risk factors. Reviewed steps to reduce disease risk including:   · Quitting tobacco use, reducing amount smoked, or not starting the habit  · Making healthy food choices  · Being physically active and gradualy increasing activity levels   · Reduce weight and determine a healthy BMI goal  · Monitor blood pressure and treat if higher than 140/90 mmHg  · Maintain blood total cholesterol levels under 5 mmol/l or 190 mg/dl  · Maintain LDL cholesterol levels under 3.0 mmol/l or 115 mg/dl   · Control blood glucose levels  · Consider taking aspirin (75 mg daily), once blood pressure is controlled   Provided a follow up plan. Time spent (minutes):  2      Electronically signed by ORTIZ Hudson CNP on 12/16/2021 at 8:14 PM

## 2021-12-16 PROBLEM — R42 VERTIGO: Status: RESOLVED | Noted: 2017-08-01 | Resolved: 2021-12-16

## 2021-12-16 PROBLEM — D51.8 VITAMIN B12 DEFICIENCY (DIETARY) ANEMIA: Status: RESOLVED | Noted: 2018-11-17 | Resolved: 2021-12-16

## 2021-12-16 PROBLEM — L71.9 ROSACEA: Status: RESOLVED | Noted: 2018-05-21 | Resolved: 2021-12-16

## 2021-12-16 PROBLEM — M19.90 ARTHRITIS: Status: RESOLVED | Noted: 2017-02-02 | Resolved: 2021-12-16

## 2021-12-16 PROBLEM — R60.0 PERIPHERAL EDEMA: Status: RESOLVED | Noted: 2017-03-08 | Resolved: 2021-12-16

## 2021-12-16 PROBLEM — F41.9 ANXIETY: Status: RESOLVED | Noted: 2019-06-23 | Resolved: 2021-12-16

## 2021-12-16 PROBLEM — M65.331 TRIGGER MIDDLE FINGER OF RIGHT HAND: Status: RESOLVED | Noted: 2018-09-23 | Resolved: 2021-12-16

## 2021-12-16 PROBLEM — G56.01 CARPAL TUNNEL SYNDROME, RIGHT: Status: RESOLVED | Noted: 2017-06-29 | Resolved: 2021-12-16

## 2021-12-16 PROBLEM — M54.50 LOW BACK PAIN: Status: RESOLVED | Noted: 2019-10-18 | Resolved: 2021-12-16

## 2021-12-16 PROBLEM — R60.9 PERIPHERAL EDEMA: Status: RESOLVED | Noted: 2017-03-08 | Resolved: 2021-12-16

## 2021-12-16 PROBLEM — N39.44 NOCTURNAL ENURESIS: Status: RESOLVED | Noted: 2021-07-09 | Resolved: 2021-12-16

## 2021-12-16 PROBLEM — M21.371 FOOT DROP, RIGHT FOOT: Status: RESOLVED | Noted: 2020-06-30 | Resolved: 2021-12-16

## 2021-12-16 PROBLEM — R94.31 ABNORMAL ELECTROCARDIOGRAM: Status: RESOLVED | Noted: 2017-03-08 | Resolved: 2021-12-16

## 2021-12-16 RX ORDER — ASPIRIN 325 MG
325 TABLET ORAL DAILY
COMMUNITY

## 2021-12-21 NOTE — PROGRESS NOTES
MD MD [facility] Consultation / New Patient Visit    Patient:  Sean Thakkar  YOB: 1945  Date: 2021  Consult requested from ORTIZ Rios - CNP     HISTORY OF PRESENT ILLNESS:   The patient is a 77 y.o. female who presents today for follow-up for the following problem(s): recurrent UTI  Overall the problem(s) : are worsening. Associated Symptoms: No dysuria, gross hematuria. Pain Severity:      Today visit:   21  Presents in follow up after Interstim placement, she is having good control and happy with procedure. 21    Botox 200 units (3/10/21) - unfortunately she has failed botox with early return of symptoms < 3 months. She is back to PPD: 1 for confidenc2-3, wet pads daily. Nocturnal enuresis has returned. She is very bothered by these symptoms. She is now interested in interstim, we discuss risks and benefits. 20   Today Jc Bueno is doing well. States Nocturia has improved with oxybutynin QHS. She is tolerating the medication well, no dry mouth or constipation. She states she is having decline of her memory, so her last visit, the information regarding improvement of her symptoms was not accurate. Urge incontinence worsenin daily, 3 at night. Avoiding fluids and lasix after dinner. Lasix only in am.  Vesicare 10 mg ER in am - no dry mouth and constipation  States failing medical therapy. PMH:   DAVID on CPAP  Transverse myelitis     Summary of old records:   (Patient's old records, notes and chart reviewed and summarized above.)    Urinalysis today:  No results found for this visit on 21.     Last BUN and creatinine:  Lab Results   Component Value Date    BUN 29 (H) 2021     Lab Results   Component Value Date    CREATININE 1.1 (H) 2021       Imaging Reviewed during this Office Visit:   (results were independently reviewed by physician and radiology report verified)    PAST MEDICAL, FAMILY AND SOCIAL HISTORY:  Past Medical History:   Diagnosis Date    Actinic keratitis     Ankle pain     Anxiety 6/23/2019    Arthritis     Back pain     low     Carpal tunnel syndrome, right 6/29/2017    Chronic diastolic [facility]est[facility]) [facility])     Chronic diastolic heart failure ([facility] [address] 5/31/2017    Chronic kidney disease     Dependent edema     Hypertension     Incontinence     in female    Nocturnal enuresis 7/9/2021    Osteoarthritis     knee     Type II or unspecified type diabetes mellitus without mention of complication, not stated as uncontrolled     Vertigo 8/1/2017    Vitamin B12 deficiency (dietary) anemia 11/17/2018     Past Surgical History:   Procedure Laterality Date    ANKLE SURGERY Left     FINGER TRIGGER RELEASE Right      Family History   Problem Relation Age of Onset    Diabetes Mother     Diabetes Father     Cancer Paternal Grandfather         BONE      Outpatient Medications Marked as Taking for the 12/1/21 encounter (Office Visit) with Jesus Piper MD   Medication Sig Dispense Refill    memantine (NAMENDA) 10 MG tablet Take 10 mg by mouth 2 times daily       busPIRone (BUSPAR) 15 MG tablet TAKE 1 TABLET THREE TIMES DAILY (Patient taking differently: Indications: twice daily ) 270 tablet 3    dapagliflozin (FARXIGA) 10 MG tablet Take 1 tablet by mouth every morning 90 tablet 5    [DISCONTINUED] DROPLET PEN NEEDLES 32G X 4 MM MISC USE 4 TIMES DAILY 100 each 3    bumetanide (BUMEX) 1 MG tablet Take 1 tablet by mouth 2 times daily 180 tablet 1    [DISCONTINUED] carvedilol (COREG) 3.125 MG tablet Take 1 tablet by mouth 2 times daily (with meals) 180 tablet 3    citalopram (CELEXA) 10 MG tablet Take 1 tablet by mouth once daily 90 tablet 3    oxybutynin (DITROPAN-XL) 5 MG extended release tablet Take 5 mg by mouth daily       ferrous sulfate (IRON 325) 325 (65 Fe) MG tablet Take 1 tablet by mouth daily (with breakfast) 90 tablet 1    cyanocobalamin 1000 MCG/ML injection Inject 1 mL into the muscle every 30 days 1 mL 5    Insulin Degludec (TRESIBA FLEXTOUCH) 200 UNIT/ML SOPN Inject 26 Units into the skin daily 6 pen 5    insulin aspart (NOVOLOG FLEXPEN) 100 UNIT/ML injection pen Use tid with meals With sliding scale max dose is 30 units per day 3 pen 5    simvastatin (ZOCOR) 40 MG tablet TAKE 1 TABLET EVERY NIGHT 90 tablet 3    Continuous Blood Gluc Sensor (FREESTYLE VERA 14 DAY SENSOR) MISC 6 each by Does not apply route daily 6 each 5    ergocalciferol (ERGOCALCIFEROL) 1.25 MG (12405 UT) capsule Take 50,000 Units by mouth once a week       nystatin (MYCOSTATIN) 429883 UNIT/GM cream Apply topically 4 times daily. Continue to apply for 3 days after rash resolves 30 g 1    glucagon 1 MG injection Inject 1 mg into the muscle See Admin Instructions Follow package directions for low blood sugar. 1 kit 3    donepezil (ARICEPT) 5 MG tablet Take 1 tablet by mouth nightly 90 tablet 3    montelukast (SINGULAIR) 10 MG tablet Take 1 tablet by mouth daily 90 tablet 3    blood glucose monitor strips by Other route 3 times daily Test 3 times a day & as needed for symptoms of irregular blood glucose. 100 strip 5    RELION ULTRA THIN LANCETS 30G MISC 1 each by Does not apply route 3 times daily 300 each 3    meclizine (ANTIVERT) 12.5 MG tablet Take 1 tablet by mouth 3 times daily as needed for Dizziness 90 tablet 2    acetaminophen (TYLENOL ARTHRITIS PAIN) 650 MG extended release tablet Take 650 mg by mouth every 8 hours as needed for Pain         Patient has no known allergies.   Social History     Tobacco Use   Smoking Status Never Smoker   Smokeless Tobacco Never Used       Social History     Substance and Sexual Activity   Alcohol Use No       REVIEW OF SYSTEMS:  Constitutional: negative  Eyes: negative  Respiratory: negative  Cardiovascular: negative  Gastrointestinal: negative  Genitourinary: negative  Musculoskeletal: negative  Skin: negative

## 2022-01-05 DIAGNOSIS — J30.9 ALLERGIC RHINITIS, UNSPECIFIED SEASONALITY, UNSPECIFIED TRIGGER: ICD-10-CM

## 2022-01-05 DIAGNOSIS — E11.22 TYPE 2 DIABETES MELLITUS WITH STAGE 3B CHRONIC KIDNEY DISEASE, WITH LONG-TERM CURRENT USE OF INSULIN (HCC): Primary | ICD-10-CM

## 2022-01-05 DIAGNOSIS — N18.32 TYPE 2 DIABETES MELLITUS WITH STAGE 3B CHRONIC KIDNEY DISEASE, WITH LONG-TERM CURRENT USE OF INSULIN (HCC): Primary | ICD-10-CM

## 2022-01-05 DIAGNOSIS — Z79.4 TYPE 2 DIABETES MELLITUS WITH STAGE 3B CHRONIC KIDNEY DISEASE, WITH LONG-TERM CURRENT USE OF INSULIN (HCC): Primary | ICD-10-CM

## 2022-01-05 NOTE — TELEPHONE ENCOUNTER
Patient called in and stated that she switched insurances this year and is needing her farxiga sent to [facility] for 90 day supply. Pended.

## 2022-01-05 NOTE — TELEPHONE ENCOUNTER
[facility] is requesting a refill on the following medication(s):  Requested Prescriptions     Pending Prescriptions Disp Refills    montelukast (SINGULAIR) 10 MG tablet 90 tablet 3     Sig: Take 1 tablet by mouth daily       Last Visit Date (If Applicable):  Visit date not found    Next Visit Date:    Visit date not found

## 2022-01-07 RX ORDER — MONTELUKAST SODIUM 10 MG/1
10 TABLET ORAL DAILY
Qty: 90 TABLET | Refills: 3 | Status: SHIPPED | OUTPATIENT
Start: 2022-01-07 | End: 2022-09-15 | Stop reason: SDUPTHER

## 2022-01-17 ENCOUNTER — TELEPHONE (OUTPATIENT)
Dept: FAMILY MEDICINE CLINIC | Age: 77
End: 2022-01-17

## 2022-01-17 NOTE — TELEPHONE ENCOUNTER
Spoke to [place] she did not want to make a VV but said she will probably go to urgent care at [facility]

## 2022-02-09 ENCOUNTER — TELEPHONE (OUTPATIENT)
Dept: FAMILY MEDICINE CLINIC | Age: 77
End: 2022-02-09

## 2022-02-09 DIAGNOSIS — D50.8 OTHER IRON DEFICIENCY ANEMIA: ICD-10-CM

## 2022-02-09 DIAGNOSIS — I10 ESSENTIAL HYPERTENSION: ICD-10-CM

## 2022-02-09 RX ORDER — FERROUS SULFATE 325(65) MG
325 TABLET ORAL
Qty: 90 TABLET | Refills: 1 | Status: SHIPPED | OUTPATIENT
Start: 2022-02-09 | End: 2022-11-01

## 2022-02-09 RX ORDER — LISINOPRIL 20 MG/1
20 TABLET ORAL DAILY
Qty: 90 TABLET | Refills: 1 | Status: SHIPPED | OUTPATIENT
Start: 2022-02-09 | End: 2022-07-25

## 2022-02-15 ENCOUNTER — OFFICE VISIT (OUTPATIENT)
Dept: DIABETES SERVICES | Age: 77
End: 2022-02-15
Payer: MEDICARE

## 2022-02-15 VITALS
HEIGHT: 66 IN | BODY MASS INDEX: 40.02 KG/M2 | HEART RATE: 60 BPM | WEIGHT: 249 LBS | RESPIRATION RATE: 14 BRPM | SYSTOLIC BLOOD PRESSURE: 160 MMHG | DIASTOLIC BLOOD PRESSURE: 82 MMHG

## 2022-02-15 DIAGNOSIS — N18.32 TYPE 2 DIABETES MELLITUS WITH STAGE 3B CHRONIC KIDNEY DISEASE, WITH LONG-TERM CURRENT USE OF INSULIN (HCC): Primary | ICD-10-CM

## 2022-02-15 DIAGNOSIS — Z79.4 TYPE 2 DIABETES MELLITUS WITH STAGE 3B CHRONIC KIDNEY DISEASE, WITH LONG-TERM CURRENT USE OF INSULIN (HCC): Primary | ICD-10-CM

## 2022-02-15 DIAGNOSIS — I10 ESSENTIAL HYPERTENSION: ICD-10-CM

## 2022-02-15 DIAGNOSIS — E78.49 OTHER HYPERLIPIDEMIA: ICD-10-CM

## 2022-02-15 DIAGNOSIS — E11.22 TYPE 2 DIABETES MELLITUS WITH STAGE 3B CHRONIC KIDNEY DISEASE, WITH LONG-TERM CURRENT USE OF INSULIN (HCC): Primary | ICD-10-CM

## 2022-02-15 LAB — HBA1C MFR BLD: 7.6 %

## 2022-02-15 PROCEDURE — 1123F ACP DISCUSS/DSCN MKR DOCD: CPT | Performed by: NURSE PRACTITIONER

## 2022-02-15 PROCEDURE — G8400 PT W/DXA NO RESULTS DOC: HCPCS | Performed by: NURSE PRACTITIONER

## 2022-02-15 PROCEDURE — 3017F COLORECTAL CA SCREEN DOC REV: CPT | Performed by: NURSE PRACTITIONER

## 2022-02-15 PROCEDURE — G8417 CALC BMI ABV UP PARAM F/U: HCPCS | Performed by: NURSE PRACTITIONER

## 2022-02-15 PROCEDURE — 83036 HEMOGLOBIN GLYCOSYLATED A1C: CPT | Performed by: NURSE PRACTITIONER

## 2022-02-15 PROCEDURE — 1036F TOBACCO NON-USER: CPT | Performed by: NURSE PRACTITIONER

## 2022-02-15 PROCEDURE — 4040F PNEUMOC VAC/ADMIN/RCVD: CPT | Performed by: NURSE PRACTITIONER

## 2022-02-15 PROCEDURE — 2022F DILAT RTA XM EVC RTNOPTHY: CPT | Performed by: NURSE PRACTITIONER

## 2022-02-15 PROCEDURE — 1090F PRES/ABSN URINE INCON ASSESS: CPT | Performed by: NURSE PRACTITIONER

## 2022-02-15 PROCEDURE — 3051F HG A1C>EQUAL 7.0%<8.0%: CPT | Performed by: NURSE PRACTITIONER

## 2022-02-15 PROCEDURE — PBSHW POCT GLYCOSYLATED HEMOGLOBIN (HGB A1C): Performed by: NURSE PRACTITIONER

## 2022-02-15 PROCEDURE — G8427 DOCREV CUR MEDS BY ELIG CLIN: HCPCS | Performed by: NURSE PRACTITIONER

## 2022-02-15 PROCEDURE — 99214 OFFICE O/P EST MOD 30 MIN: CPT | Performed by: NURSE PRACTITIONER

## 2022-02-15 PROCEDURE — G8484 FLU IMMUNIZE NO ADMIN: HCPCS | Performed by: NURSE PRACTITIONER

## 2022-02-15 PROCEDURE — 95251 CONT GLUC MNTR ANALYSIS I&R: CPT | Performed by: NURSE PRACTITIONER

## 2022-02-15 RX ORDER — INSULIN DEGLUDEC 200 U/ML
28 INJECTION, SOLUTION SUBCUTANEOUS DAILY
Qty: 6 PEN | Refills: 5
Start: 2022-02-15 | End: 2022-03-14 | Stop reason: SDUPTHER

## 2022-02-15 ASSESSMENT — ENCOUNTER SYMPTOMS
RESPIRATORY NEGATIVE: 1
ABDOMINAL PAIN: 0
DIARRHEA: 0
SHORTNESS OF BREATH: 0

## 2022-02-15 NOTE — PROGRESS NOTES
[address], [address]  [facility] [id number] [phone]        HISTORY:    Lacho Ahumada presents today for evaluation and management of:  Chief Complaint   Patient presents with    Diabetes     3 month appt. had foot exam completed yesterday by Dr Héctor Francois, will request records       HPI    Interval History:    Past DM Medications   Metformin- ckd  Glimepiride-therapy completed   janvuia- glp1 started  victoza-cost  soliqua- cost     Current Diabetic Medications  novolog TID with sliding scale  Max daily dose 24 units, tresiba 30 units once daily, Farxiga 10 mg once daily.      DKA episodes: 0  06/09/21   At previous visit diabetes counseling was provided. She does admit to taking insulin after meals. Denies any s/s of hyper/hypoglycemia  Diet: unchanged.   Exercise: PT  BS testing: Uses CGM daily with success and is adherent to cgm regimen.   Issues: denies      08/03/21   At previous visit [place] was started. Doing well other than cost. She is due for a1c after tomorrow. Denies any s/s of hyper/hypoglcemia. She follows with podiatry every 3 months for dm foot care.   Diet: unchanged.   Exercise: PT  BS testing: Uses CGM daily with success and is adherent to cgm regimen.   Issues: she fell last week and has follow up with neurology on 8/16/21.      11/02/21   At previous visit dm counseling was provided. She does admit to not taking short acting insulin before meals. She denies any s/s of hyper/hypoglcyemia. Diet: regular, high carb high fat  Exercise: none  BS testing: uses cgm daily with success and is adherent to cgm therapy  Issues: deneis  Diabetic foot exam up-to-date: No  Diabetic retinal exam up-to-date: Yes  Hypoglycemia as needed treatment: glucose tabs    02/15/22   Lacho Ahumada is a 77 y.o. female patient who presents to clinic today for follow up on her diabetes.  she has a history of HTN, HF, neuropathy, OA, hyperlipidemia, obesity and depression which contributes to her diabetes. At previous visit diabetes counseling was provided. she denies any signs or symptoms of hyper/hypoglycemia. she states they are taking their medications as prescribed without any adverse effects. She is having emotional stress at home and is now interested in counseling. States she is safe at home and denies any thoughts of self harm. Diet: regular  Exercise: none  BS testing: uses cgm daily with success and is adherent to cgm therapy  Issues: deneis  Diabetic foot exam up-to-date: Yes  Diabetic retinal exam up-to-date: Yes  Hypoglycemia as needed treatment: glucose tabs    High cholesterol-  Takes zocor and denies any adverse effects with its use.  Watches diet and exercise.      Hypertension-  Takes coreg and lisinopril and denies any adverse effects with their use. Watches diet and exercise.  Denies any chest pain, dizziness or edema.  Follows with cardiology:Yes. Yearly      Obesity- Working on weight loss. .      Past Medical History:   Diagnosis Date    Actinic keratitis     Ankle pain     Anxiety 6/23/2019    Arthritis     Back pain     low     Carpal tunnel syndrome, right 6/29/2017    Chronic diastolic CHF [facility]congestive heart failure) [facility]     Chronic diastolic heart failure ([facility] [address] 5/31/2017    Chronic kidney disease     Dependent edema     Hypertension     Incontinence     in female    Nocturnal enuresis 7/9/2021    Osteoarthritis     knee     Type II or unspecified type diabetes mellitus without mention of complication, not stated as uncontrolled     Vertigo 8/1/2017    Vitamin B12 deficiency (dietary) anemia 11/17/2018     Family History   Problem Relation Age of Onset    Diabetes Mother     Diabetes Father     Cancer Paternal Grandfather         BONE      Social History     Tobacco Use    Smoking status: Never Smoker    Smokeless tobacco: Never Used   Vaping Use    Vaping Use: Never used Substance Use Topics    Alcohol use: No    Drug use: No     No Known Allergies    MEDICATIONS:  Current Outpatient Medications   Medication Sig Dispense Refill    Insulin Degludec (TRESIBA FLEXTOUCH) 200 UNIT/ML SOPN Inject 28 Units into the skin daily 6 pen 5    ferrous sulfate (IRON 325) 325 (65 Fe) MG tablet Take 1 tablet by mouth daily (with breakfast) 90 tablet 1    lisinopril (PRINIVIL;ZESTRIL) 20 MG tablet Take 1 tablet by mouth daily 90 tablet 1    montelukast (SINGULAIR) 10 MG tablet Take 1 tablet by mouth daily 90 tablet 3    dapagliflozin (FARXIGA) 10 MG tablet Take 1 tablet by mouth every morning 90 tablet 3    aspirin 325 MG tablet Take 325 mg by mouth daily      carvedilol (COREG) 3.125 MG tablet Take 1 tablet by mouth 2 times daily (with meals) 180 tablet 3    memantine (NAMENDA) 10 MG tablet Take 10 mg by mouth 2 times daily       busPIRone (BUSPAR) 15 MG tablet TAKE 1 TABLET THREE TIMES DAILY (Patient taking differently: Indications: twice daily ) 270 tablet 3    bumetanide (BUMEX) 1 MG tablet Take 1 tablet by mouth 2 times daily 180 tablet 1    citalopram (CELEXA) 10 MG tablet Take 1 tablet by mouth once daily 90 tablet 3    oxybutynin (DITROPAN-XL) 5 MG extended release tablet Take 5 mg by mouth daily       cyanocobalamin 1000 MCG/ML injection Inject 1 mL into the muscle every 30 days 1 mL 5    insulin aspart (NOVOLOG FLEXPEN) 100 UNIT/ML injection pen Use tid with meals With sliding scale max dose is 30 units per day 3 pen 5    simvastatin (ZOCOR) 40 MG tablet TAKE 1 TABLET EVERY NIGHT 90 tablet 3    nystatin (MYCOSTATIN) 003534 UNIT/GM cream Apply topically 4 times daily.  Continue to apply for 3 days after rash resolves 30 g 1    donepezil (ARICEPT) 5 MG tablet Take 1 tablet by mouth nightly 90 tablet 3    meclizine (ANTIVERT) 12.5 MG tablet Take 1 tablet by mouth 3 times daily as needed for Dizziness 90 tablet 2    DROPLET PEN NEEDLES 32G X 4 MM MISC USE 4 TIMES DAILY 400 each 1    Continuous Blood Gluc Sensor (FREESTYLE VERA 14 DAY SENSOR) MISC 6 each by Does not apply route daily 6 each 5    glucagon 1 MG injection Inject 1 mg into the muscle See Admin Instructions Follow package directions for low blood sugar. 1 kit 3    blood glucose monitor strips by Other route 3 times daily Test 3 times a day & as needed for symptoms of irregular blood glucose. 100 strip 5    RELION ULTRA THIN LANCETS 30G MISC 1 each by Does not apply route 3 times daily 300 each 3    acetaminophen (TYLENOL ARTHRITIS PAIN) 650 MG extended release tablet Take 650 mg by mouth every 8 hours as needed for Pain       No current facility-administered medications for this visit. Review ofSymptoms:  Review of Systems   Constitutional: Positive for fatigue. Negative for unexpected weight change. Eyes: Negative for visual disturbance. Respiratory: Negative. Negative for shortness of breath. Cardiovascular: Negative for chest pain and leg swelling. Gastrointestinal: Negative for abdominal pain and diarrhea. Endocrine: Negative for polydipsia, polyphagia and polyuria. Genitourinary: Negative. Musculoskeletal: Negative. Skin: Negative for rash and wound. Neurological: Negative for dizziness, tremors, seizures and headaches. Psychiatric/Behavioral: Negative. Negative for confusion and decreased concentration. Theremainder of a complete 14-point review of systems is negative. Vital Signs: BP (!) 160/82   Pulse 60   Resp 14   Ht 5' 6\" (1.676 m)   Wt 249 lb (112.9 kg)   LMP  (LMP Unknown)   BMI 40.19 kg/m²      Wt Readings from Last 3 Encounters:   02/15/22 249 lb (112.9 kg)   12/09/21 251 lb 12.8 oz (114.2 kg)   12/01/21 250 lb (113.4 kg)     Body mass index is 40.19 kg/m².   LABS:  Hemoglobin A1C   Date Value Ref Range Status   02/15/2022 7.6 % Final   08/19/2021 8.4 (H) 4.4 - 6.4 % Final     Lab Results   Component Value Date    LABMICR 59.3 (H) 08/19/2021     Lab Results Component Value Date     09/18/2021    K 4.8 09/18/2021     09/18/2021    CO2 23 09/18/2021    BUN 29 (H) 09/18/2021    CREATININE 1.1 (H) 09/18/2021    GLUCOSE 169 (H) 09/18/2021    CALCIUM 9.3 09/18/2021    PROT 7.0 09/01/2020    LABALBU 4.1 09/18/2021    BILITOT 0.5 09/18/2021    ALKPHOS 97 09/18/2021    AST 17 09/18/2021    ALT 10 09/18/2021    LABGLOM 51.6 09/18/2021    GFRAA >60 05/21/2021    AGRATIO 1.3 07/05/2019    GLOB 2.9 09/18/2021     Lab Results   Component Value Date    CHOL 160 08/19/2021    CHOL 169 09/01/2020    CHOL 157 03/28/2019     Lab Results   Component Value Date    TRIG 128 08/19/2021    TRIG 89 09/01/2020    TRIG 173 03/28/2019     Lab Results   Component Value Date    HDL 54 08/19/2021    HDL 60 09/01/2020    HDL 45 04/24/2017     Lab Results   Component Value Date    LDLCHOLESTEROL 91 09/01/2020    LDLCALC 80.4 08/19/2021    LDLCALC 69.4 03/28/2019    LDLCALC 125.0 02/21/2018     Lab Results   Component Value Date    VLDL 26 08/19/2021    VLDL NOT REPORTED 09/01/2020    VLDL 35 03/28/2019     Lab Results   Component Value Date    CHOLHDLRATIO 2.96 08/19/2021    CHOLHDLRATIO 2.8 09/01/2020    CHOLHDLRATIO 3.0 03/28/2019           Physical Exam  Constitutional:       Appearance: She is well-developed. Eyes:      Pupils: Pupils are equal, round, and reactive to light. Neck:      Thyroid: No thyroid mass, thyromegaly or thyroid tenderness. Cardiovascular:      Rate and Rhythm: Normal rate and regular rhythm. Heart sounds: Normal heart sounds. Pulmonary:      Effort: Pulmonary effort is normal.      Breath sounds: Normal breath sounds. Abdominal:      General: Bowel sounds are normal.      Palpations: Abdomen is soft. Musculoskeletal:      Comments: walker   Skin:     General: Skin is warm and dry. Comments: Negative for open/nonhealing wounds. Negative for lipohypertrophy.      Neurological:      Mental Status: She is alert and oriented to person, place, and time. Psychiatric:      Comments: Tearful            ASSESSMENT/PLAN:     Diagnosis Orders   1. Type 2 diabetes mellitus with stage 3b chronic kidney disease, with long-term current use of insulin ([facility])  POCT Hb A1C (glycosylated hemoglobin)    Insulin Degludec (TRESIBA FLEXTOUCH) 200 UNIT/ML SOPN   2. Other hyperlipidemia     3. Essential hypertension     4. BMI 40.0-44.9, adult ([facility] [address]       Orders Placed This Encounter   Procedures    POCT Hb A1C (glycosylated hemoglobin)     Orders Placed This Encounter   Medications    Insulin Degludec (TRESIBA FLEXTOUCH) 200 UNIT/ML SOPN     Sig: Inject 28 Units into the skin daily     Dispense:  6 pen     Refill:  5     Requested Prescriptions     Signed Prescriptions Disp Refills    Insulin Degludec (TRESIBA FLEXTOUCH) 200 UNIT/ML SOPN 6 pen 5     Sig: Inject 28 Units into the skin daily       1. Type 2 diabetes mellitus with stage 3b chronic kidney disease, with long-term current use of insulin ([facility])  - Unstable  HbA1C goal is less than 7%. - Fasting blood glucose goal is 70-120mg/dl and postprandial blood sugar goal is less than 180 mg/dl. - Labs reviewed including most recent A1c, microalbumin and kidney function. Repeat labs due in 3 months.    -We discussed in great detail dietary modifications they can make to better improve their blood sugars. -follow up diabetes education completed, all questions answered. CGM report downloaded and reviewed, scanned to media tab. Time in range 68% and hypoglycemia 1%. Predicted A1c per CGM report 7.2%. -referred to [facility] behavioral health for emotional support. Patient Instructions   a1c 7.6%    Decrease Tresiba to 28 units once daily   Only take Novolog before meals          Discussed signs and symptoms of hyper/hypoglycemia and how to treat. Encouraged 150 minutes of physical activity per week. Follow a low carbohydrate diet. Encouraged at least 7 hours of sleep. The patient was informed of the goals of diabetes management. This can only be accomplished by watching their diet and exercise levels. We certainly use medicines to help attain these goals. The consequences of not controlling blood sugars were discussed. These include blindness, heart disease, stroke, kidney disease, and possibly need for dialysis. They were told to be careful with their foot care as diabetics often have nerve damage, infections and risk for limb amutations . They also need a dilated eye exam yearly. We discussed the issues of diet, exercise, medication, complication avoidance, reviewed the signs and symptoms of diabetes, hypoglycemic episodes, significance of HbA1C.       - POCT Hb A1C (glycosylated hemoglobin)  - Insulin Degludec (TRESIBA FLEXTOUCH) 200 UNIT/ML SOPN; Inject 28 Units into the skin daily  Dispense: 6 pen; Refill: 5    2. Other hyperlipidemia  stable, lipid panel reviewed, continue current medications. Diet and exercise      3. Essential hypertension   abnormal, continue current medications. Diet and exercise Seek emergent care if chest pain develops. -monitor bp at home, suspect elevated bp is related to emotional stress. 4. BMI 40.0-44.9, adult (HCC)  Reduce calories and increase physical activity to achieve a slow and steady weight loss to improve blood pressure, cholesterol and diabetes. Answered all patient questions. Agrees to follow plan of care and to follow up in  months, sooner if needed. Call office if unexplained blood sugars less than 70 occur or above 400. Call office or access [employer] with any further questions or concerns. Be sure to bring glucometer/food log at next appointment. Total time spent reviewing chart, labs, counseling patient and documenting on the date of the encounter: 30 min.       Electronically signed by ORTIZ Mancia CNP on 2/15/2022 at 4:06 PM      (Please note that portions of this note were completed with a voice-recognition program. Efforts were made to edit the dictation but occasionally words are mis-transcribed.)

## 2022-03-12 ENCOUNTER — TELEPHONE (OUTPATIENT)
Dept: FAMILY MEDICINE CLINIC | Age: 77
End: 2022-03-12

## 2022-03-12 NOTE — TELEPHONE ENCOUNTER
Patient calls and states she needs refill of her insulin. Writer informs caller of the no after-hours medication policy and refers patient to contact her pharmacy. Writer informs caller that a note will be sent to the office.

## 2022-03-14 ENCOUNTER — TELEPHONE (OUTPATIENT)
Dept: FAMILY MEDICINE CLINIC | Age: 77
End: 2022-03-14

## 2022-03-14 DIAGNOSIS — Z79.4 TYPE 2 DIABETES MELLITUS WITH STAGE 3B CHRONIC KIDNEY DISEASE, WITH LONG-TERM CURRENT USE OF INSULIN (HCC): ICD-10-CM

## 2022-03-14 DIAGNOSIS — N18.32 TYPE 2 DIABETES MELLITUS WITH STAGE 3B CHRONIC KIDNEY DISEASE, WITH LONG-TERM CURRENT USE OF INSULIN (HCC): ICD-10-CM

## 2022-03-14 DIAGNOSIS — E11.22 TYPE 2 DIABETES MELLITUS WITH STAGE 3B CHRONIC KIDNEY DISEASE, WITH LONG-TERM CURRENT USE OF INSULIN (HCC): ICD-10-CM

## 2022-03-14 RX ORDER — INSULIN ASPART 100 [IU]/ML
INJECTION, SOLUTION INTRAVENOUS; SUBCUTANEOUS
Qty: 3 PEN | Refills: 5 | Status: SHIPPED | OUTPATIENT
Start: 2022-03-14 | End: 2022-03-14 | Stop reason: SDUPTHER

## 2022-03-14 RX ORDER — INSULIN DEGLUDEC 200 U/ML
28 INJECTION, SOLUTION SUBCUTANEOUS DAILY
Qty: 6 PEN | Refills: 5 | Status: SHIPPED | OUTPATIENT
Start: 2022-03-14

## 2022-03-14 RX ORDER — INSULIN ASPART 100 [IU]/ML
INJECTION, SOLUTION INTRAVENOUS; SUBCUTANEOUS
Qty: 3 PEN | Refills: 5 | Status: SHIPPED | OUTPATIENT
Start: 2022-03-14 | End: 2022-04-25 | Stop reason: ALTCHOICE

## 2022-03-14 NOTE — TELEPHONE ENCOUNTER
----- Message from Miguel Angel Ana sent at 3/12/2022  9:47 AM EST -----  Subject: Refill Request    QUESTIONS  Name of Medication? Insulin Degludec (TRESIBA FLEXTOUCH) 200 UNIT/ML SOPN  Patient-reported dosage and instructions? 200 units   How many days do you have left? 3  Preferred Pharmacy? [facility]  Pharmacy phone number (if available)? [phone]  ---------------------------------------------------------------------------  --------------  CALL BACK INFO  What is the best way for the office to contact you? OK to leave message on   voicemail  Preferred Call Back Phone Number?  [phone]
Needs refill to [facility]
No

## 2022-03-16 ENCOUNTER — OFFICE VISIT (OUTPATIENT)
Dept: FAMILY MEDICINE CLINIC | Age: 77
End: 2022-03-16
Payer: MEDICARE

## 2022-03-16 VITALS
HEART RATE: 58 BPM | SYSTOLIC BLOOD PRESSURE: 130 MMHG | WEIGHT: 250.1 LBS | OXYGEN SATURATION: 95 % | BODY MASS INDEX: 40.37 KG/M2 | DIASTOLIC BLOOD PRESSURE: 76 MMHG

## 2022-03-16 DIAGNOSIS — E78.49 OTHER HYPERLIPIDEMIA: ICD-10-CM

## 2022-03-16 DIAGNOSIS — E11.22 TYPE 2 DIABETES MELLITUS WITH STAGE 3B CHRONIC KIDNEY DISEASE, WITH LONG-TERM CURRENT USE OF INSULIN (HCC): ICD-10-CM

## 2022-03-16 DIAGNOSIS — F33.0 MILD EPISODE OF RECURRENT MAJOR DEPRESSIVE DISORDER (HCC): ICD-10-CM

## 2022-03-16 DIAGNOSIS — N18.32 TYPE 2 DIABETES MELLITUS WITH STAGE 3B CHRONIC KIDNEY DISEASE, WITH LONG-TERM CURRENT USE OF INSULIN (HCC): ICD-10-CM

## 2022-03-16 DIAGNOSIS — I10 ESSENTIAL HYPERTENSION: Primary | ICD-10-CM

## 2022-03-16 DIAGNOSIS — N39.41 URGE INCONTINENCE OF URINE: ICD-10-CM

## 2022-03-16 DIAGNOSIS — G47.33 OSA TREATED WITH BIPAP: ICD-10-CM

## 2022-03-16 DIAGNOSIS — N95.0 POSTMENOPAUSAL VAGINAL BLEEDING: ICD-10-CM

## 2022-03-16 DIAGNOSIS — Z79.4 TYPE 2 DIABETES MELLITUS WITH STAGE 3B CHRONIC KIDNEY DISEASE, WITH LONG-TERM CURRENT USE OF INSULIN (HCC): ICD-10-CM

## 2022-03-16 DIAGNOSIS — E66.01 OBESITY, MORBID, BMI 40.0-49.9 (HCC): ICD-10-CM

## 2022-03-16 DIAGNOSIS — I50.32 CHRONIC DIASTOLIC HEART FAILURE (HCC): ICD-10-CM

## 2022-03-16 PROCEDURE — 99212 OFFICE O/P EST SF 10 MIN: CPT

## 2022-03-16 PROCEDURE — 0509F URINE INCON PLAN DOCD: CPT | Performed by: NURSE PRACTITIONER

## 2022-03-16 PROCEDURE — 99213 OFFICE O/P EST LOW 20 MIN: CPT | Performed by: NURSE PRACTITIONER

## 2022-03-16 PROCEDURE — G8427 DOCREV CUR MEDS BY ELIG CLIN: HCPCS | Performed by: NURSE PRACTITIONER

## 2022-03-16 PROCEDURE — 1036F TOBACCO NON-USER: CPT | Performed by: NURSE PRACTITIONER

## 2022-03-16 PROCEDURE — 1123F ACP DISCUSS/DSCN MKR DOCD: CPT | Performed by: NURSE PRACTITIONER

## 2022-03-16 PROCEDURE — G8417 CALC BMI ABV UP PARAM F/U: HCPCS | Performed by: NURSE PRACTITIONER

## 2022-03-16 PROCEDURE — G8484 FLU IMMUNIZE NO ADMIN: HCPCS | Performed by: NURSE PRACTITIONER

## 2022-03-16 PROCEDURE — 4040F PNEUMOC VAC/ADMIN/RCVD: CPT | Performed by: NURSE PRACTITIONER

## 2022-03-16 PROCEDURE — 1090F PRES/ABSN URINE INCON ASSESS: CPT | Performed by: NURSE PRACTITIONER

## 2022-03-16 PROCEDURE — 3017F COLORECTAL CA SCREEN DOC REV: CPT | Performed by: NURSE PRACTITIONER

## 2022-03-16 PROCEDURE — G8400 PT W/DXA NO RESULTS DOC: HCPCS | Performed by: NURSE PRACTITIONER

## 2022-03-16 PROCEDURE — 2022F DILAT RTA XM EVC RTNOPTHY: CPT | Performed by: NURSE PRACTITIONER

## 2022-03-16 PROCEDURE — 3051F HG A1C>EQUAL 7.0%<8.0%: CPT | Performed by: NURSE PRACTITIONER

## 2022-03-16 ASSESSMENT — ENCOUNTER SYMPTOMS
DIARRHEA: 0
BACK PAIN: 1
NAUSEA: 0

## 2022-03-16 ASSESSMENT — PATIENT HEALTH QUESTIONNAIRE - PHQ9
SUM OF ALL RESPONSES TO PHQ QUESTIONS 1-9: 1
2. FEELING DOWN, DEPRESSED OR HOPELESS: 1
SUM OF ALL RESPONSES TO PHQ QUESTIONS 1-9: 1
SUM OF ALL RESPONSES TO PHQ QUESTIONS 1-9: 1
SUM OF ALL RESPONSES TO PHQ9 QUESTIONS 1 & 2: 1
SUM OF ALL RESPONSES TO PHQ QUESTIONS 1-9: 1
1. LITTLE INTEREST OR PLEASURE IN DOING THINGS: 0

## 2022-03-16 NOTE — PROGRESS NOTES
[address] [facility]  [address]  Dept: [phone]  Dept Fax: [phone]    Patient:  Monika Birmingham  YOB: 1945  Date of Service:  3/16/2022    Chief Complaint   Patient presents with    3 Month Follow-Up     no issues or complaints    Hyperlipidemia    Hypertension     denies leg edema, palpatations, chest pains or sob    Diabetes     recent A1C was 7.6 with Diabetic Ed Viveca Persons         SUBJECTIVE:     States she had a little vaginal bleeding yesterday. It happened one other time about a year ago. She went to [facility] ED but does not remember the outcome. Vaginal Bleeding  The patient's primary symptoms include vaginal bleeding (bright red blood). This is a recurrent problem. The current episode started more than 1 year ago. The problem occurs rarely. Associated symptoms include back pain (chronic lower back). Pertinent negatives include no abdominal pain, chills, constipation, diarrhea, discolored urine, dysuria, fever, flank pain, frequency, headaches, hematuria, nausea or urgency. The vaginal discharge was bloody. Vaginal bleeding amount: lasted for 3 hours. She has not been passing clots. She has not been passing tissue. Nothing aggravates the symptoms. She has tried nothing for the symptoms. She is not sexually active. Yes, her partner has an STD. She uses nothing for contraception. She is postmenopausal.     Hypertension, diabetes, and hyperlipidemia:  She indicates that she is feeling well and denies any symptoms referable to her elevated blood pressure or diabetes. Specifically denies chest pain, palpitations, dyspnea, peripheral edema, thirst, frequent urination, and blurred vision. Current medication regimen is as listed below. She denies any side effects of medication, and has been compliant, taking it regularly. Home glucose readings have been 150's. Checks blood sugars 4-5 times daily.  Last eye exam: within last year.      Treatment Adherence:   Medication compliance:  compliant most of the time  Diet compliance:  compliant most of the time  Weight trend: stable  Current exercise: no regular exercise  Barriers: lack of motivation    [x]  Hemoglobin A1c has been completed in the last 3-6 months  []  To be ordered today    [x]  Urine MicroAlbumin  completed and reviewed within the last 12 month  []  To be ordered today    [x]  Annual eye exam has been completed referring that patient does or does not have diabetic retinopathy  []  Recommendation to schedule and/or referral completed if required    [x]  Annual diabetic foot exam has been completed in office in the last 12 months  []  To be completed today    The 10-year ASCVD risk score (Brunilda Nance, et al., 2013) is: 37%    Values used to calculate the score:      Age: 77 years      Sex: Female      Is Non- : No      Diabetic: Yes      Tobacco smoker: No      Systolic Blood Pressure: 595 mmHg      Is BP treated: Yes      HDL Cholesterol: 54 mg/dL      Total Cholesterol: 160 mg/dL     BP Readings from Last 3 Encounters:   03/16/22 130/76   02/15/22 (!) 160/82   12/09/21 128/76      Pulse Readings from Last 3 Encounters:   03/16/22 58   02/15/22 60   12/09/21 69      Wt Readings from Last 3 Encounters:   03/16/22 250 lb 1.6 oz (113.4 kg)   02/15/22 249 lb (112.9 kg)   12/09/21 251 lb 12.8 oz (114.2 kg)        No Known Allergies  Current Outpatient Medications   Medication Sig Dispense Refill    insulin aspart (NOVOLOG FLEXPEN) 100 UNIT/ML injection pen Use tid with meals With sliding scale max dose is 30 units per day 3 pen 5    Insulin Degludec (TRESIBA FLEXTOUCH) 200 UNIT/ML SOPN Inject 28 Units into the skin daily 6 pen 5    ferrous sulfate (IRON 325) 325 (65 Fe) MG tablet Take 1 tablet by mouth daily (with breakfast) 90 tablet 1    lisinopril (PRINIVIL;ZESTRIL) 20 MG tablet Take 1 tablet by mouth daily 90 tablet 1    montelukast (SINGULAIR) 10 MG tablet Take 1 tablet by mouth daily 90 tablet 3    dapagliflozin (FARXIGA) 10 MG tablet Take 1 tablet by mouth every morning 90 tablet 3    aspirin 325 MG tablet Take 325 mg by mouth daily      carvedilol (COREG) 3.125 MG tablet Take 1 tablet by mouth 2 times daily (with meals) 180 tablet 3    DROPLET PEN NEEDLES 32G X 4 MM MISC USE 4 TIMES DAILY 400 each 1    memantine (NAMENDA) 10 MG tablet Take 10 mg by mouth 2 times daily       busPIRone (BUSPAR) 15 MG tablet TAKE 1 TABLET THREE TIMES DAILY (Patient taking differently: Indications: twice daily ) 270 tablet 3    bumetanide (BUMEX) 1 MG tablet Take 1 tablet by mouth 2 times daily 180 tablet 1    citalopram (CELEXA) 10 MG tablet Take 1 tablet by mouth once daily 90 tablet 3    oxybutynin (DITROPAN-XL) 5 MG extended release tablet Take 5 mg by mouth daily       cyanocobalamin 1000 MCG/ML injection Inject 1 mL into the muscle every 30 days 1 mL 5    simvastatin (ZOCOR) 40 MG tablet TAKE 1 TABLET EVERY NIGHT 90 tablet 3    Continuous Blood Gluc Sensor (FREESTYLE VERA 14 DAY SENSOR) MISC 6 each by Does not apply route daily 6 each 5    nystatin (MYCOSTATIN) 274851 UNIT/GM cream Apply topically 4 times daily. Continue to apply for 3 days after rash resolves 30 g 1    glucagon 1 MG injection Inject 1 mg into the muscle See Admin Instructions Follow package directions for low blood sugar. 1 kit 3    donepezil (ARICEPT) 5 MG tablet Take 1 tablet by mouth nightly 90 tablet 3    blood glucose monitor strips by Other route 3 times daily Test 3 times a day & as needed for symptoms of irregular blood glucose.  100 strip 5    RELION ULTRA THIN LANCETS 30G MISC 1 each by Does not apply route 3 times daily 300 each 3    meclizine (ANTIVERT) 12.5 MG tablet Take 1 tablet by mouth 3 times daily as needed for Dizziness 90 tablet 2    acetaminophen (TYLENOL ARTHRITIS PAIN) 650 MG extended release tablet Take 650 mg by mouth every 8 hours as needed for Pain No current facility-administered medications for this visit. Past Medical History:   Diagnosis Date    Actinic keratitis     Ankle pain     Anxiety 6/23/2019    Arthritis     Back pain     low     Carpal tunnel syndrome, right 6/29/2017    Chronic [facility]est[facility] failure[facility])     Chronic diastolic heart failure ([facility] [address] 5/31/2017    Chronic kidney disease     Dependent edema     Hypertension     Incontinence     in female    Nocturnal enuresis 7/9/2021    Osteoarthritis     knee     Type II or unspecified type diabetes mellitus without mention of complication, not stated as uncontrolled     Vertigo 8/1/2017    Vitamin B12 deficiency (dietary) anemia 11/17/2018      Past Surgical History:   Procedure Laterality Date    ANKLE SURGERY Left     FINGER TRIGGER RELEASE Right      Family History   Problem Relation Age of Onset    Diabetes Mother     Diabetes Father     Cancer Paternal Grandfather         BONE        REVIEW OF SYSTEMS:     Review of Systems   Constitutional: Negative for appetite change, chills, fatigue and fever. HENT: Negative. Eyes: Negative. Respiratory: Negative for cough, shortness of breath and wheezing. Cardiovascular: Negative for chest pain, palpitations and leg swelling. Gastrointestinal: Negative for abdominal pain, constipation, diarrhea and nausea. Genitourinary: Positive for vaginal bleeding. Negative for dysuria, flank pain, frequency, hematuria and urgency. Musculoskeletal: Positive for back pain (chronic lower back). Negative for arthralgias and myalgias. Allergic/Immunologic: Negative for environmental allergies and food allergies. Neurological: Negative for dizziness, light-headedness and headaches. Psychiatric/Behavioral: Positive for dysphoric mood. Negative for agitation, self-injury, sleep disturbance and suicidal ideas. The patient is nervous/anxious.          [facility] Scores 3/16/2022 12/9/2021 5/4/2021 10/8/2020 10/8/2020 6/2/2020 3/2/2020   PHQ2 Score 1 2 1 4 4 3 2   PHQ9 Score 1 2 1 16 4 11 2     Interpretation of Total Score Depression Severity: 1-4 = Minimal depression, 5-9 = Mild depression, 10-14 = Moderate depression, 15-19 = Moderately severe depression, 20-27 = Severe depression     PHYSICAL EXAM:     /76   Pulse 58   Wt 250 lb 1.6 oz (113.4 kg)   LMP  (LMP Unknown)   SpO2 95%   BMI 40.37 kg/m²    Body mass index is 40.37 kg/m². Physical Exam  Constitutional:       Appearance: Normal appearance. She is well-developed and well-groomed. She is obese. HENT:      Head: Normocephalic. Eyes:      Conjunctiva/sclera: Conjunctivae normal.   Neck:      Thyroid: No thyromegaly. Vascular: No carotid bruit. Cardiovascular:      Rate and Rhythm: Normal rate and regular rhythm. Heart sounds: Normal heart sounds. Pulmonary:      Effort: Pulmonary effort is normal.      Breath sounds: Normal breath sounds. No wheezing. Musculoskeletal:      Cervical back: Neck supple. Right lower leg: No edema. Left lower leg: No edema. Lymphadenopathy:      Cervical: No cervical adenopathy. Skin:     Capillary Refill: Capillary refill takes less than 2 seconds. Neurological:      Mental Status: She is alert and oriented to person, place, and time. Gait: Gait abnormal (uses walker). Psychiatric:         Mood and Affect: Mood is anxious and depressed. Behavior: Behavior is cooperative. ASSESSMENT /PLAN   1. Essential hypertension  2. Other hyperlipidemia  3. Postmenopausal vaginal bleeding  -     US PELVIS COMPLETE NON-OB TRANSABDOMINAL AND TRANSVAGINAL; Future  4. Chronic diastolic heart failure ([address]  5. Mild episode of recurrent major depressive disorder ([address]  6. Type 2 diabetes mellitus with stage 3b chronic kidney disease, with long-term current use of insulin ([address]  7. DAVID treated with BiPAP  8. Urge incontinence of urine  9.  Obesity, morbid, BMI 40.0-49.9 ([address] Return in about 6 months (around 9/16/2022). All patient questions answered. Patient voiced understanding. Health Maintenance reviewed. Instructed to continue current medications. Patient agreed with treatment plan. Follow up as directed. Please note that this chart was generated using voice recognition Dragon dictation software. Although every effort was made to ensure the accuracy of this automated transcription, some errors in transcription may have occurred.     Electronically signed by ORTIZ Driscoll CNP on 3/21/2022

## 2022-03-18 NOTE — PATIENT INSTRUCTIONS
If you need to reach the Urologist or Urology Nurses for any health care questions or concerns please call [phone] and ask to speak with the [facility] Urology Nurse. The phone line is open from 8a-430p Monday thru Friday.

## 2022-03-21 PROBLEM — N95.0 POSTMENOPAUSAL VAGINAL BLEEDING: Status: ACTIVE | Noted: 2022-03-21

## 2022-03-21 ASSESSMENT — ENCOUNTER SYMPTOMS
SHORTNESS OF BREATH: 0
ABDOMINAL PAIN: 0
EYES NEGATIVE: 1
COUGH: 0
CONSTIPATION: 0
WHEEZING: 0

## 2022-03-23 ENCOUNTER — OFFICE VISIT (OUTPATIENT)
Dept: UROLOGY | Age: 77
End: 2022-03-23
Payer: MEDICARE

## 2022-03-23 VITALS
HEART RATE: 61 BPM | DIASTOLIC BLOOD PRESSURE: 76 MMHG | WEIGHT: 251.4 LBS | OXYGEN SATURATION: 95 % | SYSTOLIC BLOOD PRESSURE: 126 MMHG | BODY MASS INDEX: 40.4 KG/M2 | HEIGHT: 66 IN

## 2022-03-23 DIAGNOSIS — N39.41 URGE INCONTINENCE: Primary | ICD-10-CM

## 2022-03-23 DIAGNOSIS — N39.44 NOCTURNAL ENURESIS: ICD-10-CM

## 2022-03-23 DIAGNOSIS — N39.46 MIXED INCONTINENCE: ICD-10-CM

## 2022-03-23 PROCEDURE — 1090F PRES/ABSN URINE INCON ASSESS: CPT | Performed by: UROLOGY

## 2022-03-23 PROCEDURE — 1036F TOBACCO NON-USER: CPT | Performed by: UROLOGY

## 2022-03-23 PROCEDURE — G8400 PT W/DXA NO RESULTS DOC: HCPCS | Performed by: UROLOGY

## 2022-03-23 PROCEDURE — 99214 OFFICE O/P EST MOD 30 MIN: CPT | Performed by: UROLOGY

## 2022-03-23 PROCEDURE — G8417 CALC BMI ABV UP PARAM F/U: HCPCS | Performed by: UROLOGY

## 2022-03-23 PROCEDURE — 1123F ACP DISCUSS/DSCN MKR DOCD: CPT | Performed by: UROLOGY

## 2022-03-23 PROCEDURE — G8427 DOCREV CUR MEDS BY ELIG CLIN: HCPCS | Performed by: UROLOGY

## 2022-03-23 PROCEDURE — 3017F COLORECTAL CA SCREEN DOC REV: CPT | Performed by: UROLOGY

## 2022-03-23 PROCEDURE — G8484 FLU IMMUNIZE NO ADMIN: HCPCS | Performed by: UROLOGY

## 2022-03-23 PROCEDURE — 0509F URINE INCON PLAN DOCD: CPT | Performed by: UROLOGY

## 2022-03-23 PROCEDURE — 4040F PNEUMOC VAC/ADMIN/RCVD: CPT | Performed by: UROLOGY

## 2022-03-23 RX ORDER — OXYBUTYNIN CHLORIDE 10 MG/1
10 TABLET, EXTENDED RELEASE ORAL DAILY
Qty: 30 TABLET | Refills: 6 | Status: SHIPPED | OUTPATIENT
Start: 2022-03-23 | End: 2022-08-15

## 2022-03-23 NOTE — PROGRESS NOTES
Pt states that intermittent  incontinence is not improving     Pt would like to know if she should get a refill on her oxybutynin

## 2022-03-23 NOTE — PROGRESS NOTES
MD MD [facility] [address] [facility] Consultation / New Patient Visit    Patient:  Kathia Cabral  YOB: 1945  Date: 3/23/2022  Consult requested from ORTIZ Chris - CNP     HISTORY OF PRESENT ILLNESS:   The patient is a 77 y.o. female who presents today for follow-up for the following problem(s): recurrent UTI  Overall the problem(s) : are worsening. Associated Symptoms: No dysuria, gross hematuria. Pain Severity:      Today visit:   3/23/22   Presents in follow up after Interstim placement, initially she had improvement of her symptom, but now she has recurrence of her symptoms. She is now using 4-5 ppd, wet. Needs interrogation. Nocturia has improved. The patient is very bothered by bumex, which exacerbates her symptoms. We discuss the importance of this medication and will ask for her PCP to review her volume status and if there is any way to improve her regimen to help with her symptoms. 21    Botox 200 units (3/10/21) - unfortunately she has failed botox with early return of symptoms < 3 months. She is back to PPD: 1 for confidenc 2-3, wet pads daily. Nocturnal enuresis has returned. She is very bothered by these symptoms. She is now interested in interstim, we discuss risks and benefits. 20   Today Jeff Cai is doing well. States Nocturia has improved with oxybutynin QHS. She is tolerating the medication well, no dry mouth or constipation. She states she is having decline of her memory, so her last visit, the information regarding improvement of her symptoms was not accurate. Urge incontinence worsenin daily, 3 at night. Avoiding fluids and lasix after dinner. Lasix only in am.  Vesicare 10 mg ER in am - no dry mouth and constipation  States failing medical therapy.      PMH:   DAVID on CPAP  Transverse myelitis     Summary of old records:   (Patient's old records, notes and chart reviewed and summarized above.)    Urinalysis today:  No results found for this visit on 03/23/22.     Last BUN and creatinine:  Lab Results   Component Value Date    BUN 29 (H) 09/18/2021     Lab Results   Component Value Date    CREATININE 1.1 (H) 09/18/2021       Imaging Reviewed during this Office Visit:   (results were independently reviewed by physician and radiology report verified)    PAST MEDICAL, FAMILY AND SOCIAL HISTORY:  Past Medical History:   Diagnosis Date    Actinic keratitis     Ankle pain     Anxiety 6/23/2019    Arthritis     Back pain     low     Carpal tunnel syndrome, right 6/29/2017    Chronic [facility]congest[facility] failure) ([facility])     Chronic diastolic heart failure ([facility] [address] 5/31/2017    Chronic kidney disease     Dependent edema     Hypertension     Incontinence     in female    Nocturnal enuresis 7/9/2021    Osteoarthritis     knee     Type II or unspecified type diabetes mellitus without mention of complication, not stated as uncontrolled     Vertigo 8/1/2017    Vitamin B12 deficiency (dietary) anemia 11/17/2018     Past Surgical History:   Procedure Laterality Date    ANKLE SURGERY Left     FINGER TRIGGER RELEASE Right      Family History   Problem Relation Age of Onset    Diabetes Mother     Diabetes Father     Cancer Paternal Grandfather         BONE      Outpatient Medications Marked as Taking for the 3/23/22 encounter (Office Visit) with Ezra Segal MD   Medication Sig Dispense Refill    insulin aspart (NOVOLOG FLEXPEN) 100 UNIT/ML injection pen Use tid with meals With sliding scale max dose is 30 units per day 3 pen 5    Insulin Degludec (TRESIBA FLEXTOUCH) 200 UNIT/ML SOPN Inject 28 Units into the skin daily 6 pen 5    ferrous sulfate (IRON 325) 325 (65 Fe) MG tablet Take 1 tablet by mouth daily (with breakfast) 90 tablet 1    lisinopril (PRINIVIL;ZESTRIL) 20 MG tablet Take 1 tablet by mouth daily 90 tablet 1    montelukast (SINGULAIR) 10 MG tablet Take 1 tablet by mouth daily 90 tablet 3    dapagliflozin (FARXIGA) 10 MG tablet Take 1 tablet by mouth every morning 90 tablet 3    aspirin 325 MG tablet Take 325 mg by mouth daily       carvedilol (COREG) 3.125 MG tablet Take 1 tablet by mouth 2 times daily (with meals) 180 tablet 3    memantine (NAMENDA) 10 MG tablet Take 10 mg by mouth 2 times daily       busPIRone (BUSPAR) 15 MG tablet TAKE 1 TABLET THREE TIMES DAILY 270 tablet 3    bumetanide (BUMEX) 1 MG tablet Take 1 tablet by mouth 2 times daily 180 tablet 1    citalopram (CELEXA) 10 MG tablet Take 1 tablet by mouth once daily 90 tablet 3    cyanocobalamin 1000 MCG/ML injection Inject 1 mL into the muscle every 30 days 1 mL 5    simvastatin (ZOCOR) 40 MG tablet TAKE 1 TABLET EVERY NIGHT 90 tablet 3    Continuous Blood Gluc Sensor (FREESTYLE VERA 14 DAY SENSOR) MISC 6 each by Does not apply route daily 6 each 5    nystatin (MYCOSTATIN) 229815 UNIT/GM cream Apply topically 4 times daily. Continue to apply for 3 days after rash resolves 30 g 1    glucagon 1 MG injection Inject 1 mg into the muscle See Admin Instructions Follow package directions for low blood sugar. 1 kit 3    donepezil (ARICEPT) 5 MG tablet Take 1 tablet by mouth nightly 90 tablet 3    blood glucose monitor strips by Other route 3 times daily Test 3 times a day & as needed for symptoms of irregular blood glucose. 100 strip 5    RELION ULTRA THIN LANCETS 30G MISC 1 each by Does not apply route 3 times daily 300 each 3    meclizine (ANTIVERT) 12.5 MG tablet Take 1 tablet by mouth 3 times daily as needed for Dizziness 90 tablet 2    acetaminophen (TYLENOL ARTHRITIS PAIN) 650 MG extended release tablet Take 650 mg by mouth every 8 hours as needed for Pain          Patient has no known allergies.   Social History     Tobacco Use   Smoking Status Never Smoker   Smokeless Tobacco Never Used       Social History     Substance and Sexual Activity   Alcohol Use No       REVIEW OF SYSTEMS:  Constitutional: negative  Eyes:

## 2022-03-31 DIAGNOSIS — D51.8 VITAMIN B12 DEFICIENCY (DIETARY) ANEMIA: ICD-10-CM

## 2022-03-31 RX ORDER — CYANOCOBALAMIN 1000 UG/ML
1000 INJECTION INTRAMUSCULAR; SUBCUTANEOUS
Qty: 1 ML | Refills: 11 | Status: SHIPPED | OUTPATIENT
Start: 2022-03-31

## 2022-03-31 NOTE — TELEPHONE ENCOUNTER
Gagan Moran is calling to request a refill on the following medication(s):  Requested Prescriptions     Pending Prescriptions Disp Refills    cyanocobalamin 1000 MCG/ML injection 1 mL 11     Sig: Inject 1 mL into the muscle every 30 days       Last Visit Date (If Applicable):  0/16/7610    Next Visit Date:    9/15/2022

## 2022-04-05 ENCOUNTER — TELEPHONE (OUTPATIENT)
Dept: FAMILY MEDICINE CLINIC | Age: 77
End: 2022-04-05

## 2022-04-05 DIAGNOSIS — Z79.4 TYPE 2 DIABETES MELLITUS WITH STAGE 3B CHRONIC KIDNEY DISEASE, WITH LONG-TERM CURRENT USE OF INSULIN (HCC): ICD-10-CM

## 2022-04-05 DIAGNOSIS — R93.89 ENDOMETRIAL THICKENING ON ULTRASOUND: ICD-10-CM

## 2022-04-05 DIAGNOSIS — N18.32 TYPE 2 DIABETES MELLITUS WITH STAGE 3B CHRONIC KIDNEY DISEASE, WITH LONG-TERM CURRENT USE OF INSULIN (HCC): ICD-10-CM

## 2022-04-05 DIAGNOSIS — N95.0 POSTMENOPAUSAL VAGINAL BLEEDING: Primary | ICD-10-CM

## 2022-04-05 DIAGNOSIS — E11.22 TYPE 2 DIABETES MELLITUS WITH STAGE 3B CHRONIC KIDNEY DISEASE, WITH LONG-TERM CURRENT USE OF INSULIN (HCC): ICD-10-CM

## 2022-04-25 ENCOUNTER — TELEPHONE (OUTPATIENT)
Dept: INTERNAL MEDICINE | Age: 77
End: 2022-04-25

## 2022-04-25 DIAGNOSIS — E11.22 TYPE 2 DIABETES MELLITUS WITH STAGE 3B CHRONIC KIDNEY DISEASE, WITH LONG-TERM CURRENT USE OF INSULIN (HCC): Primary | ICD-10-CM

## 2022-04-25 DIAGNOSIS — Z79.4 TYPE 2 DIABETES MELLITUS WITH STAGE 3B CHRONIC KIDNEY DISEASE, WITH LONG-TERM CURRENT USE OF INSULIN (HCC): Primary | ICD-10-CM

## 2022-04-25 DIAGNOSIS — N18.32 TYPE 2 DIABETES MELLITUS WITH STAGE 3B CHRONIC KIDNEY DISEASE, WITH LONG-TERM CURRENT USE OF INSULIN (HCC): Primary | ICD-10-CM

## 2022-04-25 RX ORDER — INSULIN LISPRO 100 [IU]/ML
INJECTION, SOLUTION INTRAVENOUS; SUBCUTANEOUS
Qty: 15 PEN | Refills: 3 | Status: SHIPPED | OUTPATIENT
Start: 2022-04-25

## 2022-04-27 ENCOUNTER — OFFICE VISIT (OUTPATIENT)
Dept: UROLOGY | Age: 77
End: 2022-04-27
Payer: MEDICARE

## 2022-04-27 VITALS
DIASTOLIC BLOOD PRESSURE: 82 MMHG | BODY MASS INDEX: 40.84 KG/M2 | WEIGHT: 253 LBS | HEART RATE: 54 BPM | OXYGEN SATURATION: 94 % | SYSTOLIC BLOOD PRESSURE: 136 MMHG

## 2022-04-27 DIAGNOSIS — N18.31 STAGE 3A CHRONIC KIDNEY DISEASE (HCC): ICD-10-CM

## 2022-04-27 DIAGNOSIS — N39.41 URGE INCONTINENCE: Primary | ICD-10-CM

## 2022-04-27 PROBLEM — N18.30 CHRONIC RENAL DISEASE, STAGE III (HCC): Status: ACTIVE | Noted: 2022-04-27

## 2022-04-27 PROCEDURE — 99213 OFFICE O/P EST LOW 20 MIN: CPT | Performed by: UROLOGY

## 2022-04-27 PROCEDURE — 1036F TOBACCO NON-USER: CPT | Performed by: UROLOGY

## 2022-04-27 PROCEDURE — 1090F PRES/ABSN URINE INCON ASSESS: CPT | Performed by: UROLOGY

## 2022-04-27 PROCEDURE — 0509F URINE INCON PLAN DOCD: CPT | Performed by: UROLOGY

## 2022-04-27 PROCEDURE — G8400 PT W/DXA NO RESULTS DOC: HCPCS | Performed by: UROLOGY

## 2022-04-27 PROCEDURE — 3017F COLORECTAL CA SCREEN DOC REV: CPT | Performed by: UROLOGY

## 2022-04-27 PROCEDURE — G8427 DOCREV CUR MEDS BY ELIG CLIN: HCPCS | Performed by: UROLOGY

## 2022-04-27 PROCEDURE — 1123F ACP DISCUSS/DSCN MKR DOCD: CPT | Performed by: UROLOGY

## 2022-04-27 PROCEDURE — G8417 CALC BMI ABV UP PARAM F/U: HCPCS | Performed by: UROLOGY

## 2022-04-27 PROCEDURE — 4040F PNEUMOC VAC/ADMIN/RCVD: CPT | Performed by: UROLOGY

## 2022-04-27 RX ORDER — FLUCONAZOLE 100 MG/1
100 TABLET ORAL DAILY
Qty: 8 TABLET | Refills: 0 | Status: SHIPPED | OUTPATIENT
Start: 2022-04-27 | End: 2022-05-04

## 2022-04-27 NOTE — PROGRESS NOTES
Dr. Sha Boston MD MD  [facility] Urology Clinic Consultation / New Patient Visit    Patient:  Sharon Silva  YOB: 1945  Date: 2022  Consult requested from ORTIZ Collins CNP     HISTORY OF PRESENT ILLNESS:   The patient is a 77 y.o. female who presents today for follow-up for the following problem(s): recurrent UTI  Overall the problem(s) : are worsening. Associated Symptoms: No dysuria, gross hematuria. Pain Severity:      Today visit:   22    Presents in follow up after Interstim placement, she was restarted on oxybutynin and has better control with combo therapy. She is now using 3 PPD, damp. (4-5 ppd wet). Needs interrogation. Nocturia has improved. The patient is very bothered by bumex, which exacerbates her symptoms. We discuss the importance of this medication and will ask for her PCP to review her volume status and if there is any way to improve her regimen to help with her symptoms. 21    Botox 200 units (3/10/21) - unfortunately she has failed botox with early return of symptoms < 3 months. She is back to PPD: 1 for confidenc 2-3, wet pads daily. Nocturnal enuresis has returned. She is very bothered by these symptoms. She is now interested in interstim, we discuss risks and benefits. 20   Today Samantha Zafar is doing well. States Nocturia has improved with oxybutynin QHS. She is tolerating the medication well, no dry mouth or constipation. She states she is having decline of her memory, so her last visit, the information regarding improvement of her symptoms was not accurate. Urge incontinence worsenin daily, 3 at night. Avoiding fluids and lasix after dinner. Lasix only in am.  Vesicare 10 mg ER in am - no dry mouth and constipation  States failing medical therapy.      PMH:   DAVID on CPAP  Transverse myelitis     Summary of old records:   (Patient's old records, notes and chart reviewed and summarized above.)    Urinalysis today:  No results found for this visit on 04/27/22.     Last BUN and creatinine:  Lab Results   Component Value Date    BUN 29 (H) 09/18/2021     Lab Results   Component Value Date    CREATININE 1.1 (H) 09/18/2021       Imaging Reviewed during this Office Visit:   (results were independently reviewed by physician and radiology report verified)    PAST MEDICAL, FAMILY AND SOCIAL HISTORY:  Past Medical History:   Diagnosis Date    Actinic keratitis     Ankle pain     Anxiety 6/23/2019    Arthritis     Back pain     low     Carpal tunnel syndrome, right 6/29/2017    Chronic diastolic [facility]congestive heart failure) [facility])     Chronic diastolic heart failure ([facility] [address] 5/31/2017    Chronic kidney disease     Dependent edema     Hypertension     Incontinence     in female    Nocturnal enuresis 7/9/2021    Osteoarthritis     knee     Type II or unspecified type diabetes mellitus without mention of complication, not stated as uncontrolled     Vertigo 8/1/2017    Vitamin B12 deficiency (dietary) anemia 11/17/2018     Past Surgical History:   Procedure Laterality Date    ANKLE SURGERY Left     FINGER TRIGGER RELEASE Right      Family History   Problem Relation Age of Onset    Diabetes Mother     Diabetes Father     Cancer Paternal Grandfather         BONE      Outpatient Medications Marked as Taking for the 4/27/22 encounter (Office Visit) with Baljeet De Jesus MD   Medication Sig Dispense Refill    insulin lispro, 1 Unit Dial, (HUMALOG KWIKPEN) 100 UNIT/ML SOPN Use tid with meals With sliding scale max dose is 30 units per day 15 pen 3    cyanocobalamin 1000 MCG/ML injection Inject 1 mL into the muscle every 30 days 1 mL 11    oxybutynin (DITROPAN XL) 10 MG extended release tablet Take 1 tablet by mouth daily 30 tablet 6    Insulin Degludec (TRESIBA FLEXTOUCH) 200 UNIT/ML SOPN Inject 28 Units into the skin daily 6 pen 5    ferrous sulfate (IRON 325) 325 (65 Fe) MG tablet Take 1 tablet by mouth daily (with breakfast) 90 tablet 1    lisinopril (PRINIVIL;ZESTRIL) 20 MG tablet Take 1 tablet by mouth daily 90 tablet 1    montelukast (SINGULAIR) 10 MG tablet Take 1 tablet by mouth daily 90 tablet 3    dapagliflozin (FARXIGA) 10 MG tablet Take 1 tablet by mouth every morning 90 tablet 3    aspirin 325 MG tablet Take 325 mg by mouth daily       carvedilol (COREG) 3.125 MG tablet Take 1 tablet by mouth 2 times daily (with meals) 180 tablet 3    memantine (NAMENDA) 10 MG tablet Take 10 mg by mouth 2 times daily       busPIRone (BUSPAR) 15 MG tablet TAKE 1 TABLET THREE TIMES DAILY 270 tablet 3    bumetanide (BUMEX) 1 MG tablet Take 1 tablet by mouth 2 times daily 180 tablet 1    citalopram (CELEXA) 10 MG tablet Take 1 tablet by mouth once daily 90 tablet 3    simvastatin (ZOCOR) 40 MG tablet TAKE 1 TABLET EVERY NIGHT 90 tablet 3    Continuous Blood Gluc Sensor (Define My StyleSTYLE VERA 14 DAY SENSOR) MISC 6 each by Does not apply route daily 6 each 5    nystatin (MYCOSTATIN) 829106 UNIT/GM cream Apply topically 4 times daily. Continue to apply for 3 days after rash resolves 30 g 1    glucagon 1 MG injection Inject 1 mg into the muscle See Admin Instructions Follow package directions for low blood sugar. 1 kit 3    donepezil (ARICEPT) 5 MG tablet Take 1 tablet by mouth nightly 90 tablet 3    blood glucose monitor strips by Other route 3 times daily Test 3 times a day & as needed for symptoms of irregular blood glucose. 100 strip 5    RELION ULTRA THIN LANCETS 30G MISC 1 each by Does not apply route 3 times daily 300 each 3    meclizine (ANTIVERT) 12.5 MG tablet Take 1 tablet by mouth 3 times daily as needed for Dizziness 90 tablet 2    acetaminophen (TYLENOL ARTHRITIS PAIN) 650 MG extended release tablet Take 650 mg by mouth every 8 hours as needed for Pain          Patient has no known allergies.   Social History     Tobacco Use   Smoking Status Never Smoker   Smokeless Tobacco Never Used       Social History Substance and Sexual Activity   Alcohol Use No       REVIEW OF SYSTEMS:  Constitutional: negative  Eyes: negative  Respiratory: negative  Cardiovascular: negative  Gastrointestinal: negative  Genitourinary: negative  Musculoskeletal: negative  Skin: negative   Neurological: negative  Hematological/Lymphatic: negative  Psychological: negative    Physical Exam:    This a 77 y.o. female      Vitals:    04/27/22 1207   BP: 136/82   Pulse: 54   SpO2: 94%     Telephone      Assessment and Plan      1. Urge incontinence    2. Stage 3a chronic kidney disease ([facility] [address]           Plan:      No follow-ups on file.   Interstim for urinary urge incontinence - symptoms improved with combo therapy Oxybutynin 10 mg ER   - Recommend weight loss    Discussed behavioral modifications  Bumex in am - has helped night time syptoms  Ask PCP to review Bumex regimen - consider compression stocking

## 2022-05-03 ENCOUNTER — OFFICE VISIT (OUTPATIENT)
Dept: DIABETES SERVICES | Age: 77
End: 2022-05-03
Payer: MEDICARE

## 2022-05-03 VITALS
BODY MASS INDEX: 40.5 KG/M2 | WEIGHT: 252 LBS | SYSTOLIC BLOOD PRESSURE: 134 MMHG | DIASTOLIC BLOOD PRESSURE: 84 MMHG | HEART RATE: 52 BPM | RESPIRATION RATE: 14 BRPM | HEIGHT: 66 IN

## 2022-05-03 DIAGNOSIS — N18.32 TYPE 2 DIABETES MELLITUS WITH STAGE 3B CHRONIC KIDNEY DISEASE, WITH LONG-TERM CURRENT USE OF INSULIN (HCC): Primary | ICD-10-CM

## 2022-05-03 DIAGNOSIS — I10 ESSENTIAL HYPERTENSION: ICD-10-CM

## 2022-05-03 DIAGNOSIS — E11.22 TYPE 2 DIABETES MELLITUS WITH STAGE 3B CHRONIC KIDNEY DISEASE, WITH LONG-TERM CURRENT USE OF INSULIN (HCC): Primary | ICD-10-CM

## 2022-05-03 DIAGNOSIS — Z79.4 TYPE 2 DIABETES MELLITUS WITH STAGE 3B CHRONIC KIDNEY DISEASE, WITH LONG-TERM CURRENT USE OF INSULIN (HCC): Primary | ICD-10-CM

## 2022-05-03 DIAGNOSIS — E78.49 OTHER HYPERLIPIDEMIA: ICD-10-CM

## 2022-05-03 PROCEDURE — 99213 OFFICE O/P EST LOW 20 MIN: CPT | Performed by: NURSE PRACTITIONER

## 2022-05-03 PROCEDURE — 1090F PRES/ABSN URINE INCON ASSESS: CPT | Performed by: NURSE PRACTITIONER

## 2022-05-03 PROCEDURE — 95251 CONT GLUC MNTR ANALYSIS I&R: CPT | Performed by: NURSE PRACTITIONER

## 2022-05-03 PROCEDURE — 1123F ACP DISCUSS/DSCN MKR DOCD: CPT | Performed by: NURSE PRACTITIONER

## 2022-05-03 PROCEDURE — 1036F TOBACCO NON-USER: CPT | Performed by: NURSE PRACTITIONER

## 2022-05-03 PROCEDURE — 4040F PNEUMOC VAC/ADMIN/RCVD: CPT | Performed by: NURSE PRACTITIONER

## 2022-05-03 PROCEDURE — 2022F DILAT RTA XM EVC RTNOPTHY: CPT | Performed by: NURSE PRACTITIONER

## 2022-05-03 PROCEDURE — G8427 DOCREV CUR MEDS BY ELIG CLIN: HCPCS | Performed by: NURSE PRACTITIONER

## 2022-05-03 PROCEDURE — 3017F COLORECTAL CA SCREEN DOC REV: CPT | Performed by: NURSE PRACTITIONER

## 2022-05-03 PROCEDURE — 3051F HG A1C>EQUAL 7.0%<8.0%: CPT | Performed by: NURSE PRACTITIONER

## 2022-05-03 PROCEDURE — G8400 PT W/DXA NO RESULTS DOC: HCPCS | Performed by: NURSE PRACTITIONER

## 2022-05-03 PROCEDURE — G8417 CALC BMI ABV UP PARAM F/U: HCPCS | Performed by: NURSE PRACTITIONER

## 2022-05-03 PROCEDURE — 99214 OFFICE O/P EST MOD 30 MIN: CPT | Performed by: NURSE PRACTITIONER

## 2022-05-03 ASSESSMENT — ENCOUNTER SYMPTOMS
DIARRHEA: 0
RESPIRATORY NEGATIVE: 1
ABDOMINAL PAIN: 0
SHORTNESS OF BREATH: 0

## 2022-05-03 NOTE — PATIENT INSTRUCTIONS
Humalog Sliding Scale Insulin 2+ plus scale  10-15 minutes before each meal    Blood sugar Action     <70  Drink Juice      No extra insulin  150 - 200 2 units subcutaneous Insulin  201 - 250 4 units subcutaneous Insulin  251 - 300 6 units subcutaneous Insulin  301 - 350 8 units subcutaneous Insulin  351 - 400 10 units subcutaneous Insulin   > 400  12 units subcutaneous Insulin

## 2022-05-03 NOTE — PROGRESS NOTES
[address], [address]  [facility] [id number] [phone]        HISTORY:    Sharon Silva presents today for evaluation and management of:  Chief Complaint   Patient presents with    3 Month Follow-Up    Diabetes       HPI    Interval History:      Past DM Medications   Metformin- ckd  Glimepiride-therapy completed   janvuia- glp1 started  victoza-cost  soliqua- cost     Current Diabetic Medications  novolog TID with sliding scale  Max daily dose 24 units, tresiba 30 units once daily, Farxiga 10 mg once daily.      DKA episodes: 0    08/03/21   At previous visit [place] was started. Doing well other than cost. She is due for a1c after tomorrow. Denies any s/s of hyper/hypoglcemia. She follows with podiatry every 3 months for dm foot care.   Diet: unchanged.   Exercise: PT  BS testing: Uses CGM daily with success and is adherent to cgm regimen.   Issues: she fell last week and has follow up with neurology on 8/16/21.      11/02/21   At previous visit dm counseling was provided. She does admit to not taking short acting insulin before meals. She denies any s/s of hyper/hypoglcyemia.   Diet: regular, high carb high fat  Exercise: none  BS testing: uses cgm daily with success and is adherent to cgm therapy  Issues: deneis  Diabetic foot exam up-to-date: No  Diabetic retinal exam up-to-date: Yes  Hypoglycemia as needed treatment: glucose tabs     02/15/22   Sharon Silva is a 77 y.o. female patient who presents to clinic today for follow up on her diabetes. she has a history of HTN, HF, neuropathy, OA, hyperlipidemia, obesity and depression which contributes to her diabetes. At previous visit diabetes counseling was provided. she denies any signs or symptoms of hyper/hypoglycemia. she states they are taking their medications as prescribed without any adverse effects.  She is having emotional stress at home and is now interested in counseling. States she is safe at home and denies any thoughts of self harm. Diet: regular  Exercise: none  BS testing: uses cgm daily with success and is adherent to cgm therapy  Issues: kirti  Diabetic foot exam up-to-date: Yes  Diabetic retinal exam up-to-date: Yes  Hypoglycemia as needed treatment: glucose tabs    05/03/22   Humble Barcenas is a 77 y.o. female patient who presents to clinic today for her diabetes. she has a history of HTN, HF, CKD, neuropathy, OA, hyperlipidemia, obesity and depression  which contributes to her diabetes. At previous visit insulin was decreased due to hypoglcyemia. she denies any current signs or symptoms of hyper/hypoglycemia. she states they are taking their medications as prescribed without any adverse effects. Diet: regular  Exercise: none  BS testing: uses cgm daily with success and is adherent to cgm therapy  Issues: kirti  Diabetic foot exam up-to-date: Yes  Diabetic retinal exam up-to-date: Yes  Hypoglycemia as needed treatment: glucose tabs    High cholesterol-  Takes zocor and denies any adverse effects with its use.  Watches diet and exercise.      Hypertension-  Takes coreg bumex, and lisinopril and denies any adverse effects with their use. Watches diet and exercise. Denies any chest pain, dizziness or edema.  Follows with cardiology:Yes. Yearly      Obesity- Working on weight loss.       Past Medical History:   Diagnosis Date    Actinic keratitis     Ankle pain     Anxiety 6/23/2019    Arthritis     Back pain     low     Carpal tunnel syndrome, right 6/29/2017    Chronic diastolic CHF (congestive heart failure) (HCC)     Chronic diastolic heart failure ([facility] [address] 5/31/2017    Chronic kidney disease     Dependent edema     Hypertension     Incontinence     in female    Nocturnal enuresis 7/9/2021    Osteoarthritis     knee     Type II or unspecified type diabetes mellitus without mention of complication, not stated as uncontrolled     Vertigo 8/1/2017    Vitamin B12 deficiency (dietary) anemia 11/17/2018     Family History   Problem Relation Age of Onset    Diabetes Mother     Diabetes Father     Cancer Paternal Grandfather         BONE      Social History     Tobacco Use    Smoking status: Never Smoker    Smokeless tobacco: Never Used   Vaping Use    Vaping Use: Never used   Substance Use Topics    Alcohol use: No    Drug use: No     No Known Allergies    MEDICATIONS:  Current Outpatient Medications   Medication Sig Dispense Refill    fluconazole (DIFLUCAN) 100 MG tablet Take 1 tablet by mouth daily for 7 days 2 tabs day one, then finish as directed 8 tablet 0    insulin lispro, 1 Unit Dial, (HUMALOG KWIKPEN) 100 UNIT/ML SOPN Use tid with meals With sliding scale max dose is 30 units per day 15 pen 3    cyanocobalamin 1000 MCG/ML injection Inject 1 mL into the muscle every 30 days 1 mL 11    oxybutynin (DITROPAN XL) 10 MG extended release tablet Take 1 tablet by mouth daily 30 tablet 6    Insulin Degludec (TRESIBA FLEXTOUCH) 200 UNIT/ML SOPN Inject 28 Units into the skin daily 6 pen 5    ferrous sulfate (IRON 325) 325 (65 Fe) MG tablet Take 1 tablet by mouth daily (with breakfast) 90 tablet 1    lisinopril (PRINIVIL;ZESTRIL) 20 MG tablet Take 1 tablet by mouth daily 90 tablet 1    montelukast (SINGULAIR) 10 MG tablet Take 1 tablet by mouth daily 90 tablet 3    dapagliflozin (FARXIGA) 10 MG tablet Take 1 tablet by mouth every morning 90 tablet 3    aspirin 325 MG tablet Take 325 mg by mouth daily       carvedilol (COREG) 3.125 MG tablet Take 1 tablet by mouth 2 times daily (with meals) 180 tablet 3    memantine (NAMENDA) 10 MG tablet Take 10 mg by mouth 2 times daily       busPIRone (BUSPAR) 15 MG tablet TAKE 1 TABLET THREE TIMES DAILY (Patient taking differently: Take 15 mg by mouth 2 times daily ) 270 tablet 3    bumetanide (BUMEX) 1 MG tablet Take 1 tablet by mouth 2 times daily 180 tablet 1    citalopram (CELEXA) 10 MG tablet Take 1 tablet by mouth once daily 90 tablet 3    simvastatin (ZOCOR) 40 MG tablet TAKE 1 TABLET EVERY NIGHT 90 tablet 3    nystatin (MYCOSTATIN) 385495 UNIT/GM cream Apply topically 4 times daily. Continue to apply for 3 days after rash resolves 30 g 1    donepezil (ARICEPT) 5 MG tablet Take 1 tablet by mouth nightly 90 tablet 3    meclizine (ANTIVERT) 12.5 MG tablet Take 1 tablet by mouth 3 times daily as needed for Dizziness 90 tablet 2    acetaminophen (TYLENOL ARTHRITIS PAIN) 650 MG extended release tablet Take 650 mg by mouth every 8 hours as needed for Pain       DROPLET PEN NEEDLES 32G X 4 MM MISC USE 4 TIMES DAILY (Patient not taking: Reported on 3/23/2022) 400 each 1    Continuous Blood Gluc Sensor (FREESTYLE VERA 14 DAY SENSOR) MISC 6 each by Does not apply route daily 6 each 5    glucagon 1 MG injection Inject 1 mg into the muscle See Admin Instructions Follow package directions for low blood sugar. 1 kit 3    blood glucose monitor strips by Other route 3 times daily Test 3 times a day & as needed for symptoms of irregular blood glucose. 100 strip 5    RELION ULTRA THIN LANCETS 30G MISC 1 each by Does not apply route 3 times daily 300 each 3     No current facility-administered medications for this visit. Review ofSymptoms:  Review of Systems   Constitutional: Positive for fatigue. Negative for unexpected weight change. Eyes: Negative for visual disturbance. Respiratory: Negative. Negative for shortness of breath. Cardiovascular: Negative for chest pain and leg swelling. Gastrointestinal: Negative for abdominal pain and diarrhea. Endocrine: Negative for polydipsia, polyphagia and polyuria. Genitourinary: Negative. Musculoskeletal: Negative. Skin: Negative for rash and wound. Neurological: Negative for dizziness, tremors, seizures and headaches. Psychiatric/Behavioral: Negative. Negative for confusion and decreased concentration.      Theremainder of a complete 14-point review of systems is negative. Vital Signs: /84 (Site: Left Upper Arm, Position: Sitting, Cuff Size: Large Adult)   Pulse 52   Resp 14   Ht 5' 6\" (1.676 m)   Wt 252 lb (114.3 kg)   LMP  (LMP Unknown)   BMI 40.67 kg/m²      Wt Readings from Last 3 Encounters:   05/03/22 252 lb (114.3 kg)   04/27/22 253 lb (114.8 kg)   03/23/22 251 lb 6.4 oz (114 kg)     Body mass index is 40.67 kg/m². LABS:  Hemoglobin A1C   Date Value Ref Range Status   02/15/2022 7.6 % Final   08/19/2021 8.4 (H) 4.4 - 6.4 % Final     Lab Results   Component Value Date    LABMICR 59.3 (H) 08/19/2021     Lab Results   Component Value Date     09/18/2021    K 4.8 09/18/2021     09/18/2021    CO2 23 09/18/2021    BUN 29 (H) 09/18/2021    CREATININE 1.1 (H) 09/18/2021    GLUCOSE 169 (H) 09/18/2021    CALCIUM 9.3 09/18/2021    PROT 7.0 09/01/2020    LABALBU 4.1 09/18/2021    BILITOT 0.5 09/18/2021    ALKPHOS 97 09/18/2021    AST 17 09/18/2021    ALT 10 09/18/2021    LABGLOM 51.6 09/18/2021    GFRAA >60 05/21/2021    AGRATIO 1.3 07/05/2019    GLOB 2.9 09/18/2021     Lab Results   Component Value Date    CHOL 160 08/19/2021    CHOL 169 09/01/2020    CHOL 157 03/28/2019     Lab Results   Component Value Date    TRIG 128 08/19/2021    TRIG 89 09/01/2020    TRIG 173 03/28/2019     Lab Results   Component Value Date    HDL 54 08/19/2021    HDL 60 09/01/2020    HDL 45 04/24/2017     Lab Results   Component Value Date    LDLCHOLESTEROL 91 09/01/2020    LDLCALC 80.4 08/19/2021    LDLCALC 69.4 03/28/2019    LDLCALC 125.0 02/21/2018     Lab Results   Component Value Date    VLDL 26 08/19/2021    VLDL NOT REPORTED 09/01/2020    VLDL 35 03/28/2019     Lab Results   Component Value Date    CHOLHDLRATIO 2.96 08/19/2021    CHOLHDLRATIO 2.8 09/01/2020    CHOLHDLRATIO 3.0 03/28/2019           Physical Exam  Constitutional:       Appearance: She is well-developed.    Eyes:      Pupils: Pupils are equal, round, and reactive to light.   Neck:      Thyroid: No thyroid mass, thyromegaly or thyroid tenderness. Cardiovascular:      Rate and Rhythm: Normal rate and regular rhythm. Heart sounds: Normal heart sounds. Pulmonary:      Effort: Pulmonary effort is normal.      Breath sounds: Normal breath sounds. Abdominal:      General: Bowel sounds are normal.      Palpations: Abdomen is soft. Skin:     General: Skin is warm and dry. Comments: Negative for open/nonhealing wounds. Negative for lipohypertrophy. Neurological:      Mental Status: She is alert and oriented to person, place, and time. ASSESSMENT/PLAN:     Diagnosis Orders   1. Type 2 diabetes mellitus with stage 3b chronic kidney disease, with long-term current use of insulin (HCC)  Comprehensive Metabolic Panel    Hemoglobin A1C    Microalbumin, Ur   2. Other hyperlipidemia  Lipid, Fasting   3. Essential hypertension     4. BMI 40.0-44.9, adult ([facility] [address]       Orders Placed This Encounter   Procedures    Comprehensive Metabolic Panel    Hemoglobin A1C    Lipid, Fasting    Microalbumin, Ur     No orders of the defined types were placed in this encounter. Requested Prescriptions      No prescriptions requested or ordered in this encounter       1. Type 2 diabetes mellitus with stage 3b chronic kidney disease, with long-term current use of insulin (HCC)  - Unstable  HbA1C goal is less than 7%. - Fasting blood glucose goal is 70-120mg/dl and postprandial blood sugar goal is less than 180 mg/dl. - Labs reviewed including most recent A1c, microalbumin and kidney function. Repeat labs due in 3 months.    -We discussed in great detail dietary modifications they can make to better improve their blood sugars. -follow up diabetes education completed, all questions answered. CGM report downloaded and reviewed, scanned to media tab. Time in range 51% and hypoglycemia 0%. Predicted A1c per CGM report 7.8%.   Patient Instructions   Humalog Sliding Scale Insulin 2+ plus scale  10-15 minutes before each meal    Blood sugar Action     <70  Drink Juice      No extra insulin  150 - 200 2 units subcutaneous Insulin  201 - 250 4 units subcutaneous Insulin  251 - 300 6 units subcutaneous Insulin  301 - 350 8 units subcutaneous Insulin  351 - 400 10 units subcutaneous Insulin   > 400  12 units subcutaneous Insulin            Discussed signs and symptoms of hyper/hypoglycemia and how to treat. Encouraged 150 minutes of physical activity per week. Follow a low carbohydrate diet. Encouraged at least 7 hours of sleep. The patient was informed of the goals of diabetes management. This can only be accomplished by watching their diet and exercise levels. We certainly use medicines to help attain these goals. The consequences of not controlling blood sugars were discussed. These include blindness, heart disease, stroke, kidney disease, and possibly need for dialysis. They were told to be careful with their foot care as diabetics often have nerve damage, infections and risk for limb amutations . They also need a dilated eye exam yearly. We discussed the issues of diet, exercise, medication, complication avoidance, reviewed the signs and symptoms of diabetes, hypoglycemic episodes, significance of HbA1C.       - Comprehensive Metabolic Panel; Future  - Hemoglobin A1C; Future  - Microalbumin, Ur; Future    2. Other hyperlipidemia  stable, lipid panel reviewed, continue current medications. Diet and exercise    - Lipid, Fasting; Future    3. Essential hypertension   stable, continue current medications. Diet and exercise Seek emergent care if chest pain develops. 4. BMI 40.0-44.9, adult (HCC)  Reduce calories and increase physical activity to achieve a slow and steady weight loss to improve blood pressure, cholesterol and diabetes. Answered all patient questions. Agrees to follow plan of care and to follow up in 3 months, sooner if needed.  Call office if unexplained blood sugars less than 70 occur or above 400. Call office or access [email or web address] with any further questions or concerns. Be sure to bring glucometer/food log at next appointment. Total time spent reviewing chart, labs, counseling patient and documenting on the date of the encounter: 30 min.       Electronically signed by ORTIZ Cornejo CNP on 5/3/2022 at 12:08 PM      (Please note that portions of this note were completed with a voice-recognition program. Efforts were made to edit the dictation but occasionally words are mis-transcribed.)

## 2022-05-04 ENCOUNTER — TELEPHONE (OUTPATIENT)
Dept: INTERNAL MEDICINE | Age: 77
End: 2022-05-04

## 2022-05-04 NOTE — TELEPHONE ENCOUNTER
Notified patient to call her insurance to see what medication is cheaper in that class. And to give us a call back and we can go from there. Agreed and will call and update us.

## 2022-05-04 NOTE — TELEPHONE ENCOUNTER
Last appt: 5/4/2020  Next appt: Visit date not found    Patient called in saying that she went to  the [address] at the pharmacy and it was going to cost 427 dollars. She can not afford the medication and was wondering what else Shanell recommends.

## 2022-05-19 ENCOUNTER — OFFICE VISIT (OUTPATIENT)
Dept: FAMILY MEDICINE CLINIC | Age: 77
End: 2022-05-19
Payer: MEDICARE

## 2022-05-19 ENCOUNTER — TELEPHONE (OUTPATIENT)
Dept: FAMILY MEDICINE CLINIC | Age: 77
End: 2022-05-19

## 2022-05-19 VITALS
HEART RATE: 52 BPM | BODY MASS INDEX: 39.9 KG/M2 | OXYGEN SATURATION: 97 % | DIASTOLIC BLOOD PRESSURE: 76 MMHG | WEIGHT: 247.2 LBS | SYSTOLIC BLOOD PRESSURE: 124 MMHG

## 2022-05-19 DIAGNOSIS — Z79.4 TYPE 2 DIABETES MELLITUS WITH STAGE 3B CHRONIC KIDNEY DISEASE, WITH LONG-TERM CURRENT USE OF INSULIN (HCC): ICD-10-CM

## 2022-05-19 DIAGNOSIS — F33.0 MILD EPISODE OF RECURRENT MAJOR DEPRESSIVE DISORDER (HCC): ICD-10-CM

## 2022-05-19 DIAGNOSIS — E66.01 OBESITY, MORBID, BMI 40.0-49.9 (HCC): ICD-10-CM

## 2022-05-19 DIAGNOSIS — R41.89 COGNITIVE DECLINE: ICD-10-CM

## 2022-05-19 DIAGNOSIS — E11.40 NEUROPATHY DUE TO TYPE 2 DIABETES MELLITUS (HCC): ICD-10-CM

## 2022-05-19 DIAGNOSIS — J30.9 ALLERGIC RHINITIS, UNSPECIFIED SEASONALITY, UNSPECIFIED TRIGGER: ICD-10-CM

## 2022-05-19 DIAGNOSIS — N95.0 POSTMENOPAUSAL VAGINAL BLEEDING: ICD-10-CM

## 2022-05-19 DIAGNOSIS — G47.33 OSA TREATED WITH BIPAP: ICD-10-CM

## 2022-05-19 DIAGNOSIS — I10 ESSENTIAL HYPERTENSION: ICD-10-CM

## 2022-05-19 DIAGNOSIS — N18.32 TYPE 2 DIABETES MELLITUS WITH STAGE 3B CHRONIC KIDNEY DISEASE, WITH LONG-TERM CURRENT USE OF INSULIN (HCC): ICD-10-CM

## 2022-05-19 DIAGNOSIS — Z01.818 ENCOUNTER FOR PRE-OPERATIVE EXAMINATION: Primary | ICD-10-CM

## 2022-05-19 DIAGNOSIS — E11.22 TYPE 2 DIABETES MELLITUS WITH STAGE 3B CHRONIC KIDNEY DISEASE, WITH LONG-TERM CURRENT USE OF INSULIN (HCC): ICD-10-CM

## 2022-05-19 DIAGNOSIS — N39.41 URGE INCONTINENCE OF URINE: ICD-10-CM

## 2022-05-19 DIAGNOSIS — E78.49 OTHER HYPERLIPIDEMIA: ICD-10-CM

## 2022-05-19 DIAGNOSIS — I50.32 CHRONIC DIASTOLIC HEART FAILURE (HCC): ICD-10-CM

## 2022-05-19 LAB
ALBUMIN/GLOBULIN RATIO: 1.4 G/DL
ALBUMIN: 3.9 G/DL (ref 3.5–5)
ALP BLD-CCNC: 81 UNITS/L (ref 38–126)
ALT SERPL-CCNC: 14 UNITS/L (ref 4–35)
ANION GAP SERPL CALCULATED.3IONS-SCNC: 14.6 MMOL/L
AST SERPL-CCNC: 18 UNITS/L (ref 14–36)
BASOPHILS %: 1.04 (ref 0–3)
BASOPHILS ABSOLUTE: 0.13 (ref 0–0.3)
BILIRUB SERPL-MCNC: 0.5 MG/DL (ref 0.2–1.3)
BUN BLDV-MCNC: 39 MG/DL (ref 7–17)
CALCIUM SERPL-MCNC: 8.5 MG/DL (ref 8.4–10.2)
CHLORIDE BLD-SCNC: 106 MMOL/L (ref 98–120)
CO2: 21 MMOL/L (ref 22–31)
CREAT SERPL-MCNC: 1.1 MG/DL (ref 0.5–1)
EOSINOPHILS %: 0.29 (ref 0–10)
EOSINOPHILS ABSOLUTE: 0.04 (ref 0–1.1)
GFR CALCULATED: 51.5
GLOBULIN: 2.8 G/DL
GLUCOSE: 157 MG/DL (ref 65–105)
HCT VFR BLD CALC: 40 % (ref 37–47)
HEMOGLOBIN: 12.8 (ref 12–16)
LYMPHOCYTE %: 13.52 (ref 20–51.1)
LYMPHOCYTES ABSOLUTE: 1.68 (ref 1–5.5)
MCH RBC QN AUTO: 29.7 PG (ref 28.5–32.5)
MCHC RBC AUTO-ENTMCNC: 32.1 G/DL (ref 32–37)
MCV RBC AUTO: 92.8 FL (ref 80–94)
MONOCYTES %: 6.43 (ref 1.7–9.3)
MONOCYTES ABSOLUTE: 0.8 (ref 0.1–1)
NEUTROPHILS %: 78.71 (ref 42.2–75.2)
NEUTROPHILS ABSOLUTE: 9.8 (ref 2–8.1)
PDW BLD-RTO: 14.3 % (ref 8.5–15.5)
PLATELET # BLD: 224.4 THOU/MM3 (ref 130–400)
POTASSIUM SERPL-SCNC: 4.6 MMOL/L (ref 3.6–5)
RBC: 4.32 M/UL (ref 4.2–5.4)
SODIUM BLD-SCNC: 137 MMOL/L (ref 135–145)
TOTAL PROTEIN, SERUM: 6.7 G/DL (ref 6.3–8.2)
WBC: 12.5 THOU/ML3 (ref 4.8–10.8)

## 2022-05-19 PROCEDURE — 1090F PRES/ABSN URINE INCON ASSESS: CPT | Performed by: NURSE PRACTITIONER

## 2022-05-19 PROCEDURE — 99213 OFFICE O/P EST LOW 20 MIN: CPT

## 2022-05-19 PROCEDURE — 4040F PNEUMOC VAC/ADMIN/RCVD: CPT | Performed by: NURSE PRACTITIONER

## 2022-05-19 PROCEDURE — G8417 CALC BMI ABV UP PARAM F/U: HCPCS | Performed by: NURSE PRACTITIONER

## 2022-05-19 PROCEDURE — 2022F DILAT RTA XM EVC RTNOPTHY: CPT | Performed by: NURSE PRACTITIONER

## 2022-05-19 PROCEDURE — G8427 DOCREV CUR MEDS BY ELIG CLIN: HCPCS | Performed by: NURSE PRACTITIONER

## 2022-05-19 PROCEDURE — 1036F TOBACCO NON-USER: CPT | Performed by: NURSE PRACTITIONER

## 2022-05-19 PROCEDURE — 0509F URINE INCON PLAN DOCD: CPT | Performed by: NURSE PRACTITIONER

## 2022-05-19 PROCEDURE — 3051F HG A1C>EQUAL 7.0%<8.0%: CPT | Performed by: NURSE PRACTITIONER

## 2022-05-19 PROCEDURE — 1123F ACP DISCUSS/DSCN MKR DOCD: CPT | Performed by: NURSE PRACTITIONER

## 2022-05-19 PROCEDURE — 3017F COLORECTAL CA SCREEN DOC REV: CPT | Performed by: NURSE PRACTITIONER

## 2022-05-19 PROCEDURE — 99214 OFFICE O/P EST MOD 30 MIN: CPT | Performed by: NURSE PRACTITIONER

## 2022-05-19 PROCEDURE — G8400 PT W/DXA NO RESULTS DOC: HCPCS | Performed by: NURSE PRACTITIONER

## 2022-05-19 SDOH — ECONOMIC STABILITY: FOOD INSECURITY: WITHIN THE PAST 12 MONTHS, THE FOOD YOU BOUGHT JUST DIDN'T LAST AND YOU DIDN'T HAVE MONEY TO GET MORE.: NEVER TRUE

## 2022-05-19 SDOH — ECONOMIC STABILITY: FOOD INSECURITY: WITHIN THE PAST 12 MONTHS, YOU WORRIED THAT YOUR FOOD WOULD RUN OUT BEFORE YOU GOT MONEY TO BUY MORE.: NEVER TRUE

## 2022-05-19 ASSESSMENT — ENCOUNTER SYMPTOMS
EYES NEGATIVE: 1
WHEEZING: 0
CONSTIPATION: 0
SHORTNESS OF BREATH: 0
DIARRHEA: 0
COUGH: 0
ABDOMINAL PAIN: 0
BACK PAIN: 1

## 2022-05-19 ASSESSMENT — SOCIAL DETERMINANTS OF HEALTH (SDOH): HOW HARD IS IT FOR YOU TO PAY FOR THE VERY BASICS LIKE FOOD, HOUSING, MEDICAL CARE, AND HEATING?: NOT HARD AT ALL

## 2022-05-19 NOTE — TELEPHONE ENCOUNTER
See telephone call from 5/4/2022, patient was to call insurance and get back with us.  Next appt 8/2/2022

## 2022-05-19 NOTE — ASSESSMENT & PLAN NOTE
Monitored by specialist- no acute findings meriting change in the plan.  (Follows with Dr. Lydia Roldan - neurology)

## 2022-05-19 NOTE — PROGRESS NOTES
[address] [facility]  [address]  Dept: [phone]  Dept Fax: [phone]      Ashley Rojo  YOB: 1945  Date of Service:  5/19/2022    Chief Complaint   Patient presents with    Pre-op Exam     has upcoming procedure and needs surgical clearance    Fatigue     c/o for quite awhile has felt very fatigued       Assessment/Plan:       1. Encounter for pre-operative examination  2. Postmenopausal vaginal bleeding  Assessment & Plan:  Patient is under the care of Dr. Rica Stallworth. 3. Type 2 diabetes mellitus with stage 3b chronic kidney disease, with long-term current use of insulin ([facility] [address]  Assessment & Plan:   Monitored by specialist- no acute findings meriting change in the plan. Follows with ORTIZ Garcia-CNP. Lab Results   Component Value Date    LABA1C 7.6 02/15/2022     No results found for: EAG  4. Essential hypertension  Assessment & Plan:   Well-controlled, continue current medications  5. Other hyperlipidemia  Assessment & Plan:   Well-controlled, continue current medications. Patient is taking simvastatin 40 mg nightly. 6. Allergic rhinitis, unspecified seasonality, unspecified trigger  Assessment & Plan:   Well-controlled, continue current medications  7. Chronic diastolic heart failure ([facility] [address]  Assessment & Plan:   Monitored by specialist- no acute findings meriting change in the plan. [facility] cardiology - [place])  8. Mild episode of recurrent major depressive disorder [facility])  Assessment & Plan:   Well-controlled, continue current medications  9. Neuropathy due to type 2 diabetes mellitus ([facility] [address]  Assessment & Plan:   Well-controlled, continue current medications  10. DAVID treated with BiPAP  Assessment & Plan:  Wears the Bipap faithfully at night. 11. Cognitive decline  Assessment & Plan:   Monitored by specialist- no acute findings meriting change in the plan. (Follows with Dr. Clay Armando - neurology)  12. Obesity, morbid, BMI 40.0-49.9 ([facility] [address]  Assessment & Plan:   Uncontrolled, lifestyle modifications recommended  13. Urge incontinence of urine  Assessment & Plan:   Monitored by specialist- no acute findings meriting change in the plan. ( [address] [place] [address])       77 y.o. patient with planned surgery. Known risk factors for perioperative complications: Congestive heart failure, Diabetes mellitus, Hypertension, Obstructive sleep apnea, Renal dysfunction  Current medications which may produce withdrawal symptoms if withheld perioperatively: citalopram     1. Preoperative workup as follows: ordered by surgeon  2. Change in medication regimen before surgery: HOLD insulin the morning of surgery. 3. Prophylaxis for cardiac events with perioperative beta-blockers: Currently taking carvedilol  ACC/AHA indications for pre-operative beta-blocker use:    · Vascular surgery with history of postitive stress test  · Intermediate or high risk surgery withhistory of CAD   · Intermediate or high risk surgery with multiple clinical predictors of CAD- 2 of the following: history of compensated or prior heart failure, history of cerebrovascular disease, DM, or renal insufficiency    Routine administration of higher-dose, long-acting metoprolol in beta-blocker-naïve patients on the day of surgery, and in the absence of dose titration is associated with an overall increase in mortality. Beta-blockers should be started days to weeks prior tosurgery and titrated to pulse < 70.  4. Deep vein thrombosis prophylaxis: regimen to be chosen by surgical team  5. No contraindications to planned surgery. Patient will need cardiac clearance. She will call her cardiologist today. Please note that this chart was generated using voice recognition Dragon dictation software. Although every effort was made to ensure the accuracy of this automated transcription, some errors in transcription may have occurred.     Subjective:   Patient presents to the office today for a preoperative examination at the request of surgeon, Dr. Sabino Leyva, who plans on performing hysteroscopy on 5/24/2022 at [facility].      Patient states she is unable to  Rachana Torres from the pharmacy due to cost.      Planned anesthesia: General     Known anesthesia problems:None   Bleeding risk: No recent or remote history of abnormal bleeding  Personal or FH of DVT/PE: No      BP Readings from Last 3 Encounters:   05/19/22 124/76   05/03/22 134/84   04/27/22 136/82        Pulse Readings from Last 3 Encounters:   05/19/22 52   05/03/22 52   04/27/22 54        Wt Readings from Last 3 Encounters:   05/19/22 247 lb 3.2 oz (112.1 kg)   05/03/22 252 lb (114.3 kg)   04/27/22 253 lb (114.8 kg)        Past Medical History:   Diagnosis Date    Actinic keratitis     Ankle pain     Anxiety 6/23/2019    Arthritis     Back pain     low     Carpal tunnel syndrome, right 6/29/2017    Chronic diastolic CHF (congestive heart failure) ([facility]     Chronic diastolic heart failure ([facility] [address] 5/31/2017    Chronic kidney disease     Dependent edema     Hypertension     Incontinence     in female    Nocturnal enuresis 7/9/2021    Osteoarthritis     knee     Type II or unspecified type diabetes mellitus without mention of complication, not stated as uncontrolled     Vertigo 8/1/2017    Vitamin B12 deficiency (dietary) anemia 11/17/2018     Past Surgical History:   Procedure Laterality Date    ANKLE SURGERY Left     FINGER TRIGGER RELEASE Right      Family History   Problem Relation Age of Onset    Diabetes Mother     Diabetes Father     Cancer Paternal Grandfather         BONE      Social History     Tobacco Use    Smoking status: Never Smoker    Smokeless tobacco: Never Used   Vaping Use    Vaping Use: Never used   Substance Use Topics    Alcohol use: No    Drug use: No       No Known Allergies  Outpatient Medications Marked as Taking for the 5/19/22 encounter (Office Visit) with ORTIZ Martini CNP Medication Sig Dispense Refill    insulin lispro, 1 Unit Dial, (HUMALOG KWIKPEN) 100 UNIT/ML SOPN Use tid with meals With sliding scale max dose is 30 units per day 15 pen 3    cyanocobalamin 1000 MCG/ML injection Inject 1 mL into the muscle every 30 days 1 mL 11    oxybutynin (DITROPAN XL) 10 MG extended release tablet Take 1 tablet by mouth daily 30 tablet 6    Insulin Degludec (TRESIBA FLEXTOUCH) 200 UNIT/ML SOPN Inject 28 Units into the skin daily 6 pen 5    ferrous sulfate (IRON 325) 325 (65 Fe) MG tablet Take 1 tablet by mouth daily (with breakfast) 90 tablet 1    montelukast (SINGULAIR) 10 MG tablet Take 1 tablet by mouth daily 90 tablet 3    dapagliflozin (FARXIGA) 10 MG tablet Take 1 tablet by mouth every morning 90 tablet 3    aspirin 325 MG tablet Take 325 mg by mouth daily       carvedilol (COREG) 3.125 MG tablet Take 1 tablet by mouth 2 times daily (with meals) 180 tablet 3    DROPLET PEN NEEDLES 32G X 4 MM MISC USE 4 TIMES DAILY 400 each 1    memantine (NAMENDA) 10 MG tablet Take 10 mg by mouth 2 times daily       busPIRone (BUSPAR) 15 MG tablet TAKE 1 TABLET THREE TIMES DAILY (Patient taking differently: Take 15 mg by mouth 2 times daily ) 270 tablet 3    bumetanide (BUMEX) 1 MG tablet Take 1 tablet by mouth 2 times daily 180 tablet 1    citalopram (CELEXA) 10 MG tablet Take 1 tablet by mouth once daily 90 tablet 3    simvastatin (ZOCOR) 40 MG tablet TAKE 1 TABLET EVERY NIGHT 90 tablet 3    Continuous Blood Gluc Sensor (FREESTYLE VERA 14 DAY SENSOR) MIS 6 each by Does not apply route daily 6 each 5    nystatin (MYCOSTATIN) 381099 UNIT/GM cream Apply topically 4 times daily. Continue to apply for 3 days after rash resolves 30 g 1    glucagon 1 MG injection Inject 1 mg into the muscle See Admin Instructions Follow package directions for low blood sugar.  1 kit 3    donepezil (ARICEPT) 5 MG tablet Take 1 tablet by mouth nightly 90 tablet 3    blood glucose monitor strips by Other route 3 times daily Test 3 times a day & as needed for symptoms of irregular blood glucose. 100 strip 5    RELION ULTRA THIN LANCETS 30G MISC 1 each by Does not apply route 3 times daily 300 each 3    meclizine (ANTIVERT) 12.5 MG tablet Take 1 tablet by mouth 3 times daily as needed for Dizziness 90 tablet 2    acetaminophen (TYLENOL ARTHRITIS PAIN) 650 MG extended release tablet Take 650 mg by mouth every 8 hours as needed for Pain        Review of Systems   Constitutional: Positive for fatigue. Negative for appetite change, chills and fever. HENT: Negative. Eyes: Negative. Respiratory: Negative for cough, shortness of breath and wheezing. Cardiovascular: Negative for chest pain, palpitations and leg swelling. Gastrointestinal: Negative for abdominal pain, constipation and diarrhea. Endocrine: Negative for cold intolerance, heat intolerance, polydipsia, polyphagia and polyuria. Genitourinary: Positive for vaginal bleeding. Musculoskeletal: Positive for back pain (chronic lower back pain). Negative for arthralgias and myalgias. Skin: Negative. Allergic/Immunologic: Negative for environmental allergies and food allergies. Neurological: Negative for dizziness, weakness, light-headedness and headaches. Psychiatric/Behavioral: Negative for agitation, dysphoric mood, self-injury, sleep disturbance and suicidal ideas. The patient is not nervous/anxious. Objective:     Vitals:    05/19/22 1032   BP: 124/76   Pulse: 52   SpO2: 97%   Weight: 247 lb 3.2 oz (112.1 kg)         Physical Exam   Constitutional: She is oriented to person, place, and time. She appears well-developed and well-nourished. No distress. Obese  HENT:   Head: Normocephalic and atraumatic. Mouth/Throat: Uvula is midline, oropharynx is clear and moist and mucous membranes are normal.   Eyes: Conjunctivae and EOM arenormal. Pupils are equal, round, and reactive to light.    Neck: Trachea normal and normal range of motion. Neck supple. No JVD present. Carotid bruit is not present. No mass and no thyromegaly present. Cardiovascular: Normal rate, regular rhythm, normal heart sounds and intact distal pulses. Exam reveals no gallop and no friction rub. No murmur heard. Pulmonary/Chest: Effort normal and breath sounds normal. No respiratory distress. She has no wheezes. She has no rales. Abdominal: Soft. Normal bowel sounds. She exhibits no distension and no mass. There is no hepatosplenomegaly. No tenderness. Musculoskeletal: She exhibits no edema and no tenderness. Neurological: She is alert and oriented to person, place, and time. She has normal strength. No cranial nerve deficit or sensory deficit. Unsteady gait - ambulates with walker. Skin: Skin is warm and dry. No rash noted. No erythema. Psychiatric: She has a normal mood and affect.  Her behavior is normal.     EKG Interpretation:  Per cardiology    Lab Review:  Lab Results   Component Value Date    WBC 10.3 09/18/2021    HGB 13.6 09/18/2021    HCT 41.4 09/18/2021    MCV 87.4 09/18/2021    .0 09/18/2021     Lab Results   Component Value Date     09/18/2021    K 4.8 09/18/2021     09/18/2021    CO2 23 09/18/2021    BUN 29 (H) 09/18/2021    CREATININE 1.1 (H) 09/18/2021    GLUCOSE 169 (H) 09/18/2021    CALCIUM 9.3 09/18/2021    PROT 7.0 09/01/2020    LABALBU 4.1 09/18/2021    BILITOT 0.5 09/18/2021    ALKPHOS 97 09/18/2021    AST 17 09/18/2021    ALT 10 09/18/2021    LABGLOM 51.6 09/18/2021    GFRAA >60 05/21/2021    AGRATIO 1.3 07/05/2019    GLOB 2.9 09/18/2021       Lab Results   Component Value Date    APPEARANCE CLEAR 09/18/2019    COLORU lt yellow 06/01/2021    COLORU NOT REPORTED 02/18/2020    LABSPEC 1.020 05/23/2021    NITRU Negative 05/23/2021    GLUCOSEU 1,000 06/01/2021    GLUCOSEU Trace 05/23/2021    KETUA neg 06/01/2021    KETUA Negative 05/23/2021    UROBILINOGEN 0.2 05/23/2021    BILIRUBINUR neg 06/01/2021 Electronically signed by ORTIZ Irwin CNP on 5/19/2022 at 1:28 PM.

## 2022-05-19 NOTE — ASSESSMENT & PLAN NOTE
Monitored by specialist- no acute findings meriting change in the plan.  [facility] cardiology - [place])

## 2022-05-19 NOTE — TELEPHONE ENCOUNTER
Shanell,  Just an Dorothea Ervin was in this morning for pre op clearance.  She is not taking the [place] due to cost.     Have a great day,   Sumanth Boston

## 2022-05-20 LAB
CREATININE, RANDOM URINE: 103.1 MG/DL (ref 20–370)
MICROALBUMIN UR-MCNC: 40.4 MG/DL (ref 0–1.7)
MICROALBUMIN/CREAT UR-RTO: 391.85

## 2022-05-23 RX ORDER — FLUCONAZOLE 100 MG/1
TABLET ORAL
Qty: 8 TABLET | Refills: 0 | OUTPATIENT
Start: 2022-05-23

## 2022-05-23 RX ORDER — OXYBUTYNIN CHLORIDE 10 MG/1
TABLET, EXTENDED RELEASE ORAL
Qty: 30 TABLET | Refills: 0 | OUTPATIENT
Start: 2022-05-23

## 2022-05-23 NOTE — TELEPHONE ENCOUNTER
We do not have anything else for protein in the urine. If it is to expensive we will need to continue working on glucose control and adjust insulin as needed.

## 2022-05-23 NOTE — TELEPHONE ENCOUNTER
Will notify when call back of lab result below:    She does have protein in urine still. Continue to work on glucose control. Keep follow up.

## 2022-05-23 NOTE — TELEPHONE ENCOUNTER
Spoke with patient she stated that she would like to try something else entirely if she can as insurance states it was be expensive for all.  opt[facility] rx

## 2022-05-31 NOTE — TELEPHONE ENCOUNTER
Patient spoke with her Insurance and states glidivide , metformin, acarbose and repaglinide are all cover for 90 day supplies.  Patient states she really needs something that she is having a hard time keeping blood sugars normal. Please advise

## 2022-05-31 NOTE — TELEPHONE ENCOUNTER
None of theses are sglt-2. I wanted to use William Orozco for the ckd benefit. If insurance does not cover any sglt-2 then we will just continue to optimize her insulin. If she needs changes we can move up her appt.

## 2022-07-11 ENCOUNTER — TELEPHONE (OUTPATIENT)
Dept: FAMILY MEDICINE CLINIC | Age: 77
End: 2022-07-11

## 2022-07-11 DIAGNOSIS — E78.49 OTHER HYPERLIPIDEMIA: ICD-10-CM

## 2022-07-11 RX ORDER — SIMVASTATIN 40 MG
TABLET ORAL
Qty: 90 TABLET | Refills: 3 | Status: SHIPPED | OUTPATIENT
Start: 2022-07-11

## 2022-07-11 NOTE — TELEPHONE ENCOUNTER
Patient here for appointment with ARIS Matthew to discuss elevated blood sugars and medications. She did not realize the appointment was actually with PCP. She would like a call to discuss what she should be doing when the blood sugars are elevated.

## 2022-07-11 NOTE — TELEPHONE ENCOUNTER
Spoke with patient, she does have a gerson but is not linked. Per patient:    Averages 200-250's in the AM fasting. Around 130's and higher before lunch  Around 200's before supper    And highest readings are \"evening, probably, before bed\" up to 300-400's. BS today before appt was 138. We did have 2 available appts tomorrow in [place], patient declined. Verified meds that patient is taking:  [place] 28 units daily. Humalog TID sliding scale. States she takes average of 8 units. With meals.     -[place] was too much see Abby Lesches telephone call on 5/19/2022.

## 2022-07-11 NOTE — TELEPHONE ENCOUNTER
Very good information, thank you     Lets increase Tresiba to 30 units once daily and add 2 extra units of humalog to scale.

## 2022-07-11 NOTE — TELEPHONE ENCOUNTER
Please check how high blood sugars are. Would recommend keeping follow up and document food intake so we can correlate with blood sugars.

## 2022-07-24 DIAGNOSIS — I10 ESSENTIAL HYPERTENSION: ICD-10-CM

## 2022-07-25 RX ORDER — LISINOPRIL 20 MG/1
20 TABLET ORAL DAILY
Qty: 90 TABLET | Refills: 0 | Status: SHIPPED | OUTPATIENT
Start: 2022-07-25 | End: 2022-09-15 | Stop reason: SDUPTHER

## 2022-07-25 NOTE — TELEPHONE ENCOUNTER
90 day since she uses mail order for this      Rosina Francis is requesting a refill on the following medication(s):  Requested Prescriptions     Pending Prescriptions Disp Refills    lisinopril (PRINIVIL;ZESTRIL) 20 MG tablet [Pharmacy Med Name: Lisinopril 20 MG Oral Tablet] 90 tablet 0     Sig: TAKE 1 TABLET BY MOUTH  DAILY       Last Visit Date (If Applicable):  8/46/4130    Next Visit Date:    9/15/2022

## 2022-07-27 ENCOUNTER — OFFICE VISIT (OUTPATIENT)
Dept: UROLOGY | Age: 77
End: 2022-07-27
Payer: MEDICARE

## 2022-07-27 VITALS
SYSTOLIC BLOOD PRESSURE: 126 MMHG | WEIGHT: 251 LBS | DIASTOLIC BLOOD PRESSURE: 78 MMHG | HEART RATE: 52 BPM | BODY MASS INDEX: 40.51 KG/M2

## 2022-07-27 DIAGNOSIS — N39.41 URGE INCONTINENCE: Primary | ICD-10-CM

## 2022-07-27 DIAGNOSIS — N39.46 MIXED INCONTINENCE: ICD-10-CM

## 2022-07-27 DIAGNOSIS — N39.44 NOCTURNAL ENURESIS: ICD-10-CM

## 2022-07-27 PROCEDURE — 99213 OFFICE O/P EST LOW 20 MIN: CPT | Performed by: UROLOGY

## 2022-07-27 PROCEDURE — 1123F ACP DISCUSS/DSCN MKR DOCD: CPT | Performed by: UROLOGY

## 2022-07-27 NOTE — PROGRESS NOTES
Dr. Juan Conde MD MD  [facility] Consultation / New Patient Visit    Patient:  Shila Sanchez  YOB: 1945  Date: 2022  Consult requested from ORTIZ Blanco CNP     HISTORY OF PRESENT ILLNESS:   The patient is a 77 y.o. female who presents today for follow-up for the following problem(s): recurrent UTI  Overall the problem(s) : are worsening. Associated Symptoms: No dysuria, gross hematuria. Pain Severity:      Today visit:   22    Presents in follow up after Interstim placement, she was restarted on oxybutynin 10 mg and has better control with combo therapy. She is now using 2 PPD, damp, (4-5 ppd, wet). Needs interrogation. Nocturia has improved. The patient is very bothered by bumex, which exacerbates her symptoms. We discuss the importance of this medication and will ask for her PCP to review her volume status and if there is any way to improve her regimen to help with her symptoms. 21    Botox 200 units (3/10/21) - unfortunately she has failed botox with early return of symptoms < 3 months. She is back to PPD: 1 for confidenc 2-3, wet pads daily. Nocturnal enuresis has returned. She is very bothered by these symptoms. She is now interested in interstim, we discuss risks and benefits. 20   Today Cora Johnson is doing well. States Nocturia has improved with oxybutynin QHS. She is tolerating the medication well, no dry mouth or constipation. She states she is having decline of her memory, so her last visit, the information regarding improvement of her symptoms was not accurate. Urge incontinence worsenin daily, 3 at night. Avoiding fluids and lasix after dinner. Lasix only in am.  Vesicare 10 mg ER in am - no dry mouth and constipation  States failing medical therapy.      PMH:   DAVID on CPAP  Transverse myelitis     Summary of old records:   (Patient's old records, notes and chart reviewed and summarized above.)    Urinalysis today:  No results found for this visit on 07/27/22.     Last BUN and creatinine:  Lab Results   Component Value Date    BUN 39 (H) 05/19/2022     Lab Results   Component Value Date    CREATININE 1.1 (H) 05/19/2022       Imaging Reviewed during this Office Visit:   (results were independently reviewed by physician and radiology report verified)    PAST MEDICAL, FAMILY AND SOCIAL HISTORY:  Past Medical History:   Diagnosis Date    Actinic keratitis     Ankle pain     Anxiety 6/23/2019    Arthritis     Back pain     low     Carpal tunnel syndrome, right 6/29/2017    Chronic diastolic CHF (congestive heart failure) ([facility])     Chronic diastolic heart failure ([address] 5/31/2017    Chronic kidney disease     Dependent edema     Hypertension     Incontinence     in female    Nocturnal enuresis 7/9/2021    Osteoarthritis     knee     Type II or unspecified type diabetes mellitus without mention of complication, not stated as uncontrolled     Vertigo 8/1/2017    Vitamin B12 deficiency (dietary) anemia 11/17/2018     Past Surgical History:   Procedure Laterality Date    ANKLE SURGERY Left     FINGER TRIGGER RELEASE Right      Family History   Problem Relation Age of Onset    Diabetes Mother     Diabetes Father     Cancer Paternal Grandfather         BONE      Outpatient Medications Marked as Taking for the 7/27/22 encounter (Office Visit) with Jesus Piper MD   Medication Sig Dispense Refill    lisinopril (PRINIVIL;ZESTRIL) 20 MG tablet TAKE 1 TABLET BY MOUTH  DAILY 90 tablet 0    clotrimazole-betamethasone (LOTRISONE) 1-0.05 % cream APPLY TOPICALLY TO AFFECTED AREA(S) TWICE DAILY FOR 2 WEEKS      simvastatin (ZOCOR) 40 MG tablet TAKE 1 TABLET EVERY NIGHT 90 tablet 3    insulin lispro, 1 Unit Dial, (HUMALOG KWIKPEN) 100 UNIT/ML SOPN Use tid with meals With sliding scale max dose is 30 units per day (Patient taking differently: Use tid with meals With sliding scale max dose is 30 units per day (4 units +SS)) 15 pen 3    cyanocobalamin 1000 MCG/ML injection Inject 1 mL into the muscle every 30 days 1 mL 11    oxybutynin (DITROPAN XL) 10 MG extended release tablet Take 1 tablet by mouth daily 30 tablet 6    montelukast (SINGULAIR) 10 MG tablet Take 1 tablet by mouth daily 90 tablet 3    aspirin 325 MG tablet Take 325 mg by mouth daily       carvedilol (COREG) 3.125 MG tablet Take 1 tablet by mouth 2 times daily (with meals) 180 tablet 3    DROPLET PEN NEEDLES 32G X 4 MM MISC USE 4 TIMES DAILY 400 each 1    memantine (NAMENDA) 10 MG tablet Take 10 mg by mouth 2 times daily       busPIRone (BUSPAR) 15 MG tablet TAKE 1 TABLET THREE TIMES DAILY (Patient taking differently: Take 15 mg by mouth in the morning and 15 mg before bedtime.) 270 tablet 3    bumetanide (BUMEX) 1 MG tablet Take 1 tablet by mouth 2 times daily 180 tablet 1    citalopram (CELEXA) 10 MG tablet Take 1 tablet by mouth once daily 90 tablet 3    Continuous Blood Gluc Sensor (FREESTYLE VERA 14 DAY SENSOR) MISC 6 each by Does not apply route daily 6 each 5    nystatin (MYCOSTATIN) 367259 UNIT/GM cream Apply topically 4 times daily. Continue to apply for 3 days after rash resolves 30 g 1    glucagon 1 MG injection Inject 1 mg into the muscle See Admin Instructions Follow package directions for low blood sugar. 1 kit 3    donepezil (ARICEPT) 5 MG tablet Take 1 tablet by mouth nightly 90 tablet 3    blood glucose monitor strips by Other route 3 times daily Test 3 times a day & as needed for symptoms of irregular blood glucose. 100 strip 5    RELION ULTRA THIN LANCETS 30G MISC 1 each by Does not apply route 3 times daily 300 each 3    meclizine (ANTIVERT) 12.5 MG tablet Take 1 tablet by mouth 3 times daily as needed for Dizziness 90 tablet 2    acetaminophen (TYLENOL) 650 MG extended release tablet Take 650 mg by mouth every 8 hours as needed for Pain          Patient has no known allergies.   Social History     Tobacco Use   Smoking Status Never Smokeless Tobacco Never       Social History     Substance and Sexual Activity   Alcohol Use No       REVIEW OF SYSTEMS:  Constitutional: negative  Eyes: negative  Respiratory: negative  Cardiovascular: negative  Gastrointestinal: negative  Genitourinary: negative  Musculoskeletal: negative  Skin: negative   Neurological: negative  Hematological/Lymphatic: negative  Psychological: negative    Physical Exam:    This a 77 y.o. female      Vitals:    07/27/22 1125   BP: 126/78   Pulse: 52     Telephone      Assessment and Plan      1. Urge incontinence    2. Nocturnal enuresis    3. Mixed incontinence             Plan:      No follow-ups on file.   Interstim for urinary urge incontinence - symptoms improved with combo therapy Oxybutynin 10 mg ER   - Recommend weight loss    Discussed behavioral modifications  Bumex in am - has helped night time syptoms  Ask PCP to review Bumex regimen - consider compression stocking

## 2022-08-02 ENCOUNTER — OFFICE VISIT (OUTPATIENT)
Dept: DIABETES SERVICES | Age: 77
End: 2022-08-02
Payer: MEDICARE

## 2022-08-02 VITALS
WEIGHT: 253 LBS | HEART RATE: 76 BPM | BODY MASS INDEX: 40.66 KG/M2 | HEIGHT: 66 IN | SYSTOLIC BLOOD PRESSURE: 132 MMHG | DIASTOLIC BLOOD PRESSURE: 72 MMHG | RESPIRATION RATE: 14 BRPM

## 2022-08-02 DIAGNOSIS — Z79.4 TYPE 2 DIABETES MELLITUS WITH STAGE 3B CHRONIC KIDNEY DISEASE, WITH LONG-TERM CURRENT USE OF INSULIN (HCC): Primary | ICD-10-CM

## 2022-08-02 DIAGNOSIS — E78.49 OTHER HYPERLIPIDEMIA: ICD-10-CM

## 2022-08-02 DIAGNOSIS — E11.22 TYPE 2 DIABETES MELLITUS WITH STAGE 3B CHRONIC KIDNEY DISEASE, WITH LONG-TERM CURRENT USE OF INSULIN (HCC): Primary | ICD-10-CM

## 2022-08-02 DIAGNOSIS — I10 ESSENTIAL HYPERTENSION: ICD-10-CM

## 2022-08-02 DIAGNOSIS — N18.32 TYPE 2 DIABETES MELLITUS WITH STAGE 3B CHRONIC KIDNEY DISEASE, WITH LONG-TERM CURRENT USE OF INSULIN (HCC): Primary | ICD-10-CM

## 2022-08-02 LAB — HBA1C MFR BLD: 8.1 %

## 2022-08-02 PROCEDURE — 95251 CONT GLUC MNTR ANALYSIS I&R: CPT | Performed by: NURSE PRACTITIONER

## 2022-08-02 PROCEDURE — 3052F HG A1C>EQUAL 8.0%<EQUAL 9.0%: CPT | Performed by: NURSE PRACTITIONER

## 2022-08-02 PROCEDURE — 1090F PRES/ABSN URINE INCON ASSESS: CPT | Performed by: NURSE PRACTITIONER

## 2022-08-02 PROCEDURE — PBSHW POCT GLYCOSYLATED HEMOGLOBIN (HGB A1C): Performed by: NURSE PRACTITIONER

## 2022-08-02 PROCEDURE — 99214 OFFICE O/P EST MOD 30 MIN: CPT | Performed by: NURSE PRACTITIONER

## 2022-08-02 PROCEDURE — 83036 HEMOGLOBIN GLYCOSYLATED A1C: CPT | Performed by: NURSE PRACTITIONER

## 2022-08-02 PROCEDURE — G8400 PT W/DXA NO RESULTS DOC: HCPCS | Performed by: NURSE PRACTITIONER

## 2022-08-02 PROCEDURE — 3017F COLORECTAL CA SCREEN DOC REV: CPT | Performed by: NURSE PRACTITIONER

## 2022-08-02 PROCEDURE — 2022F DILAT RTA XM EVC RTNOPTHY: CPT | Performed by: NURSE PRACTITIONER

## 2022-08-02 PROCEDURE — 1123F ACP DISCUSS/DSCN MKR DOCD: CPT | Performed by: NURSE PRACTITIONER

## 2022-08-02 PROCEDURE — G8417 CALC BMI ABV UP PARAM F/U: HCPCS | Performed by: NURSE PRACTITIONER

## 2022-08-02 PROCEDURE — G8427 DOCREV CUR MEDS BY ELIG CLIN: HCPCS | Performed by: NURSE PRACTITIONER

## 2022-08-02 PROCEDURE — 1036F TOBACCO NON-USER: CPT | Performed by: NURSE PRACTITIONER

## 2022-08-02 RX ORDER — NYSTATIN 100000 U/G
CREAM TOPICAL
Qty: 30 G | Refills: 1 | Status: SHIPPED | OUTPATIENT
Start: 2022-08-02

## 2022-08-02 RX ORDER — CLOTRIMAZOLE AND BETAMETHASONE DIPROPIONATE 10; .64 MG/G; MG/G
CREAM TOPICAL
Qty: 45 G | Refills: 1 | Status: CANCELLED | OUTPATIENT
Start: 2022-08-02

## 2022-08-02 ASSESSMENT — ENCOUNTER SYMPTOMS
SHORTNESS OF BREATH: 0
ABDOMINAL PAIN: 0
RESPIRATORY NEGATIVE: 1
DIARRHEA: 0

## 2022-08-02 NOTE — PROGRESS NOTES
[facility] [address] INTERNAL  [facility]  [address] [facility], [address]  [place] [address] [facility]  [phone]        HISTORY:    Allison Padgett presents today for evaluation and management of:  Chief Complaint   Patient presents with    3 Month Follow-Up     Has eye exam coming up in November. Diabetes         Interval History:    Past DM Medications   Metformin- ckd  Glimepiride-therapy completed  janvuia- glp1 started  victoza-cost  soliqua- cost  Farxiga- cost      Current Diabetic Medications  novolog TID with sliding scale  Max daily dose 24 units, tresiba 30 units once daily. DKA episodes: 0    05/03/22   Allison Padgett is a 77 y.o. female patient who presents to clinic today for her diabetes. she has a history of HTN, HF, CKD, neuropathy, OA, hyperlipidemia, obesity and depression  which contributes to her diabetes. At previous visit insulin was decreased due to hypoglcyemia. she denies any current signs or symptoms of hyper/hypoglycemia. she states they are taking their medications as prescribed without any adverse effects. Diet: regular  Exercise: none  BS testing: uses cgm daily with success and is adherent to cgm therapy  Issues: deneis  Diabetic foot exam up-to-date: Yes  Diabetic retinal exam up-to-date: Yes  Hypoglycemia as needed treatment: glucose tabs    08/02/22   Allison Padgett is a 77 y.o. female patient who presents to clinic today for her diabetes. she has a history of HTN, HF, CKD, neuropathy, OA, hyperlipidemia, obesity and depression which contributes to her diabetes. At previous visit insulin was increased. she denies any current signs or symptoms of hyper/hypoglycemia. she states they are taking their medications as prescribed without any adverse effects. She does admit to taking meal time insulin after meals based on postprandial spike. Having some lows overnight.    Diet: regular mindful, smaller portions   Exercise: none  BS testing: uses cgm daily with success and is adherent to cgm therapy  Issues: kirti  Diabetic foot exam up-to-date: Yes  Diabetic retinal exam up-to-date: Yes  Hypoglycemia as needed treatment: glucose tabs    High cholesterol-  Takes zocor and denies any adverse effects with its use. Watches diet and exercise. Hypertension-  Takes coreg bumex, and lisinopril and denies any adverse effects with their use. Watches diet and exercise. Denies any chest pain, dizziness or edema. Follows with cardiology:Yes. Yearly      Obesity- Working on weight loss. Past Medical History:   Diagnosis Date    Actinic keratitis     Ankle pain     Anxiety 6/23/2019    Arthritis     Back pain     low     Carpal tunnel syndrome, right 6/29/2017    Chronic diastolic CHF (congestive heart failure) ([facility])     Chronic diastolic heart failure ([facility] [address] 5/31/2017    Chronic kidney disease     Dependent edema     Hypertension     Incontinence     in female    Nocturnal enuresis 7/9/2021    Osteoarthritis     knee     Type II or unspecified type diabetes mellitus without mention of complication, not stated as uncontrolled     Vertigo 8/1/2017    Vitamin B12 deficiency (dietary) anemia 11/17/2018     Family History   Problem Relation Age of Onset    Diabetes Mother     Diabetes Father     Cancer Paternal Grandfather         BONE      Social History     Tobacco Use    Smoking status: Never    Smokeless tobacco: Never   Vaping Use    Vaping Use: Never used   Substance Use Topics    Alcohol use: No    Drug use: No     No Known Allergies    MEDICATIONS:  Current Outpatient Medications   Medication Sig Dispense Refill    nystatin (MYCOSTATIN) 828817 UNIT/GM cream Apply topically 4 times daily.  Continue to apply for 3 days after rash resolves 30 g 1    lisinopril (PRINIVIL;ZESTRIL) 20 MG tablet TAKE 1 TABLET BY MOUTH  DAILY 90 tablet 0    clotrimazole-betamethasone (LOTRISONE) 1-0.05 % cream APPLY TOPICALLY TO AFFECTED AREA(S) TWICE DAILY FOR 2 WEEKS simvastatin (ZOCOR) 40 MG tablet TAKE 1 TABLET EVERY NIGHT 90 tablet 3    insulin lispro, 1 Unit Dial, (HUMALOG KWIKPEN) 100 UNIT/ML SOPN Use tid with meals With sliding scale max dose is 30 units per day (Patient taking differently: Use tid with meals With sliding scale max dose is 30 units per day (4 units +SS)) 15 pen 3    cyanocobalamin 1000 MCG/ML injection Inject 1 mL into the muscle every 30 days 1 mL 11    oxybutynin (DITROPAN XL) 10 MG extended release tablet Take 1 tablet by mouth daily 30 tablet 6    Insulin Degludec (TRESIBA FLEXTOUCH) 200 UNIT/ML SOPN Inject 28 Units into the skin daily (Patient taking differently: Inject 30 Units into the skin daily) 6 pen 5    montelukast (SINGULAIR) 10 MG tablet Take 1 tablet by mouth daily 90 tablet 3    aspirin 325 MG tablet Take 325 mg by mouth daily       carvedilol (COREG) 3.125 MG tablet Take 1 tablet by mouth 2 times daily (with meals) 180 tablet 3    memantine (NAMENDA) 10 MG tablet Take 10 mg by mouth 2 times daily       busPIRone (BUSPAR) 15 MG tablet TAKE 1 TABLET THREE TIMES DAILY (Patient taking differently: Take 15 mg by mouth in the morning and 15 mg before bedtime.) 270 tablet 3    bumetanide (BUMEX) 1 MG tablet Take 1 tablet by mouth 2 times daily 180 tablet 1    citalopram (CELEXA) 10 MG tablet Take 1 tablet by mouth once daily 90 tablet 3    donepezil (ARICEPT) 5 MG tablet Take 1 tablet by mouth nightly 90 tablet 3    meclizine (ANTIVERT) 12.5 MG tablet Take 1 tablet by mouth 3 times daily as needed for Dizziness 90 tablet 2    acetaminophen (TYLENOL) 650 MG extended release tablet Take 650 mg by mouth every 8 hours as needed for Pain       ferrous sulfate (IRON 325) 325 (65 Fe) MG tablet Take 1 tablet by mouth daily (with breakfast) (Patient not taking: No sig reported) 90 tablet 1    DROPLET PEN NEEDLES 32G X 4 MM MISC USE 4 TIMES DAILY 400 each 1    Continuous Blood Gluc Sensor (FREESTYLE VERA 14 DAY SENSOR) MISC 6 each by Does not apply route daily 6 each 5    glucagon 1 MG injection Inject 1 mg into the muscle See Admin Instructions Follow package directions for low blood sugar. 1 kit 3    blood glucose monitor strips by Other route 3 times daily Test 3 times a day & as needed for symptoms of irregular blood glucose. 100 strip 5    RELION ULTRA THIN LANCETS 30G MISC 1 each by Does not apply route 3 times daily 300 each 3     No current facility-administered medications for this visit. Review ofSymptoms:  Review of Systems   Constitutional:  Positive for fatigue. Negative for unexpected weight change. Eyes:  Negative for visual disturbance. Respiratory: Negative. Negative for shortness of breath. Cardiovascular:  Negative for chest pain and leg swelling. Gastrointestinal:  Negative for abdominal pain and diarrhea. Endocrine: Negative for polydipsia, polyphagia and polyuria. Genitourinary: Negative. Musculoskeletal: Negative. Skin:  Negative for rash and wound. Neurological:  Negative for dizziness, tremors, seizures and headaches. Psychiatric/Behavioral: Negative. Negative for confusion and decreased concentration. Theremainder of a complete 14-point review of systems is negative. Vital Signs: /72 (Site: Right Upper Arm, Position: Sitting, Cuff Size: Large Adult)   Pulse 76   Resp 14   Ht 5' 6\" (1.676 m)   Wt 253 lb (114.8 kg)   LMP  (LMP Unknown)   BMI 40.84 kg/m²      Wt Readings from Last 3 Encounters:   08/02/22 253 lb (114.8 kg)   07/27/22 251 lb (113.9 kg)   07/11/22 252 lb 9.6 oz (114.6 kg)     Body mass index is 40.84 kg/m².   LABS:  Hemoglobin A1C   Date Value Ref Range Status   08/02/2022 8.1 % Final   02/15/2022 7.6 % Final     Lab Results   Component Value Date    LABMICR 40.4 (H) 05/20/2022     Lab Results   Component Value Date     05/19/2022    K 4.6 05/19/2022     05/19/2022    CO2 21 (L) 05/19/2022    BUN 39 (H) 05/19/2022    CREATININE 1.1 (H) 05/19/2022    GLUCOSE 157 (H) 05/19/2022    CALCIUM 8.5 05/19/2022    PROT 7.0 09/01/2020    LABALBU 3.9 05/19/2022    BILITOT 0.5 05/19/2022    ALKPHOS 81 05/19/2022    AST 18 05/19/2022    ALT 14 05/19/2022    LABGLOM 51.5 05/19/2022    GFRAA >60 05/21/2021    AGRATIO 1.3 07/05/2019    GLOB 2.8 05/19/2022     Lab Results   Component Value Date    CHOL 160 08/19/2021    CHOL 169 09/01/2020    CHOL 157 03/28/2019     Lab Results   Component Value Date    TRIG 128 08/19/2021    TRIG 89 09/01/2020    TRIG 173 03/28/2019     Lab Results   Component Value Date    HDL 54 08/19/2021    HDL 60 09/01/2020    HDL 45 04/24/2017     Lab Results   Component Value Date    LDLCHOLESTEROL 91 09/01/2020    LDLCALC 80.4 08/19/2021    LDLCALC 69.4 03/28/2019    LDLCALC 125.0 02/21/2018     Lab Results   Component Value Date    VLDL 26 08/19/2021    VLDL NOT REPORTED 09/01/2020    VLDL 35 03/28/2019     Lab Results   Component Value Date    CHOLHDLRATIO 2.96 08/19/2021    CHOLHDLRATIO 2.8 09/01/2020    CHOLHDLRATIO 3.0 03/28/2019           Physical Exam  Constitutional:       Appearance: She is well-developed. Eyes:      Pupils: Pupils are equal, round, and reactive to light. Neck:      Thyroid: No thyroid mass, thyromegaly or thyroid tenderness. Cardiovascular:      Rate and Rhythm: Normal rate and regular rhythm. Heart sounds: Normal heart sounds. Pulmonary:      Effort: Pulmonary effort is normal.      Breath sounds: Normal breath sounds. Abdominal:      General: Bowel sounds are normal.      Palpations: Abdomen is soft. Skin:     General: Skin is warm and dry. Comments: Negative for open/nonhealing wounds. Negative for lipohypertrophy. Neurological:      Mental Status: She is alert and oriented to person, place, and time. ASSESSMENT/PLAN:     Diagnosis Orders   1. Type 2 diabetes mellitus with stage 3b chronic kidney disease, with long-term current use of insulin (HCC)  POCT Hb A1C (glycosylated hemoglobin)      2.  Other hyperlipidemia        3. Essential hypertension        4. BMI 40.0-44.9, adult ([facility] [address]          Orders Placed This Encounter   Procedures    POCT Hb A1C (glycosylated hemoglobin)     Orders Placed This Encounter   Medications    nystatin (MYCOSTATIN) [id number] UNIT/GM cream     Sig: Apply topically 4 times daily. Continue to apply for 3 days after rash resolves     Dispense:  30 g     Refill:  1       Requested Prescriptions     Signed Prescriptions Disp Refills    nystatin (MYCOSTATIN) [id number] UNIT/GM cream 30 g 1     Sig: Apply topically 4 times daily. Continue to apply for 3 days after rash resolves       1. Type 2 diabetes mellitus with stage 3b chronic kidney disease, with long-term current use of insulin ([facility])  - Unstable  HbA1C goal is less than 7%. - Fasting blood glucose goal is 70-120mg/dl and postprandial blood sugar goal is less than 180 mg/dl. - Labs reviewed including most recent A1c, microalbumin and kidney function. Repeat labs due in 3 months.    -We discussed in great detail dietary modifications they can make to better improve their blood sugars. -follow up diabetes education completed, all questions answered. CGM report downloaded and reviewed, scanned to media tab. Time in range 57% and hypoglycemia 1%. Predicted A1c per CGM report 7.  -take meal time insulin before meals. Patient Instructions   Humalog Sliding Scale Insulin 2+ plus scale  10-15 minutes before each meal before bed only use the scale      Blood sugar   Action     <70               Drink Juice               No extra insulin  150 - 200         2 units subcutaneous Insulin  201 - 250         4 units subcutaneous Insulin  251 - 300         6 units subcutaneous Insulin  301 - 350         8 units subcutaneous Insulin  351 - 400         10 units subcutaneous Insulin   > 400              12 units subcutaneous Insulin           Discussed signs and symptoms of hyper/hypoglycemia and how to treat.  Encouraged 150 minutes of physical activity per week. Follow a low carbohydrate diet. Encouraged at least 7 hours of sleep. The patient was informed of the goals of diabetes management. This can only be accomplished by watching their diet and exercise levels. We certainly use medicines to help attain these goals. The consequences of not controlling blood sugars were discussed. These include blindness, heart disease, stroke, kidney disease, and possibly need for dialysis. They were told to be careful with their foot care as diabetics often have nerve damage, infections and risk for limb amutations . They also need a dilated eye exam yearly. We discussed the issues of diet, exercise, medication, complication avoidance, reviewed the signs and symptoms of diabetes, hypoglycemic episodes, significance of HbA1C.     - POCT Hb A1C (glycosylated hemoglobin)    2. Other hyperlipidemia  stable, lipid panel reviewed, continue current medications. Diet and exercise      3. Essential hypertension   stable, continue current medications. Diet and exercise Seek emergent care if chest pain develops. 4. BMI 40.0-44.9, adult (HCC)  Reduce calories and increase physical activity to achieve a slow and steady weight loss to improve blood pressure, cholesterol and diabetes. Answered all patient questions. Agrees to follow plan of care and to follow up in 3 months, sooner if needed. Call office if unexplained blood sugars less than 70 occur or above 400. Call office or access [email or web address] with any further questions or concerns. Be sure to bring glucometer/food log at next appointment. Total time spent reviewing chart, labs, counseling patient and documenting on the date of the encounter: 30 min.       Electronically signed by ORTIZ Guevara CNP on 8/2/2022 at 3:49 PM      (Please note that portions of this note were completed with a voice-recognition program. Efforts were made to edit the dictation but occasionally words are mis-transcribed.)

## 2022-08-02 NOTE — PATIENT INSTRUCTIONS
Humalog Sliding Scale Insulin 2+ plus scale  10-15 minutes before each meal before bed only use the scale      Blood sugar   Action     <70               Drink Juice               No extra insulin  150 - 200         2 units subcutaneous Insulin  201 - 250         4 units subcutaneous Insulin  251 - 300         6 units subcutaneous Insulin  301 - 350         8 units subcutaneous Insulin  351 - 400         10 units subcutaneous Insulin   > 400              12 units subcutaneous Insulin

## 2022-08-15 RX ORDER — OXYBUTYNIN CHLORIDE 10 MG/1
TABLET, EXTENDED RELEASE ORAL
Qty: 90 TABLET | Refills: 3 | Status: SHIPPED | OUTPATIENT
Start: 2022-08-15

## 2022-08-31 LAB
ALBUMIN/GLOBULIN RATIO: 1.4 G/DL
ALBUMIN: 3.9 G/DL (ref 3.5–5)
ALP BLD-CCNC: 86 UNITS/L (ref 38–126)
ALT SERPL-CCNC: 12 UNITS/L (ref 4–35)
ANION GAP SERPL CALCULATED.3IONS-SCNC: 6 MMOL/L
AST SERPL-CCNC: 14 UNITS/L (ref 14–36)
BILIRUB SERPL-MCNC: 0.4 MG/DL (ref 0.2–1.3)
BUN BLDV-MCNC: 31 MG/DL (ref 7–17)
CALCIUM SERPL-MCNC: 8.8 MG/DL (ref 8.4–10.2)
CHLORIDE BLD-SCNC: 108 MMOL/L (ref 98–120)
CHOLESTEROL/HDL RATIO: 3.29 RATIO (ref 0–4.5)
CHOLESTEROL: 184 MG/DL (ref 50–200)
CO2: 28 MMOL/L (ref 22–31)
CREAT SERPL-MCNC: 1.1 MG/DL (ref 0.5–1)
GFR CALCULATED: 51.5
GLOBULIN: 2.7 G/DL
GLUCOSE: 208 MG/DL (ref 65–105)
HDLC SERPL-MCNC: 56 MG/DL (ref 36–68)
LDL CHOLESTEROL CALCULATED: 98.8 MG/DL (ref 0–160)
POTASSIUM SERPL-SCNC: 5 MMOL/L (ref 3.6–5)
SODIUM BLD-SCNC: 143 MMOL/L (ref 135–145)
TOTAL PROTEIN, SERUM: 6.5 G/DL (ref 6.3–8.2)
TRIGL SERPL-MCNC: 146 MG/DL (ref 10–250)
VLDLC SERPL CALC-MCNC: 29 MG/DL (ref 0–50)

## 2022-09-15 ENCOUNTER — OFFICE VISIT (OUTPATIENT)
Dept: FAMILY MEDICINE CLINIC | Age: 77
End: 2022-09-15
Payer: MEDICARE

## 2022-09-15 VITALS
DIASTOLIC BLOOD PRESSURE: 80 MMHG | HEART RATE: 58 BPM | WEIGHT: 254.8 LBS | BODY MASS INDEX: 41.13 KG/M2 | OXYGEN SATURATION: 97 % | SYSTOLIC BLOOD PRESSURE: 114 MMHG

## 2022-09-15 DIAGNOSIS — J30.9 ALLERGIC RHINITIS, UNSPECIFIED SEASONALITY, UNSPECIFIED TRIGGER: ICD-10-CM

## 2022-09-15 DIAGNOSIS — E11.22 TYPE 2 DIABETES MELLITUS WITH STAGE 3B CHRONIC KIDNEY DISEASE, WITH LONG-TERM CURRENT USE OF INSULIN (HCC): ICD-10-CM

## 2022-09-15 DIAGNOSIS — Z79.4 TYPE 2 DIABETES MELLITUS WITH STAGE 3B CHRONIC KIDNEY DISEASE, WITH LONG-TERM CURRENT USE OF INSULIN (HCC): ICD-10-CM

## 2022-09-15 DIAGNOSIS — I50.32 CHRONIC DIASTOLIC HEART FAILURE (HCC): ICD-10-CM

## 2022-09-15 DIAGNOSIS — N39.41 URGE INCONTINENCE OF URINE: ICD-10-CM

## 2022-09-15 DIAGNOSIS — E66.01 OBESITY, MORBID, BMI 40.0-49.9 (HCC): ICD-10-CM

## 2022-09-15 DIAGNOSIS — N18.32 TYPE 2 DIABETES MELLITUS WITH STAGE 3B CHRONIC KIDNEY DISEASE, WITH LONG-TERM CURRENT USE OF INSULIN (HCC): ICD-10-CM

## 2022-09-15 DIAGNOSIS — I10 ESSENTIAL HYPERTENSION: Primary | ICD-10-CM

## 2022-09-15 DIAGNOSIS — E78.49 OTHER HYPERLIPIDEMIA: ICD-10-CM

## 2022-09-15 DIAGNOSIS — E11.40 NEUROPATHY DUE TO TYPE 2 DIABETES MELLITUS (HCC): ICD-10-CM

## 2022-09-15 DIAGNOSIS — Z23 NEED FOR PROPHYLACTIC VACCINATION AND INOCULATION AGAINST INFLUENZA: ICD-10-CM

## 2022-09-15 DIAGNOSIS — R41.89 COGNITIVE DECLINE: ICD-10-CM

## 2022-09-15 DIAGNOSIS — F33.0 MILD EPISODE OF RECURRENT MAJOR DEPRESSIVE DISORDER (HCC): ICD-10-CM

## 2022-09-15 DIAGNOSIS — R53.83 FATIGUE, UNSPECIFIED TYPE: ICD-10-CM

## 2022-09-15 DIAGNOSIS — M16.11 PRIMARY OSTEOARTHRITIS OF RIGHT HIP: ICD-10-CM

## 2022-09-15 DIAGNOSIS — G47.33 OSA TREATED WITH BIPAP: ICD-10-CM

## 2022-09-15 PROCEDURE — 1036F TOBACCO NON-USER: CPT | Performed by: NURSE PRACTITIONER

## 2022-09-15 PROCEDURE — 0509F URINE INCON PLAN DOCD: CPT | Performed by: NURSE PRACTITIONER

## 2022-09-15 PROCEDURE — G0008 ADMIN INFLUENZA VIRUS VAC: HCPCS | Performed by: NURSE PRACTITIONER

## 2022-09-15 PROCEDURE — 3052F HG A1C>EQUAL 8.0%<EQUAL 9.0%: CPT | Performed by: NURSE PRACTITIONER

## 2022-09-15 PROCEDURE — 99214 OFFICE O/P EST MOD 30 MIN: CPT | Performed by: NURSE PRACTITIONER

## 2022-09-15 PROCEDURE — 1123F ACP DISCUSS/DSCN MKR DOCD: CPT | Performed by: NURSE PRACTITIONER

## 2022-09-15 PROCEDURE — 3017F COLORECTAL CA SCREEN DOC REV: CPT | Performed by: NURSE PRACTITIONER

## 2022-09-15 PROCEDURE — G8427 DOCREV CUR MEDS BY ELIG CLIN: HCPCS | Performed by: NURSE PRACTITIONER

## 2022-09-15 PROCEDURE — 1090F PRES/ABSN URINE INCON ASSESS: CPT | Performed by: NURSE PRACTITIONER

## 2022-09-15 PROCEDURE — PBSHW INFLUENZA, FLUAD, (AGE 65 Y+), IM, PF, 0.5 ML: Performed by: NURSE PRACTITIONER

## 2022-09-15 PROCEDURE — G8400 PT W/DXA NO RESULTS DOC: HCPCS | Performed by: NURSE PRACTITIONER

## 2022-09-15 PROCEDURE — 2022F DILAT RTA XM EVC RTNOPTHY: CPT | Performed by: NURSE PRACTITIONER

## 2022-09-15 PROCEDURE — G8417 CALC BMI ABV UP PARAM F/U: HCPCS | Performed by: NURSE PRACTITIONER

## 2022-09-15 RX ORDER — MEMANTINE HYDROCHLORIDE 10 MG/1
10 TABLET ORAL 2 TIMES DAILY
Qty: 180 TABLET | Refills: 3 | Status: SHIPPED | OUTPATIENT
Start: 2022-09-15

## 2022-09-15 RX ORDER — LISINOPRIL 20 MG/1
20 TABLET ORAL DAILY
Qty: 90 TABLET | Refills: 3 | Status: SHIPPED | OUTPATIENT
Start: 2022-09-15 | End: 2022-12-14

## 2022-09-15 RX ORDER — MONTELUKAST SODIUM 10 MG/1
10 TABLET ORAL DAILY
Qty: 90 TABLET | Refills: 3 | Status: SHIPPED | OUTPATIENT
Start: 2022-09-15

## 2022-09-15 NOTE — PROGRESS NOTES
Have you had an allergic reaction to the flu (influenza) shot? no  Are you allergic to eggs or any component of the flu vaccine? np  Do you have a history of Guillain-A[place] Syndrome (GBS), which is paralysis after receiving the flu vaccine? np  Are you feeling well today? yes  Flu vaccine given as ordered. Patient tolerated it well. No questions re: VIS information. [address] [address] [address]  Dept: [phone]  Dept Fax: [phone]    Chief Complaint   Patient presents with    Hypertension     Only complaints is being very fatigued all the time    Chronic Kidney Disease    Diabetes     Follow with diabetic ed last A1C 8.1    Hyperlipidemia         Subjective:     Hypertension, diabetes, and hyperlipidemia:  She indicates that she is feeling well and denies any symptoms referable to her elevated blood pressure or diabetes. Specifically denies chest pain, palpitations, dyspnea, peripheral edema, thirst, frequent urination, and blurred vision. Current medication regimen is as listed below. She denies any side effects of medication, and has been compliant, taking it regularly. Checks blood sugars 4-5 times daily. Last eye exam: due now. Treatment Adherence:   Medication compliance:  compliant most of the time  Diet compliance:  compliant most of the time  Weight trend: stable  Current exercise: no regular exercise  Barriers: lack of motivation     [x]  Hemoglobin A1c has been completed in the last 3-6 months  []  To be ordered today     [x]  Urine MicroAlbumin  completed and reviewed within the last 12 month  []  To be ordered today     []  Annual eye exam has been completed referring that patient does or does not have diabetic retinopathy  [x]  Recommendation to schedule.       [x]  Annual diabetic foot exam has been completed in office in the last 12 months      The 10-year ASCVD risk score (Baldemar HIDALGO, et al., 2019) is: 33.3%    Values used to calculate the score:      Age: 69 years      Sex: Female      Is Non- : No      Diabetic: Yes      Tobacco smoker: No      Systolic Blood Pressure: 654 mmHg      Is BP treated: Yes      HDL Cholesterol: 56 mg/dL      Total Cholesterol: 184 mg/dL     BP Readings from Last 3 Encounters:   09/15/22 114/80   08/02/22 132/72   07/27/22 126/78       Pulse Readings from Last 3 Encounters:   09/15/22 58   08/02/22 76   07/27/22 52        Wt Readings from Last 3 Encounters:   09/15/22 254 lb 12.8 oz (115.6 kg)   08/02/22 253 lb (114.8 kg)   07/27/22 251 lb (113.9 kg)     Past Medical History:   Diagnosis Date    Actinic keratitis     Ankle pain     Anxiety 6/23/2019    Arthritis     Back pain     low     Carpal tunnel syndrome, right 6/29/2017    Chronic diastolic CHF (congestive heart failure) ([facility])     Chronic diastolic heart failure ([facility] 5/31/2017    Chronic kidney disease     Dependent edema     Hypertension     Incontinence     in female    Nocturnal enuresis 7/9/2021    Osteoarthritis     knee     Type II or unspecified type diabetes mellitus without mention of complication, not stated as uncontrolled     Vertigo 8/1/2017    Vitamin B12 deficiency (dietary) anemia 11/17/2018     Patient Active Problem List    Diagnosis Date Noted    Postmenopausal vaginal bleeding 03/21/2022    Urge incontinence of urine 07/09/2021    Cognitive decline 11/10/2020    Allergic rhinitis 11/10/2020    Primary osteoarthritis of right hip 10/18/2019    Neuropathy due to type 2 diabetes mellitus ([address] 11/17/2018    DAVID treated with BiPAP 07/18/2018    Mild episode of recurrent major depressive disorder ([address] 11/17/2017    Other hyperlipidemia 06/29/2017    Chronic diastolic heart failure ([address] 05/31/2017    Type 2 diabetes mellitus with stage 3 chronic kidney disease, with long-term current use of insulin ([address] 02/02/2017    Essential hypertension 02/02/2017    Obesity, morbid, BMI 40.0-49.9 ([address] 02/02/2017 Past Surgical History:   Procedure Laterality Date    ANKLE SURGERY Left     FINGER TRIGGER RELEASE Right        Family History   Problem Relation Age of Onset    Diabetes Mother     Diabetes Father     Cancer Paternal Grandfather         BONE      Social History     Socioeconomic History    Marital status:      Spouse name: None    Number of children: None    Years of education: None    Highest education level: None   Tobacco Use    Smoking status: Never    Smokeless tobacco: Never   Vaping Use    Vaping Use: Never used   Substance and Sexual Activity    Alcohol use: No    Drug use: No     Social Determinants of Health     Financial Resource Strain: Low Risk     Difficulty of Paying Living Expenses: Not hard at all   Food Insecurity: No Food Insecurity    Worried About Running Out of Food in the Last Year: Never true    Ran Out of Food in the Last Year: Never true     Health Maintenance Due   Topic Date Due    Hepatitis C screen  Never done     Current Outpatient Medications   Medication Sig Dispense Refill    memantine (NAMENDA) 10 MG tablet Take 1 tablet by mouth 2 times daily 180 tablet 3    montelukast (SINGULAIR) 10 MG tablet Take 1 tablet by mouth daily 90 tablet 3    lisinopril (PRINIVIL;ZESTRIL) 20 MG tablet Take 1 tablet by mouth daily 90 tablet 3    oxybutynin (DITROPAN-XL) 10 MG extended release tablet TAKE 1 TABLET BY MOUTH ONCE DAILY 90 tablet 3    nystatin (MYCOSTATIN) 423424 UNIT/GM cream Apply topically 4 times daily.  Continue to apply for 3 days after rash resolves 30 g 1    clotrimazole-betamethasone (LOTRISONE) 1-0.05 % cream APPLY TOPICALLY TO AFFECTED AREA(S) TWICE DAILY FOR 2 WEEKS      simvastatin (ZOCOR) 40 MG tablet TAKE 1 TABLET EVERY NIGHT 90 tablet 3    insulin lispro, 1 Unit Dial, (HUMALOG KWIKPEN) 100 UNIT/ML SOPN Use tid with meals With sliding scale max dose is 30 units per day (Patient taking differently: Use tid with meals With sliding scale max dose is 30 units per day (4 units +SS)) 15 pen 3    cyanocobalamin 1000 MCG/ML injection Inject 1 mL into the muscle every 30 days 1 mL 11    Insulin Degludec (TRESIBA FLEXTOUCH) 200 UNIT/ML SOPN Inject 28 Units into the skin daily (Patient taking differently: Inject 30 Units into the skin daily) 6 pen 5    aspirin 325 MG tablet Take 325 mg by mouth daily       carvedilol (COREG) 3.125 MG tablet Take 1 tablet by mouth 2 times daily (with meals) 180 tablet 3    DROPLET PEN NEEDLES 32G X 4 MM MISC USE 4 TIMES DAILY 400 each 1    busPIRone (BUSPAR) 15 MG tablet TAKE 1 TABLET THREE TIMES DAILY (Patient taking differently: Take 15 mg by mouth 2 times daily) 270 tablet 3    bumetanide (BUMEX) 1 MG tablet Take 1 tablet by mouth 2 times daily 180 tablet 1    citalopram (CELEXA) 10 MG tablet Take 1 tablet by mouth once daily 90 tablet 3    Continuous Blood Gluc Sensor (FREESTYLE VERA 14 DAY SENSOR) MISC 6 each by Does not apply route daily 6 each 5    glucagon 1 MG injection Inject 1 mg into the muscle See Admin Instructions Follow package directions for low blood sugar. 1 kit 3    donepezil (ARICEPT) 5 MG tablet Take 1 tablet by mouth nightly 90 tablet 3    blood glucose monitor strips by Other route 3 times daily Test 3 times a day & as needed for symptoms of irregular blood glucose. 100 strip 5    RELION ULTRA THIN LANCETS 30G MISC 1 each by Does not apply route 3 times daily 300 each 3    meclizine (ANTIVERT) 12.5 MG tablet Take 1 tablet by mouth 3 times daily as needed for Dizziness 90 tablet 2    acetaminophen (TYLENOL) 650 MG extended release tablet Take 650 mg by mouth every 8 hours as needed for Pain       ferrous sulfate (IRON 325) 325 (65 Fe) MG tablet Take 1 tablet by mouth daily (with breakfast) (Patient not taking: No sig reported) 90 tablet 1     No current facility-administered medications for this visit. No Known Allergies    Review of Systems   Constitutional:  Positive for fatigue.  Negative for appetite change, chills and fever.   HENT: Negative. Eyes: Negative. Respiratory:  Negative for cough, shortness of breath and wheezing. Cardiovascular:  Negative for chest pain, palpitations and leg swelling. Gastrointestinal:  Negative for abdominal pain, constipation and diarrhea. Endocrine: Negative for cold intolerance, heat intolerance, polydipsia, polyphagia and polyuria. Genitourinary: Negative. Musculoskeletal:  Negative for arthralgias and myalgias. Skin: Negative. Allergic/Immunologic: Negative for environmental allergies and food allergies. Neurological:  Negative for dizziness, weakness, light-headedness and headaches. Psychiatric/Behavioral:  Negative for agitation, dysphoric mood, self-injury, sleep disturbance and suicidal ideas. The patient is not nervous/anxious. PHQ Scores 3/16/2022 12/9/2021 5/4/2021 10/8/2020 10/8/2020 6/2/2020 3/2/2020   PHQ2 Score 1 2 1 4 4 3 2   PHQ9 Score 1 2 1 16 4 11 2     Interpretation of Total Score Depression Severity: 1-4 = Minimal depression, 5-9 = Mild depression, 10-14 = Moderate depression, 15-19 = Moderately severe depression, 20-27 = Severe depression     Objective:     Vitals:    09/15/22 1122   BP: 114/80   Site: Right Upper Arm   Position: Sitting   Cuff Size: Large Adult   Pulse: 58   SpO2: 97%   Weight: 254 lb 12.8 oz (115.6 kg)       Body mass index is 41.13 kg/m². Physical Exam  Constitutional:       Appearance: Normal appearance. She is well-developed and well-groomed. She is obese. HENT:      Head: Normocephalic. Eyes:      Conjunctiva/sclera: Conjunctivae normal.   Neck:      Thyroid: No thyromegaly. Vascular: No carotid bruit. Cardiovascular:      Rate and Rhythm: Normal rate and regular rhythm. Heart sounds: Normal heart sounds. Pulmonary:      Effort: Pulmonary effort is normal.      Breath sounds: Normal breath sounds. No wheezing. Abdominal:      General: Bowel sounds are normal.      Palpations: Abdomen is soft. Tenderness: There is no abdominal tenderness. Musculoskeletal:      Cervical back: Neck supple. Right lower leg: No edema. Left lower leg: No edema. Lymphadenopathy:      Cervical: No cervical adenopathy. Skin:     Capillary Refill: Capillary refill takes less than 2 seconds. Neurological:      Mental Status: She is alert and oriented to person, place, and time. Gait: Gait normal.   Psychiatric:         Mood and Affect: Mood normal.         Behavior: Behavior is cooperative. Lab Results   Component Value Date    LABA1C 8.1 08/02/2022    LABA1C 7.6 02/15/2022    LABA1C 8.4 (H) 08/19/2021     Lab Results   Component Value Date    GLUF 167 (H) 09/01/2020    LABMICR 40.4 (H) 05/20/2022    LDLCALC 98.8 08/31/2022    CREATININE 1.1 (H) 08/31/2022         Assessment/Plan:   1. Essential hypertension  -     lisinopril (PRINIVIL;ZESTRIL) 20 MG tablet; Take 1 tablet by mouth daily, Disp-90 tablet, R-3Requesting 1 year supplyNormal  2. Type 2 diabetes mellitus with stage 3b chronic kidney disease, with long-term current use of insulin ([facility]  3. Other hyperlipidemia  4. Allergic rhinitis, unspecified seasonality, unspecified trigger  -     montelukast (SINGULAIR) 10 MG tablet; Take 1 tablet by mouth daily, Disp-90 tablet, R-3Normal  -     TSH with Reflex; Future  -     CBC with Auto Differential; Future  5. Fatigue, unspecified type  -     Vitamin D 25 Hydroxy; Future  -     TSH with Reflex; Future  -     CBC with Auto Differential; Future  6. Cognitive decline  -     memantine (NAMENDA) 10 MG tablet; Take 1 tablet by mouth 2 times daily, Disp-180 tablet, R-3Normal  -     Vitamin D 25 Hydroxy; Future  -     TSH with Reflex; Future  -     CBC with Auto Differential; Future  7. Obesity, morbid, BMI 40.0-49.9 ([facility])  -     Vitamin D 25 Hydroxy; Future  -     TSH with Reflex; Future  -     CBC with Auto Differential; Future  8. Mild episode of recurrent major depressive disorder ([facility]  9.  Body mass index (BMI) 40.0-44.9, adult (HCC)   -     Vitamin D 25 Hydroxy; Future  10. Chronic diastolic heart failure ([facility] [address]  11. DAVID treated with BiPAP  12. [facility]urop[facility] due to type 2 diabetes mellitus [facility]  13. Urge incontinence of urine  14. Primary osteoarthritis of right hip  15. Need for prophylactic vaccination and inoculation against influenza  -     Influenza, FLUAD, (age 72 y+), IM, Preservative Free, 0.5 mL     Discussed use, benefit, and side effects of prescribed medications. All patient questions answered. Pt voiced understanding. Health Maintenance reviewed. Instructed to continue current medications, diet and exercise. Patient agreed with treatment plan. Follow up as directed. Return in about 3 months (around 12/15/2022) for DM, HTN, HLD.     Electronically signed by ORTIZ Lane CNP on 9/25/2022

## 2022-09-21 LAB
BASOPHILS %: 0.76 (ref 0–3)
BASOPHILS ABSOLUTE: 0.07 (ref 0–0.3)
EOSINOPHILS %: 1.91 (ref 0–10)
EOSINOPHILS ABSOLUTE: 0.18 (ref 0–1.1)
HCT VFR BLD CALC: 44 % (ref 37–47)
HEMOGLOBIN: 13.3 (ref 12–16)
LYMPHOCYTE %: 28.21 (ref 20–51.1)
LYMPHOCYTES ABSOLUTE: 2.71 (ref 1–5.5)
MCH RBC QN AUTO: 29 PG (ref 28.5–32.5)
MCHC RBC AUTO-ENTMCNC: 30.2 G/DL (ref 32–37)
MCV RBC AUTO: 96.2 FL (ref 80–94)
MONOCYTES %: 7.59 (ref 1.7–9.3)
MONOCYTES ABSOLUTE: 0.73 (ref 0.1–1)
NEUTROPHILS %: 61.52 (ref 42.2–75.2)
NEUTROPHILS ABSOLUTE: 5.9 (ref 2–8.1)
PDW BLD-RTO: 14.2 % (ref 8.5–15.5)
PLATELET # BLD: 228.3 THOU/MM3 (ref 130–400)
RBC: 4.57 M/UL (ref 4.2–5.4)
TSH REFLEX FT4: 1.4 MIU/ML (ref 0.49–4.67)
VITAMIN D 25-HYDROXY: 13.4 NG/ML (ref 30–100)
WBC: 9.6 THOU/ML3 (ref 4.8–10.8)

## 2022-09-25 PROBLEM — N18.30 CHRONIC RENAL DISEASE, STAGE III (HCC): Status: RESOLVED | Noted: 2022-04-27 | Resolved: 2022-09-25

## 2022-09-25 ASSESSMENT — ENCOUNTER SYMPTOMS
SHORTNESS OF BREATH: 0
CONSTIPATION: 0
ABDOMINAL PAIN: 0
WHEEZING: 0
DIARRHEA: 0
EYES NEGATIVE: 1
COUGH: 0

## 2022-09-27 DIAGNOSIS — R53.83 FATIGUE, UNSPECIFIED TYPE: ICD-10-CM

## 2022-09-27 DIAGNOSIS — E66.01 OBESITY, MORBID, BMI 40.0-49.9 (HCC): ICD-10-CM

## 2022-09-27 DIAGNOSIS — R41.89 COGNITIVE DECLINE: ICD-10-CM

## 2022-09-27 DIAGNOSIS — J30.9 ALLERGIC RHINITIS, UNSPECIFIED SEASONALITY, UNSPECIFIED TRIGGER: ICD-10-CM

## 2022-10-09 DIAGNOSIS — E55.9 VITAMIN D DEFICIENCY: Primary | ICD-10-CM

## 2022-10-09 RX ORDER — ERGOCALCIFEROL 1.25 MG/1
50000 CAPSULE ORAL WEEKLY
Qty: 4 CAPSULE | Refills: 0 | Status: SHIPPED | OUTPATIENT
Start: 2022-10-09

## 2022-11-01 ENCOUNTER — OFFICE VISIT (OUTPATIENT)
Dept: DIABETES SERVICES | Age: 77
End: 2022-11-01
Payer: MEDICARE

## 2022-11-01 VITALS
HEART RATE: 58 BPM | HEIGHT: 66 IN | RESPIRATION RATE: 18 BRPM | WEIGHT: 260 LBS | BODY MASS INDEX: 41.78 KG/M2 | DIASTOLIC BLOOD PRESSURE: 78 MMHG | SYSTOLIC BLOOD PRESSURE: 120 MMHG

## 2022-11-01 DIAGNOSIS — I10 ESSENTIAL HYPERTENSION: ICD-10-CM

## 2022-11-01 DIAGNOSIS — E78.49 OTHER HYPERLIPIDEMIA: ICD-10-CM

## 2022-11-01 DIAGNOSIS — E11.22 TYPE 2 DIABETES MELLITUS WITH STAGE 3B CHRONIC KIDNEY DISEASE, WITH LONG-TERM CURRENT USE OF INSULIN (HCC): Primary | ICD-10-CM

## 2022-11-01 DIAGNOSIS — F32.A ANXIETY AND DEPRESSION: ICD-10-CM

## 2022-11-01 DIAGNOSIS — E66.01 CLASS 3 SEVERE OBESITY DUE TO EXCESS CALORIES WITH SERIOUS COMORBIDITY AND BODY MASS INDEX (BMI) OF 40.0 TO 44.9 IN ADULT (HCC): ICD-10-CM

## 2022-11-01 DIAGNOSIS — Z79.4 TYPE 2 DIABETES MELLITUS WITH STAGE 3B CHRONIC KIDNEY DISEASE, WITH LONG-TERM CURRENT USE OF INSULIN (HCC): Primary | ICD-10-CM

## 2022-11-01 DIAGNOSIS — F41.9 ANXIETY AND DEPRESSION: ICD-10-CM

## 2022-11-01 DIAGNOSIS — N18.32 TYPE 2 DIABETES MELLITUS WITH STAGE 3B CHRONIC KIDNEY DISEASE, WITH LONG-TERM CURRENT USE OF INSULIN (HCC): Primary | ICD-10-CM

## 2022-11-01 DIAGNOSIS — R42 DIZZINESS: ICD-10-CM

## 2022-11-01 PROCEDURE — G8427 DOCREV CUR MEDS BY ELIG CLIN: HCPCS | Performed by: NURSE PRACTITIONER

## 2022-11-01 PROCEDURE — 1123F ACP DISCUSS/DSCN MKR DOCD: CPT | Performed by: NURSE PRACTITIONER

## 2022-11-01 PROCEDURE — 3074F SYST BP LT 130 MM HG: CPT | Performed by: NURSE PRACTITIONER

## 2022-11-01 PROCEDURE — 1036F TOBACCO NON-USER: CPT | Performed by: NURSE PRACTITIONER

## 2022-11-01 PROCEDURE — 99213 OFFICE O/P EST LOW 20 MIN: CPT | Performed by: NURSE PRACTITIONER

## 2022-11-01 PROCEDURE — G8417 CALC BMI ABV UP PARAM F/U: HCPCS | Performed by: NURSE PRACTITIONER

## 2022-11-01 PROCEDURE — 1090F PRES/ABSN URINE INCON ASSESS: CPT | Performed by: NURSE PRACTITIONER

## 2022-11-01 PROCEDURE — G8400 PT W/DXA NO RESULTS DOC: HCPCS | Performed by: NURSE PRACTITIONER

## 2022-11-01 PROCEDURE — 3052F HG A1C>EQUAL 8.0%<EQUAL 9.0%: CPT | Performed by: NURSE PRACTITIONER

## 2022-11-01 PROCEDURE — 99214 OFFICE O/P EST MOD 30 MIN: CPT | Performed by: NURSE PRACTITIONER

## 2022-11-01 PROCEDURE — G8484 FLU IMMUNIZE NO ADMIN: HCPCS | Performed by: NURSE PRACTITIONER

## 2022-11-01 PROCEDURE — 3078F DIAST BP <80 MM HG: CPT | Performed by: NURSE PRACTITIONER

## 2022-11-01 RX ORDER — TIRZEPATIDE 2.5 MG/.5ML
2.5 INJECTION, SOLUTION SUBCUTANEOUS WEEKLY
Qty: 2 ML | Refills: 3 | Status: SHIPPED | OUTPATIENT
Start: 2022-11-01

## 2022-11-01 ASSESSMENT — ENCOUNTER SYMPTOMS
ABDOMINAL PAIN: 0
SHORTNESS OF BREATH: 0
RESPIRATORY NEGATIVE: 1
DIARRHEA: 0

## 2022-11-01 NOTE — PROGRESS NOTES
[address]  [address] [facility], [address]  [facility] [address] [phone]        HISTORY:    Ambreen Coelho presents today for evaluation and management of:  Chief Complaint   Patient presents with    Diabetes    3 Month Follow-Up    Cough     X3 weeks along with chest tightness and fatigue. Patient states PCP gave vitamin d 50,000 once weekly x4 weeks to see if helps and has not so far. Afebrile         Interval History:    Past DM Medications   Metformin- ckd  Glimepiride-therapy completed  janvuia- glp1 started  victoza-cost  soliqua- cost  Reginold Susi- cost      Current Diabetic Medications   tresiba 30 units once daily. Humalog Sliding Scale Insulin 2+ plus scale  10-15 minutes before each meal before bed only use the scale      Blood sugar   Action     <70               Drink Juice               No extra insulin  150 - 200         2 units subcutaneous Insulin  201 - 250         4 units subcutaneous Insulin  251 - 300         6 units subcutaneous Insulin  301 - 350         8 units subcutaneous Insulin  351 - 400         10 units subcutaneous Insulin   > 400              12 units subcutaneous Insulin        DKA episodes: 0    08/02/22   Ambreen Coelho is a 77 y.o. female patient who presents to clinic today for her diabetes. she has a history of HTN, HF, CKD, neuropathy, OA, hyperlipidemia, obesity and depression which contributes to her diabetes. At previous visit insulin was increased. she denies any current signs or symptoms of hyper/hypoglycemia. she states they are taking their medications as prescribed without any adverse effects. She does admit to taking meal time insulin after meals based on postprandial spike. Having some lows overnight.    Diet: regular mindful, smaller portions   Exercise: none  BS testing: uses cgm daily with success and is adherent to cgm therapy  Issues: kirti  Diabetic foot exam up-to-date: Yes  Diabetic retinal exam up-to-date: Yes  Hypoglycemia as needed treatment: glucose tabs    11/01/22   Amor Thomas is a 69 y.o. female patient who presents to clinic today for her diabetes. she has a history of HTN, HF, CKD, neuropathy, OA, hyperlipidemia, obesity and depression which contributes to her diabetes. At previous visit insulin was adjusted. she denies any current signs or symptoms of hyper/hypoglycemia. she states they are taking their medications as prescribed without any adverse effects. She did not bring her [employer] [facility] reader. She states she feels better when glucose is 150-200 and tries to keep it there. Diet: mindful   Exercise: none  BS testing: uses cgm daily with success and is adherent to cgm therapy  Issues: denies  Diabetic foot exam up-to-date: Yes  Diabetic retinal exam up-to-date: Yes  Hypoglycemia as needed treatment: glucose tabs    High cholesterol-  Takes zocor and denies any adverse effects with its use. Watches diet and exercise. Hypertension-  Takes coreg bumex, and lisinopril and denies any adverse effects with their use. Watches diet and exercise. Denies any chest pain, dizziness or edema. Follows with cardiology:Yes. Yearly      Obesity- Working on weight loss.       Past Medical History:   Diagnosis Date    Actinic keratitis     Ankle pain     Anxiety 6/23/2019    Arthritis     Back pain     low     Carpal tunnel syndrome, right 6/29/2017    Chronic diastolic CHF (congestive heart failure) ([facility])     Chronic diastolic heart failure ([facility] [address] 5/31/2017    Chronic kidney disease     Dependent edema     Hypertension     Incontinence     in female    Nocturnal enuresis 7/9/2021    Osteoarthritis     knee     Type II or unspecified type diabetes mellitus without mention of complication, not stated as uncontrolled     Vertigo 8/1/2017    Vitamin B12 deficiency (dietary) anemia 11/17/2018     Family History   Problem Relation Age of Onset    Diabetes Mother     Diabetes Father Cancer Paternal Grandfather         BONE      Social History     Tobacco Use    Smoking status: Never    Smokeless tobacco: Never   Vaping Use    Vaping Use: Never used   Substance Use Topics    Alcohol use: No    Drug use: No     No Known Allergies    MEDICATIONS:  Current Outpatient Medications   Medication Sig Dispense Refill    Tirzepatide (MOUNJARO) 2.5 MG/0.5ML SOPN SC injection Inject 0.5 mLs into the skin once a week 2 mL 3    vitamin D (ERGOCALCIFEROL) 1.25 MG (64275 UT) CAPS capsule Take 1 capsule by mouth once a week 4 capsule 0    memantine (NAMENDA) 10 MG tablet Take 1 tablet by mouth 2 times daily 180 tablet 3    montelukast (SINGULAIR) 10 MG tablet Take 1 tablet by mouth daily 90 tablet 3    lisinopril (PRINIVIL;ZESTRIL) 20 MG tablet Take 1 tablet by mouth daily 90 tablet 3    oxybutynin (DITROPAN-XL) 10 MG extended release tablet TAKE 1 TABLET BY MOUTH ONCE DAILY 90 tablet 3    nystatin (MYCOSTATIN) 712479 UNIT/GM cream Apply topically 4 times daily.  Continue to apply for 3 days after rash resolves 30 g 1    clotrimazole-betamethasone (LOTRISONE) 1-0.05 % cream APPLY TOPICALLY TO AFFECTED AREA(S) TWICE DAILY FOR 2 WEEKS      simvastatin (ZOCOR) 40 MG tablet TAKE 1 TABLET EVERY NIGHT 90 tablet 3    insulin lispro, 1 Unit Dial, (HUMALOG KWIKPEN) 100 UNIT/ML SOPN Use tid with meals With sliding scale max dose is 30 units per day (Patient taking differently: Use tid with meals With sliding scale max dose is 30 units per day (4 units +SS)) 15 pen 3    cyanocobalamin 1000 MCG/ML injection Inject 1 mL into the muscle every 30 days 1 mL 11    Insulin Degludec (TRESIBA FLEXTOUCH) 200 UNIT/ML SOPN Inject 28 Units into the skin daily (Patient taking differently: Inject 30 Units into the skin daily) 6 pen 5    aspirin 325 MG tablet Take 325 mg by mouth daily       carvedilol (COREG) 3.125 MG tablet Take 1 tablet by mouth 2 times daily (with meals) 180 tablet 3    busPIRone (BUSPAR) 15 MG tablet TAKE 1 TABLET THREE TIMES DAILY (Patient taking differently: Take 15 mg by mouth 2 times daily) 270 tablet 3    bumetanide (BUMEX) 1 MG tablet Take 1 tablet by mouth 2 times daily 180 tablet 1    citalopram (CELEXA) 10 MG tablet Take 1 tablet by mouth once daily 90 tablet 3    donepezil (ARICEPT) 5 MG tablet Take 1 tablet by mouth nightly 90 tablet 3    meclizine (ANTIVERT) 12.5 MG tablet Take 1 tablet by mouth 3 times daily as needed for Dizziness 90 tablet 2    acetaminophen (TYLENOL) 650 MG extended release tablet Take 650 mg by mouth every 8 hours as needed for Pain       DROPLET PEN NEEDLES 32G X 4 MM MISC USE 4 TIMES DAILY 400 each 1    Continuous Blood Gluc Sensor (FREESTYLE VERA 14 DAY SENSOR) MISC 6 each by Does not apply route daily 6 each 5    glucagon 1 MG injection Inject 1 mg into the muscle See Admin Instructions Follow package directions for low blood sugar. 1 kit 3    blood glucose monitor strips by Other route 3 times daily Test 3 times a day & as needed for symptoms of irregular blood glucose. 100 strip 5    RELION ULTRA THIN LANCETS 30G MISC 1 each by Does not apply route 3 times daily 300 each 3     No current facility-administered medications for this visit. Review ofSymptoms:  Review of Systems   Constitutional:  Positive for fatigue. Negative for unexpected weight change. Eyes:  Negative for visual disturbance. Respiratory: Negative. Negative for shortness of breath. Cardiovascular:  Negative for chest pain and leg swelling. Gastrointestinal:  Negative for abdominal pain and diarrhea. Endocrine: Negative for polydipsia, polyphagia and polyuria. Genitourinary: Negative. Musculoskeletal: Negative. Skin:  Negative for rash and wound. Neurological:  Negative for dizziness, tremors, seizures and headaches. Psychiatric/Behavioral: Negative. Negative for confusion and decreased concentration. Theremainder of a complete 14-point review of systems is negative.        Vital Signs: /78   Pulse 58   Resp 18   Ht 5' 6\" (1.676 m)   Wt 260 lb (117.9 kg)   LMP  (LMP Unknown)   BMI 41.97 kg/m²      Wt Readings from Last 3 Encounters:   11/01/22 260 lb (117.9 kg)   09/15/22 254 lb 12.8 oz (115.6 kg)   08/02/22 253 lb (114.8 kg)     Body mass index is 41.97 kg/m². LABS:  Hemoglobin A1C   Date Value Ref Range Status   08/02/2022 8.1 % Final   02/15/2022 7.6 % Final     Lab Results   Component Value Date    LABMICR 40.4 (H) 05/20/2022     Lab Results   Component Value Date     08/31/2022    K 5.0 08/31/2022     08/31/2022    CO2 28 08/31/2022    BUN 31 (H) 08/31/2022    CREATININE 1.1 (H) 08/31/2022    GLUCOSE 208 (H) 08/31/2022    CALCIUM 8.8 08/31/2022    PROT 7.0 09/01/2020    LABALBU 3.9 08/31/2022    BILITOT 0.4 08/31/2022    ALKPHOS 86 08/31/2022    AST 14 08/31/2022    ALT 12 08/31/2022    LABGLOM 51.5 08/31/2022    GFRAA >60 05/21/2021    AGRATIO 1.3 07/05/2019    GLOB 2.7 08/31/2022     Lab Results   Component Value Date    CHOL 184 08/31/2022    CHOL 160 08/19/2021    CHOL 169 09/01/2020     Lab Results   Component Value Date    TRIG 146 08/31/2022    TRIG 128 08/19/2021    TRIG 89 09/01/2020     Lab Results   Component Value Date    HDL 56 08/31/2022    HDL 54 08/19/2021    HDL 60 09/01/2020     Lab Results   Component Value Date    LDLCHOLESTEROL 91 09/01/2020    LDLCALC 98.8 08/31/2022    LDLCALC 80.4 08/19/2021    LDLCALC 69.4 03/28/2019     Lab Results   Component Value Date    VLDL 29 08/31/2022    VLDL 26 08/19/2021    VLDL NOT REPORTED 09/01/2020     Lab Results   Component Value Date    CHOLHDLRATIO 3.29 08/31/2022    CHOLHDLRATIO 2.96 08/19/2021    CHOLHDLRATIO 2.8 09/01/2020           Physical Exam  Constitutional:       Appearance: She is well-developed. Eyes:      Pupils: Pupils are equal, round, and reactive to light. Neck:      Thyroid: No thyroid mass, thyromegaly or thyroid tenderness.    Cardiovascular:      Rate and Rhythm: Normal rate and regular rhythm. Heart sounds: Normal heart sounds. Pulmonary:      Effort: Pulmonary effort is normal.      Breath sounds: Normal breath sounds. Abdominal:      General: Bowel sounds are normal.      Palpations: Abdomen is soft. Skin:     General: Skin is warm and dry. Comments: Negative for open/nonhealing wounds. Negative for lipohypertrophy. Neurological:      Mental Status: She is alert and oriented to person, place, and time. ASSESSMENT/PLAN:     Diagnosis Orders   1. Type 2 diabetes mellitus with stage 3b chronic kidney disease, with long-term current use of insulin (HCC)  Tirzepatide (MOUNJARO) 2.5 MG/0.5ML SOPN SC injection      2. Other hyperlipidemia        3. Essential hypertension        4. Class 3 severe obesity due to excess calories with serious comorbidity and body mass index (BMI) of 40.0 to 44.9 in [facility])          No orders of the defined types were placed in this encounter. Orders Placed This Encounter   Medications    Tirzepatide (MOUNJARO) 2.5 MG/0.5ML SOPN SC injection     Sig: Inject 0.5 mLs into the skin once a week     Dispense:  2 mL     Refill:  3     Requested Prescriptions     Signed Prescriptions Disp Refills    Tirzepatide (MOUNJARO) 2.5 MG/0.5ML SOPN SC injection 2 mL 3     Sig: Inject 0.5 mLs into the skin once a week       1. Type 2 diabetes mellitus with stage 3b chronic kidney disease, with long-term current use of insulin (HCC)  - Unstable  HbA1C goal is less than 7%. - Fasting blood glucose goal is 70-120mg/dl and postprandial blood sugar goal is less than 180 mg/dl. - Labs reviewed including most recent A1c, microalbumin and kidney function. Repeat labs due in 2 weeks.    -We discussed in great detail dietary modifications they can make to better improve their blood sugars. -follow up diabetes education completed, all questions answered.  -Patient will start a new medication.  Medication reviewed with patient including administration and possible side effects. Discussed risk and benefits including any black box warnings. All questions answered.   -will start mounjaro and increase to rely less on insulin. May need patient assistance. Discussed signs and symptoms of hyper/hypoglycemia and how to treat. Encouraged 150 minutes of physical activity per week. Follow a low carbohydrate diet. Encouraged at least 7 hours of sleep. The patient was informed of the goals of diabetes management. This can only be accomplished by watching their diet and exercise levels. We certainly use medicines to help attain these goals. The consequences of not controlling blood sugars were discussed. These include blindness, heart disease, stroke, kidney disease, and possibly need for dialysis. They were told to be careful with their foot care as diabetics often have nerve damage, infections and risk for limb amutations . They also need a dilated eye exam yearly. We discussed the issues of diet, exercise, medication, complication avoidance, reviewed the signs and symptoms of diabetes, hypoglycemic episodes, significance of HbA1C.     - Tirzepatide (MOUNJARO) 2.5 MG/0.5ML SOPN SC injection; Inject 0.5 mLs into the skin once a week  Dispense: 2 mL; Refill: 3    2. Other hyperlipidemia  stable, lipid panel reviewed, continue current medications. Diet and exercise      3. Essential hypertension   stable, continue current medications. Diet and exercise Seek emergent care if chest pain develops. 4. Class 3 severe obesity due to excess calories with serious comorbidity and body mass index (BMI) of 40.0 to 44.9 in adult [facility])  Reduce calories and increase physical activity to achieve a slow and steady weight loss to improve blood pressure, cholesterol and diabetes. Answered all patient questions. Agrees to follow plan of care and to follow up in 1 months, sooner if needed. Call office if unexplained blood sugars less than 70 occur or above 400.  Call office or access MyChart with any further questions or concerns. Be sure to bring glucometer/food log at next appointment. Total time spent reviewing chart, labs, counseling patient and documenting on the date of the encounter: 30 min.       Electronically signed by ORTIZ Penaloza CNP on 11/1/2022 at 12:03 PM      (Please note that portions of this note were completed with a voice-recognition program. Efforts were made to edit the dictation but occasionally words are mis-transcribed.)

## 2022-11-01 NOTE — TELEPHONE ENCOUNTER
Patient in to see remedios today and requests refill of her buspar. States she is only taking twice daily instead of three times daily. Requesting to her mail order pharmacy. Pended for you. Also patient requesting antivert to be refilled as well. - also while going over patient's medications she states that she has one dose left of the vitamin d 50,000 and has no refills. She is asking for clarification on if she is to take that for longer or if after her last dose on Thursday is completion of therapy. Please advise and thank you.

## 2022-11-03 RX ORDER — BUSPIRONE HYDROCHLORIDE 15 MG/1
15 TABLET ORAL 2 TIMES DAILY
Qty: 180 TABLET | Refills: 3 | Status: SHIPPED | OUTPATIENT
Start: 2022-11-03 | End: 2023-02-01

## 2022-11-03 RX ORDER — MECLIZINE HCL 12.5 MG/1
12.5 TABLET ORAL 3 TIMES DAILY PRN
Qty: 90 TABLET | Refills: 2 | Status: SHIPPED | OUTPATIENT
Start: 2022-11-03

## 2022-11-29 DIAGNOSIS — Z79.4 TYPE 2 DIABETES MELLITUS WITH STAGE 3B CHRONIC KIDNEY DISEASE, WITH LONG-TERM CURRENT USE OF INSULIN (HCC): ICD-10-CM

## 2022-11-29 DIAGNOSIS — N18.32 TYPE 2 DIABETES MELLITUS WITH STAGE 3B CHRONIC KIDNEY DISEASE, WITH LONG-TERM CURRENT USE OF INSULIN (HCC): ICD-10-CM

## 2022-11-29 DIAGNOSIS — Z79.4 DIABETES MELLITUS TYPE 2, INSULIN DEPENDENT (HCC): ICD-10-CM

## 2022-11-29 DIAGNOSIS — E11.22 TYPE 2 DIABETES MELLITUS WITH STAGE 3B CHRONIC KIDNEY DISEASE, WITH LONG-TERM CURRENT USE OF INSULIN (HCC): ICD-10-CM

## 2022-11-29 DIAGNOSIS — E11.9 DIABETES MELLITUS TYPE 2, INSULIN DEPENDENT (HCC): ICD-10-CM

## 2022-11-29 RX ORDER — INSULIN LISPRO 100 [IU]/ML
INJECTION, SOLUTION INTRAVENOUS; SUBCUTANEOUS
Qty: 15 ADJUSTABLE DOSE PRE-FILLED PEN SYRINGE | Refills: 1 | Status: SHIPPED | OUTPATIENT
Start: 2022-11-29

## 2022-11-29 RX ORDER — PEN NEEDLE, DIABETIC 32GX 5/32"
NEEDLE, DISPOSABLE MISCELLANEOUS
Qty: 400 EACH | Refills: 1 | Status: SHIPPED | OUTPATIENT
Start: 2022-11-29

## 2022-11-29 RX ORDER — INSULIN DEGLUDEC 200 U/ML
30 INJECTION, SOLUTION SUBCUTANEOUS DAILY
Qty: 18 ML | Refills: 1 | Status: SHIPPED | OUTPATIENT
Start: 2022-11-29

## 2022-11-29 NOTE — TELEPHONE ENCOUNTER
Refill requests    Medication pended if agreeable    Last Appt:  5/4/2020  Next Appt:   Visit date not found  Med verified in Epic

## 2023-01-04 ENCOUNTER — TELEPHONE (OUTPATIENT)
Dept: FAMILY MEDICINE CLINIC | Age: 78
End: 2023-01-04

## 2023-01-04 DIAGNOSIS — R42 VERTIGO: Primary | ICD-10-CM

## 2023-01-04 NOTE — TELEPHONE ENCOUNTER
Pt called looking to get PT orders for her vertigo, pt would like to go to [facility], would like to schedule for next week. Pt has had PT before for her vertigo.

## 2023-01-05 NOTE — TELEPHONE ENCOUNTER
Pt called again looking for PT orders, Pt has an appt set up with [facility] on 1/9

## 2023-02-03 DIAGNOSIS — N18.32 TYPE 2 DIABETES MELLITUS WITH STAGE 3B CHRONIC KIDNEY DISEASE, WITH LONG-TERM CURRENT USE OF INSULIN (HCC): ICD-10-CM

## 2023-02-03 DIAGNOSIS — Z79.4 TYPE 2 DIABETES MELLITUS WITH STAGE 3B CHRONIC KIDNEY DISEASE, WITH LONG-TERM CURRENT USE OF INSULIN (HCC): ICD-10-CM

## 2023-02-03 DIAGNOSIS — E11.22 TYPE 2 DIABETES MELLITUS WITH STAGE 3B CHRONIC KIDNEY DISEASE, WITH LONG-TERM CURRENT USE OF INSULIN (HCC): ICD-10-CM

## 2023-02-06 RX ORDER — INSULIN LISPRO 100 [IU]/ML
INJECTION, SOLUTION INTRAVENOUS; SUBCUTANEOUS
Qty: 30 ML | Refills: 3 | Status: SHIPPED | OUTPATIENT
Start: 2023-02-06

## 2023-02-07 ENCOUNTER — OFFICE VISIT (OUTPATIENT)
Dept: DIABETES SERVICES | Age: 78
End: 2023-02-07
Payer: MEDICARE

## 2023-02-07 VITALS
HEIGHT: 66 IN | RESPIRATION RATE: 22 BRPM | WEIGHT: 258 LBS | SYSTOLIC BLOOD PRESSURE: 158 MMHG | HEART RATE: 60 BPM | DIASTOLIC BLOOD PRESSURE: 88 MMHG | BODY MASS INDEX: 41.46 KG/M2

## 2023-02-07 DIAGNOSIS — Z79.4 TYPE 2 DIABETES MELLITUS WITH STAGE 3B CHRONIC KIDNEY DISEASE, WITH LONG-TERM CURRENT USE OF INSULIN (HCC): Primary | ICD-10-CM

## 2023-02-07 DIAGNOSIS — E11.22 TYPE 2 DIABETES MELLITUS WITH STAGE 3B CHRONIC KIDNEY DISEASE, WITH LONG-TERM CURRENT USE OF INSULIN (HCC): Primary | ICD-10-CM

## 2023-02-07 DIAGNOSIS — E78.49 OTHER HYPERLIPIDEMIA: ICD-10-CM

## 2023-02-07 DIAGNOSIS — N18.32 TYPE 2 DIABETES MELLITUS WITH STAGE 3B CHRONIC KIDNEY DISEASE, WITH LONG-TERM CURRENT USE OF INSULIN (HCC): Primary | ICD-10-CM

## 2023-02-07 DIAGNOSIS — I10 ESSENTIAL HYPERTENSION: ICD-10-CM

## 2023-02-07 DIAGNOSIS — E66.01 OBESITY, CLASS III, BMI 40-49.9 (MORBID OBESITY) (HCC): ICD-10-CM

## 2023-02-07 LAB — HBA1C MFR BLD: 7.5 %

## 2023-02-07 PROCEDURE — PBSHW POCT GLYCOSYLATED HEMOGLOBIN (HGB A1C): Performed by: NURSE PRACTITIONER

## 2023-02-07 PROCEDURE — 3051F HG A1C>EQUAL 7.0%<8.0%: CPT | Performed by: NURSE PRACTITIONER

## 2023-02-07 PROCEDURE — 99214 OFFICE O/P EST MOD 30 MIN: CPT | Performed by: NURSE PRACTITIONER

## 2023-02-07 PROCEDURE — G8427 DOCREV CUR MEDS BY ELIG CLIN: HCPCS | Performed by: NURSE PRACTITIONER

## 2023-02-07 PROCEDURE — G8400 PT W/DXA NO RESULTS DOC: HCPCS | Performed by: NURSE PRACTITIONER

## 2023-02-07 PROCEDURE — 3079F DIAST BP 80-89 MM HG: CPT | Performed by: NURSE PRACTITIONER

## 2023-02-07 PROCEDURE — 1123F ACP DISCUSS/DSCN MKR DOCD: CPT | Performed by: NURSE PRACTITIONER

## 2023-02-07 PROCEDURE — 1036F TOBACCO NON-USER: CPT | Performed by: NURSE PRACTITIONER

## 2023-02-07 PROCEDURE — 3077F SYST BP >= 140 MM HG: CPT | Performed by: NURSE PRACTITIONER

## 2023-02-07 PROCEDURE — 95251 CONT GLUC MNTR ANALYSIS I&R: CPT | Performed by: NURSE PRACTITIONER

## 2023-02-07 PROCEDURE — G8484 FLU IMMUNIZE NO ADMIN: HCPCS | Performed by: NURSE PRACTITIONER

## 2023-02-07 PROCEDURE — 83036 HEMOGLOBIN GLYCOSYLATED A1C: CPT | Performed by: NURSE PRACTITIONER

## 2023-02-07 PROCEDURE — G8417 CALC BMI ABV UP PARAM F/U: HCPCS | Performed by: NURSE PRACTITIONER

## 2023-02-07 PROCEDURE — 1090F PRES/ABSN URINE INCON ASSESS: CPT | Performed by: NURSE PRACTITIONER

## 2023-02-07 ASSESSMENT — ENCOUNTER SYMPTOMS
ABDOMINAL PAIN: 0
SHORTNESS OF BREATH: 0
RESPIRATORY NEGATIVE: 1
DIARRHEA: 0

## 2023-02-07 NOTE — PATIENT INSTRUCTIONS
Start over the counter vitamin d3 supplement 5044 IU    Start Trulicity 6.44 mg once weekly     Call mail order to check on price and if to expensive complete the patient assistance forms.        Humalog Sliding Scale Insulin 2+ plus scale  10-15 minutes before each meal (hold humalog before bed)    Blood sugar   Action     <70               Drink Juice               No extra insulin  150 - 200         2 units subcutaneous Insulin  201 - 250         4 units subcutaneous Insulin  251 - 300         6 units subcutaneous Insulin  301 - 350         8 units subcutaneous Insulin  351 - 400         10 units subcutaneous Insulin   > 400              12 units subcutaneous Insulin    Check time in target on gerson reader for low glucose percentage and call office if >4%

## 2023-02-07 NOTE — PROGRESS NOTES
[address], [address]  [place] [address] [place] [address]  [phone]        HISTORY:    Ole Kayser presents today for evaluation and management of:  Chief Complaint   Patient presents with    Diabetes    3 Month Follow-Up       Patient Care Team:  ORTIZ Nazario CNP as PCP - General (Family Medicine)  ORTIZ Nazario CNP as PCP - Empaneled Provider  Yasmin Pettit as Orthopedic Surgeon (Orthopedic Surgery)  Martita Hancock DPM as Consulting Physician (Podiatry)  Farheen Maier as Referring Physician (Optometry)  ORTIZ Barrientos CNP as Diabetic Educator (Nurse Practitioner)      Interval History:    Past DM Medications   Metformin- ckd  Glimepiride-therapy completed  janvuia- glp1 started  victoza-cost  soliqua- cost  [place]- cost      Current Diabetic Medications   tresiba 30 units once daily. Humalog Sliding Scale Insulin 2+ plus scale  10-15 minutes before each meal before bed only use the scale      Blood sugar   Action     <70               Drink Juice               No extra insulin  150 - 200         2 units subcutaneous Insulin  201 - 250         4 units subcutaneous Insulin  251 - 300         6 units subcutaneous Insulin  301 - 350         8 units subcutaneous Insulin  351 - 400         10 units subcutaneous Insulin   > 400              12 units subcutaneous Insulin        DKA episodes: 0    11/01/22   Ole Kayser is a 69 y.o. female patient who presents to clinic today for her diabetes. she has a history of HTN, HF, CKD, neuropathy, OA, hyperlipidemia, obesity and depression which contributes to her diabetes. At previous visit insulin was adjusted. she denies any current signs or symptoms of hyper/hypoglycemia. she states they are taking their medications as prescribed without any adverse effects. She did not bring her [facility] reader.  She states she feels better when glucose is 150-200 and tries to keep it there. Diet: mindful   Exercise: none  BS testing: uses cgm daily with success and is adherent to cgm therapy  Issues: denies  Diabetic foot exam up-to-date: Yes  Diabetic retinal exam up-to-date: Yes  Hypoglycemia as needed treatment: glucose tabs    02/07/23   Ashu Downey is a 69 y.o. female patient who presents to clinic today for her diabetes. she has a history of HTN, HF, CKD, neuropathy, OA, hyperlipidemia, obesity and depression which contributes to her diabetes. At previous visit mounjaro was started but was to expensive. she denies any current signs or symptoms of hyper/hypoglycemia. she states they are taking their medications as prescribed without any adverse effects. Diet: mindful  Exercise: none  BS testing: uses cgm daily with success and is adherent to cgm therapy  Hypoglycemia: Yes    Sweats and Tremors     Hypoglycemia Frequency: 1 per day  Hypoglycemia as needed treatment: snack, juice or glucose tabs  Issues:   Diabetic foot exam up-to-date: Yes  Diabetic retinal exam up-to-date: Yes    Diabetes complications:nephropathy      High cholesterol-  Takes zocor and denies any adverse effects with its use. Watches diet and exercise. Hypertension-  Takes coreg bumex, and lisinopril and denies any adverse effects with their use. Watches diet and exercise. Denies any chest pain, dizziness or edema. Follows with cardiology:Yes. Yearly      Obesity- Working on weight loss.         Past Medical History:   Diagnosis Date    Actinic keratitis     Ankle pain     Anxiety 6/23/2019    Arthritis     Back pain     low     Carpal tunnel syndrome, right 6/29/2017    Chronic diastolic CHF (congestive heart failure) [facility])     Chronic diastolic heart failure ([facility] [address] 5/31/2017    Chronic kidney disease     Dependent edema     Hypertension     Incontinence     in female    Nocturnal enuresis 7/9/2021    Osteoarthritis     knee     Type II or unspecified type diabetes mellitus without mention of complication, not stated as uncontrolled     Vertigo 8/1/2017    Vitamin B12 deficiency (dietary) anemia 11/17/2018     Family History   Problem Relation Age of Onset    Diabetes Mother     Diabetes Father     Cancer Paternal Grandfather         BONE      Social History     Tobacco Use    Smoking status: Never    Smokeless tobacco: Never   Vaping Use    Vaping Use: Never used   Substance Use Topics    Alcohol use: No    Drug use: No     No Known Allergies    MEDICATIONS:  Current Outpatient Medications   Medication Sig Dispense Refill    Dulaglutide 0.75 MG/0.5ML SOPN Inject 0.75 mg into the skin once a week 12 Adjustable Dose Pre-filled Pen Syringe 1    insulin lispro, 1 Unit Dial, (HUMALOG KWIKPEN) 100 UNIT/ML SOPN INJECT SUBCUTANEOUSLY 4 UNITS 3  TIMES DAILY WITH MEALS WITH  SLIDING SCALE MAX DOSE IS 30  UNITS DAILY 30 mL 3    busPIRone (BUSPAR) 15 MG tablet Take 15 mg by mouth 2 times daily 180 tablet 3    meclizine (ANTIVERT) 12.5 MG tablet Take 1 tablet by mouth 3 times daily as needed for Dizziness 90 tablet 2    memantine (NAMENDA) 10 MG tablet Take 1 tablet by mouth 2 times daily 180 tablet 3    montelukast (SINGULAIR) 10 MG tablet Take 1 tablet by mouth daily 90 tablet 3    lisinopril (PRINIVIL;ZESTRIL) 20 MG tablet Take 1 tablet by mouth daily 90 tablet 3    oxybutynin (DITROPAN-XL) 10 MG extended release tablet TAKE 1 TABLET BY MOUTH ONCE DAILY 90 tablet 3    nystatin (MYCOSTATIN) 088275 UNIT/GM cream Apply topically 4 times daily.  Continue to apply for 3 days after rash resolves 30 g 1    simvastatin (ZOCOR) 40 MG tablet TAKE 1 TABLET EVERY NIGHT 90 tablet 3    cyanocobalamin 1000 MCG/ML injection Inject 1 mL into the muscle every 30 days 1 mL 11    aspirin 325 MG tablet Take 325 mg by mouth daily       carvedilol (COREG) 3.125 MG tablet Take 1 tablet by mouth 2 times daily (with meals) 180 tablet 3    bumetanide (BUMEX) 1 MG tablet Take 1 tablet by mouth 2 times daily 180 tablet 1    citalopram (CELEXA) 10 MG tablet Take 1 tablet by mouth once daily 90 tablet 3    donepezil (ARICEPT) 5 MG tablet Take 1 tablet by mouth nightly 90 tablet 3    acetaminophen (TYLENOL) 650 MG extended release tablet Take 650 mg by mouth every 8 hours as needed for Pain       Insulin Degludec (TRESIBA FLEXTOUCH) 200 UNIT/ML SOPN Inject 30 Units into the skin daily 18 mL 1    Insulin Pen Needle (DROPLET PEN NEEDLES) 32G X 4 MM MISC USE 4 TIMES DAILY 400 each 1    vitamin D (ERGOCALCIFEROL) 1.25 MG (82743 UT) CAPS capsule Take 1 capsule by mouth once a week 4 capsule 0    clotrimazole-betamethasone (LOTRISONE) 1-0.05 % cream APPLY TOPICALLY TO AFFECTED AREA(S) TWICE DAILY FOR 2 WEEKS      Continuous Blood Gluc Sensor ([email or web address] 14 DAY SENSOR) MISC 6 each by Does not apply route daily 6 each 5    glucagon 1 MG injection Inject 1 mg into the muscle See Admin Instructions Follow package directions for low blood sugar. 1 kit 3    blood glucose monitor strips by Other route 3 times daily Test 3 times a day & as needed for symptoms of irregular blood glucose. 100 strip 5    RELION ULTRA THIN LANCETS 30G MISC 1 each by Does not apply route 3 times daily 300 each 3     No current facility-administered medications for this visit. Review ofSymptoms:  Review of Systems   Constitutional:  Positive for fatigue. Negative for unexpected weight change. Eyes:  Negative for visual disturbance. Respiratory: Negative. Negative for shortness of breath. Cardiovascular:  Negative for chest pain and leg swelling. Gastrointestinal:  Negative for abdominal pain and diarrhea. Endocrine: Negative for polydipsia, polyphagia and polyuria. Genitourinary: Negative. Musculoskeletal: Negative. Skin:  Negative for rash and wound. Neurological:  Negative for dizziness, tremors, seizures and headaches. Psychiatric/Behavioral: Negative. Negative for confusion and decreased concentration.     Theremainder of a complete 14-point review of systems is negative. Vital Signs: BP (!) 158/88 (Site: Left Upper Arm, Position: Sitting, Cuff Size: Large Adult)   Pulse 60   Resp 22   Ht 5' 6\" (1.676 m)   Wt 258 lb (117 kg)   LMP  (LMP Unknown)   BMI 41.64 kg/m²      Wt Readings from Last 3 Encounters:   02/07/23 258 lb (117 kg)   11/01/22 260 lb (117.9 kg)   09/15/22 254 lb 12.8 oz (115.6 kg)     Body mass index is 41.64 kg/m². LABS:  Hemoglobin A1C   Date Value Ref Range Status   02/07/2023 7.5 % Final   08/02/2022 8.1 % Final     Lab Results   Component Value Date    LABMICR 40.4 (H) 05/20/2022     Lab Results   Component Value Date     08/31/2022    K 5.0 08/31/2022     08/31/2022    CO2 28 08/31/2022    BUN 31 (H) 08/31/2022    CREATININE 1.1 (H) 08/31/2022    GLUCOSE 208 (H) 08/31/2022    CALCIUM 8.8 08/31/2022    PROT 7.0 09/01/2020    LABALBU 3.9 08/31/2022    BILITOT 0.4 08/31/2022    ALKPHOS 86 08/31/2022    AST 14 08/31/2022    ALT 12 08/31/2022    LABGLOM 51.5 08/31/2022    GFRAA >60 05/21/2021    AGRATIO 1.3 07/05/2019    GLOB 2.7 08/31/2022     Lab Results   Component Value Date    CHOL 184 08/31/2022    CHOL 160 08/19/2021    CHOL 169 09/01/2020     Lab Results   Component Value Date    TRIG 146 08/31/2022    TRIG 128 08/19/2021    TRIG 89 09/01/2020     Lab Results   Component Value Date    HDL 56 08/31/2022    HDL 54 08/19/2021    HDL 60 09/01/2020     Lab Results   Component Value Date    LDLCHOLESTEROL 91 09/01/2020    LDLCALC 98.8 08/31/2022    LDLCALC 80.4 08/19/2021    LDLCALC 69.4 03/28/2019     Lab Results   Component Value Date    VLDL 29 08/31/2022    VLDL 26 08/19/2021    VLDL NOT REPORTED 09/01/2020     Lab Results   Component Value Date    CHOLHDLRATIO 3.29 08/31/2022    CHOLHDLRATIO 2.96 08/19/2021    CHOLHDLRATIO 2.8 09/01/2020           Physical Exam  Constitutional:       Appearance: She is well-developed. Eyes:      Pupils: Pupils are equal, round, and reactive to light.    Neck:      Thyroid: No thyroid mass, thyromegaly or thyroid tenderness. Cardiovascular:      Rate and Rhythm: Normal rate and regular rhythm. Heart sounds: Normal heart sounds. Pulmonary:      Effort: Pulmonary effort is normal.      Breath sounds: Normal breath sounds. Abdominal:      General: Bowel sounds are normal.      Palpations: Abdomen is soft. Skin:     General: Skin is warm and dry. Comments: Negative for open/nonhealing wounds. Negative for lipohypertrophy. Neurological:      Mental Status: She is alert and oriented to person, place, and time. ASSESSMENT/PLAN:     Diagnosis Orders   1. Type 2 diabetes mellitus with stage 3b chronic kidney disease, with long-term current use of insulin ([facility])  POCT Hb A1C (glycosylated hemoglobin)    Dulaglutide 0.75 MG/0.5ML SOPN      2. Other hyperlipidemia        3. Obesity, Class III, BMI 40-49.9 (morbid obesity) ([facility] [address]        4. Essential hypertension          Orders Placed This Encounter   Procedures    POCT Hb A1C (glycosylated hemoglobin)     Orders Placed This Encounter   Medications    Dulaglutide 0.75 MG/0.5ML SOPN     Sig: Inject 0.75 mg into the skin once a week     Dispense:  12 Adjustable Dose Pre-filled Pen Syringe     Refill:  1     Requested Prescriptions     Signed Prescriptions Disp Refills    Dulaglutide 0.75 MG/0.5ML SOPN 12 Adjustable Dose Pre-filled Pen Syringe 1     Sig: Inject 0.75 mg into the skin once a week       1. Type 2 diabetes mellitus with stage 3b chronic kidney disease, with long-term current use of insulin (HCC)  - Unstable  HbA1C goal is less than 7%. - Fasting blood glucose goal is 70-120mg/dl and postprandial blood sugar goal is less than 180 mg/dl. - Labs reviewed including most recent A1c, UACR and kidney function. Repeat labs due in 3 months.    -We discussed in great detail dietary modifications they can make to better improve their blood sugars. -follow up diabetes education completed, all questions answered.   CGM report downloaded and reviewed from the past 2 weeks scanned to media tab. Time in range 53%, 42% hyperglycemia, and hypoglycemia 5%. Predicted A1c per CGM report 7.4% and average glucose 173 mg/dl.   -Patient will start a new medication. Medication reviewed with patient including administration and possible side effects. Discussed risk and benefits including any black box warnings. All questions answered.   -will start trulicity and titrate down on insulin. Will hold bedtime short acting insulin to prevent hypos over night. Patient assistance paperwork provided to pt for Trulicity. Patient Instructions   Start over the counter vitamin d3 supplement 0500 IU    Start Trulicity 1.21 mg once weekly     Call mail order to check on price and if to expensive complete the patient assistance forms. Humalog Sliding Scale Insulin 2+ plus scale  10-15 minutes before each meal (hold humalog before bed)    Blood sugar   Action     <70               Drink Juice               No extra insulin  150 - 200         2 units subcutaneous Insulin  201 - 250         4 units subcutaneous Insulin  251 - 300         6 units subcutaneous Insulin  301 - 350         8 units subcutaneous Insulin  351 - 400         10 units subcutaneous Insulin   > 400              12 units subcutaneous Insulin    Check time in target on gerson reader for low glucose percentage and call office if >4%      Discussed signs and symptoms of hyper/hypoglycemia and how to treat. Encouraged 150 minutes of physical activity per week. Follow a low carbohydrate diet. Encouraged at least 7 hours of sleep. The patient was informed of the goals of diabetes management. This can only be accomplished by watching their diet and exercise levels. We certainly use medicines to help attain these goals. The consequences of not controlling blood sugars were discussed. These include blindness, heart disease, stroke, kidney disease, and possibly need for dialysis.  They were told to be careful with their foot care as diabetics often have nerve damage, infections and risk for limb amutations . They also need a dilated eye exam yearly. We discussed the issues of diet, exercise, medication, complication avoidance, reviewed the signs and symptoms of diabetes, hypoglycemic episodes, significance of HbA1C.     - POCT Hb A1C (glycosylated hemoglobin)  - Dulaglutide 0.75 MG/0.5ML SOPN; Inject 0.75 mg into the skin once a week  Dispense: 12 Adjustable Dose Pre-filled Pen Syringe; Refill: 1    2. Other hyperlipidemia  stable, lipid panel reviewed, continue current medications. Diet and exercise. 3. Obesity, Class III, BMI 40-49.9 (morbid obesity) (HCC)  Reduce calories and increase physical activity to achieve a slow and steady weight loss to improve blood pressure, cholesterol and diabetes. 4. Essential hypertension   stable, continue current medications. Diet and exercise Seek emergent care if chest pain develops. Answered all patient questions. Agrees to follow plan of care and to follow up in 2 months, sooner if needed. Call office if unexplained blood sugars less than 70 occur or above 400. Call office or access [employer] with any further questions or concerns. Be sure to bring glucometer/food log at next appointment. Total time spent reviewing chart, labs, counseling patient and documenting on the date of the encounter: 30 min.       Electronically signed by ORTIZ Mercer CNP on 2/7/2023 at 12:46 PM      (Please note that portions of this note were completed with a voice-recognition program. Efforts were made to edit the dictation but occasionally words are mis-transcribed.)

## 2023-02-27 NOTE — PATIENT INSTRUCTIONS
If you need to reach the Urologist or Urology Nurses for any health care questions or concerns please call [phone] and ask to speak with the [facility] Urology Nurse. The phone line is open from 8a-430p Monday thru Friday.

## 2023-03-01 ENCOUNTER — OFFICE VISIT (OUTPATIENT)
Dept: UROLOGY | Age: 78
End: 2023-03-01
Payer: MEDICARE

## 2023-03-01 VITALS
HEIGHT: 66 IN | WEIGHT: 258 LBS | SYSTOLIC BLOOD PRESSURE: 124 MMHG | DIASTOLIC BLOOD PRESSURE: 72 MMHG | RESPIRATION RATE: 16 BRPM | HEART RATE: 71 BPM | OXYGEN SATURATION: 94 % | BODY MASS INDEX: 41.46 KG/M2

## 2023-03-01 DIAGNOSIS — N39.0 RECURRENT UTI: ICD-10-CM

## 2023-03-01 DIAGNOSIS — N39.46 MIXED INCONTINENCE: ICD-10-CM

## 2023-03-01 DIAGNOSIS — N39.44 NOCTURNAL ENURESIS: ICD-10-CM

## 2023-03-01 DIAGNOSIS — N39.41 URGE INCONTINENCE: Primary | ICD-10-CM

## 2023-03-01 DIAGNOSIS — B35.6 FUNGAL INFECTION OF THE GROIN: ICD-10-CM

## 2023-03-01 PROCEDURE — 1036F TOBACCO NON-USER: CPT | Performed by: UROLOGY

## 2023-03-01 PROCEDURE — 99214 OFFICE O/P EST MOD 30 MIN: CPT | Performed by: UROLOGY

## 2023-03-01 PROCEDURE — 1123F ACP DISCUSS/DSCN MKR DOCD: CPT | Performed by: UROLOGY

## 2023-03-01 PROCEDURE — G8400 PT W/DXA NO RESULTS DOC: HCPCS | Performed by: UROLOGY

## 2023-03-01 PROCEDURE — 3074F SYST BP LT 130 MM HG: CPT | Performed by: UROLOGY

## 2023-03-01 PROCEDURE — G8417 CALC BMI ABV UP PARAM F/U: HCPCS | Performed by: UROLOGY

## 2023-03-01 PROCEDURE — 1090F PRES/ABSN URINE INCON ASSESS: CPT | Performed by: UROLOGY

## 2023-03-01 PROCEDURE — 0509F URINE INCON PLAN DOCD: CPT | Performed by: UROLOGY

## 2023-03-01 PROCEDURE — 3078F DIAST BP <80 MM HG: CPT | Performed by: UROLOGY

## 2023-03-01 PROCEDURE — G8427 DOCREV CUR MEDS BY ELIG CLIN: HCPCS | Performed by: UROLOGY

## 2023-03-01 PROCEDURE — G8484 FLU IMMUNIZE NO ADMIN: HCPCS | Performed by: UROLOGY

## 2023-03-01 RX ORDER — FLUCONAZOLE 100 MG/1
100 TABLET ORAL DAILY
Qty: 8 TABLET | Refills: 0 | Status: SHIPPED | OUTPATIENT
Start: 2023-03-01 | End: 2023-03-01 | Stop reason: SDUPTHER

## 2023-03-01 RX ORDER — FLUCONAZOLE 100 MG/1
100 TABLET ORAL DAILY
Qty: 8 TABLET | Refills: 0 | Status: SHIPPED | OUTPATIENT
Start: 2023-03-01 | End: 2023-03-08

## 2023-03-01 NOTE — PROGRESS NOTES
Dr. Haven Gross MD MD  [facility]rology Clinic Consultation / FU Visit    Patient:  Lisa Pineda  YOB: 1945  Date: 3/1/2023  Consult requested from ORTIZ Villalta - CNP     HISTORY OF PRESENT ILLNESS:   The patient is a 69 y.o. female who presents today for follow-up for the following problem(s): recurrent UTI  Overall the problem(s) : are worsening. Associated Symptoms: No dysuria, gross hematuria. Pain Severity:      Today visit:   3/1/23   Presents in follow up after Interstim placement, she was restarted on oxybutynin 10 mg and has better control with combo therapy. - She is now using 3 ppd, (2 PPD, damp, / 4-5 ppd, wet). Needs interrogation. Nocturia has improved. The patient is very bothered by bumex, which exacerbates her symptoms. We discuss the importance of this medication and will ask for her PCP to review her volume status and if there is any way to improve her regimen to help with her symptoms. 21    Botox 200 units (3/10/21) - unfortunately she has failed botox with early return of symptoms < 3 months. She is back to PPD: 1 for confidenc 2-3, wet pads daily. Nocturnal enuresis has returned. She is very bothered by these symptoms. She is now interested in interstim, we discuss risks and benefits. 20   Today Eduardo Rodriguez is doing well. States Nocturia has improved with oxybutynin QHS. She is tolerating the medication well, no dry mouth or constipation. She states she is having decline of her memory, so her last visit, the information regarding improvement of her symptoms was not accurate. Urge incontinence worsenin daily, 3 at night. Avoiding fluids and lasix after dinner. Lasix only in am.  Vesicare 10 mg ER in am - no dry mouth and constipation  States failing medical therapy.      PMH:   DAVID on CPAP  Transverse myelitis     Summary of old records:   (Patient's old records, notes and chart reviewed and summarized above.)    Urinalysis today:  No results found for this visit on 03/01/23.     Last BUN and creatinine:  Lab Results   Component Value Date    BUN 31 (H) 08/31/2022     Lab Results   Component Value Date    CREATININE 1.1 (H) 08/31/2022       Imaging Reviewed during this Office Visit:   (results were independently reviewed by physician and radiology report verified)    PAST MEDICAL, FAMILY AND SOCIAL HISTORY:  Past Medical History:   Diagnosis Date    Actinic keratitis     Ankle pain     Anxiety 6/23/2019    Arthritis     Back pain     low     Carpal tunnel syndrome, right 6/29/2017    Chronic diastolic CHF (congestive heart failure) ([facility])     Chronic diastolic heart failure ([address] 5/31/2017    Chronic kidney disease     Dependent edema     Hypertension     Incontinence     in female    Nocturnal enuresis 7/9/2021    Osteoarthritis     knee     Type II or unspecified type diabetes mellitus without mention of complication, not stated as uncontrolled     Vertigo 8/1/2017    Vitamin B12 deficiency (dietary) anemia 11/17/2018     Past Surgical History:   Procedure Laterality Date    ANKLE SURGERY Left     FINGER TRIGGER RELEASE Right      Family History   Problem Relation Age of Onset    Diabetes Mother     Diabetes Father     Cancer Paternal Grandfather         BONE      Outpatient Medications Marked as Taking for the 3/1/23 encounter (Office Visit) with Leandra Burnett MD   Medication Sig Dispense Refill    Dulaglutide 0.75 MG/0.5ML SOPN Inject 0.75 mg into the skin once a week 12 Adjustable Dose Pre-filled Pen Syringe 1    insulin lispro, 1 Unit Dial, (HUMALOG KWIKPEN) 100 UNIT/ML SOPN INJECT SUBCUTANEOUSLY 4 UNITS 3  TIMES DAILY WITH MEALS WITH  SLIDING SCALE MAX DOSE IS 30  UNITS DAILY 30 mL 3    Insulin Degludec (TRESIBA FLEXTOUCH) 200 UNIT/ML SOPN Inject 30 Units into the skin daily 18 mL 1    Insulin Pen Needle (DROPLET PEN NEEDLES) 32G X 4 MM MISC USE 4 TIMES DAILY 400 each 1    busPIRone (BUSPAR) 15 MG tablet Take 15 mg by mouth 2 times daily 180 tablet 3    meclizine (ANTIVERT) 12.5 MG tablet Take 1 tablet by mouth 3 times daily as needed for Dizziness 90 tablet 2    vitamin D (ERGOCALCIFEROL) 1.25 MG (58756 UT) CAPS capsule Take 1 capsule by mouth once a week 4 capsule 0    memantine (NAMENDA) 10 MG tablet Take 1 tablet by mouth 2 times daily 180 tablet 3    montelukast (SINGULAIR) 10 MG tablet Take 1 tablet by mouth daily 90 tablet 3    oxybutynin (DITROPAN-XL) 10 MG extended release tablet TAKE 1 TABLET BY MOUTH ONCE DAILY 90 tablet 3    nystatin (MYCOSTATIN) 807243 UNIT/GM cream Apply topically 4 times daily. Continue to apply for 3 days after rash resolves 30 g 1    clotrimazole-betamethasone (LOTRISONE) 1-0.05 % cream APPLY TOPICALLY TO AFFECTED AREA(S) TWICE DAILY FOR 2 WEEKS      simvastatin (ZOCOR) 40 MG tablet TAKE 1 TABLET EVERY NIGHT 90 tablet 3    cyanocobalamin 1000 MCG/ML injection Inject 1 mL into the muscle every 30 days 1 mL 11    aspirin 325 MG tablet Take 325 mg by mouth daily       carvedilol (COREG) 3.125 MG tablet Take 1 tablet by mouth 2 times daily (with meals) 180 tablet 3    bumetanide (BUMEX) 1 MG tablet Take 1 tablet by mouth 2 times daily 180 tablet 1    citalopram (CELEXA) 10 MG tablet Take 1 tablet by mouth once daily 90 tablet 3    Continuous Blood Gluc Sensor (FREESTYLE VERA 14 DAY SENSOR) MISC 6 each by Does not apply route daily 6 each 5    glucagon 1 MG injection Inject 1 mg into the muscle See Admin Instructions Follow package directions for low blood sugar. 1 kit 3    donepezil (ARICEPT) 5 MG tablet Take 1 tablet by mouth nightly 90 tablet 3    blood glucose monitor strips by Other route 3 times daily Test 3 times a day & as needed for symptoms of irregular blood glucose.  100 strip 5    RELION ULTRA THIN LANCETS 30G MISC 1 each by Does not apply route 3 times daily 300 each 3    acetaminophen (TYLENOL) 650 MG extended release tablet Take 650 mg by mouth every 8 hours as needed for Pain          Patient has no known allergies. Social History     Tobacco Use   Smoking Status Never   Smokeless Tobacco Never       Social History     Substance and Sexual Activity   Alcohol Use No       REVIEW OF SYSTEMS:  Constitutional: negative  Eyes: negative  Respiratory: negative  Cardiovascular: negative  Gastrointestinal: negative  Genitourinary: negative  Musculoskeletal: negative  Skin: negative   Neurological: negative  Hematological/Lymphatic: negative  Psychological: negative    Physical Exam:    This a 69 y.o. female      Vitals:    03/01/23 0849   BP: 124/72   Pulse: 71   Resp: 16   SpO2: 94%     Telephone      Assessment and Plan      1. Urge incontinence    2. Nocturnal enuresis    3. Mixed incontinence    4. Recurrent UTI               Plan:      No follow-ups on file. Interstim for urinary urge incontinence - symptoms improved with combo therapy Oxybutynin 10 mg ER   - Recommend weight loss    Discussed behavioral modifications  Bumex in am - has helped night time syptoms  Ask PCP to review Bumex regimen - consider compression stocking    She appears to have fungal infection of groin, and she is affected.

## 2023-03-21 ENCOUNTER — TELEPHONE (OUTPATIENT)
Dept: FAMILY MEDICINE CLINIC | Age: 78
End: 2023-03-21

## 2023-03-21 DIAGNOSIS — D51.8 VITAMIN B12 DEFICIENCY (DIETARY) ANEMIA: Primary | ICD-10-CM

## 2023-03-21 LAB
FOLATE: 7.8 NG/ML (ref 2.8–20)
VITAMIN B-12: > 1000 PG/ML (ref 239–931)

## 2023-04-04 PROBLEM — G30.9 ALZHEIMER'S DISEASE, UNSPECIFIED (CODE) (HCC): Status: ACTIVE | Noted: 2023-04-04

## 2023-04-16 PROBLEM — K59.00 CONSTIPATION: Status: ACTIVE | Noted: 2023-04-16

## 2023-04-24 ENCOUNTER — TELEPHONE (OUTPATIENT)
Dept: UROLOGY | Age: 78
End: 2023-04-24

## 2023-04-24 NOTE — TELEPHONE ENCOUNTER
[employer] rep saw pt Andres Crabtree today. Pt reporting increased OAB symptoms having gone on at least one year. Pt reporting;  #voids awake - 8  # voids asleep - 6  Urgency - 3 (0-4)  # leaks in 24 hrs - 14  Leak amount - slight  # pads in 24 hrs - 8    Interstim was implanted 9/22/21. System is functioning, as impedances are WNL. Battery status good. Patient arrived on P2 at 1.1V with apppropriate sensory response in vaginal area. All electrodes tested resulted in appropriate sensory response. Ending programming is now P6 at 1.1V, . Asked pt to try this and assess for two weeks. If no improvement, [employer] rep will meet with her again. But if no improvement, recommend that to pt see HCP for further assessment. Pt has appt with Dr. Geraldo Arora in June as of now.

## 2023-05-02 ENCOUNTER — OFFICE VISIT (OUTPATIENT)
Dept: DIABETES SERVICES | Age: 78
End: 2023-05-02
Payer: MEDICARE

## 2023-05-02 VITALS
RESPIRATION RATE: 16 BRPM | DIASTOLIC BLOOD PRESSURE: 78 MMHG | HEART RATE: 64 BPM | BODY MASS INDEX: 40.49 KG/M2 | HEIGHT: 67 IN | WEIGHT: 258 LBS | SYSTOLIC BLOOD PRESSURE: 170 MMHG

## 2023-05-02 DIAGNOSIS — Z79.4 TYPE 2 DIABETES MELLITUS WITH STAGE 3B CHRONIC KIDNEY DISEASE, WITH LONG-TERM CURRENT USE OF INSULIN (HCC): Primary | ICD-10-CM

## 2023-05-02 DIAGNOSIS — E11.22 TYPE 2 DIABETES MELLITUS WITH STAGE 3B CHRONIC KIDNEY DISEASE, WITH LONG-TERM CURRENT USE OF INSULIN (HCC): Primary | ICD-10-CM

## 2023-05-02 DIAGNOSIS — I10 ESSENTIAL HYPERTENSION: ICD-10-CM

## 2023-05-02 DIAGNOSIS — E78.49 OTHER HYPERLIPIDEMIA: ICD-10-CM

## 2023-05-02 DIAGNOSIS — N18.32 TYPE 2 DIABETES MELLITUS WITH STAGE 3B CHRONIC KIDNEY DISEASE, WITH LONG-TERM CURRENT USE OF INSULIN (HCC): Primary | ICD-10-CM

## 2023-05-02 DIAGNOSIS — E66.01 CLASS 3 SEVERE OBESITY DUE TO EXCESS CALORIES WITH SERIOUS COMORBIDITY AND BODY MASS INDEX (BMI) OF 40.0 TO 44.9 IN ADULT (HCC): ICD-10-CM

## 2023-05-02 PROCEDURE — 99214 OFFICE O/P EST MOD 30 MIN: CPT | Performed by: NURSE PRACTITIONER

## 2023-05-02 RX ORDER — DULAGLUTIDE 1.5 MG/.5ML
1.5 INJECTION, SOLUTION SUBCUTANEOUS WEEKLY
Qty: 6 ML | Refills: 1 | Status: SHIPPED | OUTPATIENT
Start: 2023-05-02

## 2023-05-02 RX ORDER — ASPIRIN 81 MG/1
81 TABLET ORAL DAILY
COMMUNITY

## 2023-05-02 ASSESSMENT — ENCOUNTER SYMPTOMS
SHORTNESS OF BREATH: 0
ABDOMINAL PAIN: 0
RESPIRATORY NEGATIVE: 1
DIARRHEA: 0

## 2023-05-02 NOTE — PROGRESS NOTES
and Rhythm: Normal rate and regular rhythm. Heart sounds: Normal heart sounds. Pulmonary:      Effort: Pulmonary effort is normal.      Breath sounds: Normal breath sounds. Abdominal:      General: Bowel sounds are normal.      Palpations: Abdomen is soft. Skin:     General: Skin is warm and dry. Comments: Negative for open/nonhealing wounds. Negative for lipohypertrophy. Neurological:      Mental Status: She is alert and oriented to person, place, and time. ASSESSMENT/PLAN:     Diagnosis Orders   1. Type 2 diabetes mellitus with stage 3b chronic kidney disease, with long-term current use of insulin (HCC)  dulaglutide (TRULICITY) 1.5 ZI/5.0LF SC injection      2. Other hyperlipidemia        3. Essential hypertension        4. Class 3 severe obesity due to excess calories with serious comorbidity and body mass index (BMI) of 40.0 to 44.9 in [facility])          No orders of the defined types were placed in this encounter. Orders Placed This Encounter   Medications    dulaglutide (TRULICITY) 1.5 VT/8.4SM SC injection     Sig: Inject 0.5 mLs into the skin once a week     Dispense:  6 mL     Refill:  1     Requested Prescriptions     Signed Prescriptions Disp Refills    dulaglutide (TRULICITY) 1.5 VT/8.4KJ SC injection 6 mL 1     Sig: Inject 0.5 mLs into the skin once a week       1. Type 2 diabetes mellitus with stage 3b chronic kidney disease, with long-term current use of insulin (HCC)  - Unstable  HbA1C goal is less than 7%. - Fasting blood glucose goal is 70-120mg/dl and postprandial blood sugar goal is less than 180 mg/dl. - Labs reviewed including most recent A1c, UACR and kidney function. Repeat labs due in 1 week. -We discussed in great detail dietary modifications they can make to better improve their blood sugars. -follow up diabetes education completed, all questions answered. CGM report downloaded and reviewed from the past 2 weeks scanned to media tab.  Time in range

## 2023-05-03 ENCOUNTER — TELEPHONE (OUTPATIENT)
Dept: INTERNAL MEDICINE | Age: 78
End: 2023-05-03

## 2023-05-03 NOTE — TELEPHONE ENCOUNTER
Are we able to offer patient assistance if no she can stop trulicity or she can call her insurance and ask what is preferred. If everything is to expensive we can just titrate insulin.

## 2023-05-03 NOTE — TELEPHONE ENCOUNTER
Patient called and said she saw you yesterday and the medicine you prescribed is going to cost $735 for 3 month supply. She said you talked about a different med that would be much cheaper.   Can you send new rx for whatever the other option was to [facility]   let her know [phone]

## 2023-05-04 NOTE — TELEPHONE ENCOUNTER
Spoke to patient, she stated that she has called on these meds and all were around \"$500-600\". Ok with titration of insulin.

## 2023-05-04 NOTE — TELEPHONE ENCOUNTER
Patient has 3 weeks left of trulicity, will change therapy to increasing insulin once all Trulicity is gone.

## 2023-05-18 DIAGNOSIS — Z79.4 TYPE 2 DIABETES MELLITUS WITH STAGE 3B CHRONIC KIDNEY DISEASE, WITH LONG-TERM CURRENT USE OF INSULIN (HCC): ICD-10-CM

## 2023-05-18 DIAGNOSIS — N18.32 TYPE 2 DIABETES MELLITUS WITH STAGE 3B CHRONIC KIDNEY DISEASE, WITH LONG-TERM CURRENT USE OF INSULIN (HCC): ICD-10-CM

## 2023-05-18 DIAGNOSIS — E11.22 TYPE 2 DIABETES MELLITUS WITH STAGE 3B CHRONIC KIDNEY DISEASE, WITH LONG-TERM CURRENT USE OF INSULIN (HCC): ICD-10-CM

## 2023-05-18 RX ORDER — FLASH GLUCOSE SENSOR
6 KIT MISCELLANEOUS DAILY
Qty: 6 EACH | Refills: 5 | Status: CANCELLED | OUTPATIENT
Start: 2023-05-18

## 2023-05-22 ENCOUNTER — TELEPHONE (OUTPATIENT)
Dept: INTERNAL MEDICINE | Age: 78
End: 2023-05-22

## 2023-05-22 NOTE — TELEPHONE ENCOUNTER
Patient called in wanting to let remedios know that her medication is going to cost her 735 dollars and that she is in the donut hole. Patient states regardless she is not paying that for medication.

## 2023-05-30 ENCOUNTER — TELEPHONE (OUTPATIENT)
Dept: INTERNAL MEDICINE | Age: 78
End: 2023-05-30

## 2023-05-30 RX ORDER — OXYBUTYNIN CHLORIDE 10 MG/1
TABLET, EXTENDED RELEASE ORAL
Qty: 90 TABLET | Refills: 3 | Status: SHIPPED | OUTPATIENT
Start: 2023-05-30

## 2023-05-30 NOTE — TELEPHONE ENCOUNTER
Patient calling to check status of her sensors for Abad. States she called over a week ago and has heard nothing and she is completely out.   Plese let her know [phone]

## 2023-05-31 NOTE — TELEPHONE ENCOUNTER
[facility] Responded as followed:      Madhav Liu  [employer]  5/30 ? 6:21PM  @[email or web address] [place]! Our re-order team called Wednesday, Friday and yesterday attempting to speak to the patient but could only leave voice mails with the number to the reorder team.  The patient has yet to call back. That phone number is [phone].   Thank you Shelly:)\"

## 2023-05-31 NOTE — TELEPHONE ENCOUNTER
Notified patient, she stated she would call right away. States she thinks her phone number may have changed since then.

## 2023-06-06 NOTE — PATIENT INSTRUCTIONS
If you need to reach the Urologist or Urology Nurses for any health care questions or concerns please call [phone] and ask to speak with the [facility] Urology Nurse. The phone line is open from 8a-430p Monday thru Friday.

## 2023-06-07 ENCOUNTER — OFFICE VISIT (OUTPATIENT)
Dept: UROLOGY | Age: 78
End: 2023-06-07
Payer: MEDICARE

## 2023-06-07 VITALS
DIASTOLIC BLOOD PRESSURE: 80 MMHG | BODY MASS INDEX: 40.49 KG/M2 | OXYGEN SATURATION: 97 % | WEIGHT: 258 LBS | HEART RATE: 57 BPM | HEIGHT: 67 IN | SYSTOLIC BLOOD PRESSURE: 124 MMHG | RESPIRATION RATE: 16 BRPM

## 2023-06-07 DIAGNOSIS — N39.44 NOCTURNAL ENURESIS: ICD-10-CM

## 2023-06-07 DIAGNOSIS — N39.46 MIXED INCONTINENCE: ICD-10-CM

## 2023-06-07 DIAGNOSIS — N39.0 RECURRENT UTI: ICD-10-CM

## 2023-06-07 DIAGNOSIS — N39.41 URGE INCONTINENCE: Primary | ICD-10-CM

## 2023-06-07 PROCEDURE — 1090F PRES/ABSN URINE INCON ASSESS: CPT | Performed by: UROLOGY

## 2023-06-07 PROCEDURE — 3074F SYST BP LT 130 MM HG: CPT | Performed by: UROLOGY

## 2023-06-07 PROCEDURE — G8417 CALC BMI ABV UP PARAM F/U: HCPCS | Performed by: UROLOGY

## 2023-06-07 PROCEDURE — 99214 OFFICE O/P EST MOD 30 MIN: CPT | Performed by: UROLOGY

## 2023-06-07 PROCEDURE — G8400 PT W/DXA NO RESULTS DOC: HCPCS | Performed by: UROLOGY

## 2023-06-07 PROCEDURE — G8427 DOCREV CUR MEDS BY ELIG CLIN: HCPCS | Performed by: UROLOGY

## 2023-06-07 PROCEDURE — 1123F ACP DISCUSS/DSCN MKR DOCD: CPT | Performed by: UROLOGY

## 2023-06-07 PROCEDURE — 1036F TOBACCO NON-USER: CPT | Performed by: UROLOGY

## 2023-06-07 PROCEDURE — 0509F URINE INCON PLAN DOCD: CPT | Performed by: UROLOGY

## 2023-06-07 PROCEDURE — 3079F DIAST BP 80-89 MM HG: CPT | Performed by: UROLOGY

## 2023-06-07 NOTE — PROGRESS NOTES
allergies. Social History     Tobacco Use   Smoking Status Never   Smokeless Tobacco Never       Social History     Substance and Sexual Activity   Alcohol Use No       REVIEW OF SYSTEMS:  Constitutional: negative  Eyes: negative  Respiratory: negative  Cardiovascular: negative  Gastrointestinal: negative  Genitourinary: negative  Musculoskeletal: negative  Skin: negative   Neurological: negative  Hematological/Lymphatic: negative  Psychological: negative    Physical Exam:    This a 69 y.o. female      Vitals:    06/07/23 0904   BP: 124/80   Pulse: 57   Resp: 16   SpO2: 97%     Telephone      Assessment and Plan      1. Urge incontinence    2. Nocturnal enuresis    3. Mixed incontinence    4. Recurrent UTI           Plan:      No follow-ups on file. Interstim for urinary urge incontinence - symptoms improved with combo therapy Oxybutynin 10 mg ER   - Recommend weight loss    Discussed behavioral modifications  Bumex in am - has helped night time syptoms  Ask PCP to review Bumex regimen - consider compression stocking    She appears to have fungal infection of groin, and she is affected.

## 2023-06-16 ENCOUNTER — TELEPHONE (OUTPATIENT)
Dept: FAMILY MEDICINE CLINIC | Age: 78
End: 2023-06-16

## 2023-06-19 DIAGNOSIS — E11.22 TYPE 2 DIABETES MELLITUS WITH STAGE 3B CHRONIC KIDNEY DISEASE, WITH LONG-TERM CURRENT USE OF INSULIN (HCC): ICD-10-CM

## 2023-06-19 DIAGNOSIS — N18.32 TYPE 2 DIABETES MELLITUS WITH STAGE 3B CHRONIC KIDNEY DISEASE, WITH LONG-TERM CURRENT USE OF INSULIN (HCC): ICD-10-CM

## 2023-06-19 DIAGNOSIS — Z79.4 TYPE 2 DIABETES MELLITUS WITH STAGE 3B CHRONIC KIDNEY DISEASE, WITH LONG-TERM CURRENT USE OF INSULIN (HCC): ICD-10-CM

## 2023-06-19 RX ORDER — INSULIN DEGLUDEC 200 U/ML
INJECTION, SOLUTION SUBCUTANEOUS
Qty: 18 ML | Refills: 3 | Status: SHIPPED | OUTPATIENT
Start: 2023-06-19

## 2023-06-27 DIAGNOSIS — R41.89 COGNITIVE DECLINE: ICD-10-CM

## 2023-06-28 ENCOUNTER — TELEPHONE (OUTPATIENT)
Dept: FAMILY MEDICINE CLINIC | Age: 78
End: 2023-06-28

## 2023-06-28 DIAGNOSIS — J30.9 ALLERGIC RHINITIS, UNSPECIFIED SEASONALITY, UNSPECIFIED TRIGGER: ICD-10-CM

## 2023-06-28 DIAGNOSIS — E78.49 OTHER HYPERLIPIDEMIA: ICD-10-CM

## 2023-06-28 DIAGNOSIS — I10 ESSENTIAL HYPERTENSION: ICD-10-CM

## 2023-06-29 RX ORDER — MEMANTINE HYDROCHLORIDE 10 MG/1
TABLET ORAL
Qty: 180 TABLET | Refills: 3 | Status: SHIPPED | OUTPATIENT
Start: 2023-06-29

## 2023-06-29 RX ORDER — SIMVASTATIN 40 MG
TABLET ORAL
Qty: 90 TABLET | Refills: 3 | Status: SHIPPED | OUTPATIENT
Start: 2023-06-29

## 2023-06-29 RX ORDER — MONTELUKAST SODIUM 10 MG/1
10 TABLET ORAL DAILY
Qty: 90 TABLET | Refills: 3 | Status: SHIPPED | OUTPATIENT
Start: 2023-06-29

## 2023-06-29 RX ORDER — LISINOPRIL 20 MG/1
20 TABLET ORAL DAILY
Qty: 90 TABLET | Refills: 3 | Status: SHIPPED | OUTPATIENT
Start: 2023-06-29 | End: 2023-09-27

## 2023-07-10 ENCOUNTER — TELEPHONE (OUTPATIENT)
Dept: FAMILY MEDICINE CLINIC | Age: 78
End: 2023-07-10

## 2023-07-10 NOTE — TELEPHONE ENCOUNTER
Pt called with uti sx, offered pt an appt today at 1:20. Pt wanted medication called in for her. Explained to pt that she would need to be seen first before we write any scripts, pt understands. Pt declined appt time today due to not having a ride, informed pt that it may be easier for her to go to our walk in clinic since they take appt as late as 6:00 if that would help with her ride situation.

## 2023-08-23 ENCOUNTER — OFFICE VISIT (OUTPATIENT)
Dept: UROLOGY | Age: 78
End: 2023-08-23

## 2023-08-23 DIAGNOSIS — N39.0 RECURRENT UTI: ICD-10-CM

## 2023-08-23 DIAGNOSIS — N39.41 URGE INCONTINENCE: Primary | ICD-10-CM

## 2023-08-23 PROCEDURE — NBSRV NON-BILLABLE SERVICE: Performed by: UROLOGY

## 2023-08-23 NOTE — PROGRESS NOTES
Patient met with [employer] repRoseann, today for device management. Urine culture ordered and collected at this time per Dr. Aidan Cedeno verbal order. Rep gave report to Dr. Gonzalez Lizarraga about device settings and patient will follow up in one week with Dr. Gonzalez Lizarraga. Will follow up with urine culture results.

## 2023-08-25 ENCOUNTER — TELEPHONE (OUTPATIENT)
Dept: INTERNAL MEDICINE | Age: 78
End: 2023-08-25

## 2023-08-25 DIAGNOSIS — Z79.4 TYPE 2 DIABETES MELLITUS WITH STAGE 3B CHRONIC KIDNEY DISEASE, WITH LONG-TERM CURRENT USE OF INSULIN (HCC): ICD-10-CM

## 2023-08-25 DIAGNOSIS — E11.22 TYPE 2 DIABETES MELLITUS WITH STAGE 3B CHRONIC KIDNEY DISEASE, WITH LONG-TERM CURRENT USE OF INSULIN (HCC): ICD-10-CM

## 2023-08-25 DIAGNOSIS — N18.32 TYPE 2 DIABETES MELLITUS WITH STAGE 3B CHRONIC KIDNEY DISEASE, WITH LONG-TERM CURRENT USE OF INSULIN (HCC): ICD-10-CM

## 2023-08-25 RX ORDER — FLASH GLUCOSE SENSOR
6 KIT MISCELLANEOUS DAILY
Qty: 6 EACH | Refills: 5 | Status: SHIPPED | OUTPATIENT
Start: 2023-08-25 | End: 2023-08-25 | Stop reason: SDUPTHER

## 2023-08-25 RX ORDER — FLASH GLUCOSE SENSOR
6 KIT MISCELLANEOUS DAILY
Qty: 6 EACH | Refills: 5 | Status: SHIPPED | OUTPATIENT
Start: 2023-08-25

## 2023-08-25 NOTE — TELEPHONE ENCOUNTER
Patient informed. She stated that they were sent to the wrong pharmacy. Sensors were suppose to be sent to [facility].

## 2023-08-25 NOTE — TELEPHONE ENCOUNTER
Patient called in requesting freestyle gerson sensors     Patient states she is unable to wait and needs sent in today.

## 2023-08-28 ENCOUNTER — TELEPHONE (OUTPATIENT)
Dept: UROLOGY | Age: 78
End: 2023-08-28

## 2023-08-28 RX ORDER — CEPHALEXIN 500 MG/1
500 CAPSULE ORAL 3 TIMES DAILY
Qty: 21 CAPSULE | Refills: 0 | Status: SHIPPED | OUTPATIENT
Start: 2023-08-28 | End: 2023-09-04

## 2023-08-28 NOTE — TELEPHONE ENCOUNTER
Received patient's urine culture report. Per Dr. Eddy Moreno, patient to start Keflex 500 mg TID for 7 days. Sent to [facility] per patient instructions.

## 2023-08-31 DIAGNOSIS — N39.0 RECURRENT UTI: ICD-10-CM

## 2023-10-03 ENCOUNTER — OFFICE VISIT (OUTPATIENT)
Dept: DIABETES SERVICES | Age: 78
End: 2023-10-03
Payer: MEDICARE

## 2023-10-03 VITALS
WEIGHT: 259 LBS | HEART RATE: 65 BPM | SYSTOLIC BLOOD PRESSURE: 136 MMHG | BODY MASS INDEX: 41.62 KG/M2 | OXYGEN SATURATION: 93 % | DIASTOLIC BLOOD PRESSURE: 80 MMHG | HEIGHT: 66 IN

## 2023-10-03 DIAGNOSIS — E78.49 OTHER HYPERLIPIDEMIA: ICD-10-CM

## 2023-10-03 DIAGNOSIS — E11.22 TYPE 2 DIABETES MELLITUS WITH STAGE 3B CHRONIC KIDNEY DISEASE, WITH LONG-TERM CURRENT USE OF INSULIN (HCC): Primary | ICD-10-CM

## 2023-10-03 DIAGNOSIS — E66.01 CLASS 3 SEVERE OBESITY DUE TO EXCESS CALORIES WITH SERIOUS COMORBIDITY AND BODY MASS INDEX (BMI) OF 40.0 TO 44.9 IN ADULT (HCC): ICD-10-CM

## 2023-10-03 DIAGNOSIS — I10 ESSENTIAL HYPERTENSION: ICD-10-CM

## 2023-10-03 DIAGNOSIS — Z79.4 TYPE 2 DIABETES MELLITUS WITH STAGE 3B CHRONIC KIDNEY DISEASE, WITH LONG-TERM CURRENT USE OF INSULIN (HCC): Primary | ICD-10-CM

## 2023-10-03 DIAGNOSIS — N18.32 TYPE 2 DIABETES MELLITUS WITH STAGE 3B CHRONIC KIDNEY DISEASE, WITH LONG-TERM CURRENT USE OF INSULIN (HCC): Primary | ICD-10-CM

## 2023-10-03 LAB — HBA1C MFR BLD: 9 %

## 2023-10-03 PROCEDURE — PBSHW POCT GLYCOSYLATED HEMOGLOBIN (HGB A1C): Performed by: NURSE PRACTITIONER

## 2023-10-03 PROCEDURE — G8484 FLU IMMUNIZE NO ADMIN: HCPCS | Performed by: NURSE PRACTITIONER

## 2023-10-03 PROCEDURE — 1036F TOBACCO NON-USER: CPT | Performed by: NURSE PRACTITIONER

## 2023-10-03 PROCEDURE — G8417 CALC BMI ABV UP PARAM F/U: HCPCS | Performed by: NURSE PRACTITIONER

## 2023-10-03 PROCEDURE — 99214 OFFICE O/P EST MOD 30 MIN: CPT | Performed by: NURSE PRACTITIONER

## 2023-10-03 PROCEDURE — 95251 CONT GLUC MNTR ANALYSIS I&R: CPT | Performed by: NURSE PRACTITIONER

## 2023-10-03 PROCEDURE — 99212 OFFICE O/P EST SF 10 MIN: CPT | Performed by: NURSE PRACTITIONER

## 2023-10-03 PROCEDURE — 3078F DIAST BP <80 MM HG: CPT | Performed by: NURSE PRACTITIONER

## 2023-10-03 PROCEDURE — G8427 DOCREV CUR MEDS BY ELIG CLIN: HCPCS | Performed by: NURSE PRACTITIONER

## 2023-10-03 PROCEDURE — 3052F HG A1C>EQUAL 8.0%<EQUAL 9.0%: CPT | Performed by: NURSE PRACTITIONER

## 2023-10-03 PROCEDURE — 1090F PRES/ABSN URINE INCON ASSESS: CPT | Performed by: NURSE PRACTITIONER

## 2023-10-03 PROCEDURE — 1123F ACP DISCUSS/DSCN MKR DOCD: CPT | Performed by: NURSE PRACTITIONER

## 2023-10-03 PROCEDURE — G8400 PT W/DXA NO RESULTS DOC: HCPCS | Performed by: NURSE PRACTITIONER

## 2023-10-03 PROCEDURE — 3074F SYST BP LT 130 MM HG: CPT | Performed by: NURSE PRACTITIONER

## 2023-10-03 PROCEDURE — 83036 HEMOGLOBIN GLYCOSYLATED A1C: CPT | Performed by: NURSE PRACTITIONER

## 2023-10-03 RX ORDER — INSULIN DEGLUDEC 200 U/ML
INJECTION, SOLUTION SUBCUTANEOUS
Qty: 18 ML | Refills: 3 | Status: SHIPPED | OUTPATIENT
Start: 2023-10-03

## 2023-10-03 NOTE — PATIENT INSTRUCTIONS
tresiba 40 units once daily.      Humalog Sliding Scale Insulin 2+ plus scale  10-15 minutes before each meal      Blood sugar   Action     <70               Drink Juice               No extra insulin  150 - 200         2 units subcutaneous Insulin  201 - 250         4 units subcutaneous Insulin  251 - 300         6 units subcutaneous Insulin  301 - 350         8 units subcutaneous Insulin  351 - 400         10 units subcutaneous Insulin   > 400              12 units subcutaneous Insulin

## 2023-10-03 NOTE — PROGRESS NOTES
[address]  [phone]        HISTORY:    Louise Robbins presents today for evaluation and management of:  Chief Complaint   Patient presents with    Diabetes       Patient Care Team:  ORTIZ Cerda CNP as PCP - General (Family Medicine)  ORTIZ Cerda CNP as PCP - Empaneled Provider  Gloria Goltz as Orthopedic Surgeon (Orthopedic Surgery)  Juan Jose Moreno DPM as Consulting Physician (Podiatry)  Kira Michelle as Referring Physician (Optometry)  ORTIZ Negron CNP as Diabetic Educator (Nurse Practitioner)      Interval History:    Past DM Medications   Metformin- ckd  Glimepiride-therapy completed  janvuia- glp1 started  victoza-cost  soliqua- cost  Sugey- cost   Trulicity- cost     Current Diabetic Medications   tresiba 36 units once daily. Humalog Sliding Scale Insulin 2+ plus scale  10-15 minutes before each meal      Blood sugar   Action     <70               Drink Juice               No extra insulin  150 - 200         2 units subcutaneous Insulin  201 - 250         4 units subcutaneous Insulin  251 - 300         6 units subcutaneous Insulin  301 - 350         8 units subcutaneous Insulin  351 - 400         10 units subcutaneous Insulin   > 400              12 units subcutaneous Insulin        DKA episodes: 0    05/02/23   Louise Robbins is a 69 y.o. female patient who presents to clinic today for her diabetes. she has a history of HTN, HF, CKD, neuropathy, OA, hyperlipidemia, obesity and depression  which contributes to her diabetes. At previous visit trulicity was started. she denies any current signs or symptoms of hyper/hypoglycemia. she states they are taking their medications as prescribed without any adverse effects.    Diet: mindful  Exercise: none  BS testing: uses cgm daily with success and is adherent to cgm

## 2023-10-04 ASSESSMENT — ENCOUNTER SYMPTOMS
SHORTNESS OF BREATH: 0
RESPIRATORY NEGATIVE: 1
ABDOMINAL PAIN: 0
DIARRHEA: 0

## 2023-10-09 DIAGNOSIS — Z79.4 DIABETES MELLITUS TYPE 2, INSULIN DEPENDENT (HCC): ICD-10-CM

## 2023-10-09 DIAGNOSIS — E11.9 DIABETES MELLITUS TYPE 2, INSULIN DEPENDENT (HCC): ICD-10-CM

## 2023-10-09 RX ORDER — PEN NEEDLE, DIABETIC 32GX 5/32"
NEEDLE, DISPOSABLE MISCELLANEOUS
Qty: 400 EACH | Refills: 1 | Status: SHIPPED | OUTPATIENT
Start: 2023-10-09

## 2023-10-09 NOTE — TELEPHONE ENCOUNTER
Christopher Garces is calling to request a refill on the following medication(s):  Requested Prescriptions     Pending Prescriptions Disp Refills    Insulin Pen Needle (DROPLET PEN NEEDLES) 32G X 4 MM MISC 400 each 1     Sig: USE 4 TIMES DAILY       Last Visit Date (If Applicable):  2/7/9279    Next Visit Date:    Visit date not found

## 2023-11-21 ENCOUNTER — OFFICE VISIT (OUTPATIENT)
Dept: DIABETES SERVICES | Age: 78
End: 2023-11-21
Payer: MEDICARE

## 2023-11-21 VITALS
OXYGEN SATURATION: 94 % | HEART RATE: 80 BPM | BODY MASS INDEX: 42.43 KG/M2 | WEIGHT: 264 LBS | SYSTOLIC BLOOD PRESSURE: 138 MMHG | DIASTOLIC BLOOD PRESSURE: 84 MMHG | HEIGHT: 66 IN

## 2023-11-21 DIAGNOSIS — I10 ESSENTIAL HYPERTENSION: ICD-10-CM

## 2023-11-21 DIAGNOSIS — E11.22 TYPE 2 DIABETES MELLITUS WITH STAGE 3B CHRONIC KIDNEY DISEASE, WITH LONG-TERM CURRENT USE OF INSULIN (HCC): Primary | ICD-10-CM

## 2023-11-21 DIAGNOSIS — E78.49 OTHER HYPERLIPIDEMIA: ICD-10-CM

## 2023-11-21 DIAGNOSIS — Z79.4 TYPE 2 DIABETES MELLITUS WITH STAGE 3B CHRONIC KIDNEY DISEASE, WITH LONG-TERM CURRENT USE OF INSULIN (HCC): Primary | ICD-10-CM

## 2023-11-21 DIAGNOSIS — E66.01 CLASS 3 SEVERE OBESITY DUE TO EXCESS CALORIES WITH SERIOUS COMORBIDITY AND BODY MASS INDEX (BMI) OF 40.0 TO 44.9 IN ADULT (HCC): ICD-10-CM

## 2023-11-21 DIAGNOSIS — N18.32 TYPE 2 DIABETES MELLITUS WITH STAGE 3B CHRONIC KIDNEY DISEASE, WITH LONG-TERM CURRENT USE OF INSULIN (HCC): Primary | ICD-10-CM

## 2023-11-21 PROCEDURE — 99212 OFFICE O/P EST SF 10 MIN: CPT | Performed by: NURSE PRACTITIONER

## 2023-11-21 ASSESSMENT — ENCOUNTER SYMPTOMS
RESPIRATORY NEGATIVE: 1
DIARRHEA: 0
SHORTNESS OF BREATH: 0
ABDOMINAL PAIN: 0

## 2023-11-21 NOTE — PROGRESS NOTES
deficit present. Mental Status: She is alert and oriented to person, place, and time. Psychiatric:         Mood and Affect: Mood normal.         Behavior: Behavior normal.         Thought Content: Thought content normal.         Judgment: Judgment normal.             ASSESSMENT/PLAN:     Diagnosis Orders   1. Type 2 diabetes mellitus with stage 3b chronic kidney disease, with long-term current use of insulin ([address])        2. Other hyperlipidemia        3. Essential hypertension        4. Class 3 severe obesity due to excess calories with serious comorbidity and body mass index (BMI) of 40.0 to 44.9 in adult [facility])          No orders of the defined types were placed in this encounter. No orders of the defined types were placed in this encounter. Requested Prescriptions      No prescriptions requested or ordered in this encounter       1. Type 2 diabetes mellitus with stage 3b chronic kidney disease, with long-term current use of insulin (HCC)  - Unstable  HbA1C goal is less than 7%. - Fasting blood glucose goal is 70-120mg/dl and postprandial blood sugar goal is less than 180 mg/dl. - Labs reviewed including most recent A1c, UACR and kidney function. Repeat labs due in 3 months.    -We discussed in great detail dietary modifications they can make to better improve their blood sugars. -follow up diabetes education completed, all questions answered. CGM report downloaded and reviewed from the past 2 weeks scanned to media tab. Time in range 41%, 58% hyperglycemia, and hypoglycemia 1%. Predicted A1c per CGM report 8.0% and average glucose 195 mg/dl.   -discussed missed insulin dosing and taking meal time insulin before meals. Discussed signs and symptoms of hyper/hypoglycemia and how to treat. Encouraged 150 minutes of physical activity per week. Follow a low carbohydrate diet. Encouraged at least 7 hours of sleep. The patient was informed of the goals of diabetes management.  This can only be

## 2023-12-06 ENCOUNTER — OFFICE VISIT (OUTPATIENT)
Dept: UROLOGY | Age: 78
End: 2023-12-06
Payer: MEDICARE

## 2023-12-06 VITALS
BODY MASS INDEX: 42.43 KG/M2 | WEIGHT: 264 LBS | DIASTOLIC BLOOD PRESSURE: 80 MMHG | SYSTOLIC BLOOD PRESSURE: 136 MMHG | RESPIRATION RATE: 16 BRPM | HEART RATE: 57 BPM | OXYGEN SATURATION: 94 % | HEIGHT: 66 IN

## 2023-12-06 DIAGNOSIS — N39.41 URGE INCONTINENCE: Primary | ICD-10-CM

## 2023-12-06 DIAGNOSIS — N39.44 NOCTURNAL ENURESIS: ICD-10-CM

## 2023-12-06 DIAGNOSIS — N39.0 RECURRENT UTI: ICD-10-CM

## 2023-12-06 PROCEDURE — 3075F SYST BP GE 130 - 139MM HG: CPT | Performed by: UROLOGY

## 2023-12-06 PROCEDURE — 1123F ACP DISCUSS/DSCN MKR DOCD: CPT | Performed by: UROLOGY

## 2023-12-06 PROCEDURE — 1036F TOBACCO NON-USER: CPT | Performed by: UROLOGY

## 2023-12-06 PROCEDURE — G8484 FLU IMMUNIZE NO ADMIN: HCPCS | Performed by: UROLOGY

## 2023-12-06 PROCEDURE — 99213 OFFICE O/P EST LOW 20 MIN: CPT | Performed by: UROLOGY

## 2023-12-06 PROCEDURE — G8400 PT W/DXA NO RESULTS DOC: HCPCS | Performed by: UROLOGY

## 2023-12-06 PROCEDURE — 3079F DIAST BP 80-89 MM HG: CPT | Performed by: UROLOGY

## 2023-12-06 PROCEDURE — 0509F URINE INCON PLAN DOCD: CPT | Performed by: UROLOGY

## 2023-12-06 PROCEDURE — 1090F PRES/ABSN URINE INCON ASSESS: CPT | Performed by: UROLOGY

## 2023-12-06 PROCEDURE — G8417 CALC BMI ABV UP PARAM F/U: HCPCS | Performed by: UROLOGY

## 2023-12-06 PROCEDURE — G8427 DOCREV CUR MEDS BY ELIG CLIN: HCPCS | Performed by: UROLOGY

## 2023-12-06 NOTE — PROGRESS NOTES
SYSTEMS:  Constitutional: negative  Eyes: negative  Respiratory: negative  Cardiovascular: negative  Gastrointestinal: negative  Genitourinary: negative  Musculoskeletal: negative  Skin: negative   Neurological: negative  Hematological/Lymphatic: negative  Psychological: negative    Physical Exam:    This a 69 y.o. female      Vitals:    12/06/23 1008   BP: 136/80   Pulse: 57   Resp: 16   SpO2: 94%     Telephone      Assessment and Plan      1. Urge incontinence    2. Nocturnal enuresis    3. Recurrent UTI             Plan:      No follow-ups on file. Interstim for urinary urge incontinence - symptoms improved with combo therapy Oxybutynin 10 mg ER   - Recommend weight loss    Discussed behavioral modifications  Bumex in am - has helped night time syptoms  Ask PCP to review Bumex regimen - consider compression stocking    She appears to have fungal infection of groin, and she is affected.

## 2023-12-08 DIAGNOSIS — F32.A ANXIETY AND DEPRESSION: ICD-10-CM

## 2023-12-08 DIAGNOSIS — F41.9 ANXIETY AND DEPRESSION: ICD-10-CM

## 2023-12-08 RX ORDER — BUSPIRONE HYDROCHLORIDE 15 MG/1
15 TABLET ORAL 2 TIMES DAILY
Qty: 180 TABLET | Refills: 3 | Status: SHIPPED | OUTPATIENT
Start: 2023-12-08 | End: 2024-12-02

## 2023-12-08 NOTE — TELEPHONE ENCOUNTER
Myron Molina is calling to request a refill on the following medication(s):  Requested Prescriptions     Pending Prescriptions Disp Refills    busPIRone (BUSPAR) 15 MG tablet 180 tablet 3     Sig: Take 15 mg by mouth 2 times daily       Last Visit Date (If Applicable):  0/3/8930    Next Visit Date:    Visit date not found

## 2024-01-18 DIAGNOSIS — E11.22 TYPE 2 DIABETES MELLITUS WITH STAGE 3B CHRONIC KIDNEY DISEASE, WITH LONG-TERM CURRENT USE OF INSULIN (HCC): ICD-10-CM

## 2024-01-18 DIAGNOSIS — Z79.4 TYPE 2 DIABETES MELLITUS WITH STAGE 3B CHRONIC KIDNEY DISEASE, WITH LONG-TERM CURRENT USE OF INSULIN (HCC): ICD-10-CM

## 2024-01-18 DIAGNOSIS — N18.32 TYPE 2 DIABETES MELLITUS WITH STAGE 3B CHRONIC KIDNEY DISEASE, WITH LONG-TERM CURRENT USE OF INSULIN (HCC): ICD-10-CM

## 2024-01-19 NOTE — TELEPHONE ENCOUNTER
Kusum called requesting a refill of the below medication which has been pended for you:     Requested Prescriptions     Pending Prescriptions Disp Refills    insulin lispro, 1 Unit Dial, (HUMALOG KWIKPEN) 100 UNIT/ML SOPN [Pharmacy Med Name: HumaLOG KwikPen 100 UNIT/ML Subcutaneous Solution Pen-injector] 30 mL 3     Sig: INJECT SUBCUTANEOUSLY 4 UNITS 3  TIMES DAILY WITH MEALS WITH  SLIDING SCALE MAX DOSE IS 30  UNITS DAILY       Last Appointment Date: 11/21/2023  Next Appointment Date: 2/6/2024    No Known Allergies

## 2024-01-22 RX ORDER — INSULIN LISPRO 100 [IU]/ML
INJECTION, SOLUTION INTRAVENOUS; SUBCUTANEOUS
Qty: 30 ML | Refills: 3 | Status: SHIPPED | OUTPATIENT
Start: 2024-01-22

## 2024-02-06 ENCOUNTER — OFFICE VISIT (OUTPATIENT)
Dept: DIABETES SERVICES | Age: 79
End: 2024-02-06
Payer: MEDICARE

## 2024-02-06 VITALS
WEIGHT: 262 LBS | HEART RATE: 62 BPM | OXYGEN SATURATION: 96 % | DIASTOLIC BLOOD PRESSURE: 86 MMHG | SYSTOLIC BLOOD PRESSURE: 134 MMHG | HEIGHT: 66 IN | BODY MASS INDEX: 42.11 KG/M2

## 2024-02-06 DIAGNOSIS — E66.01 OBESITY, CLASS III, BMI 40-49.9 (MORBID OBESITY) (HCC): ICD-10-CM

## 2024-02-06 DIAGNOSIS — Z79.4 TYPE 2 DIABETES MELLITUS WITH STAGE 3B CHRONIC KIDNEY DISEASE, WITH LONG-TERM CURRENT USE OF INSULIN (HCC): Primary | ICD-10-CM

## 2024-02-06 DIAGNOSIS — E11.22 TYPE 2 DIABETES MELLITUS WITH STAGE 3B CHRONIC KIDNEY DISEASE, WITH LONG-TERM CURRENT USE OF INSULIN (HCC): Primary | ICD-10-CM

## 2024-02-06 DIAGNOSIS — I10 ESSENTIAL HYPERTENSION: ICD-10-CM

## 2024-02-06 DIAGNOSIS — E78.49 OTHER HYPERLIPIDEMIA: ICD-10-CM

## 2024-02-06 DIAGNOSIS — N18.32 TYPE 2 DIABETES MELLITUS WITH STAGE 3B CHRONIC KIDNEY DISEASE, WITH LONG-TERM CURRENT USE OF INSULIN (HCC): Primary | ICD-10-CM

## 2024-02-06 LAB — HBA1C MFR BLD: 8.8 %

## 2024-02-06 PROCEDURE — 95251 CONT GLUC MNTR ANALYSIS I&R: CPT | Performed by: NURSE PRACTITIONER

## 2024-02-06 PROCEDURE — 99214 OFFICE O/P EST MOD 30 MIN: CPT | Performed by: NURSE PRACTITIONER

## 2024-02-06 PROCEDURE — G8427 DOCREV CUR MEDS BY ELIG CLIN: HCPCS | Performed by: NURSE PRACTITIONER

## 2024-02-06 PROCEDURE — 1090F PRES/ABSN URINE INCON ASSESS: CPT | Performed by: NURSE PRACTITIONER

## 2024-02-06 PROCEDURE — 1036F TOBACCO NON-USER: CPT | Performed by: NURSE PRACTITIONER

## 2024-02-06 PROCEDURE — G8400 PT W/DXA NO RESULTS DOC: HCPCS | Performed by: NURSE PRACTITIONER

## 2024-02-06 PROCEDURE — G8417 CALC BMI ABV UP PARAM F/U: HCPCS | Performed by: NURSE PRACTITIONER

## 2024-02-06 PROCEDURE — 3052F HG A1C>EQUAL 8.0%<EQUAL 9.0%: CPT | Performed by: NURSE PRACTITIONER

## 2024-02-06 PROCEDURE — 83036 HEMOGLOBIN GLYCOSYLATED A1C: CPT | Performed by: NURSE PRACTITIONER

## 2024-02-06 PROCEDURE — PBSHW POCT GLYCOSYLATED HEMOGLOBIN (HGB A1C): Performed by: NURSE PRACTITIONER

## 2024-02-06 PROCEDURE — 3079F DIAST BP 80-89 MM HG: CPT | Performed by: NURSE PRACTITIONER

## 2024-02-06 PROCEDURE — 1123F ACP DISCUSS/DSCN MKR DOCD: CPT | Performed by: NURSE PRACTITIONER

## 2024-02-06 PROCEDURE — G8484 FLU IMMUNIZE NO ADMIN: HCPCS | Performed by: NURSE PRACTITIONER

## 2024-02-06 PROCEDURE — 3075F SYST BP GE 130 - 139MM HG: CPT | Performed by: NURSE PRACTITIONER

## 2024-02-06 RX ORDER — INSULIN DEGLUDEC 200 U/ML
INJECTION, SOLUTION SUBCUTANEOUS
Qty: 18 ML | Refills: 3
Start: 2024-02-06

## 2024-02-06 NOTE — PROGRESS NOTES
[facility]  [address] [facility] [address] [phone]        HISTORY:    Kusum Linda presents today for evaluation and management of:  Chief Complaint   Patient presents with    Diabetes     3 month follow up       Patient Care Team:  Katie De León APRN - CNP as PCP - General (Family Medicine)  Katie De León APRN - CNP as PCP - Empaneled Provider  Prince Burroughs as Orthopedic Surgeon (Orthopedic Surgery)  Gaurav Palma DPM as Consulting Physician (Podiatry)  Peter Schmitt as Referring Physician (Optometry)  Shanell Mathis APRN - CNP as Diabetic Educator (Nurse Practitioner)      Interval History:    Past DM Medications   Metformin- ckd  Glimepiride-therapy completed  janvuia- glp1 started  victoza-cost  soliqua- cost  Farxiga- cost   Trulicity- cost     Current Diabetic Medications   tresiba 40 units once daily.      Humalog Sliding Scale Insulin 2+ plus scale  10-15 minutes before each meal      Blood sugar   Action     <70               Drink Juice               No extra insulin  150 - 200         2 units subcutaneous Insulin  201 - 250         4 units subcutaneous Insulin  251 - 300         6 units subcutaneous Insulin  301 - 350         8 units subcutaneous Insulin  351 - 400         10 units subcutaneous Insulin   > 400              12 units subcutaneous Insulin        DKA episodes: 0    11/21/23   Kusum Linda is a 78 y.o. female patient who presents to clinic today for her diabetes. she has a history of HTN, HF, CKD, neuropathy, OA, hyperlipidemia, obesity and depression which contributes to her diabetes. At previous visit discussed compliance. She is documenting insulin and is taking insulin after meals and often misses lunch coverage. . she denies any current signs or symptoms of hyper/hypoglycemia. she states they are taking their medications as prescribed without any adverse

## 2024-02-07 ASSESSMENT — ENCOUNTER SYMPTOMS
ABDOMINAL PAIN: 0
DIARRHEA: 0
RESPIRATORY NEGATIVE: 1
SHORTNESS OF BREATH: 0

## 2024-02-08 ENCOUNTER — TELEPHONE (OUTPATIENT)
Dept: INTERNAL MEDICINE | Age: 79
End: 2024-02-08

## 2024-02-08 NOTE — TELEPHONE ENCOUNTER
Patient called and said she needs Free style gerson sensors 14 day. When I asked what pharmacy she said she did not get from a pharmacy  that you get them for her and she  did not know from where.?

## 2024-02-08 NOTE — TELEPHONE ENCOUNTER
Called patient.  Informed her that her DME supplier is [employer].  Gave patient the phone number to reorder.  Educated patient on contacting [employer] on future orders.  Sent DME information card via [employer] for future use.  Patient aware.

## 2024-02-28 ENCOUNTER — TELEPHONE (OUTPATIENT)
Dept: FAMILY MEDICINE CLINIC | Age: 79
End: 2024-02-28

## 2024-02-29 NOTE — TELEPHONE ENCOUNTER
Care Transitions Initial Follow Up Call    Outreach made within 2 business days of discharge: Yes    Patient: Kusum Linda Patient : 1945   MRN: [id number]  Reason for Admission: cold symptoms/LL pneumonia  Discharge Date: 2024        Spoke with: Kusum    Discharge department/facility: [facility]    TCM Interactive Patient Contact:  Was patient able to fill all prescriptions: Yes  Was patient instructed to bring all medications to the follow-up visit: Yes  Is patient taking all medications as directed in the discharge summary? Yes  Does patient understand their discharge instructions: Yes  Does patient have questions or concerns that need addressed prior to 7-14 day follow up office visit: no    Scheduled appointment with PCP within 7-14 days    Follow Up  Future Appointments   Date Time Provider Department Center   3/19/2024 10:30 AM Shanell Mathis APRN - CNP  [facility]   2024 10:00 AM Marv Ferris Jr., MD [facility]       Mackenzie Bahena MA

## 2024-03-07 ENCOUNTER — OFFICE VISIT (OUTPATIENT)
Dept: FAMILY MEDICINE CLINIC | Age: 79
End: 2024-03-07

## 2024-03-07 VITALS
HEART RATE: 64 BPM | SYSTOLIC BLOOD PRESSURE: 124 MMHG | DIASTOLIC BLOOD PRESSURE: 80 MMHG | BODY MASS INDEX: 41.97 KG/M2 | OXYGEN SATURATION: 94 % | WEIGHT: 260 LBS

## 2024-03-07 DIAGNOSIS — G47.33 OSA TREATED WITH BIPAP: ICD-10-CM

## 2024-03-07 DIAGNOSIS — J18.9 PNEUMONIA OF LEFT LOWER LOBE DUE TO INFECTIOUS ORGANISM: ICD-10-CM

## 2024-03-07 DIAGNOSIS — E11.40 NEUROPATHY DUE TO TYPE 2 DIABETES MELLITUS (HCC): ICD-10-CM

## 2024-03-07 DIAGNOSIS — I50.32 CHRONIC DIASTOLIC HEART FAILURE (HCC): ICD-10-CM

## 2024-03-07 DIAGNOSIS — E66.01 OBESITY, MORBID, BMI 40.0-49.9 (HCC): ICD-10-CM

## 2024-03-07 DIAGNOSIS — G30.9 ALZHEIMER'S DISEASE, UNSPECIFIED (CODE) (HCC): ICD-10-CM

## 2024-03-07 DIAGNOSIS — I10 ESSENTIAL HYPERTENSION: ICD-10-CM

## 2024-03-07 DIAGNOSIS — F41.9 ANXIETY: ICD-10-CM

## 2024-03-07 DIAGNOSIS — Z09 HOSPITAL DISCHARGE FOLLOW-UP: Primary | ICD-10-CM

## 2024-03-07 DIAGNOSIS — F33.0 MILD EPISODE OF RECURRENT MAJOR DEPRESSIVE DISORDER (HCC): ICD-10-CM

## 2024-03-07 RX ORDER — AZITHROMYCIN 250 MG/1
TABLET, FILM COATED ORAL
COMMUNITY
Start: 2024-02-27

## 2024-03-07 RX ORDER — CEFDINIR 300 MG/1
300 CAPSULE ORAL EVERY 12 HOURS
COMMUNITY
Start: 2024-02-27

## 2024-03-21 DIAGNOSIS — R42 DIZZINESS: ICD-10-CM

## 2024-03-21 NOTE — TELEPHONE ENCOUNTER
Pt requesting refill due to vertigo symptoms      Kusum Linda is requesting a refill on the following medication(s):  Requested Prescriptions     Pending Prescriptions Disp Refills    meclizine (ANTIVERT) 12.5 MG tablet 90 tablet 2     Sig: Take 1 tablet by mouth 3 times daily as needed for Dizziness       Last Visit Date (If Applicable):  3/7/2024    Next Visit Date:    Visit date not found

## 2024-03-22 RX ORDER — MECLIZINE HCL 12.5 MG/1
12.5 TABLET ORAL 3 TIMES DAILY PRN
Qty: 90 TABLET | Refills: 2 | Status: SHIPPED | OUTPATIENT
Start: 2024-03-22

## 2024-04-17 ENCOUNTER — OFFICE VISIT (OUTPATIENT)
Dept: FAMILY MEDICINE CLINIC | Age: 79
End: 2024-04-17
Payer: MEDICARE

## 2024-04-17 VITALS
BODY MASS INDEX: 42.49 KG/M2 | SYSTOLIC BLOOD PRESSURE: 154 MMHG | DIASTOLIC BLOOD PRESSURE: 80 MMHG | RESPIRATION RATE: 16 BRPM | WEIGHT: 264.4 LBS | HEIGHT: 66 IN | TEMPERATURE: 97.3 F | HEART RATE: 72 BPM | OXYGEN SATURATION: 94 %

## 2024-04-17 DIAGNOSIS — N39.41 URGE INCONTINENCE OF URINE: ICD-10-CM

## 2024-04-17 DIAGNOSIS — R35.0 URINARY FREQUENCY: Primary | ICD-10-CM

## 2024-04-17 PROCEDURE — 99212 OFFICE O/P EST SF 10 MIN: CPT

## 2024-04-17 SDOH — ECONOMIC STABILITY: INCOME INSECURITY: HOW HARD IS IT FOR YOU TO PAY FOR THE VERY BASICS LIKE FOOD, HOUSING, MEDICAL CARE, AND HEATING?: NOT HARD AT ALL

## 2024-04-17 SDOH — ECONOMIC STABILITY: FOOD INSECURITY: WITHIN THE PAST 12 MONTHS, THE FOOD YOU BOUGHT JUST DIDN'T LAST AND YOU DIDN'T HAVE MONEY TO GET MORE.: NEVER TRUE

## 2024-04-17 SDOH — ECONOMIC STABILITY: HOUSING INSECURITY
IN THE LAST 12 MONTHS, WAS THERE A TIME WHEN YOU DID NOT HAVE A STEADY PLACE TO SLEEP OR SLEPT IN A SHELTER (INCLUDING NOW)?: NO

## 2024-04-17 SDOH — ECONOMIC STABILITY: FOOD INSECURITY: WITHIN THE PAST 12 MONTHS, YOU WORRIED THAT YOUR FOOD WOULD RUN OUT BEFORE YOU GOT MONEY TO BUY MORE.: NEVER TRUE

## 2024-04-17 ASSESSMENT — PATIENT HEALTH QUESTIONNAIRE - PHQ9
SUM OF ALL RESPONSES TO PHQ9 QUESTIONS 1 & 2: 0
3. TROUBLE FALLING OR STAYING ASLEEP: NOT AT ALL
2. FEELING DOWN, DEPRESSED OR HOPELESS: NOT AT ALL
5. POOR APPETITE OR OVEREATING: NOT AT ALL
9. THOUGHTS THAT YOU WOULD BE BETTER OFF DEAD, OR OF HURTING YOURSELF: NOT AT ALL
SUM OF ALL RESPONSES TO PHQ QUESTIONS 1-9: 0
6. FEELING BAD ABOUT YOURSELF - OR THAT YOU ARE A FAILURE OR HAVE LET YOURSELF OR YOUR FAMILY DOWN: NOT AT ALL
1. LITTLE INTEREST OR PLEASURE IN DOING THINGS: NOT AT ALL
7. TROUBLE CONCENTRATING ON THINGS, SUCH AS READING THE NEWSPAPER OR WATCHING TELEVISION: NOT AT ALL
SUM OF ALL RESPONSES TO PHQ QUESTIONS 1-9: 0
8. MOVING OR SPEAKING SO SLOWLY THAT OTHER PEOPLE COULD HAVE NOTICED. OR THE OPPOSITE, BEING SO FIGETY OR RESTLESS THAT YOU HAVE BEEN MOVING AROUND A LOT MORE THAN USUAL: NOT AT ALL
4. FEELING TIRED OR HAVING LITTLE ENERGY: NOT AT ALL
SUM OF ALL RESPONSES TO PHQ QUESTIONS 1-9: 0
10. IF YOU CHECKED OFF ANY PROBLEMS, HOW DIFFICULT HAVE THESE PROBLEMS MADE IT FOR YOU TO DO YOUR WORK, TAKE CARE OF THINGS AT HOME, OR GET ALONG WITH OTHER PEOPLE: NOT DIFFICULT AT ALL
SUM OF ALL RESPONSES TO PHQ QUESTIONS 1-9: 0

## 2024-04-17 NOTE — PATIENT INSTRUCTIONS
Drop off urine sample to the [facility] lab. I will call you with results once I receive them.

## 2024-04-17 NOTE — PROGRESS NOTES
Hypertension     Incontinence     in female    Nocturnal enuresis 7/9/2021    Osteoarthritis     knee     Type II or unspecified type diabetes mellitus without mention of complication, not stated as uncontrolled     Vertigo 8/1/2017    Vitamin B12 deficiency (dietary) anemia 11/17/2018        Past Surgical History:  has a past surgical history that includes Ankle surgery (Left) and Finger trigger release (Right).     Allergies: No Known Allergies    Social History:  reports that she has never smoked. She has never used smokeless tobacco. She reports that she does not drink alcohol and does not use drugs.     Objective:     Vitals:    04/17/24 1250 04/17/24 1257   BP: (!) 158/70 (!) 154/80   Site: Right Upper Arm Right Upper Arm   Position: Sitting Sitting   Cuff Size: Large Adult Large Adult   Pulse: 72    Resp: 16    Temp: 97.3 °F (36.3 °C)    TempSrc: Temporal    SpO2: 94%    Weight: 119.9 kg (264 lb 6.4 oz)    Height: 1.676 m (5' 6\")      Body mass index is 42.68 kg/m².    BP (!) 154/80 (Site: Right Upper Arm, Position: Sitting, Cuff Size: Large Adult)   Pulse 72   Temp 97.3 °F (36.3 °C) (Temporal)   Resp 16   Ht 1.676 m (5' 6\")   Wt 119.9 kg (264 lb 6.4 oz)   LMP  (LMP Unknown)   SpO2 94%   BMI 42.68 kg/m²   Physical Exam  Vitals and nursing note reviewed.   Constitutional:       General: She is not in acute distress.     Appearance: Normal appearance.   HENT:      Nose: Nose normal.      Mouth/Throat:      Mouth: Mucous membranes are moist.      Pharynx: Oropharynx is clear.   Eyes:      Conjunctiva/sclera: Conjunctivae normal.      Pupils: Pupils are equal, round, and reactive to light.   Cardiovascular:      Rate and Rhythm: Normal rate.      Pulses: Normal pulses.   Pulmonary:      Effort: Pulmonary effort is normal.   Abdominal:      General: Bowel sounds are normal.      Tenderness: There is no right CVA tenderness or left CVA tenderness.   Skin:     General: Skin is warm and dry.      Capillary

## 2024-04-18 LAB
BACTERIA, URINE: ABNORMAL /HPF
BILIRUBIN URINE: NEGATIVE
BLOOD, URINE: ABNORMAL
CASTS UA: ABNORMAL /LPF
CLARITY: ABNORMAL
COLOR, URINE: YELLOW
CRYSTALS, UA: ABNORMAL
GLUCOSE URINE: ABNORMAL MG/DL
KETONES, URINE: NEGATIVE MG/DL
LEUKOCYTE ESTERASE, URINE: NEGATIVE
MUCUS, URINE: ABNORMAL
NITRITE, URINE: NEGATIVE
PH UA: 6 (ref 5–8.5)
PROTEIN UA: ABNORMAL MG/DL
RBC URINE: ABNORMAL /HPF (ref 0–2)
SPECIFIC GRAVITY, URINE: 1.01 MG/DL (ref 1–1.03)
SQUAMOUS EPITHELIAL: ABNORMAL /HPF
UROBILINOGEN, URINE: 0.2 MG/DL (ref 0.2–1)
WBC URINE: ABNORMAL /HPF (ref 0–4)

## 2024-04-18 NOTE — RESULT ENCOUNTER NOTE
Please call patient and let her know that there is no clear indication of a bacterial infection in urine. I recommend that she follow up with her urologist regarding her symptoms. Her blood sugars are probably running high as well as there is 3+ Glucose in her urine.

## 2024-04-24 RX ORDER — OXYBUTYNIN CHLORIDE 10 MG/1
TABLET, EXTENDED RELEASE ORAL
Qty: 90 TABLET | Refills: 3 | Status: SHIPPED | OUTPATIENT
Start: 2024-04-24

## 2024-05-07 ENCOUNTER — OFFICE VISIT (OUTPATIENT)
Dept: DIABETES SERVICES | Age: 79
End: 2024-05-07
Payer: MEDICARE

## 2024-05-07 VITALS
DIASTOLIC BLOOD PRESSURE: 88 MMHG | OXYGEN SATURATION: 94 % | HEIGHT: 66 IN | WEIGHT: 264 LBS | SYSTOLIC BLOOD PRESSURE: 138 MMHG | HEART RATE: 76 BPM | BODY MASS INDEX: 42.43 KG/M2

## 2024-05-07 DIAGNOSIS — I10 ESSENTIAL HYPERTENSION: ICD-10-CM

## 2024-05-07 DIAGNOSIS — E78.49 OTHER HYPERLIPIDEMIA: ICD-10-CM

## 2024-05-07 DIAGNOSIS — Z79.4 TYPE 2 DIABETES MELLITUS WITH STAGE 3B CHRONIC KIDNEY DISEASE, WITH LONG-TERM CURRENT USE OF INSULIN (HCC): Primary | ICD-10-CM

## 2024-05-07 DIAGNOSIS — N18.32 TYPE 2 DIABETES MELLITUS WITH STAGE 3B CHRONIC KIDNEY DISEASE, WITH LONG-TERM CURRENT USE OF INSULIN (HCC): Primary | ICD-10-CM

## 2024-05-07 DIAGNOSIS — E11.22 TYPE 2 DIABETES MELLITUS WITH STAGE 3B CHRONIC KIDNEY DISEASE, WITH LONG-TERM CURRENT USE OF INSULIN (HCC): Primary | ICD-10-CM

## 2024-05-07 LAB — HBA1C MFR BLD: 8.8 %

## 2024-05-07 PROCEDURE — 3052F HG A1C>EQUAL 8.0%<EQUAL 9.0%: CPT | Performed by: NURSE PRACTITIONER

## 2024-05-07 PROCEDURE — 83036 HEMOGLOBIN GLYCOSYLATED A1C: CPT | Performed by: NURSE PRACTITIONER

## 2024-05-07 PROCEDURE — 99214 OFFICE O/P EST MOD 30 MIN: CPT | Performed by: NURSE PRACTITIONER

## 2024-05-07 PROCEDURE — 3075F SYST BP GE 130 - 139MM HG: CPT | Performed by: NURSE PRACTITIONER

## 2024-05-07 PROCEDURE — 99212 OFFICE O/P EST SF 10 MIN: CPT

## 2024-05-07 PROCEDURE — G8417 CALC BMI ABV UP PARAM F/U: HCPCS | Performed by: NURSE PRACTITIONER

## 2024-05-07 PROCEDURE — G2211 COMPLEX E/M VISIT ADD ON: HCPCS | Performed by: NURSE PRACTITIONER

## 2024-05-07 PROCEDURE — 3079F DIAST BP 80-89 MM HG: CPT | Performed by: NURSE PRACTITIONER

## 2024-05-07 PROCEDURE — 95251 CONT GLUC MNTR ANALYSIS I&R: CPT | Performed by: NURSE PRACTITIONER

## 2024-05-07 PROCEDURE — 1123F ACP DISCUSS/DSCN MKR DOCD: CPT | Performed by: NURSE PRACTITIONER

## 2024-05-07 PROCEDURE — G8427 DOCREV CUR MEDS BY ELIG CLIN: HCPCS | Performed by: NURSE PRACTITIONER

## 2024-05-07 PROCEDURE — 1090F PRES/ABSN URINE INCON ASSESS: CPT | Performed by: NURSE PRACTITIONER

## 2024-05-07 PROCEDURE — PBSHW POCT GLYCOSYLATED HEMOGLOBIN (HGB A1C): Performed by: NURSE PRACTITIONER

## 2024-05-07 PROCEDURE — G8400 PT W/DXA NO RESULTS DOC: HCPCS | Performed by: NURSE PRACTITIONER

## 2024-05-07 PROCEDURE — 1036F TOBACCO NON-USER: CPT | Performed by: NURSE PRACTITIONER

## 2024-05-07 ASSESSMENT — ENCOUNTER SYMPTOMS
ABDOMINAL PAIN: 0
SHORTNESS OF BREATH: 0
RESPIRATORY NEGATIVE: 1
DIARRHEA: 0

## 2024-05-07 NOTE — PROGRESS NOTES
[facility]  [address] [facility] [address] [phone]        HISTORY:    Kusum Linda presents today for evaluation and management of:  Chief Complaint   Patient presents with    Diabetes       Patient Care Team:  Katie De León APRN - CNP as PCP - General (Family Medicine)  Katie De León APRN - CNP as PCP - Empaneled Provider  Prince Burroughs MD as Orthopedic Surgeon (Orthopedic Surgery)  Gaurav Palma DPM as Consulting Physician (Podiatry)  Peter Schmitt as Referring Physician (Optometry)  Shanell Mathis APRN - CNP as Diabetic Educator (Nurse Practitioner)      Interval History:    Past DM Medications   Metformin- ckd  Glimepiride-therapy completed  janvuia- glp1 started  victoza-cost  soliqua- cost  Farxiga- cost   Trulicity- cost     Current Diabetic Medications   tresiba 48 units once daily.      Humalog Sliding Scale Insulin 2+ plus scale  10-15 minutes before each meal      Blood sugar   Action     <70               Drink Juice               No extra insulin  150 - 200         2 units subcutaneous Insulin  201 - 250         4 units subcutaneous Insulin  251 - 300         6 units subcutaneous Insulin  301 - 350         8 units subcutaneous Insulin  351 - 400         10 units subcutaneous Insulin   > 400              12 units subcutaneous Insulin        DKA episodes: 0    02/07/24   Kusum Linda is a 78 y.o. female patient who presents to clinic today for her diabetes. she has a history of HTN, HF, CKD, neuropathy, OA, hyperlipidemia, obesity and depression which contributes to her diabetes. At previous visit diabetes counseling was provided. she denies any current signs or symptoms of hyper/hypoglycemia. she states they are taking their medications as prescribed without any adverse effects.   Diet: mindful  Exercise: none  BS testing: uses cgm daily with success and is adherent to cgm  Follow up official ultrasound report

## 2024-05-13 ENCOUNTER — TELEPHONE (OUTPATIENT)
Dept: FAMILY MEDICINE CLINIC | Age: 79
End: 2024-05-13

## 2024-05-15 NOTE — TELEPHONE ENCOUNTER
Care Transitions Initial Follow Up Call    Outreach made within 2 business days of discharge: Yes    Patient: Kusum Linda Patient : 1945   MRN: [id number]  Reason for Admission: UTI  Discharge Date:  24       Spoke with: Kusum    Discharge department/facility: [facility] Interactive Patient Contact:  Was patient able to fill all prescriptions: Yes  Was patient instructed to bring all medications to the follow-up visit: Yes  Is patient taking all medications as directed in the discharge summary? Yes  Does patient understand their discharge instructions: Yes  Does patient have questions or concerns that need addressed prior to 7-14 day follow up office visit: no    Scheduled appointment with PCP within 7-14 days    Follow Up  Future Appointments   Date Time Provider Department Center   2024  9:40 AM Katie De León APRN - CNP [facility]   2024 10:00 AM Marv Ferris Jr., MD [place] [facility]   2024 11:00 AM Shanell Mathis APRN - CNP  [facility]       Mackenzie Bahena MA

## 2024-05-21 ENCOUNTER — OFFICE VISIT (OUTPATIENT)
Dept: FAMILY MEDICINE CLINIC | Age: 79
End: 2024-05-21

## 2024-05-21 VITALS
DIASTOLIC BLOOD PRESSURE: 74 MMHG | WEIGHT: 265.4 LBS | BODY MASS INDEX: 42.84 KG/M2 | OXYGEN SATURATION: 94 % | SYSTOLIC BLOOD PRESSURE: 130 MMHG | HEART RATE: 54 BPM

## 2024-05-21 DIAGNOSIS — N30.01 ACUTE CYSTITIS WITH HEMATURIA: ICD-10-CM

## 2024-05-21 DIAGNOSIS — E66.01 OBESITY, MORBID, BMI 40.0-49.9 (HCC): ICD-10-CM

## 2024-05-21 DIAGNOSIS — G30.9 ALZHEIMER'S DISEASE, UNSPECIFIED (CODE) (HCC): ICD-10-CM

## 2024-05-21 DIAGNOSIS — F41.9 ANXIETY: ICD-10-CM

## 2024-05-21 DIAGNOSIS — Z09 HOSPITAL DISCHARGE FOLLOW-UP: Primary | ICD-10-CM

## 2024-05-21 DIAGNOSIS — Z79.4 TYPE 2 DIABETES MELLITUS WITH STAGE 3B CHRONIC KIDNEY DISEASE, WITH LONG-TERM CURRENT USE OF INSULIN (HCC): ICD-10-CM

## 2024-05-21 DIAGNOSIS — N18.32 TYPE 2 DIABETES MELLITUS WITH STAGE 3B CHRONIC KIDNEY DISEASE, WITH LONG-TERM CURRENT USE OF INSULIN (HCC): ICD-10-CM

## 2024-05-21 DIAGNOSIS — E78.49 OTHER HYPERLIPIDEMIA: ICD-10-CM

## 2024-05-21 DIAGNOSIS — R06.00 DYSPNEA, UNSPECIFIED TYPE: ICD-10-CM

## 2024-05-21 DIAGNOSIS — R42 DIZZINESS: ICD-10-CM

## 2024-05-21 DIAGNOSIS — I50.32 CHRONIC DIASTOLIC HEART FAILURE (HCC): ICD-10-CM

## 2024-05-21 DIAGNOSIS — F33.0 MILD EPISODE OF RECURRENT MAJOR DEPRESSIVE DISORDER (HCC): ICD-10-CM

## 2024-05-21 DIAGNOSIS — I10 ESSENTIAL HYPERTENSION: ICD-10-CM

## 2024-05-21 DIAGNOSIS — G47.33 OSA TREATED WITH BIPAP: ICD-10-CM

## 2024-05-21 DIAGNOSIS — E11.22 TYPE 2 DIABETES MELLITUS WITH STAGE 3B CHRONIC KIDNEY DISEASE, WITH LONG-TERM CURRENT USE OF INSULIN (HCC): ICD-10-CM

## 2024-05-21 NOTE — PROGRESS NOTES
Post-Discharge Transitional Care Follow Up    Kusum Linda   YOB: 1945    Date of Office Visit:  5/21/2024  Date of Hospital Admission: 5/9/2024  Date of Hospital Discharge: 5/11/2024    Care management risk score Rising risk (score 2-5) and Complex Care (Scores >=6): No Risk Score On File     Non face to face  following discharge, date last encounter closed (first attempt may have been earlier): *No documented post hospital discharge outreach found in the last 14 days     Call initiated 2 business days of discharge: *No response recorded in the last 14 days    ASSESSMENT/PLAN:   Hospital discharge follow-up  -     [place] DISCHARGE MEDS RECONCILED W/ CURRENT OUTPATIENT MED LIST  Acute cystitis with hematuria  Dizziness  Dyspnea, unspecified type  Chronic diastolic heart failure (HCC)  Essential hypertension  Mild episode of recurrent major depressive disorder ([facility])  Alzheimer's disease, unspecified  DAVID treated with BiPAP  Obesity, morbid, BMI 40.0-49.9 ([facility])  Type 2 diabetes mellitus with stage 3b chronic kidney disease, with long-term current use of insulin ([facility])  Anxiety  Other hyperlipidemia    Medical Decision Making: moderate complexity    Continue cefdinir as prescribed. Monitor for any signs of recurrence or complications.  Complete repeat UA after completion of antibiotic.     Unable to locate results of recent stress test and echocardiogram. Recommend she contact her cardiologist for results and discuss plan moving forward. May also want to get more information on the upcoming CT at [place].     Return in about 4 weeks (around 6/18/2024) for Dizziness, SOB, Stress.           Subjective:   HPI  Follow up after discharge from [facility] for UTI. Discharge medication include cefdinir. No other medication changes.     Inpatient course: Discharge summary reviewed- see chart.    Interval history/Current status:   Patient reports gradual improvement in UTI symptoms. She is taking cefdinir as prescribed at

## 2024-05-27 DIAGNOSIS — J30.9 ALLERGIC RHINITIS, UNSPECIFIED SEASONALITY, UNSPECIFIED TRIGGER: ICD-10-CM

## 2024-05-27 DIAGNOSIS — R41.89 COGNITIVE DECLINE: ICD-10-CM

## 2024-05-27 DIAGNOSIS — I10 ESSENTIAL HYPERTENSION: ICD-10-CM

## 2024-05-27 DIAGNOSIS — E78.49 OTHER HYPERLIPIDEMIA: ICD-10-CM

## 2024-05-28 NOTE — TELEPHONE ENCOUNTER
Kusum Lidna is calling to request a refill on the following medication(s):  Requested Prescriptions     Pending Prescriptions Disp Refills    memantine (NAMENDA) 10 MG tablet [Pharmacy Med Name: Memantine HCl 10 MG Oral Tablet] 180 tablet 3     Sig: TAKE 1 TABLET BY MOUTH TWICE  DAILY    simvastatin (ZOCOR) 40 MG tablet [Pharmacy Med Name: Simvastatin 40 MG Oral Tablet] 90 tablet 3     Sig: TAKE 1 TABLET BY MOUTH AT NIGHT    lisinopril (PRINIVIL;ZESTRIL) 20 MG tablet [Pharmacy Med Name: Lisinopril 20 MG Oral Tablet] 90 tablet 3     Sig: TAKE 1 TABLET BY MOUTH DAILY    montelukast (SINGULAIR) 10 MG tablet [Pharmacy Med Name: Montelukast Sodium 10 MG Oral Tablet] 90 tablet 3     Sig: TAKE 1 TABLET BY MOUTH DAILY       Last Visit Date (If Applicable):  5/21/2024    Next Visit Date:    5/30/2024

## 2024-05-29 RX ORDER — LISINOPRIL 20 MG/1
20 TABLET ORAL DAILY
Qty: 90 TABLET | Refills: 3 | Status: SHIPPED | OUTPATIENT
Start: 2024-05-29 | End: 2025-05-24

## 2024-05-29 RX ORDER — SIMVASTATIN 40 MG
TABLET ORAL
Qty: 90 TABLET | Refills: 3 | Status: SHIPPED | OUTPATIENT
Start: 2024-05-29

## 2024-05-29 RX ORDER — MONTELUKAST SODIUM 10 MG/1
10 TABLET ORAL DAILY
Qty: 90 TABLET | Refills: 3 | Status: SHIPPED | OUTPATIENT
Start: 2024-05-29

## 2024-05-29 RX ORDER — MEMANTINE HYDROCHLORIDE 10 MG/1
TABLET ORAL
Qty: 180 TABLET | Refills: 3 | Status: SHIPPED | OUTPATIENT
Start: 2024-05-29

## 2024-05-30 ENCOUNTER — OFFICE VISIT (OUTPATIENT)
Dept: FAMILY MEDICINE CLINIC | Age: 79
End: 2024-05-30
Payer: MEDICARE

## 2024-05-30 VITALS
SYSTOLIC BLOOD PRESSURE: 124 MMHG | HEART RATE: 49 BPM | BODY MASS INDEX: 43.26 KG/M2 | WEIGHT: 268 LBS | OXYGEN SATURATION: 98 % | DIASTOLIC BLOOD PRESSURE: 84 MMHG

## 2024-05-30 DIAGNOSIS — R53.82 CHRONIC FATIGUE: Primary | ICD-10-CM

## 2024-05-30 DIAGNOSIS — R41.89 BRAIN FOG: ICD-10-CM

## 2024-05-30 DIAGNOSIS — I50.32 CHRONIC DIASTOLIC HEART FAILURE (HCC): ICD-10-CM

## 2024-05-30 DIAGNOSIS — R45.89 ANXIETY ABOUT HEALTH: ICD-10-CM

## 2024-05-30 DIAGNOSIS — N39.41 URGE INCONTINENCE OF URINE: ICD-10-CM

## 2024-05-30 DIAGNOSIS — E53.8 LOW SERUM VITAMIN B12: ICD-10-CM

## 2024-05-30 DIAGNOSIS — R94.39 ABNORMAL STRESS TEST: ICD-10-CM

## 2024-05-30 PROCEDURE — 1090F PRES/ABSN URINE INCON ASSESS: CPT | Performed by: NURSE PRACTITIONER

## 2024-05-30 PROCEDURE — 3079F DIAST BP 80-89 MM HG: CPT | Performed by: NURSE PRACTITIONER

## 2024-05-30 PROCEDURE — G8400 PT W/DXA NO RESULTS DOC: HCPCS | Performed by: NURSE PRACTITIONER

## 2024-05-30 PROCEDURE — 99213 OFFICE O/P EST LOW 20 MIN: CPT | Performed by: NURSE PRACTITIONER

## 2024-05-30 PROCEDURE — G8417 CALC BMI ABV UP PARAM F/U: HCPCS | Performed by: NURSE PRACTITIONER

## 2024-05-30 PROCEDURE — 1036F TOBACCO NON-USER: CPT | Performed by: NURSE PRACTITIONER

## 2024-05-30 PROCEDURE — 0509F URINE INCON PLAN DOCD: CPT | Performed by: NURSE PRACTITIONER

## 2024-05-30 PROCEDURE — G8427 DOCREV CUR MEDS BY ELIG CLIN: HCPCS | Performed by: NURSE PRACTITIONER

## 2024-05-30 PROCEDURE — 1123F ACP DISCUSS/DSCN MKR DOCD: CPT | Performed by: NURSE PRACTITIONER

## 2024-05-30 PROCEDURE — 3074F SYST BP LT 130 MM HG: CPT | Performed by: NURSE PRACTITIONER

## 2024-05-30 NOTE — PROGRESS NOTES
[address], [address]  [place] [id number]  Dept: [phone]  Dept Fax: [phone]    Date of Service:  5/30/2024    Kusum Linda is a 78 y.o. female who comes in today for follow up of chronic health issues.     Chief Complaint   Patient presents with    Results     Would like to review results of stress test and ct        Diagnoses / Plan:   1. Chronic fatigue  -     Culture, Urine  2. Abnormal stress test  3. Chronic diastolic heart failure (HCC)  Assessment & Plan:   Monitored by specialist- no acute findings meriting change in the plan  4. Urge incontinence of urine  -     Culture, Urine  5. Brain fog  -     Culture, Urine  6. Low serum vitamin B12  7. Anxiety about health     UA to rule out UTI.     Reviewed results of stress and echo as requested. Patient understands the cardiac stress is abnormal. Discussed the Cardiac CT, what to expect and purpose of test. Patient is anxious for the outcome and plan moving forward. Recommend she discuss the plan with cardiology during the scheduled follow up appointment. All questions answered. Patient verbalized understanding.    Return if symptoms worsen or fail to improve.     Subjective:   History of Present Illness:  Recently completed cardiac stress and echo, which were ordered by cardiology. She is scheduled for a cardiac CT at [place] next week. Follow up with cardiology is scheduled on 6/13/2024.  She would like to discuss results.     Complains of chronic worsening fatigue. She is concerned symptoms are related to her heart.     BP Readings from Last 3 Encounters:   06/05/24 (!) 150/80   05/30/24 124/84   05/21/24 130/74       Pulse Readings from Last 3 Encounters:   06/05/24 51   05/30/24 (!) 49   05/21/24 54        Wt Readings from Last 3 Encounters:   06/05/24 121.6 kg (268 lb)   05/30/24 121.6 kg (268 lb)   05/21/24 120.4 kg (265 lb 6.4 oz)        Current Outpatient Medications   Medication Sig

## 2024-05-31 DIAGNOSIS — N39.41 URGE INCONTINENCE OF URINE: Primary | ICD-10-CM

## 2024-06-05 ENCOUNTER — OFFICE VISIT (OUTPATIENT)
Dept: UROLOGY | Age: 79
End: 2024-06-05
Payer: MEDICARE

## 2024-06-05 ENCOUNTER — TELEPHONE (OUTPATIENT)
Dept: UROLOGY | Age: 79
End: 2024-06-05

## 2024-06-05 VITALS
OXYGEN SATURATION: 93 % | SYSTOLIC BLOOD PRESSURE: 150 MMHG | BODY MASS INDEX: 43.07 KG/M2 | HEART RATE: 51 BPM | DIASTOLIC BLOOD PRESSURE: 80 MMHG | HEIGHT: 66 IN | WEIGHT: 268 LBS

## 2024-06-05 DIAGNOSIS — N39.46 MIXED INCONTINENCE: ICD-10-CM

## 2024-06-05 DIAGNOSIS — N39.41 URGE INCONTINENCE: Primary | ICD-10-CM

## 2024-06-05 DIAGNOSIS — N39.44 NOCTURNAL ENURESIS: ICD-10-CM

## 2024-06-05 DIAGNOSIS — N39.0 RECURRENT UTI: ICD-10-CM

## 2024-06-05 LAB — VITAMIN B-12: 279 PG/ML (ref 239–931)

## 2024-06-05 PROCEDURE — 0509F URINE INCON PLAN DOCD: CPT | Performed by: UROLOGY

## 2024-06-05 PROCEDURE — 3079F DIAST BP 80-89 MM HG: CPT | Performed by: UROLOGY

## 2024-06-05 PROCEDURE — G8400 PT W/DXA NO RESULTS DOC: HCPCS | Performed by: UROLOGY

## 2024-06-05 PROCEDURE — 1036F TOBACCO NON-USER: CPT | Performed by: UROLOGY

## 2024-06-05 PROCEDURE — 3077F SYST BP >= 140 MM HG: CPT | Performed by: UROLOGY

## 2024-06-05 PROCEDURE — 99214 OFFICE O/P EST MOD 30 MIN: CPT | Performed by: UROLOGY

## 2024-06-05 PROCEDURE — 1123F ACP DISCUSS/DSCN MKR DOCD: CPT | Performed by: UROLOGY

## 2024-06-05 PROCEDURE — G8427 DOCREV CUR MEDS BY ELIG CLIN: HCPCS | Performed by: UROLOGY

## 2024-06-05 PROCEDURE — 1090F PRES/ABSN URINE INCON ASSESS: CPT | Performed by: UROLOGY

## 2024-06-05 PROCEDURE — G8417 CALC BMI ABV UP PARAM F/U: HCPCS | Performed by: UROLOGY

## 2024-06-05 RX ORDER — CARVEDILOL 12.5 MG/1
TABLET ORAL
COMMUNITY
Start: 2024-04-29

## 2024-06-05 RX ORDER — LISINOPRIL 40 MG/1
40 TABLET ORAL DAILY
COMMUNITY
Start: 2024-05-11

## 2024-06-05 NOTE — PATIENT INSTRUCTIONS
If you need to reach the Urologist or Urology Nurses for any health care questions or concerns please call [phone] and ask to speak with the [facility] Urology Nurse.   The phone line is open from 8a-430p Monday thru Friday.

## 2024-06-05 NOTE — PROGRESS NOTES
Dr. Marv Ferris Jr, MD MD  [facility] Consultation / FU Visit    Patient:  Kusum Linda  YOB: 1945  Date: 2024  Consult requested from Katie De León APRN - CNP     HISTORY OF PRESENT ILLNESS:   The patient is a 78 y.o. female who presents today for follow-up for the following problem(s): recurrent UTI  Overall the problem(s) : are worsening.  Associated Symptoms: No dysuria, gross hematuria.   Pain Severity:      Today visit:   24   Presents in follow up after Interstim placement, she was restarted on oxybutynin 10 mg and has worse control with combo therapy.    - She is now using 6 ppd (after interrogation).  - (4 PPD, 3 PPD, 2 PPD, damp, / 4-5 ppd, wet).        The patient is very bothered by bumex (BID), which exacerbates her symptoms.  We discuss the importance of this medication and will ask for her PCP to review her volume status and if there is any way to improve her regimen to help with her symptoms.     21    Botox 200 units (3/10/21) - unfortunately she has failed botox with early return of symptoms < 3 months.  She is back to PPD: 1 for confidenc 2-3, wet pads daily.  Nocturnal enuresis has returned.  She is very bothered by these symptoms.  She is now interested in interstim, we discuss risks and benefits.      20   Today Kusum is doing well.  States Nocturia has improved with oxybutynin QHS.  She is tolerating the medication well, no dry mouth or constipation.   She states she is having decline of her memory, so her last visit, the information regarding improvement of her symptoms was not accurate.   Urge incontinence worsenin daily, 3 at night.  Avoiding fluids and lasix after dinner.  Lasix only in am.  Vesicare 10 mg ER in am - no dry mouth and constipation  States failing medical therapy.     PMH:   DAVID on CPAP  Transverse myelitis     Summary of old records:   (Patient's old records, notes and chart reviewed and summarized

## 2024-06-05 NOTE — TELEPHONE ENCOUNTER
Patient called the office, patient saw Dr Ferris today.  Gemetesa was sent to [facility] pharmacy.  Patient states it is $324, which she can not afford. Patient would like a different  medication sent to [facility].    Any questions call back # [phone].

## 2024-06-06 RX ORDER — TOLTERODINE 4 MG/1
4 CAPSULE, EXTENDED RELEASE ORAL DAILY
Qty: 30 CAPSULE | Refills: 3 | Status: SHIPPED | OUTPATIENT
Start: 2024-06-06

## 2024-06-06 NOTE — TELEPHONE ENCOUNTER
Spoke with Dr. Ferris, would like patient to try Detrol LA 4 mg. Spoke with patient, prescription sent in as requested.

## 2024-06-07 DIAGNOSIS — N30.01 ACUTE CYSTITIS WITH HEMATURIA: ICD-10-CM

## 2024-06-07 DIAGNOSIS — E53.8 LOW SERUM VITAMIN B12: Primary | ICD-10-CM

## 2024-06-07 RX ORDER — LEVOFLOXACIN 250 MG/1
250 TABLET, FILM COATED ORAL DAILY
Qty: 7 TABLET | Refills: 0 | Status: SHIPPED | OUTPATIENT
Start: 2024-06-07 | End: 2024-06-14

## 2024-06-07 RX ORDER — CYANOCOBALAMIN 1000 UG/ML
1000 INJECTION, SOLUTION INTRAMUSCULAR; SUBCUTANEOUS
Qty: 4 ML | Refills: 0 | Status: SHIPPED | OUTPATIENT
Start: 2024-06-07 | End: 2024-06-29

## 2024-06-07 RX ORDER — CYANOCOBALAMIN 1000 UG/ML
1000 INJECTION, SOLUTION INTRAMUSCULAR; SUBCUTANEOUS
Status: SHIPPED | OUTPATIENT
Start: 2024-07-08 | End: 2025-07-03

## 2024-06-13 ENCOUNTER — TELEPHONE (OUTPATIENT)
Dept: FAMILY MEDICINE CLINIC | Age: 79
End: 2024-06-13

## 2024-06-13 NOTE — TELEPHONE ENCOUNTER
Pt was to get a CTA done today at [facility], they could not complete test due to pt not having iv access, pt is now scheduling at [facility]. Pt wanted Ktaie to be informed as she told her she was awaiting these results.

## 2024-06-16 PROBLEM — R45.89 ANXIETY ABOUT HEALTH: Status: ACTIVE | Noted: 2019-06-23

## 2024-06-17 DIAGNOSIS — R42 DIZZINESS: ICD-10-CM

## 2024-06-17 RX ORDER — MECLIZINE HCL 12.5 MG/1
12.5 TABLET ORAL 3 TIMES DAILY PRN
Qty: 270 TABLET | Refills: 0 | Status: SHIPPED | OUTPATIENT
Start: 2024-06-17

## 2024-06-17 NOTE — TELEPHONE ENCOUNTER
Kusum Linda is calling to request a refill on the following medication(s):  Requested Prescriptions     Pending Prescriptions Disp Refills    meclizine (ANTIVERT) 12.5 MG tablet [Pharmacy Med Name: MECLIZINE  12.5MG  TAB] 270 tablet      Sig: TAKE 1 TABLET BY MOUTH 3 TIMES  DAILY AS NEEDED FOR DIZZINESS       Last Visit Date (If Applicable):  5/30/2024    Next Visit Date:    Visit date not found

## 2024-06-18 ENCOUNTER — TELEPHONE (OUTPATIENT)
Dept: FAMILY MEDICINE CLINIC | Age: 79
End: 2024-06-18

## 2024-06-18 DIAGNOSIS — R42 VERTIGO: Primary | ICD-10-CM

## 2024-06-18 NOTE — TELEPHONE ENCOUNTER
Kusum called and said she is having vertigo again and would an order sent to [facility] to have her tested.

## 2024-06-20 ENCOUNTER — TELEPHONE (OUTPATIENT)
Dept: FAMILY MEDICINE CLINIC | Age: 79
End: 2024-06-20

## 2024-06-20 NOTE — TELEPHONE ENCOUNTER
Pt called and asked if a referral could be put in for PT. She had one placed 1/5/23 but PT stated she would need a new one (for vertigo)

## 2024-06-24 DIAGNOSIS — R42 DIZZINESS: ICD-10-CM

## 2024-06-24 RX ORDER — MECLIZINE HCL 12.5 MG/1
12.5 TABLET ORAL 3 TIMES DAILY PRN
Qty: 90 TABLET | Refills: 0 | Status: SHIPPED | OUTPATIENT
Start: 2024-06-24

## 2024-06-24 NOTE — TELEPHONE ENCOUNTER
Spoke with pt and pt states her symptoms are when goes to get sitting/laying becomes very dizzy and gets it quite frequently

## 2024-06-26 NOTE — TELEPHONE ENCOUNTER
Patient said she is very frustrated.  Has had this off and on for years and Physical Therapy is what always helps.  The Meclazine never helps her much.  She really wants a referral to [facility] PT for her vertigo.

## 2024-06-27 NOTE — TELEPHONE ENCOUNTER
In addition to the PT referral, she is sure she has a UTI and would like a script sent to [facility] in [place].  (Can't drive with the vertigo).  Patient sounded very upset.

## 2024-07-29 ENCOUNTER — TELEPHONE (OUTPATIENT)
Dept: FAMILY MEDICINE CLINIC | Age: 79
End: 2024-07-29

## 2024-07-29 DIAGNOSIS — Z01.812 PRE-PROCEDURE LAB EXAM: Primary | ICD-10-CM

## 2024-07-29 NOTE — TELEPHONE ENCOUNTER
Orders Placed This Encounter    Creatinine    Central line insertion     Patient is difficulty IV and will need Midline access for CTA being performed at [facility] on Wednesday 7/31/2024.      Order placed in Epic for patient to have Central line insertion or Midline catheter for CTA planned at [facility] on Wednesday 7/31/2024. Patient will need stat Creatinine drawn as well due to use of contrast used during CTA to assess kidney function prior to administration of contrast.    Electronically signed by ORTIZ Hernandez CNP on 7/29/2024 at 9:13 AM

## 2024-07-29 NOTE — TELEPHONE ENCOUNTER
ON CALL to address- PCP OUT    Patient will be having a CTA on Wednesday at [facility]. They are requesting order for midline and also creat. See note from cardiology below:    Telephone Encounter - Carri Nicole RN - 06/13/2024 3:34 PM EDT  Formatting of this note is different from the original.  Images from the original note were not included.  Recieived call from Zaina at [facility] re pt there today for CTA Cors as ordered by [facility]. Multiple IV attempts including U/S guided IV fails. Pt was rescheduled to have CTA completed done at [facility] where she states likely pt may require midline access for CTA to be performed.    Zaina Barriga, RN  Registered Nurse    Nursing Note  Signed    Date of Service: 06/13/24 1430    Signed      Coronary cta to be resheduled as IV access could not be obtained.  Writer also left SARAH De León cnp be aware per pt request.        Electronically signed by Zaina Barriga RN at 06/13/24 9741      Please fax order for midline and creat to [facility]. [phone]

## 2024-07-30 DIAGNOSIS — E11.22 TYPE 2 DIABETES MELLITUS WITH STAGE 3B CHRONIC KIDNEY DISEASE, WITH LONG-TERM CURRENT USE OF INSULIN (HCC): ICD-10-CM

## 2024-07-30 DIAGNOSIS — Z79.4 TYPE 2 DIABETES MELLITUS WITH STAGE 3B CHRONIC KIDNEY DISEASE, WITH LONG-TERM CURRENT USE OF INSULIN (HCC): ICD-10-CM

## 2024-07-30 DIAGNOSIS — N18.32 TYPE 2 DIABETES MELLITUS WITH STAGE 3B CHRONIC KIDNEY DISEASE, WITH LONG-TERM CURRENT USE OF INSULIN (HCC): ICD-10-CM

## 2024-08-01 RX ORDER — INSULIN DEGLUDEC 200 U/ML
INJECTION, SOLUTION SUBCUTANEOUS
Qty: 18 ML | Refills: 3 | Status: SHIPPED | OUTPATIENT
Start: 2024-08-01

## 2024-08-20 ENCOUNTER — OFFICE VISIT (OUTPATIENT)
Dept: DIABETES SERVICES | Age: 79
End: 2024-08-20
Payer: MEDICARE

## 2024-08-20 VITALS
DIASTOLIC BLOOD PRESSURE: 88 MMHG | SYSTOLIC BLOOD PRESSURE: 148 MMHG | WEIGHT: 264 LBS | OXYGEN SATURATION: 94 % | BODY MASS INDEX: 42.43 KG/M2 | HEART RATE: 56 BPM | HEIGHT: 66 IN

## 2024-08-20 DIAGNOSIS — Z79.4 TYPE 2 DIABETES MELLITUS WITH STAGE 3B CHRONIC KIDNEY DISEASE, WITH LONG-TERM CURRENT USE OF INSULIN (HCC): Primary | ICD-10-CM

## 2024-08-20 DIAGNOSIS — N18.32 TYPE 2 DIABETES MELLITUS WITH STAGE 3B CHRONIC KIDNEY DISEASE, WITH LONG-TERM CURRENT USE OF INSULIN (HCC): Primary | ICD-10-CM

## 2024-08-20 DIAGNOSIS — E78.49 OTHER HYPERLIPIDEMIA: ICD-10-CM

## 2024-08-20 DIAGNOSIS — E11.22 TYPE 2 DIABETES MELLITUS WITH STAGE 3B CHRONIC KIDNEY DISEASE, WITH LONG-TERM CURRENT USE OF INSULIN (HCC): Primary | ICD-10-CM

## 2024-08-20 DIAGNOSIS — E66.01 CLASS 3 SEVERE OBESITY DUE TO EXCESS CALORIES WITH SERIOUS COMORBIDITY AND BODY MASS INDEX (BMI) OF 40.0 TO 44.9 IN ADULT (HCC): ICD-10-CM

## 2024-08-20 DIAGNOSIS — I10 ESSENTIAL HYPERTENSION: ICD-10-CM

## 2024-08-20 LAB — HBA1C MFR BLD: 8.3 %

## 2024-08-20 PROCEDURE — 99214 OFFICE O/P EST MOD 30 MIN: CPT | Performed by: NURSE PRACTITIONER

## 2024-08-20 PROCEDURE — 1036F TOBACCO NON-USER: CPT | Performed by: NURSE PRACTITIONER

## 2024-08-20 PROCEDURE — 1090F PRES/ABSN URINE INCON ASSESS: CPT | Performed by: NURSE PRACTITIONER

## 2024-08-20 PROCEDURE — 99212 OFFICE O/P EST SF 10 MIN: CPT | Performed by: NURSE PRACTITIONER

## 2024-08-20 PROCEDURE — 83036 HEMOGLOBIN GLYCOSYLATED A1C: CPT | Performed by: NURSE PRACTITIONER

## 2024-08-20 PROCEDURE — G8417 CALC BMI ABV UP PARAM F/U: HCPCS | Performed by: NURSE PRACTITIONER

## 2024-08-20 PROCEDURE — 3052F HG A1C>EQUAL 8.0%<EQUAL 9.0%: CPT | Performed by: NURSE PRACTITIONER

## 2024-08-20 PROCEDURE — 3079F DIAST BP 80-89 MM HG: CPT | Performed by: NURSE PRACTITIONER

## 2024-08-20 PROCEDURE — G8400 PT W/DXA NO RESULTS DOC: HCPCS | Performed by: NURSE PRACTITIONER

## 2024-08-20 PROCEDURE — 95251 CONT GLUC MNTR ANALYSIS I&R: CPT | Performed by: NURSE PRACTITIONER

## 2024-08-20 PROCEDURE — 1123F ACP DISCUSS/DSCN MKR DOCD: CPT | Performed by: NURSE PRACTITIONER

## 2024-08-20 PROCEDURE — 3077F SYST BP >= 140 MM HG: CPT | Performed by: NURSE PRACTITIONER

## 2024-08-20 PROCEDURE — G8427 DOCREV CUR MEDS BY ELIG CLIN: HCPCS | Performed by: NURSE PRACTITIONER

## 2024-08-20 PROCEDURE — G2211 COMPLEX E/M VISIT ADD ON: HCPCS | Performed by: NURSE PRACTITIONER

## 2024-08-20 ASSESSMENT — ENCOUNTER SYMPTOMS
SHORTNESS OF BREATH: 0
RESPIRATORY NEGATIVE: 1
DIARRHEA: 0
ABDOMINAL PAIN: 0

## 2024-08-20 NOTE — PROGRESS NOTES
[facility]  [address] [facility] [address] [phone]        HISTORY:    Kusum Linda presents today for evaluation and management of:  Chief Complaint   Patient presents with    Diabetes       Patient Care Team:  Katie De León APRN - CNP as PCP - General (Family Medicine)  Katie De León APRN - CNP as PCP - Empaneled Provider  Prince Burroughs MD as Orthopedic Surgeon (Orthopedic Surgery)  Gaurav Palma DPM as Consulting Physician (Podiatry)  Peter Schmitt as Referring Physician (Optometry)  Shanell Mathis APRN - CNP as Diabetic Educator (Nurse Practitioner)      Interval History:    Past DM Medications   Metformin- ckd  Glimepiride-therapy completed  janvuia- glp1 started  victoza-cost  soliqua- cost  Farxiga- cost   Trulicity- cost     Current Diabetic Medications   tresiba 48 units once daily.      Humalog Sliding Scale Insulin 2+ plus scale  10-15 minutes before each meal      Blood sugar   Action     <70               Drink Juice               No extra insulin  150 - 200         2 units subcutaneous Insulin  201 - 250         4 units subcutaneous Insulin  251 - 300         6 units subcutaneous Insulin  301 - 350         8 units subcutaneous Insulin  351 - 400         10 units subcutaneous Insulin   > 400              12 units subcutaneous Insulin        DKA episodes: 0    05/07/24   Kusum Linda is a 78 y.o. female patient who presents to clinic today for her diabetes. she has a history of HTN, HF, CKD, neuropathy, OA, hyperlipidemia, obesity which contributes to her diabetes. At previous visit diabetes counseling was provided. she denies any current signs or symptoms of hyper/hypoglycemia. she states they are taking their medications as prescribed without any adverse effects. Granddaughter is present. Pt sates she is missing frequent doses of meal time insulin.   Diet: mindful  Exercise:  [Influenza] : Influenza

## 2024-08-22 ENCOUNTER — TELEPHONE (OUTPATIENT)
Dept: DIABETES SERVICES | Age: 79
End: 2024-08-22

## 2024-08-22 DIAGNOSIS — E11.22 TYPE 2 DIABETES MELLITUS WITH STAGE 3B CHRONIC KIDNEY DISEASE, WITH LONG-TERM CURRENT USE OF INSULIN (HCC): ICD-10-CM

## 2024-08-22 DIAGNOSIS — Z79.4 TYPE 2 DIABETES MELLITUS WITH STAGE 3B CHRONIC KIDNEY DISEASE, WITH LONG-TERM CURRENT USE OF INSULIN (HCC): ICD-10-CM

## 2024-08-22 DIAGNOSIS — N18.32 TYPE 2 DIABETES MELLITUS WITH STAGE 3B CHRONIC KIDNEY DISEASE, WITH LONG-TERM CURRENT USE OF INSULIN (HCC): ICD-10-CM

## 2024-08-22 RX ORDER — INSULIN LISPRO 100 [IU]/ML
INJECTION, SOLUTION INTRAVENOUS; SUBCUTANEOUS
Qty: 30 ML | Refills: 3
Start: 2024-08-22

## 2024-08-22 NOTE — TELEPHONE ENCOUNTER
Crystal please see patients call note and advise.  Chart note states humalog SS plus 2 units, MAR states SS plus 4.  Please advise.

## 2024-08-22 NOTE — TELEPHONE ENCOUNTER
Kusum would like a call back as she is confused as to what she is supposed to be doing with sugars. Wants to know if you have a chart you can send her? (Like she is to test her sugars at certain times, but unclear what to do if it is high?)    [phone]   Please call Alexus -  I have a form to complete about housing accomodations and how they'd help her. The exercise part I can do, but I  Do not know what to state about having 2 bedrooms. Can she give input?  (form is in a blue folder in the outbasket at Dr. Morales's desk)

## 2024-08-22 NOTE — TELEPHONE ENCOUNTER
Humalog Sliding Scale Insulin 2+ plus scale  10-15 minutes before each meal      Blood sugar   Action     <70               Drink Juice               No extra insulin  150 - 200         2 units subcutaneous Insulin  201 - 250         4 units subcutaneous Insulin  251 - 300         6 units subcutaneous Insulin  301 - 350         8 units subcutaneous Insulin  351 - 400         10 units subcutaneous Insulin   > 400              12 units subcutaneous Insulin      Here is the scale she should be following MAR updated.

## 2024-08-22 NOTE — TELEPHONE ENCOUNTER
Called and spoke with patient. Patient had sidling scale in front of them at home.  Writer reviewed the chart and practiced multiple hypothetical blood sugar readings, patient was successfully able to add units.  Patient had no further questions at this time.

## 2024-09-04 ENCOUNTER — OFFICE VISIT (OUTPATIENT)
Dept: FAMILY MEDICINE CLINIC | Age: 79
End: 2024-09-04

## 2024-09-04 VITALS
BODY MASS INDEX: 42.06 KG/M2 | HEART RATE: 60 BPM | SYSTOLIC BLOOD PRESSURE: 120 MMHG | WEIGHT: 260.6 LBS | OXYGEN SATURATION: 94 % | DIASTOLIC BLOOD PRESSURE: 82 MMHG

## 2024-09-04 DIAGNOSIS — I10 ESSENTIAL HYPERTENSION: ICD-10-CM

## 2024-09-04 DIAGNOSIS — E66.01 OBESITY, MORBID, BMI 40.0-49.9 (HCC): ICD-10-CM

## 2024-09-04 DIAGNOSIS — N18.32 TYPE 2 DIABETES MELLITUS WITH STAGE 3B CHRONIC KIDNEY DISEASE, WITH LONG-TERM CURRENT USE OF INSULIN (HCC): ICD-10-CM

## 2024-09-04 DIAGNOSIS — E78.49 OTHER HYPERLIPIDEMIA: ICD-10-CM

## 2024-09-04 DIAGNOSIS — E11.22 TYPE 2 DIABETES MELLITUS WITH STAGE 3B CHRONIC KIDNEY DISEASE, WITH LONG-TERM CURRENT USE OF INSULIN (HCC): ICD-10-CM

## 2024-09-04 DIAGNOSIS — E53.8 LOW SERUM VITAMIN B12: ICD-10-CM

## 2024-09-04 DIAGNOSIS — R41.89 COGNITIVE DECLINE: ICD-10-CM

## 2024-09-04 DIAGNOSIS — Z00.00 MEDICARE ANNUAL WELLNESS VISIT, SUBSEQUENT: Primary | ICD-10-CM

## 2024-09-04 DIAGNOSIS — G47.33 OSA TREATED WITH BIPAP: ICD-10-CM

## 2024-09-04 DIAGNOSIS — G30.9 ALZHEIMER'S DISEASE, UNSPECIFIED (CODE) (HCC): ICD-10-CM

## 2024-09-04 DIAGNOSIS — F33.0 MILD EPISODE OF RECURRENT MAJOR DEPRESSIVE DISORDER (HCC): ICD-10-CM

## 2024-09-04 DIAGNOSIS — R45.89 ANXIETY ABOUT HEALTH: ICD-10-CM

## 2024-09-04 DIAGNOSIS — I50.32 CHRONIC DIASTOLIC HEART FAILURE (HCC): ICD-10-CM

## 2024-09-04 DIAGNOSIS — E11.40 NEUROPATHY DUE TO TYPE 2 DIABETES MELLITUS (HCC): ICD-10-CM

## 2024-09-04 DIAGNOSIS — Z79.4 TYPE 2 DIABETES MELLITUS WITH STAGE 3B CHRONIC KIDNEY DISEASE, WITH LONG-TERM CURRENT USE OF INSULIN (HCC): ICD-10-CM

## 2024-09-04 RX ORDER — CYANOCOBALAMIN 1000 UG/ML
1000 INJECTION, SOLUTION INTRAMUSCULAR; SUBCUTANEOUS
Qty: 3 ML | Refills: 1 | Status: SHIPPED | OUTPATIENT
Start: 2024-09-04

## 2024-09-04 ASSESSMENT — PATIENT HEALTH QUESTIONNAIRE - PHQ9
6. FEELING BAD ABOUT YOURSELF - OR THAT YOU ARE A FAILURE OR HAVE LET YOURSELF OR YOUR FAMILY DOWN: NOT AT ALL
9. THOUGHTS THAT YOU WOULD BE BETTER OFF DEAD, OR OF HURTING YOURSELF: NOT AT ALL
SUM OF ALL RESPONSES TO PHQ QUESTIONS 1-9: 10
SUM OF ALL RESPONSES TO PHQ9 QUESTIONS 1 & 2: 3
7. TROUBLE CONCENTRATING ON THINGS, SUCH AS READING THE NEWSPAPER OR WATCHING TELEVISION: NOT AT ALL
1. LITTLE INTEREST OR PLEASURE IN DOING THINGS: MORE THAN HALF THE DAYS
10. IF YOU CHECKED OFF ANY PROBLEMS, HOW DIFFICULT HAVE THESE PROBLEMS MADE IT FOR YOU TO DO YOUR WORK, TAKE CARE OF THINGS AT HOME, OR GET ALONG WITH OTHER PEOPLE: NOT DIFFICULT AT ALL
SUM OF ALL RESPONSES TO PHQ QUESTIONS 1-9: 10
3. TROUBLE FALLING OR STAYING ASLEEP: SEVERAL DAYS
4. FEELING TIRED OR HAVING LITTLE ENERGY: NEARLY EVERY DAY
SUM OF ALL RESPONSES TO PHQ QUESTIONS 1-9: 10
SUM OF ALL RESPONSES TO PHQ QUESTIONS 1-9: 10
2. FEELING DOWN, DEPRESSED OR HOPELESS: SEVERAL DAYS
5. POOR APPETITE OR OVEREATING: NOT AT ALL
8. MOVING OR SPEAKING SO SLOWLY THAT OTHER PEOPLE COULD HAVE NOTICED. OR THE OPPOSITE, BEING SO FIGETY OR RESTLESS THAT YOU HAVE BEEN MOVING AROUND A LOT MORE THAN USUAL: NEARLY EVERY DAY

## 2024-09-04 ASSESSMENT — COLUMBIA-SUICIDE SEVERITY RATING SCALE - C-SSRS
6. HAVE YOU EVER DONE ANYTHING, STARTED TO DO ANYTHING, OR PREPARED TO DO ANYTHING TO END YOUR LIFE?: NO
1. WITHIN THE PAST MONTH, HAVE YOU WISHED YOU WERE DEAD OR WISHED YOU COULD GO TO SLEEP AND NOT WAKE UP?: NO
2. HAVE YOU ACTUALLY HAD ANY THOUGHTS OF KILLING YOURSELF?: NO

## 2024-09-04 ASSESSMENT — LIFESTYLE VARIABLES
HOW OFTEN DO YOU HAVE A DRINK CONTAINING ALCOHOL: NEVER
HOW MANY STANDARD DRINKS CONTAINING ALCOHOL DO YOU HAVE ON A TYPICAL DAY: PATIENT DOES NOT DRINK

## 2024-09-12 ENCOUNTER — TELEPHONE (OUTPATIENT)
Dept: FAMILY MEDICINE CLINIC | Age: 79
End: 2024-09-12

## 2024-09-12 DIAGNOSIS — E53.8 LOW SERUM VITAMIN B12: Primary | ICD-10-CM

## 2024-09-13 DIAGNOSIS — R35.0 FREQUENCY OF URINATION: Primary | ICD-10-CM

## 2024-09-19 LAB
CHOLESTEROL, TOTAL: 189 MG/DL (ref 50–200)
CHOLESTEROL/HDL RATIO: 3.32 RATIO (ref 0–4.5)
HDLC SERPL-MCNC: 57 MG/DL (ref 36–68)
LDL CHOLESTEROL: 96.8 MG/DL (ref 0–160)
TRIGL SERPL-MCNC: 176 MG/DL (ref 10–250)
VITAMIN B-12: > 1000 PG/ML (ref 239–931)
VLDLC SERPL CALC-MCNC: 35 MG/DL (ref 0–50)

## 2024-10-25 ENCOUNTER — HOSPITAL ENCOUNTER (OUTPATIENT)
Age: 79
Setting detail: SPECIMEN
Discharge: HOME | End: 2024-10-25
Payer: MEDICARE

## 2024-10-25 ENCOUNTER — OFFICE VISIT (OUTPATIENT)
Dept: FAMILY MEDICINE CLINIC | Age: 79
End: 2024-10-25
Payer: MEDICARE

## 2024-10-25 VITALS
HEART RATE: 55 BPM | BODY MASS INDEX: 42.74 KG/M2 | DIASTOLIC BLOOD PRESSURE: 70 MMHG | WEIGHT: 264.8 LBS | OXYGEN SATURATION: 93 % | SYSTOLIC BLOOD PRESSURE: 118 MMHG

## 2024-10-25 DIAGNOSIS — R29.6 FREQUENT FALLS: ICD-10-CM

## 2024-10-25 DIAGNOSIS — R29.898 WEAKNESS OF BOTH LOWER EXTREMITIES: Primary | ICD-10-CM

## 2024-10-25 DIAGNOSIS — E11.9 TYPE 2 DIABETES MELLITUS WITHOUT COMPLICATION, WITH LONG-TERM CURRENT USE OF INSULIN (HCC): ICD-10-CM

## 2024-10-25 DIAGNOSIS — Z79.4 TYPE 2 DIABETES MELLITUS WITHOUT COMPLICATION, WITH LONG-TERM CURRENT USE OF INSULIN (HCC): ICD-10-CM

## 2024-10-25 DIAGNOSIS — R29.898 WEAKNESS OF BOTH LOWER EXTREMITIES: ICD-10-CM

## 2024-10-25 LAB
ALBUMIN/GLOBULIN RATIO: 1.5 G/DL
ALBUMIN: 3.8 G/DL (ref 3.5–5)
ALP BLD-CCNC: 112 UNITS/L (ref 38–126)
ALT SERPL-CCNC: 19 UNITS/L (ref 4–35)
ANION GAP SERPL CALCULATED.3IONS-SCNC: 6.7 MMOL/L (ref 3–11)
AST SERPL-CCNC: 22 UNITS/L (ref 14–36)
BASOPHILS # BLD: <0.03 K/UL (ref 0–0.2)
BASOPHILS NFR BLD: 0 % (ref 0–2)
BILIRUB SERPL-MCNC: 0.5 MG/DL (ref 0.2–1.3)
BUN BLDV-MCNC: 26 MG/DL (ref 7–17)
CALCIUM SERPL-MCNC: 9.3 MG/DL (ref 8.4–10.2)
CHLORIDE BLD-SCNC: 104 MMOL/L (ref 98–120)
CO2: 26 MMOL/L (ref 22–31)
CREAT SERPL-MCNC: 1.3 MG/DL (ref 0.5–1)
EOSINOPHIL # BLD: 0.15 K/UL (ref 0–0.44)
EOSINOPHILS RELATIVE PERCENT: 2 % (ref 1–4)
ERYTHROCYTE [DISTWIDTH] IN BLOOD BY AUTOMATED COUNT: 14.1 % (ref 11.8–14.4)
GFR, ESTIMATED: 42.1
GLOBULIN: 2.5 G/DL
GLUCOSE: 298 MG/DL (ref 65–105)
HCT VFR BLD AUTO: 38 % (ref 36.3–47.1)
HGB BLD-MCNC: 12.3 G/DL (ref 11.9–15.1)
IMM GRANULOCYTES # BLD AUTO: 0.04 K/UL (ref 0–0.3)
IMM GRANULOCYTES NFR BLD: 1 %
LYMPHOCYTES NFR BLD: 1.36 K/UL (ref 1.1–3.7)
LYMPHOCYTES RELATIVE PERCENT: 16 % (ref 24–43)
MCH RBC QN AUTO: 29.1 PG (ref 25.2–33.5)
MCHC RBC AUTO-ENTMCNC: 32.4 G/DL (ref 25.2–33.5)
MCV RBC AUTO: 90 FL (ref 82.6–102.9)
MONOCYTES NFR BLD: 0.6 K/UL (ref 0.1–1.2)
MONOCYTES NFR BLD: 7 % (ref 3–12)
NEUTROPHILS NFR BLD: 74 % (ref 36–65)
NEUTS SEG NFR BLD: 6.41 K/UL (ref 1.5–8.1)
NRBC BLD-RTO: 0 PER 100 WBC
PLATELET # BLD AUTO: 214 K/UL (ref 138–453)
PMV BLD AUTO: 10.2 FL (ref 8.1–13.5)
POTASSIUM SERPL-SCNC: 4.9 MMOL/L (ref 3.6–5)
RBC # BLD AUTO: 4.22 M/UL (ref 3.95–5.11)
SODIUM BLD-SCNC: 138 MMOL/L (ref 135–145)
TOTAL PROTEIN: 6.3 G/DL (ref 6.3–8.2)
TSH REFLEX FT4: 0.98 MIU/ML (ref 0.49–4.67)
WBC OTHER # BLD: 8.6 K/UL (ref 3.5–11.3)

## 2024-10-25 PROCEDURE — G8484 FLU IMMUNIZE NO ADMIN: HCPCS | Performed by: STUDENT IN AN ORGANIZED HEALTH CARE EDUCATION/TRAINING PROGRAM

## 2024-10-25 PROCEDURE — 99214 OFFICE O/P EST MOD 30 MIN: CPT | Performed by: STUDENT IN AN ORGANIZED HEALTH CARE EDUCATION/TRAINING PROGRAM

## 2024-10-25 PROCEDURE — 1090F PRES/ABSN URINE INCON ASSESS: CPT | Performed by: STUDENT IN AN ORGANIZED HEALTH CARE EDUCATION/TRAINING PROGRAM

## 2024-10-25 PROCEDURE — 1123F ACP DISCUSS/DSCN MKR DOCD: CPT | Performed by: STUDENT IN AN ORGANIZED HEALTH CARE EDUCATION/TRAINING PROGRAM

## 2024-10-25 PROCEDURE — G8417 CALC BMI ABV UP PARAM F/U: HCPCS | Performed by: STUDENT IN AN ORGANIZED HEALTH CARE EDUCATION/TRAINING PROGRAM

## 2024-10-25 PROCEDURE — 3052F HG A1C>EQUAL 8.0%<EQUAL 9.0%: CPT | Performed by: STUDENT IN AN ORGANIZED HEALTH CARE EDUCATION/TRAINING PROGRAM

## 2024-10-25 PROCEDURE — 85025 COMPLETE CBC W/AUTO DIFF WBC: CPT

## 2024-10-25 PROCEDURE — 3078F DIAST BP <80 MM HG: CPT | Performed by: STUDENT IN AN ORGANIZED HEALTH CARE EDUCATION/TRAINING PROGRAM

## 2024-10-25 PROCEDURE — 3074F SYST BP LT 130 MM HG: CPT | Performed by: STUDENT IN AN ORGANIZED HEALTH CARE EDUCATION/TRAINING PROGRAM

## 2024-10-25 PROCEDURE — G8428 CUR MEDS NOT DOCUMENT: HCPCS | Performed by: STUDENT IN AN ORGANIZED HEALTH CARE EDUCATION/TRAINING PROGRAM

## 2024-10-25 PROCEDURE — G8400 PT W/DXA NO RESULTS DOC: HCPCS | Performed by: STUDENT IN AN ORGANIZED HEALTH CARE EDUCATION/TRAINING PROGRAM

## 2024-10-25 PROCEDURE — 1036F TOBACCO NON-USER: CPT | Performed by: STUDENT IN AN ORGANIZED HEALTH CARE EDUCATION/TRAINING PROGRAM

## 2024-10-25 PROCEDURE — 99212 OFFICE O/P EST SF 10 MIN: CPT | Performed by: STUDENT IN AN ORGANIZED HEALTH CARE EDUCATION/TRAINING PROGRAM

## 2024-11-08 ENCOUNTER — OFFICE VISIT (OUTPATIENT)
Dept: FAMILY MEDICINE CLINIC | Age: 79
End: 2024-11-08
Payer: MEDICARE

## 2024-11-08 VITALS
HEART RATE: 61 BPM | OXYGEN SATURATION: 97 % | WEIGHT: 267.2 LBS | DIASTOLIC BLOOD PRESSURE: 80 MMHG | BODY MASS INDEX: 43.13 KG/M2 | SYSTOLIC BLOOD PRESSURE: 128 MMHG

## 2024-11-08 DIAGNOSIS — R42 VERTIGO: Primary | ICD-10-CM

## 2024-11-08 DIAGNOSIS — R29.898 WEAKNESS OF BOTH LOWER EXTREMITIES: ICD-10-CM

## 2024-11-08 DIAGNOSIS — R29.6 FREQUENT FALLS: ICD-10-CM

## 2024-11-08 PROCEDURE — 99212 OFFICE O/P EST SF 10 MIN: CPT | Performed by: STUDENT IN AN ORGANIZED HEALTH CARE EDUCATION/TRAINING PROGRAM

## 2024-11-08 NOTE — PROGRESS NOTES
[address], [address]  [place] [address]  Phone: [phone]  Fax: [phone]      Date of Visit:  24  Patient Name: Kusum Linda   Patient :  1945     ASSESSMENT/PLAN     1. Vertigo  -     [facility] Physical Therapy [facility]  2. Weakness of both lower extremities  3. Frequent falls     Patient has improved significantly with physical therapy.  She is here in the office alone today, ambulates with a walker appropriately with no help.  No recent falls per patient.  Would like to continue with physical therapy and follow-up in approximately 1 month.  Agree with this plan.  Patient encouraged to return immediately with any worsening or slowed improvement.    - Questions/concerns answered. Patient verbalized and expressed understanding. Medications, laboratory testing, imaging, consultation, and follow up as documented in this encounter.       HPI:     Kusum Linda is a 79 y.o. 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 Vertigo is getting worse.     HPI: Patient presents for 2-week follow-up for weakness.  She has started with therapy, does feel improved.  No recent falls at home.  Would like an updated

## 2024-11-19 ENCOUNTER — TELEPHONE (OUTPATIENT)
Dept: INTERNAL MEDICINE | Age: 79
End: 2024-11-19

## 2024-11-19 NOTE — TELEPHONE ENCOUNTER
Patient called in and states that she needs a new prescription for Alida 3 she has been out for 3 days.

## 2024-11-19 NOTE — TELEPHONE ENCOUNTER
Writer called patient to ask if she called her DME([employer]). Patient stated that she did, and that [employer] stated that they do not send her supplies.     Writer called [employer] rep Juan Jose, who verified that the patient is associated with [employer] and that they had shipped her supplies out yesterday, and will be delivered Wednesday 11/20/2024 between 2 and 4 PM.    Writer called patient. Patient aware. Writer gave patient number to [employer] CALL [phone]   for future reference.

## 2024-12-03 ENCOUNTER — OFFICE VISIT (OUTPATIENT)
Dept: DIABETES SERVICES | Age: 79
End: 2024-12-03
Payer: MEDICARE

## 2024-12-03 VITALS
BODY MASS INDEX: 42.11 KG/M2 | SYSTOLIC BLOOD PRESSURE: 134 MMHG | HEART RATE: 50 BPM | DIASTOLIC BLOOD PRESSURE: 82 MMHG | WEIGHT: 262 LBS | OXYGEN SATURATION: 96 % | HEIGHT: 66 IN

## 2024-12-03 DIAGNOSIS — R41.0 CONFUSION: ICD-10-CM

## 2024-12-03 DIAGNOSIS — N18.32 TYPE 2 DIABETES MELLITUS WITH STAGE 3B CHRONIC KIDNEY DISEASE, WITH LONG-TERM CURRENT USE OF INSULIN (HCC): Primary | ICD-10-CM

## 2024-12-03 DIAGNOSIS — E78.49 OTHER HYPERLIPIDEMIA: ICD-10-CM

## 2024-12-03 DIAGNOSIS — Z79.4 TYPE 2 DIABETES MELLITUS WITH STAGE 3B CHRONIC KIDNEY DISEASE, WITH LONG-TERM CURRENT USE OF INSULIN (HCC): Primary | ICD-10-CM

## 2024-12-03 DIAGNOSIS — E66.01 CLASS 3 SEVERE OBESITY DUE TO EXCESS CALORIES WITH SERIOUS COMORBIDITY AND BODY MASS INDEX (BMI) OF 40.0 TO 44.9 IN ADULT: ICD-10-CM

## 2024-12-03 DIAGNOSIS — E66.813 CLASS 3 SEVERE OBESITY DUE TO EXCESS CALORIES WITH SERIOUS COMORBIDITY AND BODY MASS INDEX (BMI) OF 40.0 TO 44.9 IN ADULT: ICD-10-CM

## 2024-12-03 DIAGNOSIS — E11.22 TYPE 2 DIABETES MELLITUS WITH STAGE 3B CHRONIC KIDNEY DISEASE, WITH LONG-TERM CURRENT USE OF INSULIN (HCC): Primary | ICD-10-CM

## 2024-12-03 DIAGNOSIS — I10 ESSENTIAL HYPERTENSION: ICD-10-CM

## 2024-12-03 LAB
BACTERIA, URINE: ABNORMAL /HPF
BILIRUBIN, URINE: NEGATIVE
BLOOD, URINE: ABNORMAL
CASTS UA: ABNORMAL /LPF
CLARITY, UA: ABNORMAL
COLOR, UA: YELLOW
CREATININE, RANDOM URINE: 138.3 MG/DL (ref 20–370)
CRYSTALS, UA: ABNORMAL
GLUCOSE URINE: ABNORMAL MG/DL
HBA1C MFR BLD: 9.7 %
LEUKOCYTE ESTERASE, URINE: NEGATIVE
MICROALBUMIN/CREAT 24H UR: 60.4 MG/DL (ref 0–1.7)
MICROALBUMIN/CREAT UR-RTO: 436.73
NITRITE, URINE: NEGATIVE
PH, URINE: 5.5 (ref 5–8.5)
PROTEIN UA: ABNORMAL MG/DL
RBC URINE: ABNORMAL /HPF (ref 0–2)
SPECIFIC GRAVITY UA: 1.02 MG/DL (ref 1–1.03)
SQUAMOUS EPITHELIAL: ABNORMAL /HPF
UROBILINOGEN, URINE: 0.2 MG/DL (ref 0.2–1)
WBC URINE: ABNORMAL /HPF (ref 0–4)

## 2024-12-03 PROCEDURE — 99212 OFFICE O/P EST SF 10 MIN: CPT | Performed by: NURSE PRACTITIONER

## 2024-12-03 PROCEDURE — 83036 HEMOGLOBIN GLYCOSYLATED A1C: CPT | Performed by: NURSE PRACTITIONER

## 2024-12-03 RX ORDER — INSULIN DEGLUDEC 200 U/ML
INJECTION, SOLUTION SUBCUTANEOUS
Qty: 18 ML | Refills: 3
Start: 2024-12-03

## 2024-12-03 RX ORDER — INSULIN LISPRO 100 [IU]/ML
INJECTION, SOLUTION INTRAVENOUS; SUBCUTANEOUS
Qty: 30 ML | Refills: 3
Start: 2024-12-03

## 2024-12-03 ASSESSMENT — ENCOUNTER SYMPTOMS
SHORTNESS OF BREATH: 0
RESPIRATORY NEGATIVE: 1

## 2024-12-03 NOTE — PATIENT INSTRUCTIONS
Check blood sugar before you eat  Increase Tresiba to 52 units once daily   Increase Humalog to 4 units plus the scale   Blood sugar   Action     <70               Drink Juice               No extra insulin  150 - 200         2 units subcutaneous Insulin  201 - 250         4 units subcutaneous Insulin  251 - 300         6 units subcutaneous Insulin  301 - 350         8 units subcutaneous Insulin  351 - 400         10 units subcutaneous Insulin   > 400              12 units subcutaneous Insulin

## 2024-12-03 NOTE — PROGRESS NOTES
Mood and Affect: Mood normal.         Behavior: Behavior normal.         Thought Content: Thought content normal.         Judgment: Judgment normal.       Results  Laboratory Studies  A1c was 9.7. Blood sugar was 201 in the morning. Blood sugar was 248 before breakfast. Blood sugar was 362 before lunch.    Hemoglobin A1C   Date Value Ref Range Status   12/03/2024 9.7 % Final      Results       ** No results found for the last 336 hours. **          Lab Results   Component Value Date/Time     11/28/2024 03:40 PM    K 4.7 11/28/2024 03:40 PM     11/28/2024 03:40 PM    CO2 21 11/28/2024 03:40 PM    BUN 42 11/28/2024 03:40 PM    CREATININE 1.5 11/28/2024 03:40 PM    GLUCOSE 348 11/28/2024 03:40 PM    CALCIUM 10.1 11/28/2024 03:40 PM    LABGLOM 35.7 11/28/2024 03:40 PM      Lab Results   Component Value Date    CHOL 189 09/19/2024    TRIG 176 09/19/2024    HDL 57 09/19/2024    LDL 96.8 09/19/2024    VLDL 35 09/19/2024    CHOLHDLRATIO 3.32 09/19/2024     Lab Results   Component Value Date    ALT 19 10/25/2024    AST 22 10/25/2024    ALKPHOS 112 10/25/2024    BILITOT 0.5 10/25/2024             Attestation    The patient (or guardian, if applicable) and other individuals in attendance with the patient were advised that Artificial Intelligence will be utilized during this visit to record, process the conversation to generate a clinical note, and support improvement of the AI technology. The patient (or guardian, if applicable) and other individuals in attendance at the appointment consented to the use of AI, including the recording.            Electronically signed by ORTIZ Finley CNP on 12/3/2024 at 9:29 AM      (Please note that portions of this note were completed with a voice-recognition program. Efforts were made to edit the dictation but occasionally words are mis-transcribed.)

## 2024-12-06 ENCOUNTER — TELEPHONE (OUTPATIENT)
Dept: INTERNAL MEDICINE | Age: 79
End: 2024-12-06

## 2024-12-06 RX ORDER — SULFAMETHOXAZOLE AND TRIMETHOPRIM 800; 160 MG/1; MG/1
1 TABLET ORAL 2 TIMES DAILY
Qty: 20 TABLET | Refills: 0 | Status: SHIPPED | OUTPATIENT
Start: 2024-12-06 | End: 2024-12-16

## 2024-12-06 NOTE — TELEPHONE ENCOUNTER
Urine culture shows that she has a UTI. Can we call patient and ask where she would like us to send the antibiotic?

## 2024-12-06 NOTE — TELEPHONE ENCOUNTER
[facility] call over to let us know that patient urine came back with Escherichia coli .  They will be sent over the result to us by fax

## 2024-12-16 ENCOUNTER — OFFICE VISIT (OUTPATIENT)
Dept: FAMILY MEDICINE CLINIC | Age: 79
End: 2024-12-16
Payer: MEDICARE

## 2024-12-16 VITALS
HEART RATE: 53 BPM | WEIGHT: 268 LBS | SYSTOLIC BLOOD PRESSURE: 126 MMHG | DIASTOLIC BLOOD PRESSURE: 84 MMHG | BODY MASS INDEX: 43.26 KG/M2 | OXYGEN SATURATION: 91 %

## 2024-12-16 DIAGNOSIS — R41.89 COGNITIVE DECLINE: ICD-10-CM

## 2024-12-16 DIAGNOSIS — F41.9 ANXIETY: ICD-10-CM

## 2024-12-16 DIAGNOSIS — I50.32 CHRONIC DIASTOLIC HEART FAILURE (HCC): ICD-10-CM

## 2024-12-16 PROCEDURE — 3079F DIAST BP 80-89 MM HG: CPT | Performed by: STUDENT IN AN ORGANIZED HEALTH CARE EDUCATION/TRAINING PROGRAM

## 2024-12-16 PROCEDURE — 1036F TOBACCO NON-USER: CPT | Performed by: STUDENT IN AN ORGANIZED HEALTH CARE EDUCATION/TRAINING PROGRAM

## 2024-12-16 PROCEDURE — G8427 DOCREV CUR MEDS BY ELIG CLIN: HCPCS | Performed by: STUDENT IN AN ORGANIZED HEALTH CARE EDUCATION/TRAINING PROGRAM

## 2024-12-16 PROCEDURE — 1159F MED LIST DOCD IN RCRD: CPT | Performed by: STUDENT IN AN ORGANIZED HEALTH CARE EDUCATION/TRAINING PROGRAM

## 2024-12-16 PROCEDURE — G8400 PT W/DXA NO RESULTS DOC: HCPCS | Performed by: STUDENT IN AN ORGANIZED HEALTH CARE EDUCATION/TRAINING PROGRAM

## 2024-12-16 PROCEDURE — G8417 CALC BMI ABV UP PARAM F/U: HCPCS | Performed by: STUDENT IN AN ORGANIZED HEALTH CARE EDUCATION/TRAINING PROGRAM

## 2024-12-16 PROCEDURE — 99214 OFFICE O/P EST MOD 30 MIN: CPT | Performed by: STUDENT IN AN ORGANIZED HEALTH CARE EDUCATION/TRAINING PROGRAM

## 2024-12-16 PROCEDURE — 1090F PRES/ABSN URINE INCON ASSESS: CPT | Performed by: STUDENT IN AN ORGANIZED HEALTH CARE EDUCATION/TRAINING PROGRAM

## 2024-12-16 PROCEDURE — G8484 FLU IMMUNIZE NO ADMIN: HCPCS | Performed by: STUDENT IN AN ORGANIZED HEALTH CARE EDUCATION/TRAINING PROGRAM

## 2024-12-16 PROCEDURE — 3074F SYST BP LT 130 MM HG: CPT | Performed by: STUDENT IN AN ORGANIZED HEALTH CARE EDUCATION/TRAINING PROGRAM

## 2024-12-16 PROCEDURE — 1123F ACP DISCUSS/DSCN MKR DOCD: CPT | Performed by: STUDENT IN AN ORGANIZED HEALTH CARE EDUCATION/TRAINING PROGRAM

## 2024-12-16 PROCEDURE — 99212 OFFICE O/P EST SF 10 MIN: CPT | Performed by: STUDENT IN AN ORGANIZED HEALTH CARE EDUCATION/TRAINING PROGRAM

## 2024-12-16 RX ORDER — CITALOPRAM HYDROBROMIDE 10 MG/1
10 TABLET ORAL DAILY
Qty: 90 TABLET | Refills: 1 | Status: SHIPPED | OUTPATIENT
Start: 2024-12-16

## 2024-12-16 RX ORDER — DONEPEZIL HYDROCHLORIDE 5 MG/1
5 TABLET, FILM COATED ORAL NIGHTLY
Qty: 90 TABLET | Refills: 1 | Status: SHIPPED | OUTPATIENT
Start: 2024-12-16

## 2024-12-16 RX ORDER — BUMETANIDE 1 MG/1
1 TABLET ORAL DAILY
Qty: 90 TABLET | Refills: 1 | Status: SHIPPED | OUTPATIENT
Start: 2024-12-16

## 2024-12-16 RX ORDER — AMLODIPINE BESYLATE 5 MG/1
5 TABLET ORAL DAILY
COMMUNITY
Start: 2024-11-11 | End: 2024-12-16 | Stop reason: ALTCHOICE

## 2024-12-16 RX ORDER — LISINOPRIL 20 MG/1
20 TABLET ORAL DAILY
COMMUNITY
Start: 2024-12-05

## 2024-12-16 NOTE — PROGRESS NOTES
Vertigo 8/1/2017    Vitamin B12 deficiency (dietary) anemia 11/17/2018       Past Surgical History:   Procedure Laterality Date    ANKLE SURGERY Left     FINGER TRIGGER RELEASE Right         Social History     Socioeconomic History    Marital status:      Spouse name: None    Number of children: None    Years of education: None    Highest education level: None   Tobacco Use    Smoking status: Never    Smokeless tobacco: Never   Vaping Use    Vaping status: Never Used   Substance and Sexual Activity    Alcohol use: No    Drug use: No    Sexual activity: Not Currently     Social Determinants of Health     Financial Resource Strain: Low Risk  (4/17/2024)    Overall Financial Resource Strain (CARDIA)     Difficulty of Paying Living Expenses: Not hard at all   Food Insecurity: No Food Insecurity (4/17/2024)    Hunger Vital Sign     Worried About Running Out of Food in the Last Year: Never true     Ran Out of Food in the Last Year: Never true   Transportation Needs: Unknown (4/17/2024)    PRAPARE - Transportation     Lack of Transportation (Non-Medical): No   Physical Activity: Inactive (9/4/2024)    Exercise Vital Sign     Days of Exercise per Week: 0 days     Minutes of Exercise per Session: 0 min   Housing Stability: Unknown (4/17/2024)    Housing Stability Vital Sign     Unstable Housing in the Last Year: No        PHYSICAL EXAM   /84 (Site: Right Upper Arm, Position: Sitting, Cuff Size: Medium Adult)   Pulse 53   Wt 121.6 kg (268 lb)   LMP  (LMP Unknown)   SpO2 91%   BMI 43.26 kg/m²    Physical Exam  Constitutional:       General: She is not in acute distress.  HENT:      Head: Normocephalic and atraumatic.      Right Ear: External ear normal.      Left Ear: External ear normal.      Mouth/Throat:      Mouth: Mucous membranes are moist.      Pharynx: No posterior oropharyngeal erythema.   Eyes:      Extraocular Movements: Extraocular movements intact.      Conjunctiva/sclera: Conjunctivae normal.

## 2024-12-19 DIAGNOSIS — F32.A ANXIETY AND DEPRESSION: ICD-10-CM

## 2024-12-19 DIAGNOSIS — F41.9 ANXIETY AND DEPRESSION: ICD-10-CM

## 2024-12-19 NOTE — TELEPHONE ENCOUNTER
Patient states she is out and was not sure if she was to still be taking pended medication.  If so she asked that it be sent to her mail order pharmacy.  Please advise.      Kusum called requesting a refill of the below medication which has been pended for you:     Requested Prescriptions     Pending Prescriptions Disp Refills    busPIRone (BUSPAR) 15 MG tablet 180 tablet 3     Sig: Take 15 mg by mouth 2 times daily       Last Appointment Date: 12/16/2024  Next Appointment Date: 1/16/2025    No Known Allergies

## 2024-12-20 RX ORDER — BUSPIRONE HYDROCHLORIDE 15 MG/1
15 TABLET ORAL 2 TIMES DAILY
Qty: 180 TABLET | Refills: 1 | Status: SHIPPED | OUTPATIENT
Start: 2024-12-20 | End: 2025-12-15

## 2025-01-13 DIAGNOSIS — Z79.4 DIABETES MELLITUS TYPE 2, INSULIN DEPENDENT (HCC): ICD-10-CM

## 2025-01-13 DIAGNOSIS — E11.9 DIABETES MELLITUS TYPE 2, INSULIN DEPENDENT (HCC): ICD-10-CM

## 2025-01-13 RX ORDER — PEN NEEDLE, DIABETIC 32GX 5/32"
NEEDLE, DISPOSABLE MISCELLANEOUS
Qty: 400 EACH | Refills: 1 | Status: SHIPPED | OUTPATIENT
Start: 2025-01-13

## 2025-01-13 NOTE — TELEPHONE ENCOUNTER
Kusum Linda is requesting a refill on the following medication(s):  Requested Prescriptions     Pending Prescriptions Disp Refills    Insulin Pen Needle (DROPLET PEN NEEDLES) 32G X 4 MM MISC 400 each 1     Sig: USE 4 TIMES DAILY       Last Visit Date (If Applicable):  12/16/2024    Next Visit Date:    1/16/2025

## 2025-01-13 NOTE — TELEPHONE ENCOUNTER
Patient called and said she has to go back on the medication she was taken off of for her bladder because she is going to the bathroom too often.   Still has some of them at home so will not need refill

## 2025-01-16 ENCOUNTER — OFFICE VISIT (OUTPATIENT)
Dept: FAMILY MEDICINE CLINIC | Age: 80
End: 2025-01-16
Payer: MEDICARE

## 2025-01-16 VITALS
SYSTOLIC BLOOD PRESSURE: 118 MMHG | WEIGHT: 261 LBS | BODY MASS INDEX: 42.13 KG/M2 | OXYGEN SATURATION: 95 % | DIASTOLIC BLOOD PRESSURE: 82 MMHG | HEART RATE: 53 BPM

## 2025-01-16 DIAGNOSIS — G47.33 OBSTRUCTIVE SLEEP APNEA: Primary | ICD-10-CM

## 2025-01-16 DIAGNOSIS — N39.41 URGE INCONTINENCE OF URINE: ICD-10-CM

## 2025-01-16 PROCEDURE — 0509F URINE INCON PLAN DOCD: CPT | Performed by: STUDENT IN AN ORGANIZED HEALTH CARE EDUCATION/TRAINING PROGRAM

## 2025-01-16 PROCEDURE — 99213 OFFICE O/P EST LOW 20 MIN: CPT | Performed by: STUDENT IN AN ORGANIZED HEALTH CARE EDUCATION/TRAINING PROGRAM

## 2025-01-16 PROCEDURE — 3079F DIAST BP 80-89 MM HG: CPT | Performed by: STUDENT IN AN ORGANIZED HEALTH CARE EDUCATION/TRAINING PROGRAM

## 2025-01-16 PROCEDURE — 1123F ACP DISCUSS/DSCN MKR DOCD: CPT | Performed by: STUDENT IN AN ORGANIZED HEALTH CARE EDUCATION/TRAINING PROGRAM

## 2025-01-16 PROCEDURE — 1090F PRES/ABSN URINE INCON ASSESS: CPT | Performed by: STUDENT IN AN ORGANIZED HEALTH CARE EDUCATION/TRAINING PROGRAM

## 2025-01-16 PROCEDURE — 1159F MED LIST DOCD IN RCRD: CPT | Performed by: STUDENT IN AN ORGANIZED HEALTH CARE EDUCATION/TRAINING PROGRAM

## 2025-01-16 PROCEDURE — 3074F SYST BP LT 130 MM HG: CPT | Performed by: STUDENT IN AN ORGANIZED HEALTH CARE EDUCATION/TRAINING PROGRAM

## 2025-01-16 PROCEDURE — G8417 CALC BMI ABV UP PARAM F/U: HCPCS | Performed by: STUDENT IN AN ORGANIZED HEALTH CARE EDUCATION/TRAINING PROGRAM

## 2025-01-16 PROCEDURE — G8400 PT W/DXA NO RESULTS DOC: HCPCS | Performed by: STUDENT IN AN ORGANIZED HEALTH CARE EDUCATION/TRAINING PROGRAM

## 2025-01-16 PROCEDURE — 1036F TOBACCO NON-USER: CPT | Performed by: STUDENT IN AN ORGANIZED HEALTH CARE EDUCATION/TRAINING PROGRAM

## 2025-01-16 PROCEDURE — 99212 OFFICE O/P EST SF 10 MIN: CPT | Performed by: STUDENT IN AN ORGANIZED HEALTH CARE EDUCATION/TRAINING PROGRAM

## 2025-01-16 PROCEDURE — G8427 DOCREV CUR MEDS BY ELIG CLIN: HCPCS | Performed by: STUDENT IN AN ORGANIZED HEALTH CARE EDUCATION/TRAINING PROGRAM

## 2025-01-16 RX ORDER — OXYBUTYNIN CHLORIDE 10 MG/1
10 TABLET, EXTENDED RELEASE ORAL DAILY
Qty: 30 TABLET | Refills: 3
Start: 2025-01-16

## 2025-01-16 SDOH — ECONOMIC STABILITY: FOOD INSECURITY: WITHIN THE PAST 12 MONTHS, YOU WORRIED THAT YOUR FOOD WOULD RUN OUT BEFORE YOU GOT MONEY TO BUY MORE.: NEVER TRUE

## 2025-01-16 SDOH — ECONOMIC STABILITY: FOOD INSECURITY: WITHIN THE PAST 12 MONTHS, THE FOOD YOU BOUGHT JUST DIDN'T LAST AND YOU DIDN'T HAVE MONEY TO GET MORE.: NEVER TRUE

## 2025-01-16 ASSESSMENT — ANXIETY QUESTIONNAIRES
1. FEELING NERVOUS, ANXIOUS, OR ON EDGE: MORE THAN HALF THE DAYS
2. NOT BEING ABLE TO STOP OR CONTROL WORRYING: NOT AT ALL
GAD7 TOTAL SCORE: 8
7. FEELING AFRAID AS IF SOMETHING AWFUL MIGHT HAPPEN: NOT AT ALL
4. TROUBLE RELAXING: SEVERAL DAYS
6. BECOMING EASILY ANNOYED OR IRRITABLE: NOT AT ALL
IF YOU CHECKED OFF ANY PROBLEMS ON THIS QUESTIONNAIRE, HOW DIFFICULT HAVE THESE PROBLEMS MADE IT FOR YOU TO DO YOUR WORK, TAKE CARE OF THINGS AT HOME, OR GET ALONG WITH OTHER PEOPLE: SOMEWHAT DIFFICULT
5. BEING SO RESTLESS THAT IT IS HARD TO SIT STILL: MORE THAN HALF THE DAYS
3. WORRYING TOO MUCH ABOUT DIFFERENT THINGS: NEARLY EVERY DAY

## 2025-01-16 ASSESSMENT — PATIENT HEALTH QUESTIONNAIRE - PHQ9
4. FEELING TIRED OR HAVING LITTLE ENERGY: NEARLY EVERY DAY
9. THOUGHTS THAT YOU WOULD BE BETTER OFF DEAD, OR OF HURTING YOURSELF: NOT AT ALL
3. TROUBLE FALLING OR STAYING ASLEEP: NOT AT ALL
SUM OF ALL RESPONSES TO PHQ QUESTIONS 1-9: 8
1. LITTLE INTEREST OR PLEASURE IN DOING THINGS: NOT AT ALL
2. FEELING DOWN, DEPRESSED OR HOPELESS: MORE THAN HALF THE DAYS
7. TROUBLE CONCENTRATING ON THINGS, SUCH AS READING THE NEWSPAPER OR WATCHING TELEVISION: NOT AT ALL
SUM OF ALL RESPONSES TO PHQ QUESTIONS 1-9: 8
SUM OF ALL RESPONSES TO PHQ QUESTIONS 1-9: 8
10. IF YOU CHECKED OFF ANY PROBLEMS, HOW DIFFICULT HAVE THESE PROBLEMS MADE IT FOR YOU TO DO YOUR WORK, TAKE CARE OF THINGS AT HOME, OR GET ALONG WITH OTHER PEOPLE: SOMEWHAT DIFFICULT
5. POOR APPETITE OR OVEREATING: NOT AT ALL
SUM OF ALL RESPONSES TO PHQ QUESTIONS 1-9: 8
8. MOVING OR SPEAKING SO SLOWLY THAT OTHER PEOPLE COULD HAVE NOTICED. OR THE OPPOSITE, BEING SO FIGETY OR RESTLESS THAT YOU HAVE BEEN MOVING AROUND A LOT MORE THAN USUAL: NOT AT ALL
SUM OF ALL RESPONSES TO PHQ9 QUESTIONS 1 & 2: 2
6. FEELING BAD ABOUT YOURSELF - OR THAT YOU ARE A FAILURE OR HAVE LET YOURSELF OR YOUR FAMILY DOWN: NEARLY EVERY DAY

## 2025-01-16 NOTE — PROGRESS NOTES
[address], [address]  [place] [address]  Phone: [phone]  Fax: [phone]      Date of Visit:  25  Patient Name: Kusum Linda   Patient :  1945     ASSESSMENT/PLAN     1. Obstructive sleep apnea  -     Sleep Study with PAP Titration; Future  2. Chronic diastolic heart failure (HCC)  3. Type 2 diabetes mellitus with stage 3b chronic kidney disease, with long-term current use of insulin ([facility])  4. Urge incontinence of urine     History of significantly uncontrolled obstructive sleep apnea, has CPAP but patient not sure has been working has been more than 2 decades since she has had study done.  Will order sleep study with titration of her current CPAP machine.  Patient has had significant cognitive issues as of late, possibly undertreated or poorly treated sleep apnea contributing.    Patient has had to restart her oxybutynin due to significant incontinence issues.  Has not noticed any cognitive effects.    - Questions/concerns answered. Patient verbalized and expressed understanding. Medications, laboratory testing, imaging, consultation, and follow up as documented in this encounter.       HPI:     Kusum Linda is a 79 y.o. 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

## 2025-01-28 ENCOUNTER — TELEPHONE (OUTPATIENT)
Dept: FAMILY MEDICINE CLINIC | Age: 80
End: 2025-01-28

## 2025-01-28 DIAGNOSIS — N18.32 TYPE 2 DIABETES MELLITUS WITH STAGE 3B CHRONIC KIDNEY DISEASE, WITH LONG-TERM CURRENT USE OF INSULIN (HCC): ICD-10-CM

## 2025-01-28 DIAGNOSIS — Z79.4 TYPE 2 DIABETES MELLITUS WITH STAGE 3B CHRONIC KIDNEY DISEASE, WITH LONG-TERM CURRENT USE OF INSULIN (HCC): ICD-10-CM

## 2025-01-28 DIAGNOSIS — E11.22 TYPE 2 DIABETES MELLITUS WITH STAGE 3B CHRONIC KIDNEY DISEASE, WITH LONG-TERM CURRENT USE OF INSULIN (HCC): ICD-10-CM

## 2025-01-28 RX ORDER — FLASH GLUCOSE SENSOR
6 KIT MISCELLANEOUS DAILY
Qty: 6 EACH | Refills: 5 | Status: SHIPPED | OUTPATIENT
Start: 2025-01-28

## 2025-01-28 NOTE — TELEPHONE ENCOUNTER
Kusum Linda is requesting a refill on the following medication(s):  Requested Prescriptions     Pending Prescriptions Disp Refills    Continuous Glucose Sensor (FREESTYLE VERA 14 DAY SENSOR) MISC 6 each 5     Si each by Does not apply route daily       Last Visit Date (If Applicable):  2025    Next Visit Date:    3/17/2025

## 2025-02-13 ENCOUNTER — TELEPHONE (OUTPATIENT)
Dept: FAMILY MEDICINE CLINIC | Age: 80
End: 2025-02-13

## 2025-02-14 ENCOUNTER — OFFICE VISIT (OUTPATIENT)
Dept: FAMILY MEDICINE CLINIC | Age: 80
End: 2025-02-14
Payer: MEDICARE

## 2025-02-14 VITALS
OXYGEN SATURATION: 93 % | HEART RATE: 61 BPM | WEIGHT: 254.8 LBS | DIASTOLIC BLOOD PRESSURE: 88 MMHG | BODY MASS INDEX: 41.13 KG/M2 | SYSTOLIC BLOOD PRESSURE: 132 MMHG

## 2025-02-14 DIAGNOSIS — N18.32 TYPE 2 DIABETES MELLITUS WITH STAGE 3B CHRONIC KIDNEY DISEASE, WITH LONG-TERM CURRENT USE OF INSULIN (HCC): Primary | ICD-10-CM

## 2025-02-14 DIAGNOSIS — Z79.4 TYPE 2 DIABETES MELLITUS WITH STAGE 3B CHRONIC KIDNEY DISEASE, WITH LONG-TERM CURRENT USE OF INSULIN (HCC): Primary | ICD-10-CM

## 2025-02-14 DIAGNOSIS — N30.01 ACUTE CYSTITIS WITH HEMATURIA: ICD-10-CM

## 2025-02-14 DIAGNOSIS — E66.01 MORBID (SEVERE) OBESITY DUE TO EXCESS CALORIES: ICD-10-CM

## 2025-02-14 DIAGNOSIS — E11.22 TYPE 2 DIABETES MELLITUS WITH STAGE 3B CHRONIC KIDNEY DISEASE, WITH LONG-TERM CURRENT USE OF INSULIN (HCC): Primary | ICD-10-CM

## 2025-02-14 DIAGNOSIS — L89.891: ICD-10-CM

## 2025-02-14 LAB
ALBUMIN/GLOBULIN RATIO: 1.6 G/DL
ALBUMIN: 3.9 G/DL (ref 3.5–5)
ALP BLD-CCNC: 113 UNITS/L (ref 38–126)
ALT SERPL-CCNC: 11 UNITS/L (ref 4–35)
ANION GAP SERPL CALCULATED.3IONS-SCNC: 11 MMOL/L (ref 3–11)
AST SERPL-CCNC: 18 UNITS/L (ref 14–36)
BASOPHILS ABSOLUTE: 0.05 X10E3/?L (ref 0–0.3)
BASOPHILS RELATIVE PERCENT: 0.55 % (ref 0–3)
BILIRUB SERPL-MCNC: 0.6 MG/DL (ref 0.2–1.3)
BILIRUBIN, POC: NORMAL
BLOOD URINE, POC: NORMAL
BUN BLDV-MCNC: 34 MG/DL (ref 7–17)
CALCIUM SERPL-MCNC: 9 MG/DL (ref 8.4–10.2)
CHLORIDE BLD-SCNC: 98 MMOL/L (ref 98–120)
CHP ED QC CHECK: NORMAL
CLARITY, POC: CLEAR
CO2: 29 MMOL/L (ref 22–31)
COLOR, POC: NORMAL
CREAT SERPL-MCNC: 1.2 MG/DL (ref 0.5–1)
EOSINOPHILS ABSOLUTE: 0.01 X10E3/?L (ref 0–1.1)
EOSINOPHILS RELATIVE PERCENT: 0.06 % (ref 0–10)
GFR, ESTIMATED: 46.2
GLOBULIN: 2.4 G/DL
GLUCOSE BLD-MCNC: 334 MG/DL
GLUCOSE URINE, POC: NORMAL MG/DL
GLUCOSE: 372 MG/DL (ref 65–105)
HCT VFR BLD CALC: 38.3 % (ref 37–47)
HEMOGLOBIN: 11.6 G/DL (ref 12–16)
KETONES, POC: NORMAL MG/DL
LEUKOCYTE EST, POC: NORMAL
LYMPHOCYTES ABSOLUTE: 1.9 X10E3/?L (ref 1–5.5)
LYMPHOCYTES RELATIVE PERCENT: 21.59 % (ref 20–51.1)
MCH RBC QN AUTO: 27.5 PG (ref 28.5–32.5)
MCHC RBC AUTO-ENTMCNC: 30.2 G/DL (ref 32–37)
MCV RBC AUTO: 91.2 FL (ref 80–94)
MONOCYTES ABSOLUTE: 0.48 X10E3/?L (ref 0.1–1)
MONOCYTES RELATIVE PERCENT: 5.47 % (ref 1.7–9.3)
NEUTROPHILS ABSOLUTE: 6.37 X10E3/?L (ref 2–8.1)
NEUTROPHILS RELATIVE PERCENT: 72.33 % (ref 42.2–75.2)
NITRITE, POC: POSITIVE
PDW BLD-RTO: 13.7 % (ref 8.5–15.5)
PH, POC: 5
PLATELET # BLD: 208.6 THOU/MM3 (ref 130–400)
POTASSIUM SERPL-SCNC: 5 MMOL/L (ref 3.6–5)
PROTEIN, POC: NORMAL MG/DL
RBC # BLD: 4.2 M/UL (ref 4.2–5.4)
SERUM OSMOLALITY: 314 MOSM/KG (ref 275–295)
SODIUM BLD-SCNC: 133 MMOL/L (ref 135–145)
SPECIFIC GRAVITY, POC: 1.01
TOTAL PROTEIN: 6.3 G/DL (ref 6.3–8.2)
TSH REFLEX FT4: 0.85 MIU/ML (ref 0.49–4.67)
UROBILINOGEN, POC: 0.2 MG/DL
WBC # BLD: 8.8 THOU/ML3 (ref 4.8–10.8)

## 2025-02-14 PROCEDURE — 81002 URINALYSIS NONAUTO W/O SCOPE: CPT | Performed by: STUDENT IN AN ORGANIZED HEALTH CARE EDUCATION/TRAINING PROGRAM

## 2025-02-14 PROCEDURE — 82962 GLUCOSE BLOOD TEST: CPT | Performed by: STUDENT IN AN ORGANIZED HEALTH CARE EDUCATION/TRAINING PROGRAM

## 2025-02-14 PROCEDURE — 99212 OFFICE O/P EST SF 10 MIN: CPT | Performed by: STUDENT IN AN ORGANIZED HEALTH CARE EDUCATION/TRAINING PROGRAM

## 2025-02-14 RX ORDER — CEPHALEXIN 500 MG/1
500 CAPSULE ORAL 3 TIMES DAILY
Qty: 21 CAPSULE | Refills: 0 | Status: SHIPPED | OUTPATIENT
Start: 2025-02-14 | End: 2025-02-21

## 2025-02-14 RX ORDER — AMLODIPINE BESYLATE 5 MG/1
5 TABLET ORAL DAILY
COMMUNITY
Start: 2025-01-27

## 2025-02-14 RX ADMIN — Medication: at 11:52

## 2025-02-14 NOTE — PROGRESS NOTES
Tenderness: There is no abdominal tenderness.   Musculoskeletal:         General: No deformity. Normal range of motion.      Cervical back: Normal range of motion and neck supple.      Right lower leg: No edema.      Left lower leg: No edema.      Comments: Bilateral lower extremity strength 4+ out of 5   Skin:     General: Skin is warm and dry.      Findings: Lesion (2 stage I pressure wounds medial aspect of right great toe and immediately proximal, no fluctuance, drainage, open areas) present.   Neurological:      General: No focal deficit present.      Mental Status: She is alert and oriented to person, place, and time.   Psychiatric:         Mood and Affect: Mood normal.         Behavior: Behavior normal.      Comments: Poor short-term recall           Please be aware portions of this note were completed using voice recognition software and unforeseen errors may have occurred    I personally reviewed the patient's past medical history, current medications, allergies, surgical history, family history and social history.  Updates were made as necessary.    Electronically signed by Robe Ramesh MD on 2/14/2025 at 11:40 AM

## 2025-02-14 NOTE — PATIENT INSTRUCTIONS
Schedule with podiatrist as discussed.  Get your labs done today. We will call you with results.  Increase the long acting to 54 units daily.  Make sure we're doing the sliding scale (short acting) 4 times per day (with each meal and at night).  I will call your daughter.

## 2025-02-19 ENCOUNTER — TELEPHONE (OUTPATIENT)
Dept: FAMILY MEDICINE CLINIC | Age: 80
End: 2025-02-19

## 2025-02-19 DIAGNOSIS — N18.32 TYPE 2 DIABETES MELLITUS WITH STAGE 3B CHRONIC KIDNEY DISEASE, WITH LONG-TERM CURRENT USE OF INSULIN (HCC): ICD-10-CM

## 2025-02-19 DIAGNOSIS — Z79.4 TYPE 2 DIABETES MELLITUS WITH STAGE 3B CHRONIC KIDNEY DISEASE, WITH LONG-TERM CURRENT USE OF INSULIN (HCC): ICD-10-CM

## 2025-02-19 DIAGNOSIS — E11.22 TYPE 2 DIABETES MELLITUS WITH STAGE 3B CHRONIC KIDNEY DISEASE, WITH LONG-TERM CURRENT USE OF INSULIN (HCC): ICD-10-CM

## 2025-02-19 RX ORDER — FLASH GLUCOSE SENSOR
6 KIT MISCELLANEOUS DAILY
Qty: 6 EACH | Refills: 5 | Status: SHIPPED | OUTPATIENT
Start: 2025-02-19

## 2025-02-19 NOTE — TELEPHONE ENCOUNTER
For provider on call.      Needs sent to pharmacy today.  They will overnight it to her.  She only has 2 days worth left.

## 2025-02-19 NOTE — TELEPHONE ENCOUNTER
On call please refill due to PCP day off      Kusum Linda is requesting a refill on the following medication(s):  Requested Prescriptions     Pending Prescriptions Disp Refills    Continuous Glucose Sensor (FREESTYLE VERA 14 DAY SENSOR) MISC 6 each 5     Si each by Does not apply route daily       Last Visit Date (If Applicable):  2025    Next Visit Date:    3/17/2025

## 2025-03-04 ENCOUNTER — TELEPHONE (OUTPATIENT)
Dept: FAMILY MEDICINE CLINIC | Age: 80
End: 2025-03-04

## 2025-03-04 NOTE — TELEPHONE ENCOUNTER
Fasting sugars have been as follows the last couple weeks:    107, 111, 182, 141, 175, 158, 129, 88, 162, 154, 102, 122.

## 2025-03-17 ENCOUNTER — OFFICE VISIT (OUTPATIENT)
Dept: FAMILY MEDICINE CLINIC | Age: 80
End: 2025-03-17
Payer: MEDICARE

## 2025-03-17 VITALS
HEART RATE: 53 BPM | DIASTOLIC BLOOD PRESSURE: 68 MMHG | SYSTOLIC BLOOD PRESSURE: 124 MMHG | OXYGEN SATURATION: 95 % | WEIGHT: 256 LBS | BODY MASS INDEX: 41.32 KG/M2

## 2025-03-17 DIAGNOSIS — R29.898 WEAKNESS OF BOTH LOWER EXTREMITIES: ICD-10-CM

## 2025-03-17 DIAGNOSIS — I50.32 CHRONIC DIASTOLIC HEART FAILURE (HCC): ICD-10-CM

## 2025-03-17 DIAGNOSIS — E11.22 TYPE 2 DIABETES MELLITUS WITH STAGE 3B CHRONIC KIDNEY DISEASE, WITH LONG-TERM CURRENT USE OF INSULIN (HCC): Primary | ICD-10-CM

## 2025-03-17 DIAGNOSIS — E11.40 NEUROPATHY DUE TO TYPE 2 DIABETES MELLITUS (HCC): ICD-10-CM

## 2025-03-17 DIAGNOSIS — N18.32 TYPE 2 DIABETES MELLITUS WITH STAGE 3B CHRONIC KIDNEY DISEASE, WITH LONG-TERM CURRENT USE OF INSULIN (HCC): Primary | ICD-10-CM

## 2025-03-17 DIAGNOSIS — R29.6 FREQUENT FALLS: ICD-10-CM

## 2025-03-17 DIAGNOSIS — Z79.4 TYPE 2 DIABETES MELLITUS WITH STAGE 3B CHRONIC KIDNEY DISEASE, WITH LONG-TERM CURRENT USE OF INSULIN (HCC): Primary | ICD-10-CM

## 2025-03-17 DIAGNOSIS — R42 VERTIGO: ICD-10-CM

## 2025-03-17 LAB — HBA1C MFR BLD: 8.9 %

## 2025-03-17 PROCEDURE — 99213 OFFICE O/P EST LOW 20 MIN: CPT | Performed by: STUDENT IN AN ORGANIZED HEALTH CARE EDUCATION/TRAINING PROGRAM

## 2025-03-17 PROCEDURE — G8427 DOCREV CUR MEDS BY ELIG CLIN: HCPCS | Performed by: STUDENT IN AN ORGANIZED HEALTH CARE EDUCATION/TRAINING PROGRAM

## 2025-03-17 PROCEDURE — 1090F PRES/ABSN URINE INCON ASSESS: CPT | Performed by: STUDENT IN AN ORGANIZED HEALTH CARE EDUCATION/TRAINING PROGRAM

## 2025-03-17 PROCEDURE — 99214 OFFICE O/P EST MOD 30 MIN: CPT | Performed by: STUDENT IN AN ORGANIZED HEALTH CARE EDUCATION/TRAINING PROGRAM

## 2025-03-17 PROCEDURE — 1123F ACP DISCUSS/DSCN MKR DOCD: CPT | Performed by: STUDENT IN AN ORGANIZED HEALTH CARE EDUCATION/TRAINING PROGRAM

## 2025-03-17 PROCEDURE — G8400 PT W/DXA NO RESULTS DOC: HCPCS | Performed by: STUDENT IN AN ORGANIZED HEALTH CARE EDUCATION/TRAINING PROGRAM

## 2025-03-17 PROCEDURE — 1036F TOBACCO NON-USER: CPT | Performed by: STUDENT IN AN ORGANIZED HEALTH CARE EDUCATION/TRAINING PROGRAM

## 2025-03-17 PROCEDURE — PBSHW POCT GLYCOSYLATED HEMOGLOBIN (HGB A1C): Performed by: STUDENT IN AN ORGANIZED HEALTH CARE EDUCATION/TRAINING PROGRAM

## 2025-03-17 PROCEDURE — 95251 CONT GLUC MNTR ANALYSIS I&R: CPT | Performed by: STUDENT IN AN ORGANIZED HEALTH CARE EDUCATION/TRAINING PROGRAM

## 2025-03-17 PROCEDURE — 83036 HEMOGLOBIN GLYCOSYLATED A1C: CPT | Performed by: STUDENT IN AN ORGANIZED HEALTH CARE EDUCATION/TRAINING PROGRAM

## 2025-03-17 PROCEDURE — 3074F SYST BP LT 130 MM HG: CPT | Performed by: STUDENT IN AN ORGANIZED HEALTH CARE EDUCATION/TRAINING PROGRAM

## 2025-03-17 PROCEDURE — 1159F MED LIST DOCD IN RCRD: CPT | Performed by: STUDENT IN AN ORGANIZED HEALTH CARE EDUCATION/TRAINING PROGRAM

## 2025-03-17 PROCEDURE — 3078F DIAST BP <80 MM HG: CPT | Performed by: STUDENT IN AN ORGANIZED HEALTH CARE EDUCATION/TRAINING PROGRAM

## 2025-03-17 PROCEDURE — 3052F HG A1C>EQUAL 8.0%<EQUAL 9.0%: CPT | Performed by: STUDENT IN AN ORGANIZED HEALTH CARE EDUCATION/TRAINING PROGRAM

## 2025-03-17 PROCEDURE — G8417 CALC BMI ABV UP PARAM F/U: HCPCS | Performed by: STUDENT IN AN ORGANIZED HEALTH CARE EDUCATION/TRAINING PROGRAM

## 2025-03-17 NOTE — PATIENT INSTRUCTIONS
Decrease Tresiba to 50 units every morning  As a reminder, continue to give yourself the short acting with each meal as much as you can remember  We will send in a new referral to physical therapy.

## 2025-03-17 NOTE — PROGRESS NOTES
[address], [address]  [place] [address]  Phone: [phone]  Fax: [phone]      Date of Visit:  3/17/25  Patient Name: Kusum Linda   Patient :  1945     ASSESSMENT/PLAN     1. Type 2 diabetes mellitus with stage 3b chronic kidney disease, with long-term current use of insulin (HCC)  -     POCT glycosylated hemoglobin (Hb A1C)  2. 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 Chronic diastolic heart failure ([facility])  4. Weakness of both lower extremities  -     [facility] [place]  5. Vertigo  -     [facility] [place]  6. Frequent falls  -     [facility] [place]     A1c today is 8.9.  This is a significant improvement for her as compared to recently, also looking at patient's CGM, expected A1c based on the last 2 weeks is 7.4.  Has been improving how often she does short acting insulin.  Will decrease long-acting to 50 units daily has had some lows at night.  Clearly if she remains post meal hyperglycemia.  Encouraged once again importance of insulin at mealtime and per sliding scale.  Patient verbalized that she has been doing much better.    Handicap Placard prescription filled out for patient    Will send patient back for physical therapy as she feels she is getting weaker again.    Most recent echo demonstrated grade 1 diastolic dysfunction.  Continue management per cardiology    Follow up in 3 months    - Questions/concerns answered. Patient verbalized and expressed understanding. Medications, laboratory testing, imaging, consultation, and follow up as documented in this encounter.       HPI:     Kusum Linda is a 79 y.o. female with   Patient Active Problem List   Diagnosis    Type 2 diabetes mellitus with stage 3 chronic kidney disease, with long-term current use of

## 2025-03-26 LAB
BACTERIA, URINE: ABNORMAL /HPF
BILIRUBIN, URINE: NEGATIVE
BLOOD, URINE: ABNORMAL
CASTS UA: PRESENT /LPF
CLARITY, UA: CLEAR
COLOR, UA: YELLOW
CRYSTALS, UA: ABNORMAL
GLUCOSE URINE: NEGATIVE MG/DL
HYALINE CASTS: ABNORMAL /LPF
KETONES, URINE: NEGATIVE MG/DL
LEUKOCYTE ESTERASE, URINE: ABNORMAL
MUCUS, URINE: ABNORMAL
NITRITE, URINE: NEGATIVE
PH, URINE: 5.5 (ref 5–8.5)
PROTEIN UA: ABNORMAL MG/DL
RBC URINE: ABNORMAL /HPF (ref 0–2)
SPECIFIC GRAVITY UA: 1.01 E.U./DL (ref 1–1.03)
SQUAMOUS EPITHELIAL: ABNORMAL /HPF
UROBILINOGEN, URINE: 0.2 MG/DL (ref 0.2–1)
WBC URINE: ABNORMAL /HPF (ref 0–4)

## 2025-04-04 ENCOUNTER — TELEPHONE (OUTPATIENT)
Dept: FAMILY MEDICINE CLINIC | Age: 80
End: 2025-04-04

## 2025-04-04 DIAGNOSIS — N30.00 ACUTE CYSTITIS WITHOUT HEMATURIA: Primary | ICD-10-CM

## 2025-04-04 DIAGNOSIS — I10 ESSENTIAL HYPERTENSION: ICD-10-CM

## 2025-04-04 LAB
ANION GAP SERPL CALCULATED.3IONS-SCNC: 6.8 MMOL/L (ref 3–11)
BASOPHILS ABSOLUTE: 0.11 X10E3/?L (ref 0–0.3)
BASOPHILS RELATIVE PERCENT: 0.95 % (ref 0–3)
BUN BLDV-MCNC: 46 MG/DL (ref 7–17)
CALCIUM SERPL-MCNC: 8.7 MG/DL (ref 8.4–10.2)
CHLORIDE BLD-SCNC: 105 MMOL/L (ref 98–120)
CO2: 23 MMOL/L (ref 22–31)
CREAT SERPL-MCNC: 1 MG/DL (ref 0.5–1)
CREATININE CLEARANCE: 43.4
EOSINOPHILS ABSOLUTE: 0.02 X10E3/?L (ref 0–1.1)
EOSINOPHILS RELATIVE PERCENT: 0.18 % (ref 0–10)
GFR, ESTIMATED: 57
GLUCOSE: 174 MG/DL (ref 65–105)
HCT VFR BLD CALC: 34.8 % (ref 37–47)
HEMOGLOBIN: 11.3 G/DL (ref 12–16)
LYMPHOCYTES ABSOLUTE: 2.42 X10E3/?L (ref 1–5.5)
LYMPHOCYTES RELATIVE PERCENT: 21.51 % (ref 20–51.1)
MCH RBC QN AUTO: 28.9 PG (ref 28.5–32.5)
MCHC RBC AUTO-ENTMCNC: 32.5 G/DL (ref 32–37)
MCV RBC AUTO: 89 FL (ref 80–94)
MONOCYTES ABSOLUTE: 0.84 X10E3/?L (ref 0.1–1)
MONOCYTES RELATIVE PERCENT: 7.45 % (ref 1.7–9.3)
NEUTROPHILS ABSOLUTE: 7.87 X10E3/?L (ref 2–8.1)
NEUTROPHILS RELATIVE PERCENT: 69.92 % (ref 42.2–75.2)
PDW BLD-RTO: 13.6 % (ref 8.5–15.5)
PLATELET # BLD: 208.6 THOU/MM3 (ref 130–400)
POTASSIUM SERPL-SCNC: 4.2 MMOL/L (ref 3.6–5)
RBC # BLD: 3.91 M/UL (ref 4.2–5.4)
SODIUM BLD-SCNC: 135 MMOL/L (ref 135–145)
WBC # BLD: 11.3 THOU/ML3 (ref 4.8–10.8)

## 2025-04-04 RX ORDER — CARVEDILOL 6.25 MG/1
6.25 TABLET ORAL 2 TIMES DAILY
Qty: 60 TABLET | Refills: 1 | Status: SHIPPED | OUTPATIENT
Start: 2025-04-04

## 2025-04-04 RX ORDER — CEFDINIR 300 MG/1
300 CAPSULE ORAL 2 TIMES DAILY
Qty: 14 CAPSULE | Refills: 0 | Status: SHIPPED | OUTPATIENT
Start: 2025-04-04 | End: 2025-04-11

## 2025-04-07 ENCOUNTER — TELEPHONE (OUTPATIENT)
Dept: FAMILY MEDICINE CLINIC | Age: 80
End: 2025-04-07

## 2025-04-14 ENCOUNTER — OFFICE VISIT (OUTPATIENT)
Dept: FAMILY MEDICINE CLINIC | Age: 80
End: 2025-04-14
Payer: MEDICARE

## 2025-04-14 VITALS
TEMPERATURE: 97.9 F | OXYGEN SATURATION: 95 % | HEIGHT: 66 IN | HEART RATE: 73 BPM | SYSTOLIC BLOOD PRESSURE: 124 MMHG | DIASTOLIC BLOOD PRESSURE: 86 MMHG | WEIGHT: 255 LBS | RESPIRATION RATE: 14 BRPM | BODY MASS INDEX: 40.98 KG/M2

## 2025-04-14 DIAGNOSIS — R55 SYNCOPE, UNSPECIFIED SYNCOPE TYPE: ICD-10-CM

## 2025-04-14 DIAGNOSIS — Z09 HOSPITAL DISCHARGE FOLLOW-UP: Primary | ICD-10-CM

## 2025-04-14 DIAGNOSIS — I10 ESSENTIAL HYPERTENSION: ICD-10-CM

## 2025-04-14 DIAGNOSIS — N39.41 URGE INCONTINENCE OF URINE: ICD-10-CM

## 2025-04-14 PROCEDURE — G8400 PT W/DXA NO RESULTS DOC: HCPCS | Performed by: STUDENT IN AN ORGANIZED HEALTH CARE EDUCATION/TRAINING PROGRAM

## 2025-04-14 PROCEDURE — 1123F ACP DISCUSS/DSCN MKR DOCD: CPT | Performed by: STUDENT IN AN ORGANIZED HEALTH CARE EDUCATION/TRAINING PROGRAM

## 2025-04-14 PROCEDURE — 1159F MED LIST DOCD IN RCRD: CPT | Performed by: STUDENT IN AN ORGANIZED HEALTH CARE EDUCATION/TRAINING PROGRAM

## 2025-04-14 PROCEDURE — 1090F PRES/ABSN URINE INCON ASSESS: CPT | Performed by: STUDENT IN AN ORGANIZED HEALTH CARE EDUCATION/TRAINING PROGRAM

## 2025-04-14 PROCEDURE — 1036F TOBACCO NON-USER: CPT | Performed by: STUDENT IN AN ORGANIZED HEALTH CARE EDUCATION/TRAINING PROGRAM

## 2025-04-14 PROCEDURE — 3074F SYST BP LT 130 MM HG: CPT | Performed by: STUDENT IN AN ORGANIZED HEALTH CARE EDUCATION/TRAINING PROGRAM

## 2025-04-14 PROCEDURE — 3079F DIAST BP 80-89 MM HG: CPT | Performed by: STUDENT IN AN ORGANIZED HEALTH CARE EDUCATION/TRAINING PROGRAM

## 2025-04-14 PROCEDURE — G8417 CALC BMI ABV UP PARAM F/U: HCPCS | Performed by: STUDENT IN AN ORGANIZED HEALTH CARE EDUCATION/TRAINING PROGRAM

## 2025-04-14 PROCEDURE — G8427 DOCREV CUR MEDS BY ELIG CLIN: HCPCS | Performed by: STUDENT IN AN ORGANIZED HEALTH CARE EDUCATION/TRAINING PROGRAM

## 2025-04-14 PROCEDURE — 0509F URINE INCON PLAN DOCD: CPT | Performed by: STUDENT IN AN ORGANIZED HEALTH CARE EDUCATION/TRAINING PROGRAM

## 2025-04-14 PROCEDURE — 99214 OFFICE O/P EST MOD 30 MIN: CPT | Performed by: STUDENT IN AN ORGANIZED HEALTH CARE EDUCATION/TRAINING PROGRAM

## 2025-04-14 RX ORDER — LISINOPRIL 20 MG/1
20 TABLET ORAL DAILY
Qty: 90 TABLET | Refills: 1 | Status: SHIPPED | OUTPATIENT
Start: 2025-04-14

## 2025-04-14 RX ORDER — OXYBUTYNIN CHLORIDE 10 MG/1
10 TABLET, EXTENDED RELEASE ORAL DAILY
Qty: 90 TABLET | Refills: 1 | Status: SHIPPED | OUTPATIENT
Start: 2025-04-14

## 2025-04-14 RX ORDER — CARVEDILOL 6.25 MG/1
6.25 TABLET ORAL 2 TIMES DAILY
Qty: 180 TABLET | Refills: 1 | Status: SHIPPED | OUTPATIENT
Start: 2025-04-14

## 2025-04-14 NOTE — PROGRESS NOTES
[address], [address]  [place] [address]  Phone: [phone]  Fax: [phone]      Date of Visit:  25  Patient Name: Kusum Linda   Patient :  1945     ASSESSMENT/PLAN     1. Hospital discharge follow-up  2. Syncope, unspecified syncope type  3. Essential hypertension  -     lisinopril (PRINIVIL;ZESTRIL) 20 MG tablet; Take 1 tablet by mouth daily, Disp-90 tablet, R-1Normal  -     carvedilol (COREG) 6.25 MG tablet; Take 1 tablet by mouth 2 times daily, Disp-180 tablet, R-1Normal  4. Urge incontinence of urine  -     oxyBUTYnin (DITROPAN-XL) 10 MG extended release tablet; Take 1 tablet by mouth daily, Disp-90 tablet, R-1Normal     Hospital follow-up visit.  Had vasovagal syncopal episode at home while having bowel movement.  No syncopal episodes since.  Made change to blood pressure regimen, decreased some medication dosing.  Patient tolerated well with no issues.  Continue other medications as above.  No other acute issues after hospitalization.        - Questions/concerns answered. Patient verbalized and expressed understanding. Medications, laboratory testing, imaging, consultation, and follow up as documented in this encounter.       HPI:     Kusum Linda is a 79 y.o. 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

## 2025-04-15 DIAGNOSIS — E11.22 TYPE 2 DIABETES MELLITUS WITH STAGE 3B CHRONIC KIDNEY DISEASE, WITH LONG-TERM CURRENT USE OF INSULIN (HCC): ICD-10-CM

## 2025-04-15 DIAGNOSIS — Z79.4 TYPE 2 DIABETES MELLITUS WITH STAGE 3B CHRONIC KIDNEY DISEASE, WITH LONG-TERM CURRENT USE OF INSULIN (HCC): ICD-10-CM

## 2025-04-15 DIAGNOSIS — N18.32 TYPE 2 DIABETES MELLITUS WITH STAGE 3B CHRONIC KIDNEY DISEASE, WITH LONG-TERM CURRENT USE OF INSULIN (HCC): ICD-10-CM

## 2025-04-15 RX ORDER — INSULIN DEGLUDEC 200 U/ML
INJECTION, SOLUTION SUBCUTANEOUS
Qty: 18 ML | Refills: 3 | Status: SHIPPED | OUTPATIENT
Start: 2025-04-15

## 2025-04-15 NOTE — TELEPHONE ENCOUNTER
Kusum Linda is requesting a refill on the following medication(s):  Requested Prescriptions     Pending Prescriptions Disp Refills    Insulin Degludec (TRESIBA FLEXTOUCH) 200 UNIT/ML SOPN 18 mL 3     Sig: INJECT SUBCUTANEOUSLY 52 UNITS  DAILY       Last Visit Date (If Applicable):  4/14/2025    Next Visit Date:    6/19/2025

## 2025-04-21 ENCOUNTER — TELEPHONE (OUTPATIENT)
Dept: FAMILY MEDICINE CLINIC | Age: 80
End: 2025-04-21

## 2025-04-21 DIAGNOSIS — N30.00 ACUTE CYSTITIS WITHOUT HEMATURIA: Primary | ICD-10-CM

## 2025-04-21 RX ORDER — CEPHALEXIN 500 MG/1
500 CAPSULE ORAL 3 TIMES DAILY
Qty: 21 CAPSULE | Refills: 0 | Status: SHIPPED | OUTPATIENT
Start: 2025-04-21 | End: 2025-04-28

## 2025-04-21 NOTE — TELEPHONE ENCOUNTER
Patient called stating she knows she has a UTI and she needs medication for it. She would like Dr. Ramesh to send something in for her.

## 2025-04-21 NOTE — TELEPHONE ENCOUNTER
Abx sent to [facility]. Please notify patient. Will need her to come in if symptoms do not resolve or return soon.

## 2025-04-24 DIAGNOSIS — R41.89 COGNITIVE DECLINE: ICD-10-CM

## 2025-04-24 DIAGNOSIS — E78.49 OTHER HYPERLIPIDEMIA: ICD-10-CM

## 2025-04-25 NOTE — TELEPHONE ENCOUNTER
Please refill, PCP out of office      Kusum Linda is requesting a refill on the following medication(s):  Requested Prescriptions     Pending Prescriptions Disp Refills    simvastatin (ZOCOR) 40 MG tablet [Pharmacy Med Name: Simvastatin 40 MG Oral Tablet] 90 tablet 0     Sig: TAKE 1 TABLET BY MOUTH AT NIGHT    memantine (NAMENDA) 10 MG tablet [Pharmacy Med Name: Memantine HCl 10 MG Oral Tablet] 180 tablet 0     Sig: TAKE 1 TABLET BY MOUTH TWICE  DAILY       Last Visit Date (If Applicable):  4/14/2025    Next Visit Date:    6/19/2025

## 2025-04-27 RX ORDER — MEMANTINE HYDROCHLORIDE 10 MG/1
10 TABLET ORAL 2 TIMES DAILY
Qty: 180 TABLET | Refills: 0 | Status: SHIPPED | OUTPATIENT
Start: 2025-04-27

## 2025-04-27 RX ORDER — SIMVASTATIN 40 MG
40 TABLET ORAL NIGHTLY
Qty: 90 TABLET | Refills: 0 | Status: SHIPPED | OUTPATIENT
Start: 2025-04-27

## 2025-05-05 DIAGNOSIS — I50.32 CHRONIC DIASTOLIC HEART FAILURE (HCC): ICD-10-CM

## 2025-05-05 DIAGNOSIS — R41.89 COGNITIVE DECLINE: ICD-10-CM

## 2025-05-05 DIAGNOSIS — F41.9 ANXIETY: ICD-10-CM

## 2025-05-05 RX ORDER — BUMETANIDE 1 MG/1
1 TABLET ORAL DAILY
Qty: 90 TABLET | Refills: 1 | Status: SHIPPED | OUTPATIENT
Start: 2025-05-05

## 2025-05-05 RX ORDER — CITALOPRAM HYDROBROMIDE 10 MG/1
10 TABLET ORAL DAILY
Qty: 90 TABLET | Refills: 1 | Status: SHIPPED | OUTPATIENT
Start: 2025-05-05

## 2025-05-05 RX ORDER — DONEPEZIL HYDROCHLORIDE 5 MG/1
5 TABLET, FILM COATED ORAL NIGHTLY
Qty: 90 TABLET | Refills: 1 | Status: SHIPPED | OUTPATIENT
Start: 2025-05-05

## 2025-05-05 NOTE — TELEPHONE ENCOUNTER
Kusum Alexei is requesting a refill on the following medication(s):  Requested Prescriptions     Pending Prescriptions Disp Refills    donepezil (ARICEPT) 5 MG tablet [Pharmacy Med Name: Donepezil HCl 5 MG Oral Tablet] 90 tablet 3     Sig: TAKE 1 TABLET BY MOUTH EVERY  NIGHT    citalopram (CELEXA) 10 MG tablet [Pharmacy Med Name: Citalopram Hydrobromide 10 MG Oral Tablet] 90 tablet 3     Sig: TAKE 1 TABLET BY MOUTH DAILY    bumetanide (BUMEX) 1 MG tablet [Pharmacy Med Name: Bumetanide 1 MG Oral Tablet] 90 tablet 3     Sig: TAKE 1 TABLET BY MOUTH DAILY       Last Visit Date (If Applicable):  4/14/2025    Next Visit Date:    6/19/2025

## 2025-05-27 DIAGNOSIS — Z79.4 TYPE 2 DIABETES MELLITUS WITH STAGE 3B CHRONIC KIDNEY DISEASE, WITH LONG-TERM CURRENT USE OF INSULIN (HCC): ICD-10-CM

## 2025-05-27 DIAGNOSIS — N18.32 TYPE 2 DIABETES MELLITUS WITH STAGE 3B CHRONIC KIDNEY DISEASE, WITH LONG-TERM CURRENT USE OF INSULIN (HCC): ICD-10-CM

## 2025-05-27 DIAGNOSIS — E11.22 TYPE 2 DIABETES MELLITUS WITH STAGE 3B CHRONIC KIDNEY DISEASE, WITH LONG-TERM CURRENT USE OF INSULIN (HCC): ICD-10-CM

## 2025-05-27 RX ORDER — FLASH GLUCOSE SENSOR
6 KIT MISCELLANEOUS DAILY
Qty: 6 EACH | Refills: 5 | Status: SHIPPED | OUTPATIENT
Start: 2025-05-27

## 2025-05-27 NOTE — TELEPHONE ENCOUNTER
Kusum Linda is requesting a refill on the following medication(s):  Requested Prescriptions     Pending Prescriptions Disp Refills    Continuous Glucose Sensor (FREESTYLE VERA 14 DAY SENSOR) MISC 6 each 5     Si each by Does not apply route daily       Last Visit Date (If Applicable):  2025    Next Visit Date:    2025

## 2025-06-19 ENCOUNTER — OFFICE VISIT (OUTPATIENT)
Dept: FAMILY MEDICINE CLINIC | Age: 80
End: 2025-06-19
Payer: MEDICARE

## 2025-06-19 ENCOUNTER — HOSPITAL ENCOUNTER (OUTPATIENT)
Age: 80
Setting detail: SPECIMEN
Discharge: HOME | End: 2025-06-19
Payer: MEDICARE

## 2025-06-19 VITALS
BODY MASS INDEX: 42.15 KG/M2 | DIASTOLIC BLOOD PRESSURE: 70 MMHG | HEART RATE: 48 BPM | SYSTOLIC BLOOD PRESSURE: 110 MMHG | WEIGHT: 261 LBS | OXYGEN SATURATION: 91 %

## 2025-06-19 DIAGNOSIS — N30.00 ACUTE CYSTITIS WITHOUT HEMATURIA: Primary | ICD-10-CM

## 2025-06-19 DIAGNOSIS — N18.32 TYPE 2 DIABETES MELLITUS WITH STAGE 3B CHRONIC KIDNEY DISEASE, WITH LONG-TERM CURRENT USE OF INSULIN (HCC): ICD-10-CM

## 2025-06-19 DIAGNOSIS — G30.9 ALZHEIMER'S DISEASE, UNSPECIFIED (CODE) (HCC): ICD-10-CM

## 2025-06-19 DIAGNOSIS — N30.00 ACUTE CYSTITIS WITHOUT HEMATURIA: ICD-10-CM

## 2025-06-19 DIAGNOSIS — E11.22 TYPE 2 DIABETES MELLITUS WITH STAGE 3B CHRONIC KIDNEY DISEASE, WITH LONG-TERM CURRENT USE OF INSULIN (HCC): ICD-10-CM

## 2025-06-19 DIAGNOSIS — Z79.4 TYPE 2 DIABETES MELLITUS WITH STAGE 3B CHRONIC KIDNEY DISEASE, WITH LONG-TERM CURRENT USE OF INSULIN (HCC): ICD-10-CM

## 2025-06-19 LAB — HBA1C MFR BLD: 8.9 %

## 2025-06-19 PROCEDURE — 3074F SYST BP LT 130 MM HG: CPT | Performed by: STUDENT IN AN ORGANIZED HEALTH CARE EDUCATION/TRAINING PROGRAM

## 2025-06-19 PROCEDURE — 1159F MED LIST DOCD IN RCRD: CPT | Performed by: STUDENT IN AN ORGANIZED HEALTH CARE EDUCATION/TRAINING PROGRAM

## 2025-06-19 PROCEDURE — 99212 OFFICE O/P EST SF 10 MIN: CPT | Performed by: STUDENT IN AN ORGANIZED HEALTH CARE EDUCATION/TRAINING PROGRAM

## 2025-06-19 PROCEDURE — 99214 OFFICE O/P EST MOD 30 MIN: CPT | Performed by: STUDENT IN AN ORGANIZED HEALTH CARE EDUCATION/TRAINING PROGRAM

## 2025-06-19 PROCEDURE — 1123F ACP DISCUSS/DSCN MKR DOCD: CPT | Performed by: STUDENT IN AN ORGANIZED HEALTH CARE EDUCATION/TRAINING PROGRAM

## 2025-06-19 PROCEDURE — G8417 CALC BMI ABV UP PARAM F/U: HCPCS | Performed by: STUDENT IN AN ORGANIZED HEALTH CARE EDUCATION/TRAINING PROGRAM

## 2025-06-19 PROCEDURE — 3052F HG A1C>EQUAL 8.0%<EQUAL 9.0%: CPT | Performed by: STUDENT IN AN ORGANIZED HEALTH CARE EDUCATION/TRAINING PROGRAM

## 2025-06-19 PROCEDURE — G8400 PT W/DXA NO RESULTS DOC: HCPCS | Performed by: STUDENT IN AN ORGANIZED HEALTH CARE EDUCATION/TRAINING PROGRAM

## 2025-06-19 PROCEDURE — 1090F PRES/ABSN URINE INCON ASSESS: CPT | Performed by: STUDENT IN AN ORGANIZED HEALTH CARE EDUCATION/TRAINING PROGRAM

## 2025-06-19 PROCEDURE — PBSHW POCT GLYCOSYLATED HEMOGLOBIN (HGB A1C): Performed by: STUDENT IN AN ORGANIZED HEALTH CARE EDUCATION/TRAINING PROGRAM

## 2025-06-19 PROCEDURE — 83036 HEMOGLOBIN GLYCOSYLATED A1C: CPT | Performed by: STUDENT IN AN ORGANIZED HEALTH CARE EDUCATION/TRAINING PROGRAM

## 2025-06-19 PROCEDURE — 3078F DIAST BP <80 MM HG: CPT | Performed by: STUDENT IN AN ORGANIZED HEALTH CARE EDUCATION/TRAINING PROGRAM

## 2025-06-19 PROCEDURE — G8427 DOCREV CUR MEDS BY ELIG CLIN: HCPCS | Performed by: STUDENT IN AN ORGANIZED HEALTH CARE EDUCATION/TRAINING PROGRAM

## 2025-06-19 PROCEDURE — 1036F TOBACCO NON-USER: CPT | Performed by: STUDENT IN AN ORGANIZED HEALTH CARE EDUCATION/TRAINING PROGRAM

## 2025-06-19 PROCEDURE — 87086 URINE CULTURE/COLONY COUNT: CPT

## 2025-06-19 RX ORDER — CEPHALEXIN 500 MG/1
500 CAPSULE ORAL 3 TIMES DAILY
Qty: 21 CAPSULE | Refills: 0 | Status: SHIPPED | OUTPATIENT
Start: 2025-06-19 | End: 2025-06-26

## 2025-06-19 NOTE — PROGRESS NOTES
[address], [address]  [place] [address]  Phone: [phone]  Fax: [phone]      Date of Visit:  25  Patient Name: Kusum Linda   Patient :  1945     ASSESSMENT/PLAN     1. Acute cystitis without hematuria  -     cephALEXin (KEFLEX) 500 MG capsule; Take 1 capsule by mouth 3 times daily for 7 days, Disp-21 capsule, R-0Normal  -     Culture, Urine; Future  2. Type 2 diabetes mellitus with stage 3b chronic kidney disease, with long-term current use of insulin ([facility])  -     POCT glycosylated hemoglobin (Hb A1C)  3. Alzheimer's disease, unspecified (CODE) ([facility])     A1c today's are still uncontrolled at 8.9, has not been using short acting consistently, however is improved as compared to her typical.  Reiterated importance of short acting insulin.  Her fastings have been largely well-controlled, titration of lispro will be important for getting A1c under adequate control.  At next visit if still uncontrolled, will add bolus with a meal plus sliding scale.  She is resistant at this time    Keflex as above UTI    Follow up in 3 months      - Questions/concerns answered. Patient verbalized and expressed understanding. Medications, laboratory testing, imaging, consultation, and follow up as documented in this encounter.       HPI:     Kusum Linda is a 79 y.o. 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

## 2025-06-19 NOTE — PATIENT INSTRUCTIONS
Change long acting insulin (Tresiba) to 50 units every morning.  Also, continue the sliding scale, short acting 3 times daily before meals.

## 2025-06-20 LAB
MICROORGANISM SPEC CULT: NORMAL
SERVICE CMNT-IMP: NORMAL
SPECIMEN DESCRIPTION: NORMAL

## 2025-06-24 DIAGNOSIS — I50.32 CHRONIC DIASTOLIC HEART FAILURE (HCC): ICD-10-CM

## 2025-06-24 DIAGNOSIS — J30.9 ALLERGIC RHINITIS, UNSPECIFIED SEASONALITY, UNSPECIFIED TRIGGER: ICD-10-CM

## 2025-06-24 NOTE — TELEPHONE ENCOUNTER
Physical therapy called. They need a new order for pt. Its from march and it needs to be within the last 30 days

## 2025-06-24 NOTE — TELEPHONE ENCOUNTER
Kusum Linda is requesting a refill on the following medication(s):  Requested Prescriptions     Pending Prescriptions Disp Refills    montelukast (SINGULAIR) 10 MG tablet 90 tablet 3     Sig: Take 1 tablet by mouth daily    bumetanide (BUMEX) 1 MG tablet 90 tablet 1     Sig: Take 1 tablet by mouth daily       Last Visit Date (If Applicable):  6/19/2025    Next Visit Date:    9/19/2025

## 2025-06-25 RX ORDER — MONTELUKAST SODIUM 10 MG/1
10 TABLET ORAL DAILY
Qty: 90 TABLET | Refills: 3 | OUTPATIENT
Start: 2025-06-25

## 2025-06-25 RX ORDER — BUMETANIDE 1 MG/1
1 TABLET ORAL DAILY
Qty: 90 TABLET | Refills: 1 | Status: SHIPPED | OUTPATIENT
Start: 2025-06-25

## 2025-06-26 DIAGNOSIS — R41.89 COGNITIVE DECLINE: ICD-10-CM

## 2025-06-26 DIAGNOSIS — E78.49 OTHER HYPERLIPIDEMIA: ICD-10-CM

## 2025-06-27 RX ORDER — MEMANTINE HYDROCHLORIDE 10 MG/1
10 TABLET ORAL 2 TIMES DAILY
Qty: 180 TABLET | Refills: 1 | Status: SHIPPED | OUTPATIENT
Start: 2025-06-27

## 2025-06-27 RX ORDER — SIMVASTATIN 40 MG
40 TABLET ORAL NIGHTLY
Qty: 90 TABLET | Refills: 1 | Status: SHIPPED | OUTPATIENT
Start: 2025-06-27

## 2025-06-27 NOTE — TELEPHONE ENCOUNTER
Kusum Alexei is requesting a refill on the following medication(s):  Requested Prescriptions     Pending Prescriptions Disp Refills    memantine (NAMENDA) 10 MG tablet [Pharmacy Med Name: Memantine HCl 10 MG Oral Tablet] 180 tablet 3     Sig: TAKE 1 TABLET BY MOUTH TWICE  DAILY    simvastatin (ZOCOR) 40 MG tablet [Pharmacy Med Name: Simvastatin 40 MG Oral Tablet] 90 tablet 3     Sig: TAKE 1 TABLET BY MOUTH AT NIGHT       Last Visit Date (If Applicable):  9/4/2024      Next Visit Date:    Visit date not found

## 2025-07-02 LAB
BACTERIA, URINE: ABNORMAL /HPF
BILIRUBIN, URINE: NEGATIVE
BLOOD, URINE: NEGATIVE
CASTS UA: ABNORMAL /LPF
CLARITY, UA: CLEAR
COLOR, UA: YELLOW
CRYSTALS, UA: ABNORMAL
GLUCOSE URINE: ABNORMAL MG/DL
KETONES, URINE: NEGATIVE MG/DL
LEUKOCYTE ESTERASE, URINE: NEGATIVE
NITRITE, URINE: NEGATIVE
PH, URINE: 5.5 (ref 5–8.5)
PROTEIN UA: ABNORMAL MG/DL
RBC URINE: ABNORMAL /HPF (ref 0–2)
SPECIFIC GRAVITY UA: 1.01 E.U./DL (ref 1–1.03)
SQUAMOUS EPITHELIAL: ABNORMAL /HPF
UROBILINOGEN, URINE: 0.2 MG/DL (ref 0.2–1)
WBC URINE: ABNORMAL /HPF (ref 0–4)

## 2025-07-03 ENCOUNTER — OFFICE VISIT (OUTPATIENT)
Dept: FAMILY MEDICINE CLINIC | Age: 80
End: 2025-07-03
Payer: MEDICARE

## 2025-07-03 VITALS
SYSTOLIC BLOOD PRESSURE: 130 MMHG | WEIGHT: 268 LBS | OXYGEN SATURATION: 93 % | BODY MASS INDEX: 43.28 KG/M2 | HEART RATE: 57 BPM | DIASTOLIC BLOOD PRESSURE: 82 MMHG

## 2025-07-03 DIAGNOSIS — R60.0 PERIPHERAL EDEMA: Primary | ICD-10-CM

## 2025-07-03 DIAGNOSIS — R06.02 SHORTNESS OF BREATH: ICD-10-CM

## 2025-07-03 DIAGNOSIS — R06.00 DYSPNEA, UNSPECIFIED TYPE: ICD-10-CM

## 2025-07-03 DIAGNOSIS — T14.8XXA BRUISING: ICD-10-CM

## 2025-07-03 LAB
ALBUMIN/GLOBULIN RATIO: 1.4 G/DL
ALBUMIN: 3.8 G/DL (ref 3.5–5)
ALP BLD-CCNC: 108 UNITS/L (ref 38–126)
ALT SERPL-CCNC: 12 UNITS/L (ref 4–35)
ANION GAP SERPL CALCULATED.3IONS-SCNC: 13.1 MMOL/L (ref 3–11)
AST SERPL-CCNC: 17 UNITS/L (ref 14–36)
B-TYPE NATRIURETIC PEPTIDE: 28.1 PG/ML (ref 0–100)
BASOPHILS ABSOLUTE: 0.08 X10E3/?L (ref 0–0.3)
BASOPHILS RELATIVE PERCENT: 0.87 % (ref 0–3)
BILIRUB SERPL-MCNC: 0.2 MG/DL (ref 0.2–1.3)
BUN BLDV-MCNC: 30 MG/DL (ref 7–17)
CALCIUM SERPL-MCNC: 9.1 MG/DL (ref 8.4–10.2)
CHLORIDE BLD-SCNC: 104 MMOL/L (ref 98–120)
CO2: 27 MMOL/L (ref 22–31)
CREAT SERPL-MCNC: 1.1 MG/DL (ref 0.5–1)
EOSINOPHILS ABSOLUTE: 0.04 X10E3/?L (ref 0–1.1)
EOSINOPHILS RELATIVE PERCENT: 0.42 % (ref 0–10)
GFR, ESTIMATED: 51.1
GLOBULIN: 2.7 G/DL
GLUCOSE: 238 MG/DL (ref 65–105)
HCT VFR BLD CALC: 39.2 % (ref 37–47)
HEMOGLOBIN: 12 G/DL (ref 12–16)
INR BLD: 1.1 RATIO (ref 0.8–1.2)
LYMPHOCYTES ABSOLUTE: 2.46 X10E3/?L (ref 1–5.5)
LYMPHOCYTES RELATIVE PERCENT: 26.26 % (ref 20–51.1)
MCH RBC QN AUTO: 27.4 PG (ref 28.5–32.5)
MCHC RBC AUTO-ENTMCNC: 30.6 G/DL (ref 32–37)
MCV RBC AUTO: 89.4 FL (ref 80–94)
MONOCYTES ABSOLUTE: 0.49 X10E3/?L (ref 0.1–1)
MONOCYTES RELATIVE PERCENT: 5.2 % (ref 1.7–9.3)
NEUTROPHILS ABSOLUTE: 6.29 X10E3/?L (ref 2–8.1)
NEUTROPHILS RELATIVE PERCENT: 67.24 % (ref 42.2–75.2)
PDW BLD-RTO: 14.6 % (ref 8.5–15.5)
PLATELET # BLD: 248.3 THOU/MM3 (ref 130–400)
POTASSIUM SERPL-SCNC: 5.1 MMOL/L (ref 3.6–5)
PROTHROMBIN TIME: 11.2 SEC (ref 9.3–12.5)
RBC # BLD: 4.39 M/UL (ref 4.2–5.4)
SODIUM BLD-SCNC: 139 MMOL/L (ref 135–145)
TOTAL PROTEIN: 6.5 G/DL (ref 6.3–8.2)
TSH REFLEX FT4: 0.94 MIU/ML (ref 0.49–4.67)
WBC # BLD: 9.4 THOU/ML3 (ref 4.8–10.8)

## 2025-07-03 PROCEDURE — 1090F PRES/ABSN URINE INCON ASSESS: CPT | Performed by: STUDENT IN AN ORGANIZED HEALTH CARE EDUCATION/TRAINING PROGRAM

## 2025-07-03 PROCEDURE — G8427 DOCREV CUR MEDS BY ELIG CLIN: HCPCS | Performed by: STUDENT IN AN ORGANIZED HEALTH CARE EDUCATION/TRAINING PROGRAM

## 2025-07-03 PROCEDURE — 1123F ACP DISCUSS/DSCN MKR DOCD: CPT | Performed by: STUDENT IN AN ORGANIZED HEALTH CARE EDUCATION/TRAINING PROGRAM

## 2025-07-03 PROCEDURE — 99212 OFFICE O/P EST SF 10 MIN: CPT | Performed by: STUDENT IN AN ORGANIZED HEALTH CARE EDUCATION/TRAINING PROGRAM

## 2025-07-03 PROCEDURE — 99214 OFFICE O/P EST MOD 30 MIN: CPT | Performed by: STUDENT IN AN ORGANIZED HEALTH CARE EDUCATION/TRAINING PROGRAM

## 2025-07-03 PROCEDURE — G8417 CALC BMI ABV UP PARAM F/U: HCPCS | Performed by: STUDENT IN AN ORGANIZED HEALTH CARE EDUCATION/TRAINING PROGRAM

## 2025-07-03 PROCEDURE — G8400 PT W/DXA NO RESULTS DOC: HCPCS | Performed by: STUDENT IN AN ORGANIZED HEALTH CARE EDUCATION/TRAINING PROGRAM

## 2025-07-03 PROCEDURE — 1159F MED LIST DOCD IN RCRD: CPT | Performed by: STUDENT IN AN ORGANIZED HEALTH CARE EDUCATION/TRAINING PROGRAM

## 2025-07-03 PROCEDURE — 3075F SYST BP GE 130 - 139MM HG: CPT | Performed by: STUDENT IN AN ORGANIZED HEALTH CARE EDUCATION/TRAINING PROGRAM

## 2025-07-03 PROCEDURE — 1036F TOBACCO NON-USER: CPT | Performed by: STUDENT IN AN ORGANIZED HEALTH CARE EDUCATION/TRAINING PROGRAM

## 2025-07-03 PROCEDURE — 3079F DIAST BP 80-89 MM HG: CPT | Performed by: STUDENT IN AN ORGANIZED HEALTH CARE EDUCATION/TRAINING PROGRAM

## 2025-07-03 RX ORDER — BUMETANIDE 1 MG/1
1 TABLET ORAL DAILY
Qty: 30 TABLET | Refills: 0 | Status: SHIPPED | OUTPATIENT
Start: 2025-07-03

## 2025-07-03 NOTE — PATIENT INSTRUCTIONS
Repeat labs today  Repeat echocardiogram (ultrasound of the heart)  Jacinto mentioned to me today there are some meds you didn't receive yet. I sent 3 medications in on Jun 25 - Jun 27. They are memantine, simvastatin, and bumetanide. The memantine and simvastatin are okay to go without until you get in the mail. The bumetanide is the water pill and if you are out of this medication, that would explain why possibly the swelling is there. If you have not had this medication for the last week or two, I want you to  the script from [facility] in [place] and take 1mg tablets TWICE PER DAY for 3 DAYS and then go back to your normal 1mg once per day. If you have been taking the bumetanide regularly, I want you to take 1 mg tablets TWICE PER DAY for 5 DAYS and then go back to normal dosing.  I want you to get some zip up compression socks from [employer]. I want you to get the 15-20 mmHg. Based on your measurements - you need the L-XL size.    [email or web address] [email or web address]-Compression-[email or web address]/[email or web address]/ref=sr_[email or web address]_sspa?[email or web address]&odilon=joY5TxirSNE8.0rq2Bwcwwum8K7w8ljJ3kChxBpQwRru8FiH0qsNoBg41zSwFspOIgcc_aUcMZmjKGmUPf-NfX7xsjmZCandr_CZ1iBlFldgHrvVqucgdEYxQDm_SzvuPzQolqOVMdoJ7-pcSvSnm68q2mQljVyizRJmMPkI4XWypenqdtyD92LTcfocqTHFRuhQAXHM_ZwyNs-PjDtw2Ilxw5Jt0cx4bBr_nuITyLGF6WV4HbJs0D6ggcf9ORjN4wgI-d6EUr8s99ymQPbNOSFS0M_KcfFypS61B0zPiDjGxIgAsUWsMh7s.UOJEzP0yy1LezPZsJtOxyk1XRI6TO2qDo9HY8N0CceU&dib_tag=se&keywords=compression%2Bstockings%2Bzip%2Bup&ivt=[id number]&sprefix=compression%2Bstockings%2Bzip%2Bup%2Caps%2C94&sr=8-2-spons&sp_csd=g2oeN2N2BjVdLY0hbN5jsMT&th=1&psc=1      If this doesn't get better, please do come back in next week.

## 2025-07-06 ENCOUNTER — RESULTS FOLLOW-UP (OUTPATIENT)
Dept: FAMILY MEDICINE CLINIC | Age: 80
End: 2025-07-06

## 2025-07-08 NOTE — PROGRESS NOTES
[address], [address]  [place] [address]  Phone: [phone]  Fax: [phone]      Date of Visit:  7/3/25  Patient Name: Kusum Linda   Patient :  1945     ASSESSMENT/PLAN     1. Peripheral edema  -     Echo (TTE) complete (PRN contrast/bubble/strain/3D); Future  2. Dyspnea, unspecified type  -     Echo (TTE) complete (PRN contrast/bubble/strain/3D); Future  3. Bruising  4. Shortness of breath  -     Echo (TTE) complete (PRN contrast/bubble/strain/3D); Future     Patient with worsening of peripheral edema and some pound weight gain over the last month.  It appears patient may be out of her bumetanide and this would explain her peripheral edema.  Will have patient take bumetanide twice per day for 5 days if she has been on her normal dosing, will double her dose for only 3 days if she is not been on her normal dosing has been without over the last week.  Will repeat lab work including CMP, CBC, BNP, TSH, INR.  Does have some bruising of her left lower foot, does not remember any trauma.  The edema is symmetric, concern for DVT is low.  Will also recheck echo as above.  Also advised 15 to 20 mmHg compression socks    Follow-up if no improvement next week    - Questions/concerns answered. Patient verbalized and expressed understanding. Medications, laboratory testing, imaging, consultation, and follow up as documented in this encounter.       HPI:     Kusum Linda is a 79 y.o. 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

## 2025-07-10 DIAGNOSIS — E11.22 TYPE 2 DIABETES MELLITUS WITH STAGE 3B CHRONIC KIDNEY DISEASE, WITH LONG-TERM CURRENT USE OF INSULIN (HCC): ICD-10-CM

## 2025-07-10 DIAGNOSIS — N18.32 TYPE 2 DIABETES MELLITUS WITH STAGE 3B CHRONIC KIDNEY DISEASE, WITH LONG-TERM CURRENT USE OF INSULIN (HCC): ICD-10-CM

## 2025-07-10 DIAGNOSIS — Z79.4 TYPE 2 DIABETES MELLITUS WITH STAGE 3B CHRONIC KIDNEY DISEASE, WITH LONG-TERM CURRENT USE OF INSULIN (HCC): ICD-10-CM

## 2025-07-10 DIAGNOSIS — F41.9 ANXIETY: ICD-10-CM

## 2025-07-10 DIAGNOSIS — R41.89 COGNITIVE DECLINE: ICD-10-CM

## 2025-07-10 RX ORDER — INSULIN DEGLUDEC 200 U/ML
INJECTION, SOLUTION SUBCUTANEOUS
Qty: 18 ML | Refills: 3 | Status: SHIPPED | OUTPATIENT
Start: 2025-07-10

## 2025-07-10 RX ORDER — CITALOPRAM HYDROBROMIDE 10 MG/1
10 TABLET ORAL DAILY
Qty: 90 TABLET | Refills: 1 | Status: SHIPPED | OUTPATIENT
Start: 2025-07-10

## 2025-07-10 RX ORDER — INSULIN LISPRO 100 [IU]/ML
INJECTION, SOLUTION INTRAVENOUS; SUBCUTANEOUS
Qty: 30 ML | Refills: 3 | Status: SHIPPED | OUTPATIENT
Start: 2025-07-10

## 2025-07-10 RX ORDER — DONEPEZIL HYDROCHLORIDE 5 MG/1
5 TABLET, FILM COATED ORAL NIGHTLY
Qty: 90 TABLET | Refills: 1 | Status: SHIPPED | OUTPATIENT
Start: 2025-07-10

## 2025-07-10 NOTE — TELEPHONE ENCOUNTER
Kusum called requesting a refill of the below medication which has been pended for you:     Requested Prescriptions     Pending Prescriptions Disp Refills    citalopram (CELEXA) 10 MG tablet 90 tablet 1     Sig: Take 1 tablet by mouth daily    donepezil (ARICEPT) 5 MG tablet 90 tablet 1     Sig: Take 1 tablet by mouth nightly    insulin lispro, 1 Unit Dial, (HUMALOG KWIKPEN) 100 UNIT/ML SOPN 30 mL 3     Sig: INJECT SUBCUTANEOUSLY 4 UNITS 3  TIMES DAILY WITH MEALS WITH  SLIDING SCALE MAX DOSE IS 42  UNITS DAILY    Insulin Degludec (TRESIBA FLEXTOUCH) 200 UNIT/ML SOPN 18 mL 3     Sig: INJECT SUBCUTANEOUSLY 52 UNITS  DAILY       Last Appointment Date: 7/3/2025  Next Appointment Date: 9/19/2025    No Known Allergies

## 2025-07-29 DIAGNOSIS — R60.0 PERIPHERAL EDEMA: ICD-10-CM

## 2025-07-29 DIAGNOSIS — R06.02 SHORTNESS OF BREATH: ICD-10-CM

## 2025-07-29 DIAGNOSIS — R06.00 DYSPNEA, UNSPECIFIED TYPE: ICD-10-CM

## 2025-08-19 DIAGNOSIS — E11.22 TYPE 2 DIABETES MELLITUS WITH STAGE 3B CHRONIC KIDNEY DISEASE, WITH LONG-TERM CURRENT USE OF INSULIN (HCC): ICD-10-CM

## 2025-08-19 DIAGNOSIS — Z79.4 TYPE 2 DIABETES MELLITUS WITH STAGE 3B CHRONIC KIDNEY DISEASE, WITH LONG-TERM CURRENT USE OF INSULIN (HCC): ICD-10-CM

## 2025-08-19 DIAGNOSIS — N18.32 TYPE 2 DIABETES MELLITUS WITH STAGE 3B CHRONIC KIDNEY DISEASE, WITH LONG-TERM CURRENT USE OF INSULIN (HCC): ICD-10-CM

## 2025-08-20 RX ORDER — FLASH GLUCOSE SENSOR
6 KIT MISCELLANEOUS DAILY
Qty: 6 EACH | Refills: 5 | Status: SHIPPED | OUTPATIENT
Start: 2025-08-20

## 2025-08-27 LAB
BACTERIA, URINE: ABNORMAL /HPF
BILIRUBIN, URINE: NEGATIVE
BLOOD, URINE: ABNORMAL
CASTS UA: ABNORMAL /LPF
CLARITY, UA: CLEAR
COLOR, UA: YELLOW
CRYSTALS, UA: ABNORMAL
GLUCOSE URINE: NEGATIVE MG/DL
KETONES, URINE: NEGATIVE MG/DL
LEUKOCYTE ESTERASE, URINE: NEGATIVE
NITRITE, URINE: NEGATIVE
PH, URINE: 5 (ref 5–8.5)
PROTEIN UA: NEGATIVE MG/DL
RBC URINE: ABNORMAL /HPF (ref 0–2)
SPECIFIC GRAVITY UA: 1.01 E.U./DL (ref 1–1.03)
SQUAMOUS EPITHELIAL: ABNORMAL /HPF
UROBILINOGEN, URINE: 0.2 MG/DL (ref 0.2–1)
WBC URINE: ABNORMAL /HPF (ref 0–4)

## 2025-09-01 DIAGNOSIS — N39.41 URGE INCONTINENCE OF URINE: ICD-10-CM

## 2025-09-02 RX ORDER — OXYBUTYNIN CHLORIDE 10 MG/1
10 TABLET, EXTENDED RELEASE ORAL DAILY
Qty: 90 TABLET | Refills: 1 | Status: SHIPPED | OUTPATIENT
Start: 2025-09-02

## 2025-09-03 DIAGNOSIS — I10 ESSENTIAL HYPERTENSION: ICD-10-CM

## 2025-09-04 RX ORDER — CARVEDILOL 6.25 MG/1
6.25 TABLET ORAL 2 TIMES DAILY
Qty: 180 TABLET | Refills: 1 | Status: SHIPPED | OUTPATIENT
Start: 2025-09-04